# Patient Record
Sex: MALE | Race: WHITE | NOT HISPANIC OR LATINO | Employment: OTHER | URBAN - METROPOLITAN AREA
[De-identification: names, ages, dates, MRNs, and addresses within clinical notes are randomized per-mention and may not be internally consistent; named-entity substitution may affect disease eponyms.]

---

## 2017-01-11 ENCOUNTER — ANTICOAGULATION VISIT (OUTPATIENT)
Dept: PHARMACY | Facility: HOSPITAL | Age: 82
End: 2017-01-11

## 2017-01-11 DIAGNOSIS — I48.20 CHRONIC ATRIAL FIBRILLATION (HCC): ICD-10-CM

## 2017-01-11 LAB — INR PPP: 2.3 (ref 0.9–1.1)

## 2017-01-11 PROCEDURE — 36416 COLLJ CAPILLARY BLOOD SPEC: CPT

## 2017-01-11 PROCEDURE — G0463 HOSPITAL OUTPT CLINIC VISIT: HCPCS

## 2017-01-11 PROCEDURE — 85610 PROTHROMBIN TIME: CPT

## 2017-01-11 NOTE — PROGRESS NOTES
Anticoagulation Clinic Progress Note  Indication: afib  Referring Provider: Black  Goal INR: 2-3  Current Drug Interactions: aspirin, simvastatin, trazodone      Anticoagulation Clinic INR History:  Date 9/14 10/12 11/9 12/7 1/11       Total Weekly Dose 17.5 mg 17.5 mg 17.5 mg 17.5 mg 17.5 mg       INR 3.0 2.4 2.5 2.5 2.3           Clinic Interview:  Tablet Strength: pt has 5mg tablets  Current Dose: pt verified dose of 2.5mg daily  Patient Findings      Negatives Signs/symptoms of thrombosis, Signs/symptoms of bleeding, Laboratory test error suspected, Change in health, Change in alcohol use, Change in activity, Upcoming invasive procedure, Emergency department visit, Upcoming dental procedure, Missed doses, Extra doses, Change in medications, Change in diet/appetite, Hospital admission, Bruising, Other complaints         Plan:  1. INR is therapeutic again today. Instructed pt to continue current dose of 2.5mg daily.  2. RTC in 5 weeks.  3. Pt reports no recent changes to medications.  4. Pt declines refills.   5. Verbal and written information provided. Pt expresses understanding and has no further questions at this time.      Ann Cowden, PharmD  1/11/2017  3:58 PM

## 2017-01-11 NOTE — MR AVS SNAPSHOT
Mike Lucero Jr.   1/11/2017 4:00 PM   Anticoagulation Visit    Dept Phone:  [phone]   Encounter #:  [id number]    Provider:  [facility]   Department:  [facility]ICO[facility]UL[facility]IC                Your Full Care Plan              Your Updated Medication List          This list is accurate as of: 1/11/17  4:02 PM.  Always use your most recent med list.                aspirin 81 MG EC tablet       diltiaZEM  MG 24 hr capsule   Commonly known as:  CARDIZEM CD       DUREZOL 0.05 % ophthalmic emulsion   Generic drug:  difluprednate       furosemide 40 MG tablet   Commonly known as:  LASIX       ipratropium 0.03 % nasal spray   Commonly known as:  ATROVENT       potassium chloride 10 MEQ CR tablet   Commonly known as:  K-DUR,KLOR-CON       simvastatin 10 MG tablet   Commonly known as:  ZOCOR       tamsulosin 0.4 MG capsule 24 hr capsule   Commonly known as:  FLOMAX       traZODone 50 MG tablet   Commonly known as:  DESYREL       warfarin 2.5 MG tablet   Commonly known as:  COUMADIN               We Performed the Following     POC INR       You Were Diagnosed With        Codes Comments    Chronic atrial fibrillation     ICD-10-CM: I48.2  ICD-9-CM: 427.31       Instructions     None    Patient Instructions History      Anticoagulation Summary as of 1/11/2017     INR goal 2.0-3.0   Today's INR 2.30   Next INR check 2/15/2017    Indications   Chronic atrial fibrillation [I48.2]         January 2017 Details    Sun Mon Tue Wed Thu Fri Sat     1               2               3               4               5               6               7                 8               9               10               11      2.5 mg   See details      12      2.5 mg         13      2.5 mg         14      2.5 mg           15      2.5 mg         16      2.5 mg         17      2.5 mg         18      2.5 mg         19      2.5 mg         20      2.5 mg         21      2.5 mg          22      2.5 mg         23      2.5 mg         24      2.5 mg         25      2.5 mg         26      2.5 mg         27      2.5 mg         28      2.5 mg           29      2.5 mg         30      2.5 mg         31      2.5 mg              Date Details   01/11 This INR check                 February 2017 Details    Sun Mon Tue Wed Thu Fri Sat        1      2.5 mg         2      2.5 mg         3      2.5 mg         4      2.5 mg           5      2.5 mg         6      2.5 mg         7      2.5 mg         8      2.5 mg         9      2.5 mg         10      2.5 mg         11      2.5 mg           12      2.5 mg         13      2.5 mg         14      2.5 mg         15      2.5 mg         16               17               18                 19               20               21               22               23               24               25                 26               27               28                    Date Details   No additional details    Date of next INR:  2/15/2017           Upcoming Appointments     Visit Type Date Time Department    [facility]I [facility]AG - FOLLOW UP 1/11/2017  4:00 PM  [facility]    [facility] - FOLLOW UP 2/15/2017  4:00 PM  [facility]    FOLLOW UP 9/18/2017 11:15 AM [facility]      MyChart Signup     Our records indicate that you have declined [facility] signup. If you would like to sign up for [employer], please email [email or web address] or call [phone] to obtain an activation code.             Other Info from Your Visit           Your Appointments     Feb 15, 2017  4:00 PM EST   Anti [facility] - Follow Up with [facility]   [facility] (--)    [address]  [address]  [facility] [address]   [phone]            Sep 18, 2017 11:15 AM EDT   Follow Up with Sarbjit Cleaning MD   [facility] (--)    [address]  [facility] [id number] [phone]  "          Arrive 15 minutes prior to appointment.              Allergies     Temazepam Intolerance Other (See Comments)    Caused pt to \"sleep drive\"      Vital Signs     Smoking Status                   Former Smoker           Problems and Diagnoses Noted     Atrial fibrillation (irregular heartbeat)      Results     POC INR      Component Value Standard Range & Units    INR 2.30 0.9 - 1.1                    "

## 2017-02-15 ENCOUNTER — ANTICOAGULATION VISIT (OUTPATIENT)
Dept: PHARMACY | Facility: HOSPITAL | Age: 82
End: 2017-02-15

## 2017-02-15 DIAGNOSIS — I48.20 CHRONIC ATRIAL FIBRILLATION (HCC): ICD-10-CM

## 2017-02-15 LAB — INR PPP: 2.3 (ref 0.9–1.1)

## 2017-02-15 PROCEDURE — 36416 COLLJ CAPILLARY BLOOD SPEC: CPT

## 2017-02-15 PROCEDURE — 85610 PROTHROMBIN TIME: CPT

## 2017-02-15 PROCEDURE — G0463 HOSPITAL OUTPT CLINIC VISIT: HCPCS

## 2017-02-15 NOTE — PROGRESS NOTES
Anticoagulation Clinic Progress Note  Indication: afib  Referring Provider: Black  Goal INR: 2-3  Current Drug Interactions: aspirin, simvastatin, trazodone      Anticoagulation Clinic INR History:  Date 9/14 10/12 11/9 12/7 1/11 2/15      Total Weekly Dose 17.5 mg 17.5 mg 17.5 mg 17.5 mg 17.5 mg 17.5mg      INR 3.0 2.4 2.5 2.5 2.3 2.3      Notes                 Clinic Interview:  Tablet Strength: pt has 5mg tablets  Current Dose: pt verified dose of 2.5mg daily  Patient Findings      Negatives Signs/symptoms of thrombosis, Signs/symptoms of bleeding, Laboratory test error suspected, Change in health, Change in alcohol use, Change in activity, Upcoming invasive procedure, Emergency department visit, Upcoming dental procedure, Missed doses, Extra doses, Change in medications, Change in diet/appetite, Hospital admission, Bruising, Other complaints     Comments Pt has nothing to report at this time.     Plan:  1. INR is therapeutic again today (2.4). Instructed pt to continue 2.5mg daily.  2. RTC in 5 weeks.  3. Pt reports no recent changes to medications.  4. Pt declines refills.   5. Verbal and written information provided. Pt expresses understanding and has no further questions at this time.      Colleen Nolasco, PharmD  2/15/2017  4:04 PM

## 2017-03-07 RX ORDER — FUROSEMIDE 40 MG/1
TABLET ORAL
Qty: 90 TABLET | Refills: 3 | Status: ON HOLD | OUTPATIENT
Start: 2017-03-07 | End: 2017-10-23

## 2017-03-22 ENCOUNTER — ANTICOAGULATION VISIT (OUTPATIENT)
Dept: PHARMACY | Facility: HOSPITAL | Age: 82
End: 2017-03-22

## 2017-03-22 DIAGNOSIS — I48.20 CHRONIC ATRIAL FIBRILLATION (HCC): ICD-10-CM

## 2017-03-22 PROBLEM — G47.00 INSOMNIA: Status: ACTIVE | Noted: 2017-03-22

## 2017-03-22 PROBLEM — R41.89 ALTERED THOUGHT PROCESSES: Status: ACTIVE | Noted: 2017-03-22

## 2017-03-22 LAB
INR PPP: 2.6 (ref 0.91–1.09)
PROTHROMBIN TIME: 31.1 SECONDS (ref 10–13.8)

## 2017-03-22 PROCEDURE — G0463 HOSPITAL OUTPT CLINIC VISIT: HCPCS

## 2017-03-22 PROCEDURE — 85610 PROTHROMBIN TIME: CPT

## 2017-03-22 PROCEDURE — 36416 COLLJ CAPILLARY BLOOD SPEC: CPT

## 2017-03-22 RX ORDER — FINASTERIDE 5 MG/1
5 TABLET, FILM COATED ORAL DAILY
COMMUNITY
End: 2017-10-17 | Stop reason: ALTCHOICE

## 2017-03-22 NOTE — PROGRESS NOTES
Anticoagulation Clinic Progress Note  Indication: afib  Referring Provider: Black  Goal INR: 2-3  Current Drug Interactions: aspirin, simvastatin, trazodone      Anticoagulation Clinic INR History:  Date 9/14 10/12 11/9 12/7 1/11 2/15 3/22     Total Weekly Dose 17.5 mg 17.5 mg 17.5 mg 17.5 mg 17.5 mg 17.5 mg 17.5 mg     INR 3.0 2.4 2.5 2.5 2.3 2.3 2.6     Notes                 Clinic Interview:  Tablet Strength: pt has 5mg tablets  Current Dose: 2.5mg daily  Patient Findings      Positives Change in medications     Negatives Signs/symptoms of thrombosis, Signs/symptoms of bleeding, Laboratory test error suspected, Change in health, Change in alcohol use, Change in activity, Upcoming invasive procedure, Emergency department visit, Upcoming dental procedure, Missed doses, Extra doses, Change in diet/appetite, Hospital admission, Bruising, Other complaints     Comments Has been waking up frequently in the middle of the night to use the restroom -- started finasteride last week.         Plan:  1. INR is therapeutic again today. Instructed pt to continue warfarin 2.5mg daily.  2. RTC in 5 weeks.  3. Pt has started finasteride -- no noted DDI with warfarin.  4. Pt declines refills. Discussed possibility of calling in 2.5mg tabs when refill needed.  5. Verbal and written information provided. Pt expresses understanding and has no further questions at this time.      Ann Cowden, PharmD  3/22/2017  2:47 PM

## 2017-03-29 ENCOUNTER — TRANSCRIBE ORDERS (OUTPATIENT)
Dept: PULMONOLOGY | Facility: HOSPITAL | Age: 82
End: 2017-03-29

## 2017-03-29 ENCOUNTER — OFFICE VISIT (OUTPATIENT)
Dept: NEUROLOGY | Facility: CLINIC | Age: 82
End: 2017-03-29

## 2017-03-29 ENCOUNTER — APPOINTMENT (OUTPATIENT)
Dept: LAB | Facility: HOSPITAL | Age: 82
End: 2017-03-29

## 2017-03-29 VITALS
SYSTOLIC BLOOD PRESSURE: 128 MMHG | DIASTOLIC BLOOD PRESSURE: 76 MMHG | WEIGHT: 204 LBS | BODY MASS INDEX: 32.02 KG/M2 | HEIGHT: 67 IN

## 2017-03-29 DIAGNOSIS — R06.83 SNORING: ICD-10-CM

## 2017-03-29 DIAGNOSIS — G31.84 MILD COGNITIVE IMPAIRMENT: Primary | ICD-10-CM

## 2017-03-29 DIAGNOSIS — G47.33 OBSTRUCTIVE SLEEP APNEA (ADULT) (PEDIATRIC): Primary | ICD-10-CM

## 2017-03-29 DIAGNOSIS — R40.0 DAYTIME SLEEPINESS: ICD-10-CM

## 2017-03-29 LAB
ALBUMIN SERPL-MCNC: 4.4 G/DL (ref 3.2–4.8)
ALBUMIN/GLOB SERPL: 1.8 G/DL (ref 1.5–2.5)
ALP SERPL-CCNC: 58 U/L (ref 25–100)
ALT SERPL W P-5'-P-CCNC: 21 U/L (ref 7–40)
ANION GAP SERPL CALCULATED.3IONS-SCNC: 6 MMOL/L (ref 3–11)
AST SERPL-CCNC: 25 U/L (ref 0–33)
BASOPHILS # BLD AUTO: 0.03 10*3/MM3 (ref 0–0.2)
BASOPHILS NFR BLD AUTO: 0.3 % (ref 0–1)
BILIRUB SERPL-MCNC: 0.9 MG/DL (ref 0.3–1.2)
BUN BLD-MCNC: 12 MG/DL (ref 9–23)
BUN/CREAT SERPL: 15 (ref 7–25)
CALCIUM SPEC-SCNC: 9.3 MG/DL (ref 8.7–10.4)
CHLORIDE SERPL-SCNC: 100 MMOL/L (ref 99–109)
CO2 SERPL-SCNC: 33 MMOL/L (ref 20–31)
CREAT BLD-MCNC: 0.8 MG/DL (ref 0.6–1.3)
DEPRECATED RDW RBC AUTO: 46.5 FL (ref 37–54)
EOSINOPHIL # BLD AUTO: 0.17 10*3/MM3 (ref 0.1–0.3)
EOSINOPHIL NFR BLD AUTO: 1.9 % (ref 0–3)
ERYTHROCYTE [DISTWIDTH] IN BLOOD BY AUTOMATED COUNT: 13 % (ref 11.3–14.5)
FOLATE SERPL-MCNC: 13.69 NG/ML (ref 3.2–20)
GFR SERPL CREATININE-BSD FRML MDRD: 92 ML/MIN/1.73
GLOBULIN UR ELPH-MCNC: 2.5 GM/DL
GLUCOSE BLD-MCNC: 92 MG/DL (ref 70–100)
HCT VFR BLD AUTO: 47.7 % (ref 38.9–50.9)
HGB BLD-MCNC: 16.2 G/DL (ref 13.1–17.5)
IMM GRANULOCYTES # BLD: 0.11 10*3/MM3 (ref 0–0.03)
IMM GRANULOCYTES NFR BLD: 1.2 % (ref 0–0.6)
LYMPHOCYTES # BLD AUTO: 2.14 10*3/MM3 (ref 0.6–4.8)
LYMPHOCYTES NFR BLD AUTO: 23.6 % (ref 24–44)
MCH RBC QN AUTO: 33.3 PG (ref 27–31)
MCHC RBC AUTO-ENTMCNC: 34 G/DL (ref 32–36)
MCV RBC AUTO: 97.9 FL (ref 80–99)
MONOCYTES # BLD AUTO: 0.77 10*3/MM3 (ref 0–1)
MONOCYTES NFR BLD AUTO: 8.5 % (ref 0–12)
NEUTROPHILS # BLD AUTO: 5.84 10*3/MM3 (ref 1.5–8.3)
NEUTROPHILS NFR BLD AUTO: 64.5 % (ref 41–71)
PLATELET # BLD AUTO: 227 10*3/MM3 (ref 150–450)
PMV BLD AUTO: 9.4 FL (ref 6–12)
POTASSIUM BLD-SCNC: 4 MMOL/L (ref 3.5–5.5)
PROT SERPL-MCNC: 6.9 G/DL (ref 5.7–8.2)
RBC # BLD AUTO: 4.87 10*6/MM3 (ref 4.2–5.76)
SODIUM BLD-SCNC: 139 MMOL/L (ref 132–146)
TSH SERPL DL<=0.05 MIU/L-ACNC: 1.14 MIU/ML (ref 0.35–5.35)
VIT B12 BLD-MCNC: 1917 PG/ML (ref 211–911)
WBC NRBC COR # BLD: 9.06 10*3/MM3 (ref 3.5–10.8)

## 2017-03-29 PROCEDURE — 82746 ASSAY OF FOLIC ACID SERUM: CPT | Performed by: PHYSICIAN ASSISTANT

## 2017-03-29 PROCEDURE — 80053 COMPREHEN METABOLIC PANEL: CPT | Performed by: PHYSICIAN ASSISTANT

## 2017-03-29 PROCEDURE — 36415 COLL VENOUS BLD VENIPUNCTURE: CPT | Performed by: PHYSICIAN ASSISTANT

## 2017-03-29 PROCEDURE — 85025 COMPLETE CBC W/AUTO DIFF WBC: CPT | Performed by: PHYSICIAN ASSISTANT

## 2017-03-29 PROCEDURE — 99215 OFFICE O/P EST HI 40 MIN: CPT | Performed by: PHYSICIAN ASSISTANT

## 2017-03-29 PROCEDURE — 82607 VITAMIN B-12: CPT | Performed by: PHYSICIAN ASSISTANT

## 2017-03-29 PROCEDURE — 84443 ASSAY THYROID STIM HORMONE: CPT | Performed by: PHYSICIAN ASSISTANT

## 2017-03-29 RX ORDER — LISINOPRIL 10 MG/1
10 TABLET ORAL DAILY
COMMUNITY

## 2017-03-29 RX ORDER — TEMAZEPAM 30 MG/1
30 CAPSULE ORAL NIGHTLY PRN
COMMUNITY
End: 2017-10-17

## 2017-03-29 RX ORDER — TERAZOSIN 5 MG/1
10 CAPSULE ORAL NIGHTLY
COMMUNITY
End: 2017-10-17 | Stop reason: ALTCHOICE

## 2017-03-29 RX ORDER — CHLORAL HYDRATE 500 MG
1000 CAPSULE ORAL
COMMUNITY
End: 2017-10-22

## 2017-03-29 NOTE — PROGRESS NOTES
"Subjective     History of Present Illness   Mike Lucero Jr. is a 84 y.o. male who returns to clinic today for evaluation of cognitive impairment. He was previously followed by Dr. Whyte. He has noted symptoms for at least several years marked initially by forgetfulness as well as naming and word-finding difficulties. This has gradually worsened  over time. Additional symptoms have included impairments in orientation and executive function. There have been no associated  symptoms of anxiety and depression. He denies  impairments in ADL's. He manages his medications and finances. He continues to drive.   He is currently residing at home with wife.     Prior evaluation has included an MRI of the brain which was essentially unremarkable.     It should be noted that he has a history of RELL and is currently using a CPAP machine. He is scheduled to have a repeat sleep study performed on 4/6/17.         The following portions of the patient's history were reviewed and updated as appropriate: allergies, current medications, past family history, past medical history, past social history, past surgical history and problem list.    Review of Systems   Constitutional: Positive for fatigue.   HENT: Negative.    Eyes: Negative.    Respiratory: Negative.    Cardiovascular: Negative.    Gastrointestinal: Negative.    Endocrine: Negative.    Genitourinary: Negative.    Musculoskeletal: Negative.    Skin: Negative.    Allergic/Immunologic: Negative.    Hematological: Negative.    Psychiatric/Behavioral: Negative.        Objective     /76  Ht 67\" (170.2 cm)  Wt 204 lb (92.5 kg)  BMI 31.95 kg/m2    General appearance today is normal.       Physical Exam   Constitutional: He is oriented to person, place, and time.   Neurological: He is oriented to person, place, and time. He has normal strength. He has a normal Finger-Nose-Finger Test.   Psychiatric: His speech is normal.        Neurologic Exam     Mental Status "   Oriented to person, place, and time.   Registration: recalls 3 of 3 objects. Recall at 5 minutes: recalls 3 of 3 objects. Follows 3 step commands.   Attention: normal.   Speech: speech is normal   Level of consciousness: alert  Knowledge: good.   Able to name object. Able to read. Able to repeat. Able to write. Normal comprehension.     Cranial Nerves   Cranial nerves II through XII intact.     Motor Exam   Muscle bulk: normal  Overall muscle tone: normal    Strength   Strength 5/5 throughout.     Sensory Exam   Light touch normal.     Gait, Coordination, and Reflexes     Coordination   Finger to nose coordination: normal    Tremor   Resting tremor: absent        Results  MMSE=29 (30 in 8/15)      Assessment/Plan   Mike was seen today for memory loss.    Diagnoses and all orders for this visit:    Mild cognitive impairment  -     Vitamin B12  -     TSH  -     CBC & Differential  -     Comprehensive Metabolic Panel  -     Folate  -     CBC Auto Differential          Discussion/Summary   Mike Lucero Jr. returns to clinic today for evaluation of cognitive impairment. His history and examination, including cognitive bedside testing is concerning for Mild Cognitive Impairment(MCI). However, I am concerned that his history of RELL could be negatively affecting his cognition. All of this was discussed in detail. It was elected to obtain screening blood work  to complete his discussion. We discussed potential treatment options such as adding donepezil. However, it was elected to wait until after his sleep study before beginning any treatment with cognitive enhancers, which is quite reasonable. He will then follow up in 1 month, or sooner if needed.     I also discussed advanced care planning with the patient and their family. He has a current advanced directive.       I spent 35 minutes out of 45 minutes face to face with the patient and family and discussing diagnosis, prognosis, diagnostic testing, evaluation,  current status, treatment options and management.    As part of this visit I reviewed radiology results and obtained additional history from the family which is incorporated in the HPI.    Additionally, I gave the patient educational materials including an overview packet and disease specific educational materials     Snehal Lemus PA-C

## 2017-04-06 ENCOUNTER — HOSPITAL ENCOUNTER (OUTPATIENT)
Dept: SLEEP MEDICINE | Facility: HOSPITAL | Age: 82
Discharge: HOME | End: 2017-04-06
Admitting: INTERNAL MEDICINE

## 2017-04-06 VITALS
HEIGHT: 67 IN | OXYGEN SATURATION: 93 % | BODY MASS INDEX: 32.18 KG/M2 | DIASTOLIC BLOOD PRESSURE: 59 MMHG | SYSTOLIC BLOOD PRESSURE: 136 MMHG | WEIGHT: 205 LBS | HEART RATE: 62 BPM

## 2017-04-06 DIAGNOSIS — G47.33 OBSTRUCTIVE SLEEP APNEA (ADULT) (PEDIATRIC): ICD-10-CM

## 2017-04-06 DIAGNOSIS — R06.83 SNORING: ICD-10-CM

## 2017-04-06 DIAGNOSIS — R40.0 DAYTIME SLEEPINESS: ICD-10-CM

## 2017-04-06 PROCEDURE — 95811 POLYSOM 6/>YRS CPAP 4/> PARM: CPT | Performed by: INTERNAL MEDICINE

## 2017-04-19 RX ORDER — DILTIAZEM HYDROCHLORIDE 240 MG/1
CAPSULE, COATED, EXTENDED RELEASE ORAL
Qty: 90 CAPSULE | Refills: 2 | Status: ON HOLD | OUTPATIENT
Start: 2017-04-19 | End: 2017-10-23

## 2017-04-26 ENCOUNTER — ANTICOAGULATION VISIT (OUTPATIENT)
Dept: PHARMACY | Facility: HOSPITAL | Age: 82
End: 2017-04-26

## 2017-04-26 DIAGNOSIS — I48.20 CHRONIC ATRIAL FIBRILLATION (HCC): ICD-10-CM

## 2017-04-26 LAB
INR PPP: 2.4 (ref 0.91–1.09)
PROTHROMBIN TIME: 28.6 SECONDS (ref 10–13.8)

## 2017-04-26 PROCEDURE — 85610 PROTHROMBIN TIME: CPT

## 2017-04-26 PROCEDURE — 36416 COLLJ CAPILLARY BLOOD SPEC: CPT

## 2017-04-26 PROCEDURE — G0463 HOSPITAL OUTPT CLINIC VISIT: HCPCS

## 2017-04-26 RX ORDER — WARFARIN SODIUM 2.5 MG/1
TABLET ORAL
Qty: 90 TABLET | Refills: 0 | OUTPATIENT
Start: 2017-04-26 | End: 2017-07-12 | Stop reason: SDUPTHER

## 2017-04-26 NOTE — PROGRESS NOTES
Anticoagulation Clinic Progress Note  Indication: afib  Referring Provider: Black  Goal INR: 2-3  Current Drug Interactions: aspirin, simvastatin, trazodone      Anticoagulation Clinic INR History:  Date 9/14 10/12 11/9 12/7 1/11 2/15 3/22 4/26    Total Weekly Dose 17.5 mg 17.5 mg 17.5 mg 17.5 mg 17.5 mg 17.5 mg 17.5 mg 17.5mg 17.5   INR 3.0 2.4 2.5 2.5 2.3 2.3 2.6 2.5    Notes                 Clinic Interview:  Tablet Strength: pt has 5mg tablets currently but am calling 2.5mg tablets today (4/26)  Current Dose: 2.5mg daily  Patient Findings      Negatives Signs/symptoms of thrombosis, Signs/symptoms of bleeding, Laboratory test error suspected, Change in health, Change in alcohol use, Change in activity, Upcoming invasive procedure, Emergency department visit, Upcoming dental procedure, Missed doses, Extra doses, Change in medications, Change in diet/appetite, Hospital admission, Bruising, Other complaints     Comments Patient is consistent with diet and has been therapeutic on this dose for several months. Discussed if patient has medications that are added (OTC, RX, or Herbal) to call us prior to starting medication. Patient has been splitting 5mg tablets- discussed 2.5mg tablets and will call some in for patient today. Patient last picked up warfarin 5mg tab on 2/26 QTY: 30.       Plan:  1. INR is therapeutic again today.  Instructed pt to continue warfarin 2.5mg daily.  2. RTC in 5 weeks.  3. Medications reviewed- no changes at this time.  4. Pt request refills. Patient would prefer to take 2.5mg tablets when 5mg run out. Discussed calling in 2.5mg tabs. When called asked pharmacist and pharmacist verified that she deactivated 5mg tablets that are on profile. Also discussed that patient should wait until close to finishing 5mg tablets before picking up 2.5mg to help avoid confusion.   5. Verbal and written information provided. Pt expresses understanding and has no further questions at this  time.      Colleen Nolasco, PharmD  4/26/2017  2:42 PM

## 2017-05-03 ENCOUNTER — OFFICE VISIT (OUTPATIENT)
Dept: NEUROLOGY | Facility: CLINIC | Age: 82
End: 2017-05-03

## 2017-05-03 VITALS
DIASTOLIC BLOOD PRESSURE: 82 MMHG | WEIGHT: 200 LBS | HEIGHT: 67 IN | SYSTOLIC BLOOD PRESSURE: 122 MMHG | BODY MASS INDEX: 31.39 KG/M2

## 2017-05-03 DIAGNOSIS — R41.3 MEMORY LOSS: Primary | ICD-10-CM

## 2017-05-03 PROCEDURE — 99214 OFFICE O/P EST MOD 30 MIN: CPT | Performed by: PSYCHIATRY & NEUROLOGY

## 2017-05-03 RX ORDER — DONEPEZIL HYDROCHLORIDE 5 MG/1
5 TABLET, FILM COATED ORAL DAILY
Qty: 30 TABLET | Refills: 11 | Status: SHIPPED | OUTPATIENT
Start: 2017-05-03 | End: 2017-08-09

## 2017-05-15 RX ORDER — POTASSIUM CHLORIDE 20 MEQ/1
TABLET, EXTENDED RELEASE ORAL
Qty: 90 TABLET | Refills: 2 | Status: ON HOLD | OUTPATIENT
Start: 2017-05-15 | End: 2017-10-23

## 2017-05-31 ENCOUNTER — ANTICOAGULATION VISIT (OUTPATIENT)
Dept: PHARMACY | Facility: HOSPITAL | Age: 82
End: 2017-05-31

## 2017-05-31 DIAGNOSIS — I48.20 CHRONIC ATRIAL FIBRILLATION (HCC): ICD-10-CM

## 2017-05-31 LAB
INR PPP: 2.6 (ref 0.91–1.09)
PROTHROMBIN TIME: 31.6 SECONDS (ref 10–13.8)

## 2017-05-31 PROCEDURE — G0463 HOSPITAL OUTPT CLINIC VISIT: HCPCS

## 2017-05-31 PROCEDURE — 85610 PROTHROMBIN TIME: CPT

## 2017-05-31 PROCEDURE — 36416 COLLJ CAPILLARY BLOOD SPEC: CPT

## 2017-07-05 ENCOUNTER — ANTICOAGULATION VISIT (OUTPATIENT)
Dept: PHARMACY | Facility: HOSPITAL | Age: 82
End: 2017-07-05

## 2017-07-05 DIAGNOSIS — I48.20 CHRONIC ATRIAL FIBRILLATION (HCC): ICD-10-CM

## 2017-07-05 LAB
INR PPP: 2.2 (ref 0.91–1.09)
PROTHROMBIN TIME: 26.2 SECONDS (ref 10–13.8)

## 2017-07-05 PROCEDURE — 85610 PROTHROMBIN TIME: CPT

## 2017-07-05 PROCEDURE — G0463 HOSPITAL OUTPT CLINIC VISIT: HCPCS

## 2017-07-05 PROCEDURE — 36416 COLLJ CAPILLARY BLOOD SPEC: CPT

## 2017-07-05 NOTE — PROGRESS NOTES
"Anticoagulation Clinic Progress Note  Indication: afib  Referring Provider: Black  Goal INR: 2-3  Current Drug Interactions: aspirin, simvastatin, trazodone    Anticoagulation Clinic INR History:  Date 9/14 10/12 11/9 12/7 1/11 2/15 3/22 4/26 5/31   Total Weekly Dose 17.5 mg 17.5 mg 17.5 mg 17.5 mg 17.5 mg 17.5 mg 17.5 mg 17.5mg 17.5   INR 3.0 2.4 2.5 2.5 2.3 2.3 2.6 2.5 2.6   Notes               Date 7/5           Total Weekly Dose 17.5mg           INR 2.2           Notes                 Clinic Interview:  Tablet Strength: 2.5mg tablets   Current Dose: 2.5mg daily  Patient Findings      Positives Change in medications     Negatives Signs/symptoms of thrombosis, Signs/symptoms of bleeding, Laboratory test error suspected, Change in health, Change in alcohol use, Change in activity, Upcoming invasive procedure, Emergency department visit, Upcoming dental procedure, Missed doses, Extra doses, Change in diet/appetite, Hospital admission, Bruising, Other complaints     Comments Patient typically is given an antibiotic prior to dental procedures due to hx of endocarditis. Instructed pt to give us a call if starts antibiotics for a dental procedure. Patient has recently started Vit B12 and states he has started a new medication that helps him from feeling \"sluggish.\" Patient has been taking this medication for a few months, however, cannot recall the name. We were unable to determine the name at this appointment. He plans to review his medication list with his bottles at home and will call if he is taking one that is not on his AVS.       Plan:  1. INR is therapeutic again today (2.2).  Instructed pt to continue warfarin 2.5mg daily.  2. RTC in 5 weeks.  3. Medications list updated with vitamin B12.  4. Patient is taking 2.5mg tablets now instead of splitting 5mg in half. 4/26  5. Verbal and written information provided. Pt expresses understanding and has no further questions at this time.    Ezio Horne, PharmD " Candidate 2018  Colleen Nolasco, PharmD  7/5/2017  2:58 PM

## 2017-07-12 RX ORDER — WARFARIN SODIUM 2.5 MG/1
TABLET ORAL
Qty: 35 TABLET | Refills: 3 | Status: SHIPPED | OUTPATIENT
Start: 2017-07-12 | End: 2017-11-26 | Stop reason: SDUPTHER

## 2017-07-17 RX ORDER — WARFARIN SODIUM 2.5 MG/1
TABLET ORAL
Qty: 35 TABLET | Refills: 0 | OUTPATIENT
Start: 2017-07-17

## 2017-08-09 ENCOUNTER — OFFICE VISIT (OUTPATIENT)
Dept: NEUROLOGY | Facility: CLINIC | Age: 82
End: 2017-08-09

## 2017-08-09 ENCOUNTER — ANTICOAGULATION VISIT (OUTPATIENT)
Dept: PHARMACY | Facility: HOSPITAL | Age: 82
End: 2017-08-09

## 2017-08-09 VITALS — HEIGHT: 67 IN | BODY MASS INDEX: 31.39 KG/M2 | WEIGHT: 200 LBS

## 2017-08-09 DIAGNOSIS — R41.3 MEMORY LOSS: Primary | ICD-10-CM

## 2017-08-09 DIAGNOSIS — I48.20 CHRONIC ATRIAL FIBRILLATION (HCC): ICD-10-CM

## 2017-08-09 LAB
INR PPP: 1.9 (ref 0.91–1.09)
PROTHROMBIN TIME: 22.8 SECONDS (ref 10–13.8)

## 2017-08-09 PROCEDURE — 99214 OFFICE O/P EST MOD 30 MIN: CPT | Performed by: PHYSICIAN ASSISTANT

## 2017-08-09 PROCEDURE — G0463 HOSPITAL OUTPT CLINIC VISIT: HCPCS

## 2017-08-09 PROCEDURE — 85610 PROTHROMBIN TIME: CPT

## 2017-08-09 PROCEDURE — 36416 COLLJ CAPILLARY BLOOD SPEC: CPT

## 2017-08-09 RX ORDER — DONEPEZIL HYDROCHLORIDE 10 MG/1
10 TABLET, FILM COATED ORAL DAILY
Qty: 30 TABLET | Refills: 11 | Status: SHIPPED | OUTPATIENT
Start: 2017-08-09 | End: 2018-08-19 | Stop reason: SDUPTHER

## 2017-08-09 NOTE — PROGRESS NOTES
"Subjective     History of Present Illness   Mike Lucero Jr. is a 85 y.o. male who returns to clinic today for evaluation of cognitive impairment. He was previously followed by Dr. Whyte. He has noted symptoms for at least several years marked initially by forgetfulness as well as naming and word-finding difficulties. This has gradually worsened  over time. Additional symptoms have included impairments in orientation and executive function. There have been no associated  symptoms of anxiety and depression. He denies  impairments in ADL's. He manages his medications and finances. He continues to drive.   He is currently residing at home with wife.      Prior evaluation has included screening blood work  and an MRI of the brain which were essentially unremarkable. He is currently taking donepezil 5mg daily.      It should be noted that he has a history of RELL and has recently had his CPAP pressure adjusted after a titration study in 4/17. However, this has not led to any clear improvement in memory, though his sleep quality has improved.     Since his last visit in 5/17, he and his family feel that his memory has somewhat improved.        The following portions of the patient's history were reviewed and updated as appropriate: allergies, current medications, past family history, past medical history, past social history, past surgical history and problem list.    Review of Systems   Constitutional: Negative.    HENT: Negative.    Eyes: Negative.    Respiratory: Negative.    Cardiovascular: Negative.    Gastrointestinal: Negative.    Endocrine: Negative.    Genitourinary: Negative.    Musculoskeletal: Negative.    Skin: Negative.    Allergic/Immunologic: Negative.    Neurological:        As noted in HPI   Hematological: Negative.    Psychiatric/Behavioral:        As noted in HPI       Objective     Ht 67\" (170.2 cm)  Wt 200 lb (90.7 kg)  BMI 31.32 kg/m2    General appearance today is normal.       Physical Exam "   Constitutional: He is oriented to person, place, and time.   Neurological: He is oriented to person, place, and time. He has normal strength. He has a normal Finger-Nose-Finger Test.   Psychiatric: His speech is normal.        Neurologic Exam     Mental Status   Oriented to person, place, and time.   Registration: recalls 3 of 3 objects. Recall at 5 minutes: recalls 3 of 3 objects. Follows 2 step commands.   Attention: 4/5 sequencing.   Speech: speech is normal   Level of consciousness: alert  Able to name object. Able to read. Able to repeat. Able to write. Normal comprehension.     Cranial Nerves   Cranial nerves II through XII intact.     Motor Exam   Muscle bulk: normal  Overall muscle tone: normal    Strength   Strength 5/5 throughout.     Sensory Exam   Light touch normal.     Gait, Coordination, and Reflexes     Coordination   Finger to nose coordination: normal    Tremor   Resting tremor: absent        Results  MMSE=28 (29 in 5/17)       Assessment/Plan   Mike was seen today for memory loss.    Diagnoses and all orders for this visit:    Memory loss    Other orders  -     donepezil (ARICEPT) 10 MG tablet; Take 1 tablet by mouth Daily.          Discussion/Summary   Mike Lucero Jr. returns to clinic today for evaluation of cognitive impairment. It is possible that his symptoms are related to underlying Mild Cognitive Impairment, though normal aging also remains a possibility. I again reviewed his current status and treatment options. After discussing potential treatment options, it was elected to increase  donepezil to 10mg daily. He will then follow up in 3 months, or sooner if needed.       I spent 20 minutes out of 30  minutes face to face with the patient and family and discussing diagnosis, prognosis, diagnostic testing, evaluation, current status, treatment options and management.    As part of this visit I obtained additional history from the family which is incorporated in the HPI.      Snehal  ELIZABETH Lemus

## 2017-08-30 ENCOUNTER — ANTICOAGULATION VISIT (OUTPATIENT)
Dept: PHARMACY | Facility: HOSPITAL | Age: 82
End: 2017-08-30

## 2017-08-30 DIAGNOSIS — I48.20 CHRONIC ATRIAL FIBRILLATION (HCC): ICD-10-CM

## 2017-08-30 LAB
INR PPP: 2.1 (ref 0.91–1.09)
PROTHROMBIN TIME: 25.4 SECONDS (ref 10–13.8)

## 2017-08-30 PROCEDURE — 85610 PROTHROMBIN TIME: CPT

## 2017-08-30 PROCEDURE — G0463 HOSPITAL OUTPT CLINIC VISIT: HCPCS

## 2017-08-30 PROCEDURE — 36416 COLLJ CAPILLARY BLOOD SPEC: CPT

## 2017-09-18 ENCOUNTER — ANTICOAGULATION VISIT (OUTPATIENT)
Dept: PHARMACY | Facility: HOSPITAL | Age: 82
End: 2017-09-18

## 2017-09-18 ENCOUNTER — OFFICE VISIT (OUTPATIENT)
Dept: CARDIOLOGY | Facility: CLINIC | Age: 82
End: 2017-09-18

## 2017-09-18 VITALS
HEIGHT: 66 IN | BODY MASS INDEX: 32.47 KG/M2 | DIASTOLIC BLOOD PRESSURE: 78 MMHG | HEART RATE: 71 BPM | SYSTOLIC BLOOD PRESSURE: 116 MMHG | WEIGHT: 202 LBS

## 2017-09-18 DIAGNOSIS — I25.10 CORONARY ARTERY DISEASE INVOLVING NATIVE CORONARY ARTERY OF NATIVE HEART WITHOUT ANGINA PECTORIS: ICD-10-CM

## 2017-09-18 DIAGNOSIS — E78.2 MIXED HYPERLIPIDEMIA: ICD-10-CM

## 2017-09-18 DIAGNOSIS — I48.20 CHRONIC ATRIAL FIBRILLATION (HCC): ICD-10-CM

## 2017-09-18 DIAGNOSIS — I10 ESSENTIAL HYPERTENSION: ICD-10-CM

## 2017-09-18 DIAGNOSIS — I48.20 CHRONIC ATRIAL FIBRILLATION (HCC): Primary | ICD-10-CM

## 2017-09-18 LAB
INR PPP: 2 (ref 0.91–1.09)
PROTHROMBIN TIME: 23.5 SECONDS (ref 10–13.8)

## 2017-09-18 PROCEDURE — 93000 ELECTROCARDIOGRAM COMPLETE: CPT | Performed by: INTERNAL MEDICINE

## 2017-09-18 PROCEDURE — 36416 COLLJ CAPILLARY BLOOD SPEC: CPT

## 2017-09-18 PROCEDURE — 85610 PROTHROMBIN TIME: CPT

## 2017-09-18 PROCEDURE — 99214 OFFICE O/P EST MOD 30 MIN: CPT | Performed by: INTERNAL MEDICINE

## 2017-09-18 PROCEDURE — G0463 HOSPITAL OUTPT CLINIC VISIT: HCPCS

## 2017-09-18 NOTE — PROGRESS NOTES
Anticoagulation Clinic Progress Note  Indication: afib  Referring Provider: Em  Goal INR: 2-3  Current Drug Interactions: aspirin, simvastatin, trazodone    Diet: Typically doesn't eat many Vit K foods    Anticoagulation Clinic INR History:  Date 9/14 10/12 11/9 12/7 1/11 2/15 3/22 4/26 5/31   Total Weekly Dose 17.5 mg 17.5 mg 17.5 mg 17.5 mg 17.5 mg 17.5 mg 17.5 mg 17.5 mg 17.5mg   INR 3.0 2.4 2.5 2.5 2.3 2.3 2.6 2.5 2.6   Notes               Date 7/5 8/9 8/30 9/18        Total Weekly Dose 17.5mg 17.5mg 17.5mg 17.5 mg        INR 2.2 1.9 2.1 2.0        Notes               Clinic Interview:  Tablet Strength: 2.5mg tablets   Current Dose: 2.5mg daily  Patient Findings      Positives Upcoming dental procedure     Negatives Signs/symptoms of thrombosis, Signs/symptoms of bleeding, Laboratory test error suspected, Change in health, Change in alcohol use, Change in activity, Upcoming invasive procedure, Emergency department visit, Missed doses, Extra doses, Change in medications, Change in diet/appetite, Hospital admission, Bruising, Other complaints     Comments Pt had a dental procedure this AM (drill to remove decay) and is going back this afternoon to receive an implant     Plan:  1. INR is therapeutic again today, stable at the low end of goal. Instructed patient to continue maintenance warfarin dose, 2.5mg daily.  2. RTC in 4 weeks. If INR remains low-normal, may consider slight dose increase at that time.  3. No recent medication changes -- encouraged Mr. Lucero to call the clinic if he requires any abx after today's dental procedure.  4. Patient declines warfarin refills.  5. Verbal and written information provided. Pt expresses understanding and has no further questions at this time.    Stefanie Hogue, PharmD  9/18/2017  2:31 PM

## 2017-09-18 NOTE — PROGRESS NOTES
[facility] at [facility]  Office visit  Mike Lucero Jr.  7/29/1932  [phone]  VISIT DATE:  09/18/2017    PCP: Ike Douglas MD  [address] [place] [id number]    CC:  Chief Complaint   Patient presents with   • Coronary Artery Disease     follow up       PROBLEM LIST:  1.  Coronary artery disease:  a. Preserved LV systolic function.   b.  CABG for 3-vessel disease, July 2006.  2. Paroxysmal atrial fibrillation,  anticoagulated on Coumadin.   3. Benign hypertension.   4. Hypercholesterolemia.   5. Endocarditis, September 2009, treated with IV antibiotics:  a.  MILTON by Dr. Valladares, 09/11/2009:  Small nodular nonpedunculated density, possibly consistent with small vegetation of the posterior leaflet of the mitral valve.  b. Previously treated with Dr. Vann.  6. Remote tobacco abuse.   7. Osteoarthritis.   8. Cataracts.   9. Seasonal allergies/rhinitis.   10. Overmedication reaction secondary to temazepam with neurologic  effects now resolved      ASSESSMENT:   Diagnosis Plan   1. Chronic atrial fibrillation     2. Essential hypertension     3. Coronary artery disease involving native coronary artery of native heart without angina pectoris     4. Mixed hyperlipidemia         PLAN:  Continue low-dose aspirin  Continue oral anticoagulation with a goal INR of 2-3 for stroke prophylaxis  Continue rate control with combination of beta blockade and diltiazem  Continue current antihypertensive regimen  Continue current statin therapy, goal LDL less than 100.    Subjective  Reports stable functional capacity.  Has not been exercising on a regular basis but denies dyspnea on exertion or chest pain.  No PND or orthopnea.  Blood pressures up and running less than 140/90 mmHg.  He denies myalgias on statin therapy.  Only complaint is related to recent toothache which she is been seen by dentistry earlier this morning.  He is compliant with medical therapy.    PHYSICAL  "EXAMINATION:  Vitals:    09/18/17 1251   BP: 116/78   BP Location: Left arm   Patient Position: Sitting   Pulse: 71   Weight: 202 lb (91.6 kg)   Height: 66\" (167.6 cm)     General Appearance:    Alert, cooperative, no distress, appears stated age   Head:    Normocephalic, without obvious abnormality, atraumatic   Eyes:    conjunctiva/corneas clear   Nose:   Nares normal, septum midline, mucosa normal, no drainage   Throat:   Lips, teeth and gums normal   Neck:   Supple, symmetrical, trachea midline, no carotid    bruit or JVD   Lungs:     Clear to auscultation bilaterally, respirations unlabored   Chest Wall:    No tenderness or deformity    Heart:   Irregularly irregular, S1 and S2 normal, no murmur, rub   or gallop, normal carotid impulse bilaterally without bruit.   Abdomen:     Soft, non-tender   Extremities:   Extremities normal, atraumatic, no cyanosis or edema   Pulses:   2+ and symmetric all extremities   Skin:   Skin color, texture, turgor normal, no rashes or lesions       Diagnostic Data:    ECG 12 Lead  Date/Time: 9/18/2017 1:05 PM  Performed by: CHUCK CARTAGENA III  Authorized by: CHUCK CARTAGENA III   Rhythm: atrial fibrillation  Conduction: right bundle branch block  T depression: V1-V6, III and aVF  Clinical impression: abnormal ECG          No results found for: CHLPL, TRIG, HDL, LDLDIRECT  Lab Results   Component Value Date    GLUCOSE 92 03/29/2017    BUN 12 03/29/2017    CREATININE 0.80 03/29/2017     03/29/2017    K 4.0 03/29/2017     03/29/2017    CO2 33.0 (H) 03/29/2017     No results found for: HGBA1C  Lab Results   Component Value Date    WBC 9.06 03/29/2017    HGB 16.2 03/29/2017    HCT 47.7 03/29/2017     03/29/2017       Allergies  Allergies   Allergen Reactions   • Temazepam Other (See Comments)     Caused pt to \"sleep drive\"       Current Medications    Current Outpatient Prescriptions:   •  aspirin 81 MG EC tablet, Take 81 mg by mouth daily., Disp: , Rfl:   •  diltiaZEM " CD (CARDIZEM CD) 240 MG 24 hr capsule, TAKE ONE CAPSULE BY MOUTH EVERY DAY, Disp: 90 capsule, Rfl: 2  •  donepezil (ARICEPT) 10 MG tablet, Take 1 tablet by mouth Daily., Disp: 30 tablet, Rfl: 11  •  DUREZOL 0.05 % ophthalmic emulsion, Administer 1 drop to the right eye daily., Disp: , Rfl:   •  finasteride (PROSCAR) 5 MG tablet, Take 5 mg by mouth Daily., Disp: , Rfl:   •  furosemide (LASIX) 40 MG tablet, TAKE ONE TABLET BY MOUTH EVERY DAY, Disp: 90 tablet, Rfl: 3  •  ipratropium (ATROVENT) 0.03 % nasal spray, 2 sprays into each nostril Every 12 (Twelve) Hours., Disp: , Rfl:   •  lisinopril (PRINIVIL,ZESTRIL) 20 MG tablet, Take 20 mg by mouth Daily., Disp: , Rfl:   •  metoprolol tartrate (LOPRESSOR) 25 MG tablet, Take 25 mg by mouth 2 (Two) Times a Day., Disp: , Rfl:   •  simvastatin (ZOCOR) 10 MG tablet, Take 10 mg by mouth every night., Disp: , Rfl:   •  Omega-3 Fatty Acids (FISH OIL) 1000 MG capsule capsule, Take 1,000 mg by mouth Daily With Breakfast., Disp: , Rfl:   •  PHARMACY TO DOSE WARFARIN, Continuous As Needed (patient is managed by the [facility] ([phone]))., Disp: , Rfl:   •  potassium chloride (K-DUR,KLOR-CON) 10 MEQ CR tablet, Take 10 mEq by mouth daily., Disp: , Rfl:   •  potassium chloride (K-DUR,KLOR-CON) 20 MEQ CR tablet, TAKE ONE TABLET BY MOUTH EVERY DAY, Disp: 90 tablet, Rfl: 2  •  tamsulosin (FLOMAX) 0.4 MG capsule 24 hr capsule, Take 1 capsule by mouth daily., Disp: , Rfl:   •  temazepam (RESTORIL) 30 MG capsule, Take 30 mg by mouth At Night As Needed for Sleep., Disp: , Rfl:   •  terazosin (HYTRIN) 5 MG capsule, Take 10 mg by mouth Every Night., Disp: , Rfl:   •  traZODone (DESYREL) 50 MG tablet, Take 50 mg by mouth every night., Disp: , Rfl:   •  warfarin (COUMADIN) 2.5 MG tablet, TAKE ONE TABLET BY MOUTH DAILY OR AS DIRECTED BY ANTICOAGULATION PHARMACIST, Disp: 35 tablet, Rfl: 3          ROS  Review of Systems   Constitution: Negative for  diaphoresis and malaise/fatigue.   HENT: Positive for congestion.    Cardiovascular: Negative for chest pain, claudication, cyanosis, dyspnea on exertion, irregular heartbeat, leg swelling and near-syncope.   Respiratory: Negative for cough and hemoptysis.        SOCIAL HX  Social History     Social History   • Marital status:      Spouse name: N/A   • Number of children: N/A   • Years of education: N/A     Occupational History   • Not on file.     Social History Main Topics   • Smoking status: Former Smoker     Quit date: 06/1970   • Smokeless tobacco: Not on file   • Alcohol use No   • Drug use: No   • Sexual activity: Defer     Other Topics Concern   • Not on file     Social History Narrative       FAMILY HX  Family History   Problem Relation Age of Onset   • Alzheimer's disease Mother    • Dementia Mother    • Heart disease Father    • Stroke Father    • Heart disease Sister    • Heart disease Brother    • Stroke Brother              Ike Strickland III, MD, FACC

## 2017-09-26 ENCOUNTER — TRANSCRIBE ORDERS (OUTPATIENT)
Dept: ADMINISTRATIVE | Facility: HOSPITAL | Age: 82
End: 2017-09-26

## 2017-09-26 DIAGNOSIS — J32.9 CHRONIC SINUSITIS, UNSPECIFIED LOCATION: Primary | ICD-10-CM

## 2017-10-05 ENCOUNTER — HOSPITAL ENCOUNTER (OUTPATIENT)
Dept: CT IMAGING | Facility: HOSPITAL | Age: 82
Discharge: HOME | End: 2017-10-05
Attending: INTERNAL MEDICINE | Admitting: INTERNAL MEDICINE

## 2017-10-05 DIAGNOSIS — J32.9 CHRONIC SINUSITIS, UNSPECIFIED LOCATION: ICD-10-CM

## 2017-10-05 PROCEDURE — 70486 CT MAXILLOFACIAL W/O DYE: CPT

## 2017-10-16 ENCOUNTER — ANTICOAGULATION VISIT (OUTPATIENT)
Dept: PHARMACY | Facility: HOSPITAL | Age: 82
End: 2017-10-16

## 2017-10-16 DIAGNOSIS — I48.20 CHRONIC ATRIAL FIBRILLATION (HCC): ICD-10-CM

## 2017-10-16 LAB
INR PPP: 2.2 (ref 0.91–1.09)
PROTHROMBIN TIME: 26.9 SECONDS (ref 10–13.8)

## 2017-10-16 PROCEDURE — G0463 HOSPITAL OUTPT CLINIC VISIT: HCPCS

## 2017-10-16 PROCEDURE — 36416 COLLJ CAPILLARY BLOOD SPEC: CPT

## 2017-10-16 PROCEDURE — 85610 PROTHROMBIN TIME: CPT

## 2017-10-16 RX ORDER — AZELASTINE 1 MG/ML
2 SPRAY, METERED NASAL 2 TIMES DAILY
COMMUNITY
End: 2017-10-17

## 2017-10-16 NOTE — PROGRESS NOTES
Anticoagulation Clinic Progress Note  Indication: afib  Referring Provider: Em  Goal INR: 2-3  Current Drug Interactions: aspirin, simvastatin, trazodone    Diet: Typically doesn't eat many Vit K foods    Anticoagulation Clinic INR History:  Date 9/14 10/12 11/9 12/7 1/11 2/15 3/22 4/26 5/31   Total Weekly Dose 17.5 mg 17.5 mg 17.5 mg 17.5 mg 17.5 mg 17.5 mg 17.5 mg 17.5 mg 17.5mg   INR 3.0 2.4 2.5 2.5 2.3 2.3 2.6 2.5 2.6   Notes               Date 7/5 8/9 8/30 9/18 10/16       Total Weekly Dose 17.5  mg 17.5 mg 17.5 mg 17.5 mg 17.5 mg       INR 2.2 1.9 2.1 2.0 2.2       Notes               Clinic Interview:  Tablet Strength: 2.5mg tablets   Current Dose: 2.5mg daily  Patient Findings      Positives Change in medications     Negatives Signs/symptoms of thrombosis, Signs/symptoms of bleeding, Laboratory test error suspected, Change in health, Change in alcohol use, Change in activity, Upcoming invasive procedure, Emergency department visit, Upcoming dental procedure, Missed doses, Extra doses, Change in diet/appetite, Hospital admission, Bruising, Other complaints     Comments Dr. Douglas changed some of Mr. Lucero's medications (tamsulosin, furosemide, ?terazosin, ?finasteride) -- will call for records. Mr. Lucero otherwise complains of some recent congestion.     Plan:  1. INR is therapeutic. Instructed Mr. Lucero to continue maintenance warfarin dose, 2.5mg daily.  2. RTC in ~4 weeks after appt with Dr. Rodriguez 11/15.  3. See above re: recent med changes -- will call Dr. Douglas's office for updated med list.  4. Patient declines warfarin refills.  5. Verbal and written information provided. Pt expresses understanding and has no further questions at this time.    Stefanie Hogue, PharmD  10/16/2017  12:02 PM

## 2017-10-17 RX ORDER — ECHINACEA PURPUREA EXTRACT 125 MG
1 TABLET ORAL DAILY PRN
COMMUNITY
End: 2018-01-08 | Stop reason: HOSPADM

## 2017-10-22 ENCOUNTER — APPOINTMENT (OUTPATIENT)
Dept: CT IMAGING | Facility: HOSPITAL | Age: 82
End: 2017-10-22

## 2017-10-22 ENCOUNTER — APPOINTMENT (OUTPATIENT)
Dept: GENERAL RADIOLOGY | Facility: HOSPITAL | Age: 82
End: 2017-10-22

## 2017-10-22 ENCOUNTER — HOSPITAL ENCOUNTER (INPATIENT)
Facility: HOSPITAL | Age: 82
LOS: 2 days | Discharge: HOME | End: 2017-10-24
Attending: EMERGENCY MEDICINE | Admitting: INTERNAL MEDICINE

## 2017-10-22 ENCOUNTER — APPOINTMENT (OUTPATIENT)
Dept: CARDIOLOGY | Facility: HOSPITAL | Age: 82
End: 2017-10-22

## 2017-10-22 DIAGNOSIS — A41.9 SEPSIS, DUE TO UNSPECIFIED ORGANISM: Primary | ICD-10-CM

## 2017-10-22 DIAGNOSIS — Z74.09 IMPAIRED FUNCTIONAL MOBILITY, BALANCE, GAIT, AND ENDURANCE: ICD-10-CM

## 2017-10-22 DIAGNOSIS — L03.116 CELLULITIS OF LEFT LOWER EXTREMITY: ICD-10-CM

## 2017-10-22 PROBLEM — F09 COGNITIVE DYSFUNCTION: Status: ACTIVE | Noted: 2017-10-22

## 2017-10-22 PROBLEM — R79.1 SUBTHERAPEUTIC INTERNATIONAL NORMALIZED RATIO (INR): Status: ACTIVE | Noted: 2017-10-22

## 2017-10-22 PROBLEM — J06.9 VIRAL UPPER RESPIRATORY TRACT INFECTION: Status: ACTIVE | Noted: 2017-10-22

## 2017-10-22 PROBLEM — R10.33 UMBILICAL PAIN: Status: ACTIVE | Noted: 2017-10-22

## 2017-10-22 LAB
ALBUMIN SERPL-MCNC: 4.1 G/DL (ref 3.2–4.8)
ALBUMIN/GLOB SERPL: 1.4 G/DL (ref 1.5–2.5)
ALP SERPL-CCNC: 52 U/L (ref 25–100)
ALT SERPL W P-5'-P-CCNC: 18 U/L (ref 7–40)
ANION GAP SERPL CALCULATED.3IONS-SCNC: 8 MMOL/L (ref 3–11)
AST SERPL-CCNC: 20 U/L (ref 0–33)
BACTERIA UR QL AUTO: ABNORMAL /HPF
BASOPHILS # BLD AUTO: 0.01 10*3/MM3 (ref 0–0.2)
BASOPHILS NFR BLD AUTO: 0.1 % (ref 0–1)
BILIRUB SERPL-MCNC: 2.5 MG/DL (ref 0.3–1.2)
BILIRUB UR QL STRIP: NEGATIVE
BUN BLD-MCNC: 15 MG/DL (ref 9–23)
BUN/CREAT SERPL: 15 (ref 7–25)
CALCIUM SPEC-SCNC: 9.2 MG/DL (ref 8.7–10.4)
CHLORIDE SERPL-SCNC: 99 MMOL/L (ref 99–109)
CLARITY UR: CLEAR
CO2 SERPL-SCNC: 29 MMOL/L (ref 20–31)
COLOR UR: ABNORMAL
CREAT BLD-MCNC: 1 MG/DL (ref 0.6–1.3)
D-LACTATE SERPL-SCNC: 1.2 MMOL/L (ref 0.5–2)
D-LACTATE SERPL-SCNC: 2.4 MMOL/L (ref 0.5–2)
DEPRECATED RDW RBC AUTO: 48.9 FL (ref 37–54)
EOSINOPHIL # BLD AUTO: 0 10*3/MM3 (ref 0–0.3)
EOSINOPHIL NFR BLD AUTO: 0 % (ref 0–3)
ERYTHROCYTE [DISTWIDTH] IN BLOOD BY AUTOMATED COUNT: 13.7 % (ref 11.3–14.5)
FLUAV AG NPH QL: NEGATIVE
FLUBV AG NPH QL IA: NEGATIVE
GFR SERPL CREATININE-BSD FRML MDRD: 71 ML/MIN/1.73
GLOBULIN UR ELPH-MCNC: 2.9 GM/DL
GLUCOSE BLD-MCNC: 160 MG/DL (ref 70–100)
GLUCOSE UR STRIP-MCNC: NEGATIVE MG/DL
HCT VFR BLD AUTO: 47.9 % (ref 38.9–50.9)
HGB BLD-MCNC: 16.2 G/DL (ref 13.1–17.5)
HGB UR QL STRIP.AUTO: ABNORMAL
HOLD SPECIMEN: NORMAL
HYALINE CASTS UR QL AUTO: ABNORMAL /LPF
IMM GRANULOCYTES # BLD: 0.06 10*3/MM3 (ref 0–0.03)
IMM GRANULOCYTES NFR BLD: 0.4 % (ref 0–0.6)
INR PPP: 1.81
INR PPP: 1.84
KETONES UR QL STRIP: NEGATIVE
LEUKOCYTE ESTERASE UR QL STRIP.AUTO: NEGATIVE
LYMPHOCYTES # BLD AUTO: 0.88 10*3/MM3 (ref 0.6–4.8)
LYMPHOCYTES NFR BLD AUTO: 5.2 % (ref 24–44)
MCH RBC QN AUTO: 32.9 PG (ref 27–31)
MCHC RBC AUTO-ENTMCNC: 33.8 G/DL (ref 32–36)
MCV RBC AUTO: 97.4 FL (ref 80–99)
MONOCYTES # BLD AUTO: 0.81 10*3/MM3 (ref 0–1)
MONOCYTES NFR BLD AUTO: 4.7 % (ref 0–12)
NEUTROPHILS # BLD AUTO: 15.32 10*3/MM3 (ref 1.5–8.3)
NEUTROPHILS NFR BLD AUTO: 89.6 % (ref 41–71)
NITRITE UR QL STRIP: NEGATIVE
PH UR STRIP.AUTO: 7 [PH] (ref 5–8)
PLATELET # BLD AUTO: 144 10*3/MM3 (ref 150–450)
PMV BLD AUTO: 9 FL (ref 6–12)
POTASSIUM BLD-SCNC: 3.7 MMOL/L (ref 3.5–5.5)
PROT SERPL-MCNC: 7 G/DL (ref 5.7–8.2)
PROT UR QL STRIP: ABNORMAL
PROTHROMBIN TIME: 20.1 SECONDS (ref 9.6–11.5)
PROTHROMBIN TIME: 20.4 SECONDS (ref 9.6–11.5)
RBC # BLD AUTO: 4.92 10*6/MM3 (ref 4.2–5.76)
RBC # UR: ABNORMAL /HPF
REF LAB TEST METHOD: ABNORMAL
SODIUM BLD-SCNC: 136 MMOL/L (ref 132–146)
SP GR UR STRIP: 1.02 (ref 1–1.03)
SQUAMOUS #/AREA URNS HPF: ABNORMAL /HPF
UROBILINOGEN UR QL STRIP: ABNORMAL
WBC NRBC COR # BLD: 17.08 10*3/MM3 (ref 3.5–10.8)
WBC UR QL AUTO: ABNORMAL /HPF
WHOLE BLOOD HOLD SPECIMEN: NORMAL
WHOLE BLOOD HOLD SPECIMEN: NORMAL

## 2017-10-22 PROCEDURE — 87804 INFLUENZA ASSAY W/OPTIC: CPT | Performed by: EMERGENCY MEDICINE

## 2017-10-22 PROCEDURE — 80053 COMPREHEN METABOLIC PANEL: CPT | Performed by: EMERGENCY MEDICINE

## 2017-10-22 PROCEDURE — 85610 PROTHROMBIN TIME: CPT | Performed by: EMERGENCY MEDICINE

## 2017-10-22 PROCEDURE — 25010000002 PIPERACILLIN SOD-TAZOBACTAM PER 1 G: Performed by: EMERGENCY MEDICINE

## 2017-10-22 PROCEDURE — 25810000003 SODIUM CHLORIDE 0.9 % WITH KCL 20 MEQ 20-0.9 MEQ/L-% SOLUTION: Performed by: NURSE PRACTITIONER

## 2017-10-22 PROCEDURE — 83605 ASSAY OF LACTIC ACID: CPT | Performed by: EMERGENCY MEDICINE

## 2017-10-22 PROCEDURE — 70450 CT HEAD/BRAIN W/O DYE: CPT

## 2017-10-22 PROCEDURE — 85025 COMPLETE CBC W/AUTO DIFF WBC: CPT | Performed by: EMERGENCY MEDICINE

## 2017-10-22 PROCEDURE — 25010000002 VANCOMYCIN HCL IN NACL 1.75-0.9 GM/500ML-% SOLUTION: Performed by: EMERGENCY MEDICINE

## 2017-10-22 PROCEDURE — 74176 CT ABD & PELVIS W/O CONTRAST: CPT

## 2017-10-22 PROCEDURE — 99223 1ST HOSP IP/OBS HIGH 75: CPT | Performed by: NURSE PRACTITIONER

## 2017-10-22 PROCEDURE — 25010000002 PIPERACILLIN SOD-TAZOBACTAM PER 1 G: Performed by: NURSE PRACTITIONER

## 2017-10-22 PROCEDURE — 99285 EMERGENCY DEPT VISIT HI MDM: CPT

## 2017-10-22 PROCEDURE — 71010 HC CHEST PA OR AP: CPT

## 2017-10-22 PROCEDURE — 87040 BLOOD CULTURE FOR BACTERIA: CPT | Performed by: EMERGENCY MEDICINE

## 2017-10-22 PROCEDURE — 81001 URINALYSIS AUTO W/SCOPE: CPT | Performed by: EMERGENCY MEDICINE

## 2017-10-22 PROCEDURE — 85610 PROTHROMBIN TIME: CPT | Performed by: NURSE PRACTITIONER

## 2017-10-22 RX ORDER — SODIUM CHLORIDE 0.9 % (FLUSH) 0.9 %
1-10 SYRINGE (ML) INJECTION AS NEEDED
Status: DISCONTINUED | OUTPATIENT
Start: 2017-10-22 | End: 2017-10-24 | Stop reason: HOSPADM

## 2017-10-22 RX ORDER — LISINOPRIL 20 MG/1
20 TABLET ORAL DAILY
Status: DISCONTINUED | OUTPATIENT
Start: 2017-10-22 | End: 2017-10-24 | Stop reason: HOSPADM

## 2017-10-22 RX ORDER — TRAZODONE HYDROCHLORIDE 50 MG/1
50 TABLET ORAL NIGHTLY
Status: DISCONTINUED | OUTPATIENT
Start: 2017-10-22 | End: 2017-10-24 | Stop reason: HOSPADM

## 2017-10-22 RX ORDER — DILTIAZEM HYDROCHLORIDE 240 MG/1
240 CAPSULE, COATED, EXTENDED RELEASE ORAL
Status: DISCONTINUED | OUTPATIENT
Start: 2017-10-22 | End: 2017-10-24 | Stop reason: HOSPADM

## 2017-10-22 RX ORDER — ECHINACEA PURPUREA EXTRACT 125 MG
1 TABLET ORAL DAILY PRN
Status: DISCONTINUED | OUTPATIENT
Start: 2017-10-22 | End: 2017-10-24 | Stop reason: HOSPADM

## 2017-10-22 RX ORDER — TAMSULOSIN HYDROCHLORIDE 0.4 MG/1
0.4 CAPSULE ORAL NIGHTLY
Status: DISCONTINUED | OUTPATIENT
Start: 2017-10-22 | End: 2017-10-23

## 2017-10-22 RX ORDER — ACETAMINOPHEN 325 MG/1
650 TABLET ORAL ONCE
Status: COMPLETED | OUTPATIENT
Start: 2017-10-22 | End: 2017-10-22

## 2017-10-22 RX ORDER — POTASSIUM CHLORIDE 1.5 G/1.77G
20 POWDER, FOR SOLUTION ORAL DAILY
Status: DISCONTINUED | OUTPATIENT
Start: 2017-10-23 | End: 2017-10-24 | Stop reason: HOSPADM

## 2017-10-22 RX ORDER — WARFARIN SODIUM 2.5 MG/1
2.5 TABLET ORAL
Status: DISCONTINUED | OUTPATIENT
Start: 2017-10-22 | End: 2017-10-23

## 2017-10-22 RX ORDER — TERAZOSIN 5 MG/1
5 CAPSULE ORAL NIGHTLY
Status: DISCONTINUED | OUTPATIENT
Start: 2017-10-22 | End: 2017-10-24 | Stop reason: HOSPADM

## 2017-10-22 RX ORDER — ACETAMINOPHEN 325 MG/1
650 TABLET ORAL EVERY 4 HOURS PRN
Status: DISCONTINUED | OUTPATIENT
Start: 2017-10-22 | End: 2017-10-24 | Stop reason: HOSPADM

## 2017-10-22 RX ORDER — SODIUM CHLORIDE AND POTASSIUM CHLORIDE 150; 900 MG/100ML; MG/100ML
75 INJECTION, SOLUTION INTRAVENOUS CONTINUOUS
Status: DISCONTINUED | OUTPATIENT
Start: 2017-10-22 | End: 2017-10-23

## 2017-10-22 RX ORDER — HYDROCODONE BITARTRATE AND ACETAMINOPHEN 5; 325 MG/1; MG/1
1 TABLET ORAL EVERY 4 HOURS PRN
Status: DISCONTINUED | OUTPATIENT
Start: 2017-10-22 | End: 2017-10-24 | Stop reason: HOSPADM

## 2017-10-22 RX ORDER — SACCHAROMYCES BOULARDII 250 MG
250 CAPSULE ORAL 2 TIMES DAILY
Status: DISCONTINUED | OUTPATIENT
Start: 2017-10-22 | End: 2017-10-24 | Stop reason: HOSPADM

## 2017-10-22 RX ORDER — FINASTERIDE 5 MG/1
5 TABLET, FILM COATED ORAL DAILY
COMMUNITY
End: 2018-01-11

## 2017-10-22 RX ORDER — TERAZOSIN 5 MG/1
5 CAPSULE ORAL NIGHTLY
Status: ON HOLD | COMMUNITY
End: 2017-10-23

## 2017-10-22 RX ORDER — FUROSEMIDE 40 MG/1
40 TABLET ORAL DAILY
Status: DISCONTINUED | OUTPATIENT
Start: 2017-10-23 | End: 2017-10-23

## 2017-10-22 RX ORDER — ASPIRIN 81 MG/1
81 TABLET ORAL DAILY
Status: DISCONTINUED | OUTPATIENT
Start: 2017-10-22 | End: 2017-10-24 | Stop reason: HOSPADM

## 2017-10-22 RX ORDER — SODIUM CHLORIDE 0.9 % (FLUSH) 0.9 %
10 SYRINGE (ML) INJECTION AS NEEDED
Status: DISCONTINUED | OUTPATIENT
Start: 2017-10-22 | End: 2017-10-24 | Stop reason: HOSPADM

## 2017-10-22 RX ORDER — ATORVASTATIN CALCIUM 10 MG/1
10 TABLET, FILM COATED ORAL NIGHTLY
Status: DISCONTINUED | OUTPATIENT
Start: 2017-10-22 | End: 2017-10-24 | Stop reason: HOSPADM

## 2017-10-22 RX ORDER — FLUTICASONE PROPIONATE 50 MCG
2 SPRAY, SUSPENSION (ML) NASAL DAILY
Status: DISCONTINUED | OUTPATIENT
Start: 2017-10-22 | End: 2017-10-24 | Stop reason: HOSPADM

## 2017-10-22 RX ORDER — VANCOMYCIN 1.75 GRAM/500 ML IN 0.9 % SODIUM CHLORIDE INTRAVENOUS
1750 ONCE
Status: COMPLETED | OUTPATIENT
Start: 2017-10-22 | End: 2017-10-22

## 2017-10-22 RX ORDER — FINASTERIDE 5 MG/1
5 TABLET, FILM COATED ORAL DAILY
Status: DISCONTINUED | OUTPATIENT
Start: 2017-10-22 | End: 2017-10-24 | Stop reason: HOSPADM

## 2017-10-22 RX ORDER — TAMSULOSIN HYDROCHLORIDE 0.4 MG/1
1 CAPSULE ORAL NIGHTLY
Status: ON HOLD | COMMUNITY
End: 2017-10-23

## 2017-10-22 RX ORDER — DONEPEZIL HYDROCHLORIDE 10 MG/1
10 TABLET, FILM COATED ORAL NIGHTLY
Status: DISCONTINUED | OUTPATIENT
Start: 2017-10-22 | End: 2017-10-24 | Stop reason: HOSPADM

## 2017-10-22 RX ORDER — VANCOMYCIN/0.9 % SOD CHLORIDE 1.5G/250ML
1500 PLASTIC BAG, INJECTION (ML) INTRAVENOUS EVERY 24 HOURS
Status: DISCONTINUED | OUTPATIENT
Start: 2017-10-23 | End: 2017-10-24 | Stop reason: HOSPADM

## 2017-10-22 RX ADMIN — FINASTERIDE 5 MG: 5 TABLET, FILM COATED ORAL at 21:28

## 2017-10-22 RX ADMIN — TAZOBACTAM SODIUM AND PIPERACILLIN SODIUM 4.5 G: 500; 4 INJECTION, SOLUTION INTRAVENOUS at 15:20

## 2017-10-22 RX ADMIN — TAMSULOSIN HYDROCHLORIDE 0.4 MG: 0.4 CAPSULE ORAL at 21:17

## 2017-10-22 RX ADMIN — ACETAMINOPHEN 650 MG: 325 TABLET, FILM COATED ORAL at 13:38

## 2017-10-22 RX ADMIN — DILTIAZEM HYDROCHLORIDE 240 MG: 240 CAPSULE, COATED, EXTENDED RELEASE ORAL at 21:17

## 2017-10-22 RX ADMIN — Medication 250 MG: at 18:52

## 2017-10-22 RX ADMIN — WARFARIN SODIUM 2.5 MG: 2.5 TABLET ORAL at 18:52

## 2017-10-22 RX ADMIN — ASPIRIN 81 MG: 81 TABLET, COATED ORAL at 21:17

## 2017-10-22 RX ADMIN — TAZOBACTAM SODIUM AND PIPERACILLIN SODIUM 3.38 G: 375; 3 INJECTION, SOLUTION INTRAVENOUS at 21:22

## 2017-10-22 RX ADMIN — Medication 1750 MG: at 16:26

## 2017-10-22 RX ADMIN — ATORVASTATIN CALCIUM 10 MG: 10 TABLET, FILM COATED ORAL at 21:17

## 2017-10-22 RX ADMIN — POTASSIUM CHLORIDE AND SODIUM CHLORIDE 75 ML/HR: 900; 150 INJECTION, SOLUTION INTRAVENOUS at 18:52

## 2017-10-22 RX ADMIN — SODIUM CHLORIDE 2802 ML: 9 INJECTION, SOLUTION INTRAVENOUS at 14:17

## 2017-10-22 RX ADMIN — TRAZODONE HYDROCHLORIDE 50 MG: 50 TABLET ORAL at 21:18

## 2017-10-22 RX ADMIN — FLUTICASONE PROPIONATE 2 SPRAY: 50 SPRAY, METERED NASAL at 18:52

## 2017-10-22 RX ADMIN — DONEPEZIL HYDROCHLORIDE 10 MG: 10 TABLET, FILM COATED ORAL at 21:17

## 2017-10-22 NOTE — ED PROVIDER NOTES
Subjective   HPI Comments: 85-year-old white male arrives by private vehicle with wife complaining of temperature Tmax 101.5 discovered this morning.  According to patient, he wasn't feeling well last evening and according to his wife did not have much of a dinner.  This morning he woke with sweating at which point a temperature was documented at 101.5.  Patient states he has had some congestion as well as facial pain.  He denies any headache, neck pain, chest pain, vomiting, diarrhea, or other complaints.    Patient is a 85 y.o. male presenting with fever.   History provided by:  Patient and spouse  Fever   Temp source:  Oral  Onset quality:  Gradual  Timing:  Unable to specify  Progression:  Unchanged  Chronicity:  New  Relieved by:  Nothing  Worsened by:  Nothing  Associated symptoms: congestion and cough    Associated symptoms: no chest pain, no chills, no confusion, no diarrhea, no dysuria, no headaches, no myalgias, no nausea, no rash, no rhinorrhea and no sore throat        Review of Systems   Constitutional: Positive for fever. Negative for chills.   HENT: Positive for congestion. Negative for rhinorrhea and sore throat.    Respiratory: Positive for cough.    Cardiovascular: Negative for chest pain.   Gastrointestinal: Negative for diarrhea and nausea.   Genitourinary: Negative for dysuria.   Musculoskeletal: Negative for myalgias.   Skin: Negative for rash.   Neurological: Negative for headaches.   Psychiatric/Behavioral: Negative for confusion.   All other systems reviewed and are negative.      Past Medical History:   Diagnosis Date   • Benign hypertension    • Cataract    • Coronary artery disease    • Endocarditis    • Hypercholesterolemia    • Medication reaction     Overmedication reaction secondary to Temazepam with neurologic effects now resolved.   • Osteoarthritis    • Permanent atrial fibrillation    • Seasonal allergies     Seasonal allergies/rhinitis.    • Tobacco abuse     Remote tobacco  "abuse.        Allergies   Allergen Reactions   • Temazepam Other (See Comments)     Caused pt to \"sleep drive\"       Past Surgical History:   Procedure Laterality Date   • CORONARY ARTERY BYPASS GRAFT  07/2006    CABG for 3-vessel disease, July 2006.       Family History   Problem Relation Age of Onset   • Alzheimer's disease Mother    • Dementia Mother    • Heart disease Father    • Stroke Father    • Heart disease Sister    • Heart disease Brother    • Stroke Brother        Social History     Social History   • Marital status:      Spouse name: N/A   • Number of children: N/A   • Years of education: N/A     Social History Main Topics   • Smoking status: Former Smoker     Quit date: 06/1970   • Smokeless tobacco: None   • Alcohol use No   • Drug use: No   • Sexual activity: Defer     Other Topics Concern   • None     Social History Narrative           Objective   Physical Exam   Constitutional: He appears well-developed and well-nourished.   HENT:   Head: Normocephalic and atraumatic.   Right Ear: External ear normal.   Left Ear: External ear normal.   Eyes: Conjunctivae are normal.   Neck: Normal range of motion. Neck supple.   Cardiovascular: Normal rate, regular rhythm and normal heart sounds.    No murmur heard.  Pulmonary/Chest: Effort normal and breath sounds normal. No respiratory distress. He has no wheezes. He has no rales.   Abdominal: Soft. Bowel sounds are normal. He exhibits no distension.   Musculoskeletal: Normal range of motion.   Neurological: He is alert.   Skin: Skin is warm and dry. There is erythema.   Absent nail left great toe.  Redness at midfoot posterior from solar surface to distal one third.  In addition there is warmth and erythema to the calf or proximal one third and middle one third.  Neurovascular status is intact.  Of note: Dorsum of foot spared of erythema.   Psychiatric: He has a normal mood and affect. His behavior is normal. Judgment and thought content normal.   Nursing " note and vitals reviewed.      Critical Care  Performed by: RANJANA CALLOWAY  Authorized by: JOSE LUNA   Total critical care time: 35 minutes  Critical care was necessary to treat or prevent imminent or life-threatening deterioration of the following conditions: sepsis.  Critical care was time spent personally by me on the following activities: evaluation of patient's response to treatment, obtaining history from patient or surrogate, ordering and review of laboratory studies, pulse oximetry, re-evaluation of patient's condition, ordering and review of radiographic studies, ordering and performing treatments and interventions and examination of patient.               ED Course  ED Course   Value Comment By Time   Temp: (!) 101.4 °F (38.6 °C) (Reviewed) Jose Luna MD 10/22 1319    I spoke with Dr. Leos regarding this patient.  He has agreed to admit the patient. ZULY Drew 10/22 1531          Recent Results (from the past 24 hour(s))   Comprehensive Metabolic Panel    Collection Time: 10/22/17  1:15 PM   Result Value Ref Range    Glucose 160 (H) 70 - 100 mg/dL    BUN 15 9 - 23 mg/dL    Creatinine 1.00 0.60 - 1.30 mg/dL    Sodium 136 132 - 146 mmol/L    Potassium 3.7 3.5 - 5.5 mmol/L    Chloride 99 99 - 109 mmol/L    CO2 29.0 20.0 - 31.0 mmol/L    Calcium 9.2 8.7 - 10.4 mg/dL    Total Protein 7.0 5.7 - 8.2 g/dL    Albumin 4.10 3.20 - 4.80 g/dL    ALT (SGPT) 18 7 - 40 U/L    AST (SGOT) 20 0 - 33 U/L    Alkaline Phosphatase 52 25 - 100 U/L    Total Bilirubin 2.5 (H) 0.3 - 1.2 mg/dL    eGFR Non African Amer 71 >60 mL/min/1.73    Globulin 2.9 gm/dL    A/G Ratio 1.4 (L) 1.5 - 2.5 g/dL    BUN/Creatinine Ratio 15.0 7.0 - 25.0    Anion Gap 8.0 3.0 - 11.0 mmol/L   Lactic Acid, Plasma    Collection Time: 10/22/17  1:15 PM   Result Value Ref Range    Lactate 2.4 (C) 0.5 - 2.0 mmol/L   CBC Auto Differential    Collection Time: 10/22/17  1:15 PM   Result Value Ref Range    WBC 17.08 (H) 3.50 - 10.80  10*3/mm3    RBC 4.92 4.20 - 5.76 10*6/mm3    Hemoglobin 16.2 13.1 - 17.5 g/dL    Hematocrit 47.9 38.9 - 50.9 %    MCV 97.4 80.0 - 99.0 fL    MCH 32.9 (H) 27.0 - 31.0 pg    MCHC 33.8 32.0 - 36.0 g/dL    RDW 13.7 11.3 - 14.5 %    RDW-SD 48.9 37.0 - 54.0 fl    MPV 9.0 6.0 - 12.0 fL    Platelets 144 (L) 150 - 450 10*3/mm3    Neutrophil % 89.6 (H) 41.0 - 71.0 %    Lymphocyte % 5.2 (L) 24.0 - 44.0 %    Monocyte % 4.7 0.0 - 12.0 %    Eosinophil % 0.0 0.0 - 3.0 %    Basophil % 0.1 0.0 - 1.0 %    Immature Grans % 0.4 0.0 - 0.6 %    Neutrophils, Absolute 15.32 (H) 1.50 - 8.30 10*3/mm3    Lymphocytes, Absolute 0.88 0.60 - 4.80 10*3/mm3    Monocytes, Absolute 0.81 0.00 - 1.00 10*3/mm3    Eosinophils, Absolute 0.00 0.00 - 0.30 10*3/mm3    Basophils, Absolute 0.01 0.00 - 0.20 10*3/mm3    Immature Grans, Absolute 0.06 (H) 0.00 - 0.03 10*3/mm3   Green Top (Gel)    Collection Time: 10/22/17  1:15 PM   Result Value Ref Range    Extra Tube Hold for add-ons.    Lavender Top    Collection Time: 10/22/17  1:15 PM   Result Value Ref Range    Extra Tube hold for add-on    Gold Top - SST    Collection Time: 10/22/17  1:15 PM   Result Value Ref Range    Extra Tube Hold for add-ons.    Protime-INR    Collection Time: 10/22/17  1:29 PM   Result Value Ref Range    Protime 20.4 (H) 9.6 - 11.5 Seconds    INR 1.84    Light Blue Top    Collection Time: 10/22/17  1:29 PM   Result Value Ref Range    Extra Tube hold for add-on      Note: In addition to lab results from this visit, the labs listed above may include labs taken at another facility or during a different encounter within the last 24 hours. Please correlate lab times with ED admission and discharge times for further clarification of the services performed during this visit.    CT Head Without Contrast   Preliminary Result   Atrophy and chronic changes seen within the brain with no   acute intracranial abnormality identified.       DICTATED:     10/22/2017   EDITED:          10/22/2017                       XR Chest 1 View    (Results Pending)     Vitals:    10/22/17 1312 10/22/17 1330 10/22/17 1414 10/22/17 1520   BP: 145/78 136/77  127/69   Pulse: 75 91 82 77   Resp: 28 24 24   Temp: (!) 101.4 °F (38.6 °C)  (!) 100.8 °F (38.2 °C)    TempSrc: Oral  Oral    SpO2: 93% 96% 96% 92%   Weight:       Height:         Medications   sodium chloride 0.9 % flush 10 mL (not administered)   sodium chloride 0.9 % bolus 2,802 mL (2,802 mL Intravenous New Bag 10/22/17 1417)   Pharmacy to dose vancomycin (not administered)   vancomycin IVPB 1750 mg in 0.9% Sodium Chloride (premix) 500 mL (not administered)   vancomycin IVPB 1500 mg in 0.9% NaCl (Premix) 500 mL (not administered)   ! Vanc trough due 10/24 @ 1430. Please hold 1500 dose until level assessed by pharmacy, thank you. (not administered)   acetaminophen (TYLENOL) tablet 650 mg (650 mg Oral Given 10/22/17 1338)   piperacillin-tazobactam (ZOSYN) 4.5 g in iso-osmotic dextrose 100 mL IVPB (premix) (4.5 g Intravenous New Bag 10/22/17 1520)     ECG/EMG Results (last 24 hours)     ** No results found for the last 24 hours. **              [facility]    Final diagnoses:   Sepsis, due to unspecified organism   Cellulitis of left lower extremity            ZULY Drew  10/22/17 1532       ZULY Drew  10/22/17 1636

## 2017-10-23 ENCOUNTER — APPOINTMENT (OUTPATIENT)
Dept: CARDIOLOGY | Facility: HOSPITAL | Age: 82
End: 2017-10-23

## 2017-10-23 ENCOUNTER — APPOINTMENT (OUTPATIENT)
Dept: ULTRASOUND IMAGING | Facility: HOSPITAL | Age: 82
End: 2017-10-23

## 2017-10-23 LAB
ALBUMIN SERPL-MCNC: 3.4 G/DL (ref 3.2–4.8)
ALBUMIN/GLOB SERPL: 1.5 G/DL (ref 1.5–2.5)
ALP SERPL-CCNC: 39 U/L (ref 25–100)
ALT SERPL W P-5'-P-CCNC: 11 U/L (ref 7–40)
ANION GAP SERPL CALCULATED.3IONS-SCNC: 4 MMOL/L (ref 3–11)
AST SERPL-CCNC: 16 U/L (ref 0–33)
BASOPHILS # BLD AUTO: 0.01 10*3/MM3 (ref 0–0.2)
BASOPHILS NFR BLD AUTO: 0.1 % (ref 0–1)
BH CV LOWER VASCULAR LEFT COMMON FEMORAL AUGMENT: NORMAL
BH CV LOWER VASCULAR LEFT COMMON FEMORAL COMPRESS: NORMAL
BH CV LOWER VASCULAR LEFT COMMON FEMORAL PHASIC: NORMAL
BH CV LOWER VASCULAR LEFT COMMON FEMORAL SPONT: NORMAL
BH CV LOWER VASCULAR LEFT DISTAL FEMORAL COMPRESS: NORMAL
BH CV LOWER VASCULAR LEFT GASTRONEMIUS COMPRESS: NORMAL
BH CV LOWER VASCULAR LEFT GREATER SAPH AK COMPRESS: NORMAL
BH CV LOWER VASCULAR LEFT GREATER SAPH BK COMPRESS: NORMAL
BH CV LOWER VASCULAR LEFT LESSER SAPH COMPRESS: NORMAL
BH CV LOWER VASCULAR LEFT MID FEMORAL AUGMENT: NORMAL
BH CV LOWER VASCULAR LEFT MID FEMORAL COMPETENT: NORMAL
BH CV LOWER VASCULAR LEFT MID FEMORAL COMPRESS: NORMAL
BH CV LOWER VASCULAR LEFT MID FEMORAL PHASIC: NORMAL
BH CV LOWER VASCULAR LEFT MID FEMORAL SPONT: NORMAL
BH CV LOWER VASCULAR LEFT PERONEAL COMPRESS: NORMAL
BH CV LOWER VASCULAR LEFT POPLITEAL AUGMENT: NORMAL
BH CV LOWER VASCULAR LEFT POPLITEAL COMPRESS: NORMAL
BH CV LOWER VASCULAR LEFT POPLITEAL PHASIC: NORMAL
BH CV LOWER VASCULAR LEFT POPLITEAL SPONT: NORMAL
BH CV LOWER VASCULAR LEFT POSTERIOR TIBIAL COMPRESS: NORMAL
BH CV LOWER VASCULAR LEFT PROXIMAL FEMORAL COMPRESS: NORMAL
BH CV LOWER VASCULAR LEFT SAPHENOFEMORAL JUNCTION AUGMENT: NORMAL
BH CV LOWER VASCULAR LEFT SAPHENOFEMORAL JUNCTION COMPRESS: NORMAL
BH CV LOWER VASCULAR LEFT SAPHENOFEMORAL JUNCTION PHASIC: NORMAL
BH CV LOWER VASCULAR LEFT SAPHENOFEMORAL JUNCTION SPONT: NORMAL
BH CV LOWER VASCULAR RIGHT COMMON FEMORAL AUGMENT: NORMAL
BH CV LOWER VASCULAR RIGHT COMMON FEMORAL COMPRESS: NORMAL
BH CV LOWER VASCULAR RIGHT COMMON FEMORAL PHASIC: NORMAL
BH CV LOWER VASCULAR RIGHT COMMON FEMORAL SPONT: NORMAL
BILIRUB SERPL-MCNC: 1.4 MG/DL (ref 0.3–1.2)
BUN BLD-MCNC: 14 MG/DL (ref 9–23)
BUN/CREAT SERPL: 17.5 (ref 7–25)
CALCIUM SPEC-SCNC: 8.1 MG/DL (ref 8.7–10.4)
CHLORIDE SERPL-SCNC: 103 MMOL/L (ref 99–109)
CO2 SERPL-SCNC: 28 MMOL/L (ref 20–31)
CREAT BLD-MCNC: 0.8 MG/DL (ref 0.6–1.3)
D-LACTATE SERPL-SCNC: 0.9 MMOL/L (ref 0.5–2)
DEPRECATED RDW RBC AUTO: 49.8 FL (ref 37–54)
EOSINOPHIL # BLD AUTO: 0.06 10*3/MM3 (ref 0–0.3)
EOSINOPHIL NFR BLD AUTO: 0.7 % (ref 0–3)
ERYTHROCYTE [DISTWIDTH] IN BLOOD BY AUTOMATED COUNT: 13.8 % (ref 11.3–14.5)
GFR SERPL CREATININE-BSD FRML MDRD: 92 ML/MIN/1.73
GLOBULIN UR ELPH-MCNC: 2.3 GM/DL
GLUCOSE BLD-MCNC: 106 MG/DL (ref 70–100)
HBA1C MFR BLD: 5.5 % (ref 4.8–5.6)
HCT VFR BLD AUTO: 41.4 % (ref 38.9–50.9)
HGB BLD-MCNC: 13.6 G/DL (ref 13.1–17.5)
IMM GRANULOCYTES # BLD: 0.04 10*3/MM3 (ref 0–0.03)
IMM GRANULOCYTES NFR BLD: 0.5 % (ref 0–0.6)
INR PPP: 1.72
LYMPHOCYTES # BLD AUTO: 1.21 10*3/MM3 (ref 0.6–4.8)
LYMPHOCYTES NFR BLD AUTO: 14.1 % (ref 24–44)
MCH RBC QN AUTO: 32.2 PG (ref 27–31)
MCHC RBC AUTO-ENTMCNC: 32.9 G/DL (ref 32–36)
MCV RBC AUTO: 97.9 FL (ref 80–99)
MONOCYTES # BLD AUTO: 0.83 10*3/MM3 (ref 0–1)
MONOCYTES NFR BLD AUTO: 9.7 % (ref 0–12)
NEUTROPHILS # BLD AUTO: 6.45 10*3/MM3 (ref 1.5–8.3)
NEUTROPHILS NFR BLD AUTO: 74.9 % (ref 41–71)
PLATELET # BLD AUTO: 121 10*3/MM3 (ref 150–450)
PMV BLD AUTO: 9.5 FL (ref 6–12)
POTASSIUM BLD-SCNC: 3.5 MMOL/L (ref 3.5–5.5)
PROT SERPL-MCNC: 5.7 G/DL (ref 5.7–8.2)
PROTHROMBIN TIME: 19.1 SECONDS (ref 9.6–11.5)
RBC # BLD AUTO: 4.23 10*6/MM3 (ref 4.2–5.76)
SODIUM BLD-SCNC: 135 MMOL/L (ref 132–146)
TSH SERPL DL<=0.05 MIU/L-ACNC: 1.55 MIU/ML (ref 0.35–5.35)
WBC NRBC COR # BLD: 8.6 10*3/MM3 (ref 3.5–10.8)

## 2017-10-23 PROCEDURE — 97161 PT EVAL LOW COMPLEX 20 MIN: CPT

## 2017-10-23 PROCEDURE — 84443 ASSAY THYROID STIM HORMONE: CPT | Performed by: NURSE PRACTITIONER

## 2017-10-23 PROCEDURE — 80053 COMPREHEN METABOLIC PANEL: CPT | Performed by: NURSE PRACTITIONER

## 2017-10-23 PROCEDURE — 25010000002 VANCOMYCIN 10 G RECONSTITUTED SOLUTION: Performed by: EMERGENCY MEDICINE

## 2017-10-23 PROCEDURE — 83036 HEMOGLOBIN GLYCOSYLATED A1C: CPT | Performed by: NURSE PRACTITIONER

## 2017-10-23 PROCEDURE — 25010000002 PIPERACILLIN SOD-TAZOBACTAM PER 1 G: Performed by: NURSE PRACTITIONER

## 2017-10-23 PROCEDURE — 76705 ECHO EXAM OF ABDOMEN: CPT

## 2017-10-23 PROCEDURE — 83605 ASSAY OF LACTIC ACID: CPT | Performed by: NURSE PRACTITIONER

## 2017-10-23 PROCEDURE — 85025 COMPLETE CBC W/AUTO DIFF WBC: CPT | Performed by: NURSE PRACTITIONER

## 2017-10-23 PROCEDURE — 85610 PROTHROMBIN TIME: CPT | Performed by: NURSE PRACTITIONER

## 2017-10-23 PROCEDURE — 99233 SBSQ HOSP IP/OBS HIGH 50: CPT | Performed by: INTERNAL MEDICINE

## 2017-10-23 PROCEDURE — 93971 EXTREMITY STUDY: CPT | Performed by: INTERNAL MEDICINE

## 2017-10-23 PROCEDURE — 93971 EXTREMITY STUDY: CPT

## 2017-10-23 RX ORDER — DILTIAZEM HYDROCHLORIDE 240 MG/1
240 CAPSULE, COATED, EXTENDED RELEASE ORAL DAILY
COMMUNITY
End: 2018-01-22 | Stop reason: SDUPTHER

## 2017-10-23 RX ORDER — FUROSEMIDE 40 MG/1
40 TABLET ORAL DAILY
Status: ON HOLD | COMMUNITY
End: 2017-10-23

## 2017-10-23 RX ORDER — WARFARIN SODIUM 4 MG/1
4 TABLET ORAL
Status: DISCONTINUED | OUTPATIENT
Start: 2017-10-23 | End: 2017-10-24 | Stop reason: HOSPADM

## 2017-10-23 RX ORDER — POTASSIUM CHLORIDE 20 MEQ/1
20 TABLET, EXTENDED RELEASE ORAL DAILY
COMMUNITY
End: 2018-02-11 | Stop reason: SDUPTHER

## 2017-10-23 RX ADMIN — TAZOBACTAM SODIUM AND PIPERACILLIN SODIUM 3.38 G: 375; 3 INJECTION, SOLUTION INTRAVENOUS at 06:12

## 2017-10-23 RX ADMIN — TERAZOSIN HYDROCHLORIDE ANHYDROUS 5 MG: 5 CAPSULE ORAL at 20:41

## 2017-10-23 RX ADMIN — ATORVASTATIN CALCIUM 10 MG: 10 TABLET, FILM COATED ORAL at 20:41

## 2017-10-23 RX ADMIN — TRAZODONE HYDROCHLORIDE 50 MG: 50 TABLET ORAL at 20:41

## 2017-10-23 RX ADMIN — FINASTERIDE 5 MG: 5 TABLET, FILM COATED ORAL at 09:11

## 2017-10-23 RX ADMIN — FLUTICASONE PROPIONATE 2 SPRAY: 50 SPRAY, METERED NASAL at 09:12

## 2017-10-23 RX ADMIN — POTASSIUM CHLORIDE 20 MEQ: 1.5 POWDER, FOR SOLUTION ORAL at 09:11

## 2017-10-23 RX ADMIN — Medication 250 MG: at 09:11

## 2017-10-23 RX ADMIN — VANCOMYCIN HYDROCHLORIDE 1500 MG: 10 INJECTION, POWDER, LYOPHILIZED, FOR SOLUTION INTRAVENOUS at 20:41

## 2017-10-23 RX ADMIN — DILTIAZEM HYDROCHLORIDE 240 MG: 240 CAPSULE, COATED, EXTENDED RELEASE ORAL at 09:11

## 2017-10-23 RX ADMIN — LISINOPRIL 20 MG: 20 TABLET ORAL at 09:11

## 2017-10-23 RX ADMIN — WARFARIN SODIUM 4 MG: 4 TABLET ORAL at 17:22

## 2017-10-23 RX ADMIN — DONEPEZIL HYDROCHLORIDE 10 MG: 10 TABLET, FILM COATED ORAL at 20:41

## 2017-10-23 RX ADMIN — METOPROLOL TARTRATE 25 MG: 25 TABLET ORAL at 09:11

## 2017-10-23 RX ADMIN — ASPIRIN 81 MG: 81 TABLET, COATED ORAL at 09:11

## 2017-10-23 RX ADMIN — TAZOBACTAM SODIUM AND PIPERACILLIN SODIUM 3.38 G: 375; 3 INJECTION, SOLUTION INTRAVENOUS at 15:08

## 2017-10-23 RX ADMIN — FUROSEMIDE 40 MG: 40 TABLET ORAL at 09:11

## 2017-10-23 RX ADMIN — Medication 250 MG: at 17:21

## 2017-10-23 NOTE — PROGRESS NOTES
"Pharmacy Consult  -  Warfarin  Mike Lucero Jr. is a  85 y.o. male   Height - 66\" (167.6 cm)  Weight - 206 lb (93.4 kg)    Consulting Provider: TIFFANY Elizondo  Indication: afib  Goal INR: 2-3  Home Regimen: 2.5mg daily (per [facility] documentation)    Bridge Therapy: none    Drug-Drug Interactions with current regimen:  Zosyn (enhanced anticoag effects of warfarin)    Warfarin Dosing During Admission:  Date  10/22 10/23          INR  1.84 1.72          Dose  2.5 mg (4 mg)             Labs:  Results from last 7 days   Lab Units 10/23/17  0518 10/23/17  0517 10/22/17  1807 10/22/17  1329 10/22/17  1315 10/16/17  1149   INR  --  1.72 1.81 1.84 --  2.2*   HEMOGLOBIN g/dL 13.6 --  --  --  16.2 --    HEMATOCRIT % 41.4 --  --  --  47.9 --    PLATELETS 10*3/mm3 121* --  --  --  144* --      Results from last 7 days   Lab Units 10/23/17  0517 10/22/17  1315   SODIUM mmol/L 135 136   POTASSIUM mmol/L 3.5 3.7   CHLORIDE mmol/L 103 99   CO2 mmol/L 28.0 29.0   BUN mg/dL 14 15   CREATININE mg/dL 0.80 1.00   CALCIUM mg/dL 8.1* 9.2   BILIRUBIN mg/dL 1.4* 2.5*   ALK PHOS U/L 39 52   ALT (SGPT) U/L 11 18   AST (SGOT) U/L 16 20   GLUCOSE mg/dL 106* 160*     Current dietary intake: regular cardiac diet ordered, patient is a new admit but breakfast documented this AM    Assessment/Plan:     INR was slightly subtherapeutic on admission, patient was given home dose of warfarin 2.5 mg last night (10/22).  INR this morning dropped slightly to 1.72 (from 1.84 yesterday) and is still subtherapeutic. Will give 4mg warfarin tonight.  Assess daily PT/INR and any s/sx of bleeding or thrombosis.  Pharmacy will follow.    Tracie Delarosa, PharmD  10/23/2017  8:05 AM     "

## 2017-10-23 NOTE — PLAN OF CARE
Problem: Patient Care Overview (Adult)  Goal: Plan of Care Review  Outcome: Ongoing (interventions implemented as appropriate)    10/23/17 1025   Coping/Psychosocial Response Interventions   Plan Of Care Reviewed With patient   Patient Care Overview   Progress improving   Outcome Evaluation   Outcome Summary/Follow up Plan Pt demonstrates bed mobility, transfers, ambulation with supervision without LOB or AD. Pt does not need skilled PT at this time.

## 2017-10-23 NOTE — CONSULTS
Mike Lucero .  7/29/1932  [id number]  10/23/2017      Referring Provider: Mauro Schwartz M.D. Hospital medicine  Reason for Consultation: Severe sepsis      Subjective   History of present illness:  This is a pleasant 85-year-old gentleman who resides in [facility].  He is followed primarily by Dr. Ike Douglas of internal medicine.  He presented to the emergency department yesterday with a 48-hour history of left lower extremity pain and erythema.  He was febrile to 101.5° with moderate confusion.  His peripheral leukocyte count was 17,000.  He was noted to have a lactic acidosis of 2.4.  The patient apparently complained to his wife that while putting on a shoe he avulsed a hypertrophic left hallux toenail which was fissured and cracked.  The patient simply remove this fragment by himself.  He is unaware purulent wound drainage.  He did have some bleeding.  He does not recall his last tetanus immunization.  The patient did have a total bilirubin of 2.5 concerning for cholestasis of gram-positive sepsis.  He was started on vancomycin and piperacillin tazobactam.  A CT scan of the abdomen revealed evidence of diverticulosis and gallstones with a small ventral hernia without incarceration.  Urinalysis was remarkable for microscopic hematuria.  Asked to assist with antimicrobial therapy and diagnostic evaluation in this context.    Past Medical History:   Diagnosis Date   • Atrial fibrillation    • Cataract    • Chronic anticoagulation    • Coronary artery disease    • Diverticulosis    • Gallstones    • History of endocarditis    • HLD (hyperlipidemia)    • HTN (hypertension)    • Osteoarthritis    • Seasonal allergies     Seasonal allergies/rhinitis.        Past Surgical History:   Procedure Laterality Date   • APPENDECTOMY     • CORONARY ARTERY BYPASS GRAFT  07/2006    CABG for 3-vessel disease, July 2006.   • INGUINAL HERNIA REPAIR     • KIDNEY SURGERY         Pediatric History   Patient Guardian Status  "  • Not on file.     Other Topics Concern   • Not on file     Social History Narrative       family history includes Alzheimer's disease in his mother; Dementia in his mother; Heart disease in his brother, father, and sister; Stroke in his brother and father.    Allergies   Allergen Reactions   • Temazepam Other (See Comments)     Caused pt to \"sleep drive\"       Medication:See below  Antibiotics: MAR reviewed  IV Anti-Infectives     Ordered     Dose/Rate Route Frequency Start Stop    10/22/17 1452  vancomycin IVPB 1500 mg in 0.9% NaCl (Premix) 500 mL     Ordering Provider:  ZULY Drew    1,500 mg Intravenous Every 24 Hours 10/23/17 1500      10/22/17 1642  piperacillin-tazobactam (ZOSYN) 3.375 g in iso-osmotic dextrose 50 ml (premix)     Ordering Provider:  TIFFANY Enciso    3.375 g  over 4 Hours Intravenous Every 8 Hours Scheduled 10/22/17 2200      10/22/17 1448  vancomycin IVPB 1750 mg in 0.9% Sodium Chloride (premix) 500 mL     Ordering Provider:  ZULY Drew    1,750 mg Intravenous Once 10/22/17 1500 10/22/17 1626    10/22/17 1441  piperacillin-tazobactam (ZOSYN) 4.5 g in iso-osmotic dextrose 100 mL IVPB (premix)     Ordering Provider:  ZULY Drew    4.5 g Intravenous Once 10/22/17 1442 10/22/17 1625    10/22/17 1441  Pharmacy to dose vancomycin     Ordering Provider:  ZULY Drew     Does not apply Continuous PRN 10/22/17 1441            Please refer to the medical record for a full medication list    Review of Systems  Pertinent items are noted in HPI, all other systems reviewed and negative    Objective     Physical Exam: See below  Vital Signs   Temp:  [97.9 °F (36.6 °C)-101.4 °F (38.6 °C)] 98 °F (36.7 °C)  Heart Rate:  [62-91] 71  Resp:  [18-28] 18  BP: (107-145)/(67-83) 124/82    Blood pressure 124/82, pulse 71, temperature 98 °F (36.7 °C), temperature source Oral, resp. rate 18, height 66\" (167.6 cm), weight 206 lb (93.4 kg), SpO2 96 %.  GENERAL: Awake and alert, in no acute " distress.  The patient is sitting upright in bed.  He is somewhat hard of hearing.  He is in no acute distress.  His wife is at the bedside during examination and interview.  He denies fever chills or night sweats.  He said no cough or sputum production.  He denies nausea vomiting or diarrhea.  He denies left lower extremity pain.  He is unaware purulent wound drainage.  His major complaint is related to back stiffness from the bed.  HEENT: Oropharynx without thrush. Sinuses nontender. Dentition in good repair. No cervical adenopathy. No carotid bruits/ jugular venous distention.   EYES: PERRL. No conjunctival injection. No icterus. EOMI.  LYMPHATICS: No lymphadenopathy of the neck or axillary or inguinal regions.   HEART: No murmur, gallop, or pericardial friction rub.   LUNGS: Clear to auscultation anteriorly. No percussion dullness.   ABDOMEN: Soft, nontender, nondistended. No appreciable HSM.  No peritoneal findings of rebound or guarding.  SKIN: Warm and dry without cutaneous eruptions. No embolic stigmata.  The toenail involving the left hallux has largely been removed.  There is some encrusted blood at the site.  There is blanching erythema and palpable warmth that extends from the ankle to the tibial plateau.  There is no crepitant gas in the soft tissues.  I do not appreciate popliteal fossa or left femoral triangle lymphadenopathy.  PSYCHIATRIC: Mental status lucid. Cranial nerve function intact.       Results Review:   I reviewed the patient's new clinical results.    Lab Results   Component Value Date    WBC 8.60 10/23/2017    HGB 13.6 10/23/2017    HCT 41.4 10/23/2017    MCV 97.9 10/23/2017     (L) 10/23/2017       Lab Results   Component Value Date    GLUCOSE 106 (H) 10/23/2017    BUN 14 10/23/2017    CREATININE 0.80 10/23/2017    EGFRIFNONA 92 10/23/2017    BCR 17.5 10/23/2017    CO2 28.0 10/23/2017    CALCIUM 8.1 (L) 10/23/2017    ALBUMIN 3.40 10/23/2017    LABIL2 1.5 10/23/2017    AST 16  10/23/2017    ALT 11 10/23/2017       Estimated Creatinine Clearance: 72.2 mL/min (by C-G formula based on Cr of 0.8).      Microbiology:Blood cultures ×2 negative at less than 24 hours of incubation.      Radiology:  Imaging Results (last 72 hours)     Procedure Component Value Units Date/Time    CT Head Without Contrast [[id number]] Collected:  10/22/17 1526     Updated:  10/23/17 0926    Narrative:       EXAMINATION: CT HEAD WO CONTRAST - 10/22/2017     INDICATION: Frontal headache.      TECHNIQUE: Multiple axial CT imaging is obtained of the head from skull  base to skull vertex without the administration of intravenous contrast.     The radiation dose reduction device was turned on for each scan per the  ALARA (As Low as Reasonably Achievable) protocol.     COMPARISON: 10/05/2017.     FINDINGS: There is atrophy identified of the brain. No intracranial  hemorrhage or hydrocephalus. No mass, mass effect or midline shift. No  abnormal extra-axial fluid collections identified. There is no acute  parenchymal disease. Calvarium is unremarkable. The visualized paranasal  sinuses are grossly clear. Globes and orbits are intact. The mastoid air  cells are patent.       Impression:       Atrophy and chronic changes seen within the brain with no  acute intracranial abnormality identified.     DICTATED:     10/22/2017  EDITED:          10/22/2017        This report was finalized on 10/23/2017 9:24 AM by Dr. Aleida Riley MD.       XR Chest 1 View [[id number]] Collected:  10/22/17 1654     Updated:  10/23/17 0926    Narrative:       EXAMINATION: XR CHEST, SINGLE VIEW - 10/22/2017     INDICATION: Cough, fever.       COMPARISON: 08/27/2011.     FINDINGS: Portable chest reveals the patient to be status post median  sternotomy. The heart is enlarged. Vascular calcification seen within  the thoracic aorta. No pleural effusion or  pneumothorax. Pulmonary  vascularity is within normal limits. Degenerative change is seen  within  the spine. No focal parenchymal opacification present.           Impression:       Heart is enlarged with no evidence of acute parenchymal  disease.     DICTATED:     10/22/2017  EDITED:          10/22/2017           This report was finalized on 10/23/2017 9:24 AM by Dr. Aleida Riley MD.       CT Abdomen Pelvis Without Contrast [[id number]] Collected:  10/22/17 1800     Updated:  10/23/17 0927    Narrative:       EXAMINATION: CT ABDOMEN PELVIS WO CONTRAST - 10/22/2017     INDICATION: A41.9-Sepsis, unspecified organism; L03.116-Cellulitis of  left lower limb. Swelling.      TECHNIQUE: Multiple axial CT imaging is obtained of the abdomen and  pelvis without the administration of oral or intravenous contrast.     The radiation dose reduction device was turned on for each scan per the  ALARA (As Low as Reasonably Achievable) protocol.     COMPARISON: 07/07/2010.     FINDINGS:      ABDOMEN: Chronic changes are seen at the lung bases bilaterally with  mild bronchiectatic changes present. Four-chamber cardiac enlargement.  The liver is homogeneous as well as the spleen with multiple stones in  the gallbladder. Pancreas is homogeneous in appearance. The kidneys and  adrenal glands are within normal limits. Small hiatal hernia. The  abdominal portion of the gastrointestinal tract is within normal limits.  Diverticulosis of the colon without evidence of  diverticulitis. Small  umbilical hernia containing mesenteric fat only.     PELVIS: Some stranding identified in the left inguinal region which may  be from prior hernia repair. A mesh is in place. There is diverticulosis  with no evidence of diverticulitis. No free fluid or free air. The  pelvic portions of the gastrointestinal tract are otherwise within  normal limits. The pelvic organs are unremarkable. Small lymph nodes  identified in the inguinal region on the left. No bulky adenopathy is  identified. Findings are unchanged when compared to the prior study  of  2010. The bony structures reveal degenerative changes identified within  the spine and pelvis.       Impression:       1. Diverticulosis of the colon without evidence of diverticulitis.  2. Gallstones.  3. Small umbilical hernia containing mesenteric fat only with chronic  interstitial changes seen at the lung bases. No definite acute  intra-abdominal or pelvic abnormality.     DICTATED:     10/22/2017  EDITED:          10/22/2017                 This report was finalized on 10/23/2017 9:25 AM by Dr. Aleida Riley MD.             ASSESSMENT AND PLAN: Severe sepsis.  Hectic fever to 101.4°.  Leukocytosis to 17,000 with left shift.  Lactic acidosis.  Cholestasis of gram-positive sepsis/presumed.  Liver function abnormalities.  Thrombocytopenia/clinical concerns for evolving disseminated intravascular coagulation.  Avulsion of left hallux nail plate with cellulitis and lymphangitis of left lower extremity.  Diverticulosis.  Asymptomatic cholelithiasis.  Umbilical hernia without evidence of incarceration.  The patient's maximum temperature over 24 hours 101.5°.  Currently 98.0.  The peripheral leukocyte count is down from 17,000 to 8600 with 75% segmented neutrophils after less than 24 hours of IV antimicrobials.  The platelet count has fallen 121,000.  There is no evidence of mucosal bleeding.  Influenza A and B rapid antigens are unremarkable.  Blood cultures ×2 are negative thus far.  Utilization management: Anticipate discharge home tomorrow on ceftriaxone 2 g IV every 24 hours by short butterfly in our office.  If so, follow-up with me in Thursday clinic this week.  I would consider Tdap booster administration prior to discharge.  Discussed with the patient's spouse at length.       I discussed the patients findings and my recommendations with patient and family    Thank you for this consult.  Our group would be pleased to follow this patient over the course of their hospitalization and assist with  outpatient antimicrobial therapy, as indicated.     Benny White MD  10/23/2017

## 2017-10-23 NOTE — PROGRESS NOTES
Discharge Planning Assessment  [facility]     Patient Name: Mike Lucero Jr.  MRN: [id number]  Today's Date: 10/23/2017    Admit Date: 10/22/2017          Discharge Needs Assessment       10/23/17 1056    Living Environment    Lives With spouse   Pt resides in [facility] with his wife Glenis    Living Arrangements house    Type of Financial/Environmental Concern none    Transportation Available car    Living Environment    Provides Primary Care For no one    Quality Of Family Relationships supportive;helpful;involved    Able to Return to Prior Living Arrangements yes    Discharge Needs Assessment    Concerns To Be Addressed no discharge needs identified;denies needs/concerns at this time    Readmission Within The Last 30 Days no previous admission in last 30 days    Equipment Currently Used at Home none    Discharge Contact Information if Applicable [phone]            Discharge Plan       10/23/17 1057    Case Management/Social Work Plan    Plan home with out pt antibiotics at [facility]    Patient/Family In Agreement With Plan yes    Additional Comments CM spoke with pt and wife at bedside. Plan is for pt to return home with assistance from his wife as needed. Dr. White with [facility] was at pt's bedside when CM entered and he was discussing discharge plan of home and presenting to [facility] office for IV antibiotics, pt and wife are both agreeable to this plan. Pt denies discharge needs at this time. CM will continue to follow.        Discharge Placement     No information found                Demographic Summary       10/23/17 1047    Referral Information    Referral Source admission list    Contact Information    Permission Granted to Share Information With     Primary Care Physician Information    Name Ike Douglas            Functional Status       10/23/17 1047    Functional Status Current    Current Functional Level Comment see previous charting    Functional Status Prior    Ambulation  0-->independent    Transferring 0-->independent    Toileting 0-->independent    Bathing 0-->independent    Dressing 0-->independent    Eating 0-->independent    Communication 0-->understands/communicates without difficulty    Swallowing 0-->swallows foods/liquids without difficulty    IADL    Medications --   pt confirms he has prescription drug coverage and denies issues obtaining or affording current medications    Meal Preparation independent    Housekeeping independent    Laundry independent    Shopping independent    Oral Care independent    Activity Tolerance    Current Activity Limitations none    Usual Activity Tolerance moderate    Current Activity Tolerance moderate    Employment/Financial    Financial Concerns none   pt confirms he has Medicare and [employer], denies concerns or disruption in coverage            Psychosocial     None            Abuse/Neglect     None            Legal     None            Substance Abuse     None            Patient Forms     None          Subha Cameron

## 2017-10-23 NOTE — PROGRESS NOTES
[facility] Medicine Services  PROGRESS NOTE    Patient Name: Mike Lucero Jr.  : 1932  MRN: [id number]    Date of Admission: 10/22/2017  Length of Stay: 1  Primary Care Physician: Ike Douglas MD    Subjective   Subjective     CC:  F/u cellulitis    HPI:  Didn't sleep well.  Main complaint is back soreness from laying in bed.  Not having much pain or discomfort from his leg.  No further bleeding from nail bed, scabbed over.  He is concerned because of previous history of endocarditis    Review of Systems  Gen- No fevers, chills  CV- No chest pain, palpitations  Resp- No cough, dyspnea  GI- No N/V/D, minimal abd pain      Otherwise ROS is negative except as mentioned in the HPI.    Objective   Objective     Vital Signs: Temp:  [97.9 °F (36.6 °C)-101.4 °F (38.6 °C)] 98 °F (36.7 °C)  Heart Rate:  [62-91] 71  Resp:  [18-28] 18  BP: (107-145)/(67-83) 124/82     Physical Exam:  Constitutional: No acute distress, awake, alert  Eyes: PERRLA, sclerae anicteric, no conjunctival injection  HENT: NCAT, mucous membranes moist  Respiratory: Clear to auscultation bilaterally, nonlabored respirations   Cardiovascular: RRR, no murmurs, rubs, or gallops, palpable pedal pulses bilaterally  Gastrointestinal: Positive bowel sounds, soft, nontender, nondistended  Musculoskeletal: left great toe nail bed with dried blood.  Some redness around to extending up medial side.  Warmth, no swelling.  Psychiatric: Oriented x 3, appropriate affect, cooperative  Neurologic: Strength symmetric in all extremities, Cranial Nerves grossly intact to confrontation, speech clear  Skin: No rashes    Results Reviewed:  I have personally reviewed current lab, radiology, and data and agree.      Results from last 7 days  Lab Units 10/23/17  0518 10/23/17  0517 10/22/17  1807 10/22/17  1329 10/22/17  1315   WBC 10*3/mm3 8.60  --   --   --  17.08*   HEMOGLOBIN g/dL 13.6  --   --   --  16.2   HEMATOCRIT % 41.4  --    --   --  47.9   PLATELETS 10*3/mm3 121*  --   --   --  144*   INR   --  1.72 1.81 1.84  --        Results from last 7 days  Lab Units 10/23/17  0517 10/22/17  1315   SODIUM mmol/L 135 136   POTASSIUM mmol/L 3.5 3.7   CHLORIDE mmol/L 103 99   CO2 mmol/L 28.0 29.0   BUN mg/dL 14 15   CREATININE mg/dL 0.80 1.00   GLUCOSE mg/dL 106* 160*   CALCIUM mg/dL 8.1* 9.2   ALT (SGPT) U/L 11 18   AST (SGOT) U/L 16 20     No results found for: BNP  No results found for: PHART    Cultures:  Blood Culture   Date Value Ref Range Status   10/22/2017 No growth at less than 24 hours  Preliminary   10/22/2017 No growth at less than 24 hours  Preliminary       Imaging Results (last 24 hours)     Procedure Component Value Units Date/Time    CT Head Without Contrast [[id number]] Collected:  10/22/17 1526     Updated:  10/22/17 1527    Narrative:       EXAMINATION: CT HEAD WO CONTRAST - 10/22/2017     INDICATION: Frontal headache.      TECHNIQUE: Multiple axial CT imaging is obtained of the head from skull  base to skull vertex without the administration of intravenous contrast.     The radiation dose reduction device was turned on for each scan per the  ALARA (As Low as Reasonably Achievable) protocol.     COMPARISON: 10/05/2017.     FINDINGS: There is atrophy identified of the brain. No intracranial  hemorrhage or hydrocephalus. No mass, mass effect or midline shift. No  abnormal extra-axial fluid collections identified. There is no acute  parenchymal disease. Calvarium is unremarkable. The visualized paranasal  sinuses are grossly clear. Globes and orbits are intact. The mastoid air  cells are patent.       Impression:       Atrophy and chronic changes seen within the brain with no  acute intracranial abnormality identified.     DICTATED:     10/22/2017  EDITED:          10/22/2017                XR Chest 1 View [[id number]] Collected:  10/22/17 1654     Updated:  10/22/17 1654    Narrative:       EXAMINATION: XR CHEST, SINGLE VIEW -  10/22/2017     INDICATION: Cough, fever.       COMPARISON: 08/27/2011.     FINDINGS: Portable chest reveals the patient to be status post median  sternotomy. The heart is enlarged. Vascular calcification seen within  the thoracic aorta. No pleural effusion or  pneumothorax. Pulmonary  vascularity is within normal limits. Degenerative change is seen within  the spine. No focal parenchymal opacification present.           Impression:       Heart is enlarged with no evidence of acute parenchymal  disease.     DICTATED:     10/22/2017  EDITED:          10/22/2017                   CT Abdomen Pelvis Without Contrast [[id number]] Collected:  10/22/17 1800     Updated:  10/22/17 1800    Narrative:       EXAMINATION: CT ABDOMEN PELVIS WO CONTRAST - 10/22/2017     INDICATION: A41.9-Sepsis, unspecified organism; L03.116-Cellulitis of  left lower limb. Swelling.      TECHNIQUE: Multiple axial CT imaging is obtained of the abdomen and  pelvis without the administration of oral or intravenous contrast.     The radiation dose reduction device was turned on for each scan per the  ALARA (As Low as Reasonably Achievable) protocol.     COMPARISON: 07/07/2010.     FINDINGS:      ABDOMEN: Chronic changes are seen at the lung bases bilaterally with  mild bronchiectatic changes present. Four-chamber cardiac enlargement.  The liver is homogeneous as well as the spleen with multiple stones in  the gallbladder. Pancreas is homogeneous in appearance. The kidneys and  adrenal glands are within normal limits. Small hiatal hernia. The  abdominal portion of the gastrointestinal tract is within normal limits.  Diverticulosis of the colon without evidence of  diverticulitis. Small  umbilical hernia containing mesenteric fat only.     PELVIS: Some stranding identified in the left inguinal region which may  be from prior hernia repair. A mesh is in place. There is diverticulosis  with no evidence of diverticulitis. No free fluid or free air.  The  pelvic portions of the gastrointestinal tract are otherwise within  normal limits. The pelvic organs are unremarkable. Small lymph nodes  identified in the inguinal region on the left. No bulky adenopathy is  identified. Findings are unchanged when compared to the prior study of  2010. The bony structures reveal degenerative changes identified within  the spine and pelvis.       Impression:       1. Diverticulosis of the colon without evidence of diverticulitis.  2. Gallstones.  3. Small umbilical hernia containing mesenteric fat only with chronic  interstitial changes seen at the lung bases. No definite acute  intra-abdominal or pelvic abnormality.     DICTATED:     10/22/2017  EDITED:          10/22/2017                            I have reviewed the medications.    Assessment/Plan   Assessment / Plan     Hospital Problem List     * (Principal)Sepsis    Chronic atrial fibrillation    Essential hypertension    Hyperlipidemia    Coronary artery disease    Overview Signed 9/13/2016  9:12 AM by Sarbjit Saunders     1. Coronary artery disease:  a. Preserved LV systolic function.   b. CABG for 3-vessel disease, July 2006.           h/o Endocarditis    Overview Signed 9/13/2016  9:12 AM by Sarbjit Saunders       2. Endocarditis, September 2009, treated with IV antibiotics:  a. MILTON by Dr. Valladares, 09/11/2009:  Small nodular non-pedunculated density, possibly consistent with small vegetation of the posterior leaflet of the mitral valve.  b. Previously treated with Dr. Vann.           RELL (obstructive sleep apnea)    Left leg cellulitis    Cognitive dysfunction    Subtherapeutic international normalized ratio (INR)             Brief Hospital Course to date:  Mike Lucero  is a 85 y.o. male with hx of a-fib on coumadin, CAD s/p CABG, previous endocarditis (2009, unknown organsim) presents after injury to left toe nail with subsequent redness spreading up leg.  Found to have temp of 101.7 and WBC 17.  Started on  empiric abx and admitted.    Assessment & Plan:  - continue vanc/zosyn for now, may change to rocephin.  Clinically looks to be improving and WBC now normalized.  Has previously seen ID for endocarditis in 2009 (? [facility]).  Will ask them to come by to assist with abx management.  - pharmacy to dose coumadin  - GB US ordered by admitting MD for some abd pain, but CT negative.  Doubt source of infection.  - home meds resumed as appropriate.    DVT Prophylaxis: coumadin    CODE STATUS: Full     Disposition: I expect the patient to be discharged to home in 1-2 days if improves.    Mauro Schwartz MD  10/23/17  8:46 AM

## 2017-10-23 NOTE — PLAN OF CARE
Problem: Patient Care Overview (Adult)  Goal: Plan of Care Review  Outcome: Ongoing (interventions implemented as appropriate)    10/23/17 0447   Coping/Psychosocial Response Interventions   Plan Of Care Reviewed With patient   Patient Care Overview   Progress progress towards functional goals is fair   Outcome Evaluation   Outcome Summary/Follow up Plan Patient complains of no pain. Redness and warmth noted on left lower extremity. Sleeping well throughout shift. Vitals stable. Continue to monitor.          Problem: Fall Risk (Adult)  Goal: Absence of Falls  Outcome: Ongoing (interventions implemented as appropriate)    10/23/17 3567   Fall Risk (Adult)   Absence of Falls making progress toward outcome

## 2017-10-23 NOTE — THERAPY DISCHARGE NOTE
Acute Care - Physical Therapy Initial Eval/Discharge  [facility]     Patient Name: Mike Lucero Jr.  : 1932  MRN: [id number]  Today's Date: 10/23/2017   Onset of Illness/Injury or Date of Surgery Date: 10/22/17  Date of Referral to PT: 10/22/17  Referring Physician: TIFFANY Mansfield      Admit Date: 10/22/2017    Visit Dx:    ICD-10-CM ICD-9-CM   1. Sepsis, due to unspecified organism A41.9 038.9     995.91   2. Cellulitis of left lower extremity L03.116 682.6   3. Impaired functional mobility, balance, gait, and endurance Z74.09 V49.89     Patient Active Problem List   Diagnosis   • Chronic atrial fibrillation   • Essential hypertension   • Hyperlipidemia   • Coronary artery disease   • Permanent atrial fibrillation   • Benign hypertension   • Hypercholesterolemia   • h/o Endocarditis   • Osteoarthritis   • Cataract   • Seasonal allergies   • Insomnia   • Daytime sleepiness   • RELL (obstructive sleep apnea)   • Snoring   • Memory loss   • Sepsis   • Left leg cellulitis   • Cognitive dysfunction   • Subtherapeutic international normalized ratio (INR)     Past Medical History:   Diagnosis Date   • Atrial fibrillation    • Cataract    • Chronic anticoagulation    • Coronary artery disease    • Diverticulosis    • Gallstones    • History of endocarditis    • HLD (hyperlipidemia)    • HTN (hypertension)    • Osteoarthritis    • Seasonal allergies     Seasonal allergies/rhinitis.      Past Surgical History:   Procedure Laterality Date   • APPENDECTOMY     • CORONARY ARTERY BYPASS GRAFT  2006    CABG for 3-vessel disease, 2006.   • INGUINAL HERNIA REPAIR     • KIDNEY SURGERY            PT ASSESSMENT (last 72 hours)      PT Evaluation       10/23/17 0934 10/22/17 1806    Rehab Evaluation    Document Type evaluation  -EH     Subjective Information agree to therapy;complains of;fatigue   hunger  -EH     Symptoms Noted During/After Treatment none  -EH     General Information    Patient Profile Review yes  -EH      Onset of Illness/Injury or Date of Surgery Date 10/22/17  -     Referring Physician TIFFANY Mansfield  -     General Observations pt supine in bed with wife in room,  -     Pertinent History Of Current Problem Pt presented to ED with 101.5 degree fever with congestion, cough, facial pain and decfrreased PO intake night PTA. Pt with LLE reedness/cellulitis with hx of injujry to L toe nail. Pt with hx ofr chronic A-fib on coumadin. Dx with sepsis.   -     Precautions/Limitations fall precautions;other (see comments)   US pending to assess for DVT; pt on coumadin  -     Prior Level of Function independent:;all household mobility;community mobility;ADL's;mod assist:;driving   per wife driving limited.  -     Equipment Currently Used at Home none  - none  -    Plans/Goals Discussed With patient;agreed upon  -     Risks Reviewed patient:;LOB;increased discomfort  -     Benefits Reviewed patient:;improve function;increase independence  -     Barriers to Rehab none identified  -     Living Environment    Lives With spouse  - spouse  -KB    Living Arrangements house  - house  -KB    Home Accessibility tub/shower is not walk in  -EH other (see comments);stairs to enter home   1 STEP  -KB    Stair Railings at Home  none  -    Type of Financial/Environmental Concern  none  -KB    Transportation Available  car  -    Clinical Impression    Date of Referral to PT 10/22/17  -     PT Diagnosis potential for decreased independence with mobility.  -     Criteria for Skilled Therapeutic Interventions Met no problems identified which require skilled intervention  -     Rehab Potential good, to achieve stated therapy goals  -     Pain Assessment    Pain Assessment Gibson-Escobedo FACES  -     Gibson-Baker FACES Pain Rating 4  -     Pain Location Back  -     Pain Intervention(s) Repositioned;Ambulation/increased activity  -     Response to Interventions improved.  -     Cognitive  Assessment/Intervention    Current Cognitive/Communication Assessment impaired   [place]  -     Orientation Status oriented x 4  -     Follows Commands/Answers Questions 100% of the time;able to follow single-step instructions;needs repetition   [place]  -     Personal Safety good awareness, safety precautions  -     Personal Safety Interventions gait belt;fall prevention program maintained;nonskid shoes/slippers when out of bed  -     ROM (Range of Motion)    General ROM Detail WNL throughout  -     MMT (Manual Muscle Testing)    General MMT Assessment Detail 4+/5 hip flexion, elbow extension BLE/BUEs., Remaining 5/5.  -     Bed Mobility, Assessment/Treatment    Bed Mob, Supine to Sit, [place] supervision required  -     Bed Mob, Sit to Supine, [place] supervision required  -     Transfer Assessment/Treatment    Transfers, Sit-Stand [place] supervision required  -     Transfers, Stand-Sit [place] supervision required  -     Toilet Transfer, [place] independent  -     Gait Assessment/Treatment    Gait, [place] Level supervision required  -     Gait, Assistive Device --   none  -     Gait, Distance (Feet) 300  -     Gait, Gait Pattern Analysis swing-through gait  -     Gait, Comment No LOB, [place].  -     Positioning and Restraints    Pre-Treatment Position in bed  -     Post Treatment Position bed  -EH     In Bed notified nsg;supine;call light within reach;encouraged to call for assist;exit alarm on;with family/caregiver  -       User Key  (r) = Recorded By, (t) = Taken By, (c) = Cosigned By    Initials Name Provider Type     Tena Mansfield, PT Physical Therapist    FELIZ Haddad LPN Licensed Nurse          Physical Therapy Education     Title: PT OT SLP Therapies (Done)     Topic: Physical Therapy (Done)     Point: Mobility training (Done)    Learning Progress Summary    Learner Readiness Method Response Comment Documented by Status   Patient  Acceptance E SALINAS BANEGAS   10/23/17 1024 Done   Significant Other Acceptance E SALINAS BANEGAS   10/23/17 1024 Done               Point: Body mechanics (Done)    Learning Progress Summary    Learner Readiness Method Response Comment Documented by Status   Patient Acceptance E SALINAS BANEGAS   10/23/17 1024 Done   Significant Other Acceptance E SALINAS BANEGAS   10/23/17 1024 Done                      User Key     Initials Effective Dates Name Provider Type [facility] 06/19/15 -  Tena Mansfield, PT Physical Therapist PT                PT Recommendation and Plan  Anticipated Equipment Needs At Discharge:  (none)  Anticipated Discharge Disposition: home with assist  PT Frequency: evaluation only  Plan of Care Review  Plan Of Care Reviewed With: patient  Progress: improving  Outcome Summary/Follow up Plan: Pt demonstrates bed mobility, transfers, ambulation with supervision without LOB or AD. Pt does not need skilled PT at this time.              Outcome Measures       10/23/17 0934          How much help from another person do you currently need...    Turning from your back to your side while in flat bed without using bedrails? 4  -EH      Moving from lying on back to sitting on the side of a flat bed without bedrails? 4  -EH      Moving to and from a bed to a chair (including a wheelchair)? 4  -EH      Standing up from a chair using your arms (e.g., wheelchair, bedside chair)? 4  -EH      Climbing 3-5 steps with a railing? 3  -EH      To walk in hospital room? 3  -EH      AM-PAC 6 Clicks Score 22  -      Functional Assessment    Outcome Measure Options AM-PAC 6 Clicks Basic Mobility (PT)  -        User Key  (r) = Recorded By, (t) = Taken By, (c) = Cosigned By    Initials Name Provider Type     Tena Mansfield, PT Physical Therapist           Time Calculation:         PT Charges       10/23/17 1026          Time Calculation    Start Time 0934  -      PT Received On 10/23/17  -      PT Goal Re-Cert Due Date 11/02/17  [facility]       Time Calculation- PT    Total Timed Code Minutes- PT 0 minute(s)  -        User Key  (r) = Recorded By, (t) = Taken By, (c) = Cosigned By    Initials Name Provider Type     Tena Mansfield PT Physical Therapist          Therapy Charges for Today     Code Description Service Date Service Provider Modifiers Qty    [id number] HC PT EVAL LOW COMPLEXITY 4 10/23/2017 Tena Mansfield, PT GP 1          PT G-Codes  Outcome Measure Options: AM-PAC 6 Clicks Basic Mobility (PT)    PT Discharge Summary  Anticipated Discharge Disposition: home with assist  Reason for Discharge: At baseline function, other (comment) (no need for skilled PT at this time.)  Outcomes Achieved: Able to achieve all goals within established timeline    Tena Mansfield, PT  10/23/2017

## 2017-10-24 VITALS
SYSTOLIC BLOOD PRESSURE: 152 MMHG | WEIGHT: 206 LBS | HEIGHT: 66 IN | OXYGEN SATURATION: 93 % | TEMPERATURE: 97.9 F | RESPIRATION RATE: 16 BRPM | BODY MASS INDEX: 33.11 KG/M2 | HEART RATE: 89 BPM | DIASTOLIC BLOOD PRESSURE: 88 MMHG

## 2017-10-24 PROBLEM — Z79.01 CHRONIC ANTICOAGULATION: Status: ACTIVE | Noted: 2017-10-24

## 2017-10-24 PROBLEM — L03.116 LEFT LEG CELLULITIS: Status: RESOLVED | Noted: 2017-10-22 | Resolved: 2017-10-24

## 2017-10-24 PROBLEM — R79.1 SUBTHERAPEUTIC INTERNATIONAL NORMALIZED RATIO (INR): Status: RESOLVED | Noted: 2017-10-22 | Resolved: 2017-10-24

## 2017-10-24 PROBLEM — A41.9 SEPSIS: Status: RESOLVED | Noted: 2017-10-22 | Resolved: 2017-10-24

## 2017-10-24 LAB
INR PPP: 1.84
PROTHROMBIN TIME: 20.4 SECONDS (ref 9.6–11.5)

## 2017-10-24 PROCEDURE — 85610 PROTHROMBIN TIME: CPT | Performed by: NURSE PRACTITIONER

## 2017-10-24 PROCEDURE — 99239 HOSP IP/OBS DSCHRG MGMT >30: CPT | Performed by: PHYSICIAN ASSISTANT

## 2017-10-24 PROCEDURE — 25010000002 PIPERACILLIN SOD-TAZOBACTAM PER 1 G: Performed by: NURSE PRACTITIONER

## 2017-10-24 RX ORDER — SACCHAROMYCES BOULARDII 250 MG
250 CAPSULE ORAL 2 TIMES DAILY
Qty: 42 CAPSULE | Refills: 0 | Status: SHIPPED | OUTPATIENT
Start: 2017-10-24 | End: 2017-11-14

## 2017-10-24 RX ADMIN — FINASTERIDE 5 MG: 5 TABLET, FILM COATED ORAL at 09:08

## 2017-10-24 RX ADMIN — POTASSIUM CHLORIDE 20 MEQ: 1.5 POWDER, FOR SOLUTION ORAL at 09:07

## 2017-10-24 RX ADMIN — ASPIRIN 81 MG: 81 TABLET, COATED ORAL at 09:07

## 2017-10-24 RX ADMIN — FLUTICASONE PROPIONATE 2 SPRAY: 50 SPRAY, METERED NASAL at 09:08

## 2017-10-24 RX ADMIN — Medication 250 MG: at 09:07

## 2017-10-24 RX ADMIN — DILTIAZEM HYDROCHLORIDE 240 MG: 240 CAPSULE, COATED, EXTENDED RELEASE ORAL at 09:08

## 2017-10-24 RX ADMIN — METOPROLOL TARTRATE 25 MG: 25 TABLET ORAL at 09:07

## 2017-10-24 RX ADMIN — TAZOBACTAM SODIUM AND PIPERACILLIN SODIUM 3.38 G: 375; 3 INJECTION, SOLUTION INTRAVENOUS at 05:45

## 2017-10-24 RX ADMIN — TAZOBACTAM SODIUM AND PIPERACILLIN SODIUM 3.38 G: 375; 3 INJECTION, SOLUTION INTRAVENOUS at 00:24

## 2017-10-24 RX ADMIN — LISINOPRIL 20 MG: 20 TABLET ORAL at 09:08

## 2017-10-24 NOTE — PROGRESS NOTES
Mike Lucero Jr.  7/29/1932  [id number]  10/24/2017    CC: Severe sepsis.    Mike Lucero Jr. is a 85 y.o. male here for marked leukocytosis, lactic acidosis, elevated pro-calcitonin level, and left lower extremity cellulitis after avulsion of the left hallux nail matrix.      Past medical history:  Past Medical History:   Diagnosis Date   • Atrial fibrillation    • Cataract    • Chronic anticoagulation    • Coronary artery disease    • Diverticulosis    • Gallstones    • History of endocarditis    • HLD (hyperlipidemia)    • HTN (hypertension)    • Osteoarthritis    • Seasonal allergies     Seasonal allergies/rhinitis.        Medications:   Current Facility-Administered Medications:   •  ! Vanc trough due 10/24 @ 1430. Please hold 1500 dose until level assessed by pharmacy, thank you., , Does not apply, Once, ZULY Drew  •  acetaminophen (TYLENOL) tablet 650 mg, 650 mg, Oral, Q4H PRN, Janene Mansfield, APRN  •  aspirin EC tablet 81 mg, 81 mg, Oral, Daily, Janene LUIS Mansfield, APRN, 81 mg at 10/23/17 0911  •  atorvastatin (LIPITOR) tablet 10 mg, 10 mg, Oral, Nightly, Janeneradha Mansfield, APRN, 10 mg at 10/23/17 2041  •  diltiaZEM CD (CARDIZEM CD) 24 hr capsule 240 mg, 240 mg, Oral, Q24H, Janene LUIS Mansfield, APRN, 240 mg at 10/23/17 0911  •  donepezil (ARICEPT) tablet 10 mg, 10 mg, Oral, Nightly, Janene LUIS Mansfield, APRN, 10 mg at 10/23/17 2041  •  finasteride (PROSCAR) tablet 5 mg, 5 mg, Oral, Daily, Janene LUIS Mansfield, APRN, 5 mg at 10/23/17 0911  •  fluticasone (FLONASE) 50 MCG/ACT nasal spray 2 spray, 2 spray, Each Nare, Daily, Janenehoracio Mansfield APRN, 2 spray at 10/23/17 0912  •  HYDROcodone-acetaminophen (NORCO) 5-325 MG per tablet 1 tablet, 1 tablet, Oral, Q4H PRN, Janenehoracio Mansfield, APRN  •  lisinopril (PRINIVIL,ZESTRIL) tablet 20 mg, 20 mg, Oral, Daily, TIFFANY Enciso, 20 mg at 10/23/17 0911  •  metoprolol tartrate (LOPRESSOR) tablet 25 mg, 25 mg, Oral, BID, TIFFANY Enciso, 25 mg at 10/23/17  0911  •  Pharmacy to dose vancomycin, , Does not apply, Continuous PRN, ZULY Drew  •  Pharmacy to dose warfarin, , Does not apply, Continuous PRN, Tracie Delarosa RPH  •  piperacillin-tazobactam (ZOSYN) 3.375 g in iso-osmotic dextrose 50 ml (premix), 3.375 g, Intravenous, Q8H, Janene LUIS Mansfield, APRN, 3.375 g at 10/24/17 0545  •  potassium chloride (KLOR-CON) packet 20 mEq, 20 mEq, Oral, Daily, Janene R Donal, APRN, 20 mEq at 10/23/17 0911  •  saccharomyces boulardii (FLORASTOR) capsule 250 mg, 250 mg, Oral, BID, Janene R Mansfield, APRN, 250 mg at 10/23/17 1721  •  sodium chloride (OCEAN) nasal spray 1 spray, 1 spray, Nasal, Daily PRN, Janene R Mansfield, APRN  •  sodium chloride 0.9 % flush 1-10 mL, 1-10 mL, Intravenous, PRN, Janene R Donal, APRN  •  sodium chloride 0.9 % flush 10 mL, 10 mL, Intravenous, PRN, Jose Shi MD  •  terazosin (HYTRIN) capsule 5 mg, 5 mg, Oral, Nightly, Janene R Donal, APRN, 5 mg at 10/23/17 2041  •  traZODone (DESYREL) tablet 50 mg, 50 mg, Oral, Nightly, Janene LUIS Mansfield, APRN, 50 mg at 10/23/17 2041  •  vancomycin IVPB 1500 mg in 0.9% NaCl (Premix) 500 mL, 1,500 mg, Intravenous, Q24H, ZULY Drew, 1,500 mg at 10/23/17 2041  •  warfarin (COUMADIN) tablet 4 mg, 4 mg, Oral, Daily, Tracie Delarosa RPH, 4 mg at 10/23/17 1722  Antibiotics:  IV Anti-Infectives     Ordered     Dose/Rate Route Frequency Start Stop    10/22/17 1452  vancomycin IVPB 1500 mg in 0.9% NaCl (Premix) 500 mL     Ordering Provider:  ZULY Drew    1,500 mg Intravenous Every 24 Hours 10/23/17 1500      10/22/17 1642  piperacillin-tazobactam (ZOSYN) 3.375 g in iso-osmotic dextrose 50 ml (premix)     Ordering Provider:  TIFFANY Enciso    3.375 g  over 4 Hours Intravenous Every 8 Hours Scheduled 10/22/17 2200      10/22/17 1448  vancomycin IVPB 1750 mg in 0.9% Sodium Chloride (premix) 500 mL     Ordering Provider:  ZULY Drew    1,750 mg Intravenous Once 10/22/17 1500 10/22/17 1626     "10/22/17 1441  piperacillin-tazobactam (ZOSYN) 4.5 g in iso-osmotic dextrose 100 mL IVPB (premix)     Ordering Provider:  ZULY Drew    4.5 g Intravenous Once 10/22/17 1442 10/22/17 1625    10/22/17 1441  Pharmacy to dose vancomycin     Ordering Provider:  ZULY Drew     Does not apply Continuous PRN 10/22/17 1441            Allergies:  is allergic to temazepam.    Review of Systems: All other reviewed and negative except as per HPI    Blood pressure 134/87, pulse 58, temperature 97.2 °F (36.2 °C), temperature source Axillary, resp. rate 16, height 66\" (167.6 cm), weight 206 lb (93.4 kg), SpO2 93 %.  GENERAL: Awake and alert, in no acute distress. Mildly hard of hearing.  Does not appear to be clinically toxic.  Denies fever chills or night sweats.  No cough sputum production pleuritic chest pain or shortness of breath.  No nausea vomiting or diarrhea.  Decreased discomfort in left lower extremity.  No purulent drainage from left hallux nail plate.  HEENT: Oropharynx without thrush. Sinuses nontender. Dentition in good repair. No cervical adenopathy. No carotid bruits/ jugular venous distention.   EYES: PERRL. No conjunctival injection. No icterus. EOMI.  LYMPHATICS: No lymphadenopathy of the neck or axillary or inguinal regions.   HEART: No murmur, gallop, or pericardial friction rub.  Irregular rhythm.  LUNGS: Clear to auscultation anteriorly. No percussion dullness.   ABDOMEN: Soft, nontender, nondistended. No appreciable HSM.    SKIN: Warm and dry without cutaneous eruptions. No embolic stigmata.  Decreased blanching erythema and left lower extremity.  No purulent drainage from left hallux.  PSYCHIATRIC: Mental status lucid. Cranial nerve function intact.       DIAGNOSTICS:  Lab Results   Component Value Date    WBC 8.60 10/23/2017    HGB 13.6 10/23/2017    HCT 41.4 10/23/2017     (L) 10/23/2017     No results found for: CRP  No results found for: SEDRATE  Lab Results   Component Value Date    " GLUCOSE 106 (H) 10/23/2017    BUN 14 10/23/2017    CREATININE 0.80 10/23/2017    EGFRIFNONA 92 10/23/2017    BCR 17.5 10/23/2017    CO2 28.0 10/23/2017    CALCIUM 8.1 (L) 10/23/2017    ALBUMIN 3.40 10/23/2017    LABIL2 1.5 10/23/2017    AST 16 10/23/2017    ALT 11 10/23/2017       Microbiology:Blood cultures ×2 remain negative.    RADIOLOGY:  Imaging Results (last 72 hours)     Procedure Component Value Units Date/Time    CT Head Without Contrast [[id number]] Collected:  10/22/17 1526     Updated:  10/23/17 0926    Narrative:       EXAMINATION: CT HEAD WO CONTRAST - 10/22/2017     INDICATION: Frontal headache.      TECHNIQUE: Multiple axial CT imaging is obtained of the head from skull  base to skull vertex without the administration of intravenous contrast.     The radiation dose reduction device was turned on for each scan per the  ALARA (As Low as Reasonably Achievable) protocol.     COMPARISON: 10/05/2017.     FINDINGS: There is atrophy identified of the brain. No intracranial  hemorrhage or hydrocephalus. No mass, mass effect or midline shift. No  abnormal extra-axial fluid collections identified. There is no acute  parenchymal disease. Calvarium is unremarkable. The visualized paranasal  sinuses are grossly clear. Globes and orbits are intact. The mastoid air  cells are patent.       Impression:       Atrophy and chronic changes seen within the brain with no  acute intracranial abnormality identified.     DICTATED:     10/22/2017  EDITED:          10/22/2017        This report was finalized on 10/23/2017 9:24 AM by Dr. Aleida Riley MD.       XR Chest 1 View [[id number]] Collected:  10/22/17 1654     Updated:  10/23/17 0926    Narrative:       EXAMINATION: XR CHEST, SINGLE VIEW - 10/22/2017     INDICATION: Cough, fever.       COMPARISON: 08/27/2011.     FINDINGS: Portable chest reveals the patient to be status post median  sternotomy. The heart is enlarged. Vascular calcification seen within  the thoracic  aorta. No pleural effusion or  pneumothorax. Pulmonary  vascularity is within normal limits. Degenerative change is seen within  the spine. No focal parenchymal opacification present.           Impression:       Heart is enlarged with no evidence of acute parenchymal  disease.     DICTATED:     10/22/2017  EDITED:          10/22/2017           This report was finalized on 10/23/2017 9:24 AM by Dr. Aleida Riley MD.       CT Abdomen Pelvis Without Contrast [[id number]] Collected:  10/22/17 1800     Updated:  10/23/17 0927    Narrative:       EXAMINATION: CT ABDOMEN PELVIS WO CONTRAST - 10/22/2017     INDICATION: A41.9-Sepsis, unspecified organism; L03.116-Cellulitis of  left lower limb. Swelling.      TECHNIQUE: Multiple axial CT imaging is obtained of the abdomen and  pelvis without the administration of oral or intravenous contrast.     The radiation dose reduction device was turned on for each scan per the  ALARA (As Low as Reasonably Achievable) protocol.     COMPARISON: 07/07/2010.     FINDINGS:      ABDOMEN: Chronic changes are seen at the lung bases bilaterally with  mild bronchiectatic changes present. Four-chamber cardiac enlargement.  The liver is homogeneous as well as the spleen with multiple stones in  the gallbladder. Pancreas is homogeneous in appearance. The kidneys and  adrenal glands are within normal limits. Small hiatal hernia. The  abdominal portion of the gastrointestinal tract is within normal limits.  Diverticulosis of the colon without evidence of  diverticulitis. Small  umbilical hernia containing mesenteric fat only.     PELVIS: Some stranding identified in the left inguinal region which may  be from prior hernia repair. A mesh is in place. There is diverticulosis  with no evidence of diverticulitis. No free fluid or free air. The  pelvic portions of the gastrointestinal tract are otherwise within  normal limits. The pelvic organs are unremarkable. Small lymph nodes  identified in the  inguinal region on the left. No bulky adenopathy is  identified. Findings are unchanged when compared to the prior study of  2010. The bony structures reveal degenerative changes identified within  the spine and pelvis.       Impression:       1. Diverticulosis of the colon without evidence of diverticulitis.  2. Gallstones.  3. Small umbilical hernia containing mesenteric fat only with chronic  interstitial changes seen at the lung bases. No definite acute  intra-abdominal or pelvic abnormality.     DICTATED:     10/22/2017  EDITED:          10/22/2017                 This report was finalized on 10/23/2017 9:25 AM by Dr. Aleida Riley MD.       US Gallbladder [[id number]] Collected:  10/23/17 1241     Updated:  10/23/17 1543    Narrative:       EXAMINATION: US GALLBLADDER-10/23/2017:     INDICATION: Gallstones on CT and Sepsis admit; A41.9-Sepsis, unspecified  organism; L03.116-Cellulitis of left lower limb; Z74.09-Other reduced  mobility, right upper quadrant pain.     TECHNIQUE: Sonographic imaging was obtained of the right upper quadrant  in both the sagittal and transverse planes.     COMPARISON: NONE.     FINDINGS: The pancreas is not well seen due to overlying bowel gas.  There are multiple non-shadowing echogenic foci seen throughout the  liver suggesting old healed granulomatous disease. No intrahepatic  biliary ductal dilatation or underlying mass or lesion. There are  multiple stones seen with shadowing in the gallbladder. There is a small  amount of free fluid seen adjacent to the gallbladder suggesting  pericholecystic fluid. There is no wall thickening identified. The  common bile duct is within normal limits for the patient's age at 6 mm.  The right kidney is normal in size, configuration, and texture measuring  in length from pole to pole 13.0 cm. There is no solid cortical mass or  renal cortical cyst seen within the right kidney. No hydronephrosis or  nephrolithiasis.       Impression:        1.  Old healed granulomatous disease seen within the liver.  2.  Gallstones with minimal pericholecystic fluid and no gallbladder  wall thickening or biliary ductal dilatation.     D:  10/23/2017  E:  10/23/2017            This report was finalized on 10/23/2017 3:41 PM by Dr. Aleida Riley MD.             Assessment and Plan: Severe sepsis.  Marked leukocytosis.  Elevated pro-calcitonin level.  Metabolic acidosis with elevated serum lactate.  Left lower extremity cellulitis. Avulsion of left hallux nail matrix.  Maximum temperature over 24 hours 98.3°.  Currently 97.7.  Peripheral leukocyte count is down to 8600.  Serum lactate is down from a peak of 2.4-0.9.  Blood cultures ×2 remain negative.  Dupplex examination of the left lower extremity is negative for deep vein thrombosis.  Utilization management: Given the patient's dramatic clinical improvement, I believe that he is safe for discharge home today.  I would anticipate ceftriaxone 2 g IV by short butterfly given in our office over the next 7-10 days.  If discharged home today, follow-up with me in Thursday clinic.      Benny White MD  10/24/2017

## 2017-10-24 NOTE — PLAN OF CARE
Problem: Fall Risk (Adult)  Goal: Absence of Falls  Outcome: Ongoing (interventions implemented as appropriate)    10/24/17 0459   Fall Risk (Adult)   Absence of Falls making progress toward outcome

## 2017-10-24 NOTE — DISCHARGE SUMMARY
[facility] Medicine Services  DISCHARGE SUMMARY    Patient Name: Mike Lucero Jr.  : 1932  MRN: [id number]    Date of Admission: 10/22/2017  Date of Discharge:    Length of Stay: 2  Primary Care Physician: Ike Douglas MD    Consults     Date and Time Order Name Status Description    10/23/2017 0845 Inpatient Consult to Infectious Diseases Completed         Hospital Course     Presenting Problem:   Sepsis, due to unspecified organism [A41.9]    Active Hospital Problems (** Indicates Principal Problem)    Diagnosis Date Noted   • Chronic anticoagulation [Z79.01] 10/24/2017   • Left leg cellulitis [L03.116] 10/22/2017   • Cognitive dysfunction [F09] 10/22/2017   • RELL (obstructive sleep apnea) [G47.33]    • Coronary artery disease [I25.10]    • h/o Endocarditis [I38]    • Chronic atrial fibrillation [I48.2] 2016   • Essential hypertension [I10] 2016   • Hyperlipidemia [E78.5] 2016      Resolved Hospital Problems    Diagnosis Date Noted Date Resolved   • **Sepsis [A41.9] 10/22/2017 10/24/2017   • Subtherapeutic international normalized ratio (INR) [R79.1] 10/22/2017 10/24/2017      Hospital Course:  Mr. Mike Lucero is an 86yo male with past medical history significant for hypertension, hyperlipidemia, RELL on CPAP, CAD s/p CABG x 4 (), chronic atrial fibrillation on Coumadin therapy followed by Dr. Strickland with [facility] cardiology, cognitive function/memory loss followed by Dr. Jeovany Rodriguez and remote history of endocarditis presented to [facility] ED on 10/22/17 with sudden onset of fever and chills up to 101.5 prior to arrival.  He reported that he was in his usual state of health until the day prior to presentation when he began to develop fatigue and generalized malaise.  Patient's wife voiced concerns that his left toenail had oh.  A few days ago and now his left lower leg with erythematous with some associated mild edema.    ED  evaluation revealed temperature 101.4, pulse 91, respirations 28.  White blood cell count 17,000 with 89.6% neutrophils, lactic acid 2.4.  CT head showed no acute findings.  Influenza screen was negative.  Left lower extremity was noted to be erythematous with a circumferential redness from ankle extending up towards the knee.  Hospital medicine admitted the patient and he was started on empiric vancomycin and Zosyn.  Infectious disease was consulted for antibiotic management.  Dr. Benny White followed the patient.    Duplex of the left lower extremity was negative for DVT.  Blood cultures remained negative throughout this hospitalization.  White blood cell count decreased to 8600 on day of discharge.  Patient has been afebrile for over 24 hours.    INR was subtherapeutic on admission. Pharmacy dosed coumadin this admission. INR is 1.84 on day of discharge. He did receive 4mg Coumadin on 10/23 and at discharge, pharmacy recommends resuming home dose given strong history of therapeutic readings over the past year on his home dose.     Mr. Lucero improved rapidly with IV antibiotic therapy and is cleared for discharge on 10/24/17.  At discharge, will transition to IV ceftriaxone 2 g daily in the [facility] office over the next 7-10 days.  He will follow-up with Dr. White in clinic on Thursday 10/26/17.    Day of Discharge     HPI:   Didn't sleep well last night, woke up disoriented overnight and pulled his IV out. He says he feels much better than he did when he came in and thinks he would be more comfortable at home. Interested in d/c today.     Review of Systems  Gen- No fevers, chills  CV- No chest pain, palpitations  Resp- No cough, dyspnea  GI- No N/V/D, abd pain    Otherwise complete ROS is negative except as mentioned in the HPI.    Temp:  [97.2 °F (36.2 °C)-98.3 °F (36.8 °C)] 97.9 °F (36.6 °C)  Heart Rate:  [51-89] 89  Resp:  [16] 16  BP: ()/(44-88) 152/88     Physical Exam:  Constitutional: No acute  distress, awake, alert  HENT: NCAT, mucous membranes moist  Respiratory: Clear to auscultation bilaterally, respiratory effort normal   Cardiovascular: RRR, no murmurs, rubs, or gallops, palpable pedal pulses bilaterally  Gastrointestinal: Positive bowel sounds, soft, nontender, nondistended  Musculoskeletal: Trace BLE edema. L great toe nail bed with dried blood. Erythema and warmth to medial side of L foot.   Psychiatric: Oriented x 3, appropriate affect, cooperative  Neurologic: Strength symmetric in all extremities, CN grossly intact to confrontation, speech is clear  Skin: No rashes    Pertinent  and/or Most Recent Results     Results from last 7 days  Lab Units 10/23/17  0518 10/23/17  0517 10/22/17  1315   WBC 10*3/mm3 8.60  --  17.08*   HEMOGLOBIN g/dL 13.6  --  16.2   HEMATOCRIT % 41.4  --  47.9   PLATELETS 10*3/mm3 121*  --  144*   SODIUM mmol/L  --  135 136   POTASSIUM mmol/L  --  3.5 3.7   CHLORIDE mmol/L  --  103 99   CO2 mmol/L  --  28.0 29.0   BUN mg/dL  --  14 15   CREATININE mg/dL  --  0.80 1.00   GLUCOSE mg/dL  --  106* 160*   CALCIUM mg/dL  --  8.1* 9.2     Results from last 7 days  Lab Units 10/24/17  0545 10/23/17  0517 10/22/17  1807 10/22/17  1329 10/22/17  1315   BILIRUBIN mg/dL  --  1.4*  --   --  2.5*   ALK PHOS U/L  --  39  --   --  52   ALT (SGPT) U/L  --  11  --   --  18   AST (SGOT) U/L  --  16  --   --  20   PROTIME Seconds 20.4* 19.1* 20.1* 20.4*  --    INR  1.84 1.72 1.81 1.84  --      Results from last 7 days  Lab Units 10/23/17  0517   TSH mIU/mL 1.555   HEMOGLOBIN A1C % 5.50     Brief Urine Lab Results  (Last result in the past 365 days)      Color   Clarity   Blood   Leuk Est   Nitrite   Protein   CREAT   Urine HCG        10/22/17 1609 Dark Yellow(A) Clear Small (1+)(A) Negative Negative 30 mg/dL (1+)(A)             Blood Culture   Date Value Ref Range Status   10/22/2017 No growth at 24 hours  Preliminary   10/22/2017 No growth at 24 hours  Preliminary     Imaging Results (all)      Procedure Component Value Units Date/Time    CT Head Without Contrast [[id number]] Collected:  10/22/17 1526     Updated:  10/23/17 0926    Narrative:       EXAMINATION: CT HEAD WO CONTRAST - 10/22/2017     INDICATION: Frontal headache.      TECHNIQUE: Multiple axial CT imaging is obtained of the head from skull  base to skull vertex without the administration of intravenous contrast.     The radiation dose reduction device was turned on for each scan per the  ALARA (As Low as Reasonably Achievable) protocol.     COMPARISON: 10/05/2017.     FINDINGS: There is atrophy identified of the brain. No intracranial  hemorrhage or hydrocephalus. No mass, mass effect or midline shift. No  abnormal extra-axial fluid collections identified. There is no acute  parenchymal disease. Calvarium is unremarkable. The visualized paranasal  sinuses are grossly clear. Globes and orbits are intact. The mastoid air  cells are patent.       Impression:       Atrophy and chronic changes seen within the brain with no  acute intracranial abnormality identified.     DICTATED:     10/22/2017  EDITED:          10/22/2017     This report was finalized on 10/23/2017 9:24 AM by Dr. Aleida Riley MD.       XR Chest 1 View [[id number]] Collected:  10/22/17 1654     Updated:  10/23/17 0926    Narrative:       EXAMINATION: XR CHEST, SINGLE VIEW - 10/22/2017     INDICATION: Cough, fever.       COMPARISON: 08/27/2011.     FINDINGS: Portable chest reveals the patient to be status post median  sternotomy. The heart is enlarged. Vascular calcification seen within  the thoracic aorta. No pleural effusion or  pneumothorax. Pulmonary  vascularity is within normal limits. Degenerative change is seen within  the spine. No focal parenchymal opacification present.           Impression:       Heart is enlarged with no evidence of acute parenchymal  disease.     DICTATED:     10/22/2017  EDITED:          10/22/2017     This report was finalized on 10/23/2017  9:24 AM by Dr. Aleida Riley MD.       CT Abdomen Pelvis Without Contrast [[id number]] Collected:  10/22/17 1800     Updated:  10/23/17 0927    Narrative:       EXAMINATION: CT ABDOMEN PELVIS WO CONTRAST - 10/22/2017     INDICATION: A41.9-Sepsis, unspecified organism; L03.116-Cellulitis of  left lower limb. Swelling.      TECHNIQUE: Multiple axial CT imaging is obtained of the abdomen and  pelvis without the administration of oral or intravenous contrast.     The radiation dose reduction device was turned on for each scan per the  ALARA (As Low as Reasonably Achievable) protocol.     COMPARISON: 07/07/2010.     FINDINGS:      ABDOMEN: Chronic changes are seen at the lung bases bilaterally with  mild bronchiectatic changes present. Four-chamber cardiac enlargement.  The liver is homogeneous as well as the spleen with multiple stones in  the gallbladder. Pancreas is homogeneous in appearance. The kidneys and  adrenal glands are within normal limits. Small hiatal hernia. The  abdominal portion of the gastrointestinal tract is within normal limits.  Diverticulosis of the colon without evidence of  diverticulitis. Small  umbilical hernia containing mesenteric fat only.     PELVIS: Some stranding identified in the left inguinal region which may  be from prior hernia repair. A mesh is in place. There is diverticulosis  with no evidence of diverticulitis. No free fluid or free air. The  pelvic portions of the gastrointestinal tract are otherwise within  normal limits. The pelvic organs are unremarkable. Small lymph nodes  identified in the inguinal region on the left. No bulky adenopathy is  identified. Findings are unchanged when compared to the prior study of  2010. The bony structures reveal degenerative changes identified within  the spine and pelvis.       Impression:       1. Diverticulosis of the colon without evidence of diverticulitis.  2. Gallstones.  3. Small umbilical hernia containing mesenteric fat only  with chronic  interstitial changes seen at the lung bases. No definite acute  intra-abdominal or pelvic abnormality.     DICTATED:     10/22/2017  EDITED:          10/22/2017     This report was finalized on 10/23/2017 9:25 AM by Dr. Aleida Riley MD.       US Gallbladder [[id number]] Collected:  10/23/17 1241     Updated:  10/23/17 1543    Narrative:       EXAMINATION: US GALLBLADDER-10/23/2017:     INDICATION: Gallstones on CT and Sepsis admit; A41.9-Sepsis, unspecified  organism; L03.116-Cellulitis of left lower limb; Z74.09-Other reduced  mobility, right upper quadrant pain.     TECHNIQUE: Sonographic imaging was obtained of the right upper quadrant  in both the sagittal and transverse planes.     COMPARISON: NONE.     FINDINGS: The pancreas is not well seen due to overlying bowel gas.  There are multiple non-shadowing echogenic foci seen throughout the  liver suggesting old healed granulomatous disease. No intrahepatic  biliary ductal dilatation or underlying mass or lesion. There are  multiple stones seen with shadowing in the gallbladder. There is a small  amount of free fluid seen adjacent to the gallbladder suggesting  pericholecystic fluid. There is no wall thickening identified. The  common bile duct is within normal limits for the patient's age at 6 mm.  The right kidney is normal in size, configuration, and texture measuring  in length from pole to pole 13.0 cm. There is no solid cortical mass or  renal cortical cyst seen within the right kidney. No hydronephrosis or  nephrolithiasis.       Impression:       1.  Old healed granulomatous disease seen within the liver.  2.  Gallstones with minimal pericholecystic fluid and no gallbladder  wall thickening or biliary ductal dilatation.     D:  10/23/2017  E:  10/23/2017     This report was finalized on 10/23/2017 3:41 PM by Dr. Aleida Riley MD.         Order Current Status    Blood Culture - Blood, Preliminary result    Blood Culture - Blood,  Preliminary result        Discharge Details      Mike Lucero JAZZ Calvin.   Home Medication Instructions HAIDER:[id number]    Printed on:10/24/17 6747   Medication Information                      aspirin 81 MG EC tablet  Take 81 mg by mouth daily.             cefTRIAXone 2 g in dextrose 5 % 50 mL IVPB  Infuse 2 g into a venous catheter Daily.             diltiaZEM CD (CARDIZEM CD) 240 MG 24 hr capsule  Take 240 mg by mouth Daily.             donepezil (ARICEPT) 10 MG tablet  Take 1 tablet by mouth Daily.             finasteride (PROSCAR) 5 MG tablet  Take 5 mg by mouth Daily.             lisinopril (PRINIVIL,ZESTRIL) 20 MG tablet  Take 20 mg by mouth Daily.             metoprolol tartrate (LOPRESSOR) 25 MG tablet  Take 25 mg by mouth 2 (Two) Times a Day.             potassium chloride (K-DUR,KLOR-CON) 20 MEQ CR tablet  Take 20 mEq by mouth Daily.             saccharomyces boulardii (FLORASTOR) 250 MG capsule  Take 1 capsule by mouth 2 (Two) Times a Day for 21 days.             simvastatin (ZOCOR) 10 MG tablet  Take 10 mg by mouth every night.             sodium chloride (OCEAN) 0.65 % nasal spray  1 spray into each nostril Daily As Needed for Congestion.             traZODone (DESYREL) 50 MG tablet  Take 50 mg by mouth every night.             warfarin (COUMADIN) 2.5 MG tablet  TAKE ONE TABLET BY MOUTH DAILY OR AS DIRECTED BY ANTICOAGULATION PHARMACIST               Discharge Disposition:  Home or Self Care    Discharge Diet:  Diet Instructions     Diet: Regular, Cardiac; Thin       Discharge Diet:   Regular  Cardiac      Fluid Consistency:  Thin               Activity Instructions     Activity as Tolerated                   Future Appointments  Date Time Provider Department Center   11/15/2017 11:30 AM Jeovany Rodriguez MD [facility] None   11/15/2017 1:00 PM ANTI COAG CLINIC  [facility] None   9/19/2018 1:30 PM Ike Strickland III, MD [facility] None     Additional Instructions for the Follow-ups that You Need to  Schedule     Discharge Follow-Up With Specified Provider    As directed    To:  Dr. Benny Cardoza - follow up on Thursday 10/26/17       Discharge Follow-up with PCP    As directed    Follow Up Details:  PCP follow up in 7-10 days or sooner if needed               Time Spent on Discharge: 40 minutes    Susi Cifuentes PA-C  10/24/17  9:23 AM

## 2017-10-24 NOTE — PROGRESS NOTES
Continued Stay Note  [facility]     Patient Name: Mike Lucero Jr.  MRN: [id number]  Today's Date: 10/24/2017    Admit Date: 10/22/2017          Discharge Plan       10/24/17 0949    Case Management/Social Work Plan    Additional Comments Pt reports his goal is to return home and follow up with [facility] for IV antibiotics. Per [facility] they will schedule the first appointment with pt.    Final Note    Final Note home with follow up with [facility]              Discharge Codes     None        Expected Discharge Date and Time     Expected Discharge Date Expected Discharge Time    Oct 24, 2017             Sally Garg RN

## 2017-10-27 LAB
BACTERIA SPEC AEROBE CULT: NORMAL
BACTERIA SPEC AEROBE CULT: NORMAL

## 2017-11-15 ENCOUNTER — ANTICOAGULATION VISIT (OUTPATIENT)
Dept: PHARMACY | Facility: HOSPITAL | Age: 82
End: 2017-11-15

## 2017-11-15 DIAGNOSIS — I48.20 CHRONIC ATRIAL FIBRILLATION (HCC): ICD-10-CM

## 2017-11-15 LAB
INR PPP: 1.8 (ref 0.91–1.09)
PROTHROMBIN TIME: 22 SECONDS (ref 10–13.8)

## 2017-11-15 PROCEDURE — G0463 HOSPITAL OUTPT CLINIC VISIT: HCPCS

## 2017-11-15 PROCEDURE — 85610 PROTHROMBIN TIME: CPT

## 2017-11-15 PROCEDURE — 36416 COLLJ CAPILLARY BLOOD SPEC: CPT

## 2017-11-15 RX ORDER — ASPIRIN 81 MG/1
81 TABLET ORAL DAILY
COMMUNITY
End: 2021-12-15 | Stop reason: HOSPADM

## 2017-11-15 RX ORDER — CEPHALEXIN 500 MG/1
500 CAPSULE ORAL 2 TIMES DAILY
COMMUNITY
End: 2017-11-20

## 2017-11-15 NOTE — PROGRESS NOTES
Anticoagulation Clinic Progress Note  Indication: afib  Referring Provider: Em  Goal INR: 2-3  Current Drug Interactions: aspirin, simvastatin, trazodone  Other: no bleeding per patient  [id number]:    Diet: Typically doesn't eat many Vit K foods    Anticoagulation Clinic INR History:  Date 9/14 10/12 11/9 12/7 1/11 2/15 3/22 4/26 5/31   Total Weekly Dose 17.5 mg 17.5 mg 17.5 mg 17.5 mg 17.5 mg 17.5 mg 17.5 mg 17.5 mg 17.5mg   INR 3.0 2.4 2.5 2.5 2.3 2.3 2.6 2.5 2.6   Notes               Date 7/5 8/9 8/30 9/18 10/16 11/15      Total Weekly Dose 17.5  mg 17.5 mg 17.5 mg 17.5 mg 17.5 mg 17.5mg      INR 2.2 1.9 2.1 2.0 2.2 1.8      Notes       abx        Warfarin Dosing During Admission:  Date  10/22 10/23                 INR  1.84 1.72                 Dose  2.5 mg (4 mg)                      Clinic Interview:  Tablet Strength: 2.5mg tablets   Current Dose: 2.5mg daily  Patient Findings      Positives Change in medications, Change in diet/appetite, Hospital admission     Negatives Signs/symptoms of thrombosis, Signs/symptoms of bleeding, Laboratory test error suspected, Change in health, Change in alcohol use, Change in activity, Upcoming invasive procedure, Emergency department visit, Upcoming dental procedure, Missed doses, Extra doses, Bruising, Other complaints     Comments Mr. Lucero was admitted to inpatient at [facility] where he was given antibiotics for sepsis. Patient came to hospital for IV ceftriaxone x 7-10 days at Dr. White's office and then switched to oral cephalexin 500mg TID x 30 capsule picked up on 10/26, however, was still receiving IV abx. He will finish his last dose today. Reviewed with [facility] pharmacy. Patient states that his appetite has decreased, however, he is still eating. Discussed that cranberry's eaten over the holiday can cause his INR to increase.     Plan:  1. INR is SUBtherapeutic. Given Mr. Lucero was admitted through ED with a subtherapeutic INR and his INR is  SUBtherapeutic today with abx use. Instructed Mr. Lucero to finish his last dose of abx tonight and to increase dose slightly to continue maintenance warfarin dose, 2.5mg daily except 3.75mg on Wed.  2. Would prefer patient return to clinic in one week, however, given the holiday unable to, therefore, will RTC in two weeks.  3. See above re: recent med changes.  4. Patient declines warfarin refills.  5. Verbal and written information provided. Pt expresses understanding and has no further questions at this time.      Colleen Nolasco, PharmD  11/15/2017  2:31 PM

## 2017-11-20 ENCOUNTER — OFFICE VISIT (OUTPATIENT)
Dept: NEUROLOGY | Facility: CLINIC | Age: 82
End: 2017-11-20

## 2017-11-20 VITALS
HEIGHT: 67 IN | DIASTOLIC BLOOD PRESSURE: 80 MMHG | WEIGHT: 202 LBS | BODY MASS INDEX: 31.71 KG/M2 | SYSTOLIC BLOOD PRESSURE: 130 MMHG

## 2017-11-20 DIAGNOSIS — G31.84 MILD COGNITIVE IMPAIRMENT: Primary | ICD-10-CM

## 2017-11-20 PROCEDURE — 99214 OFFICE O/P EST MOD 30 MIN: CPT | Performed by: PSYCHIATRY & NEUROLOGY

## 2017-11-20 RX ORDER — MEMANTINE HYDROCHLORIDE 5 MG-10 MG
KIT ORAL
Qty: 1 PACKAGE | Refills: 0 | Status: SHIPPED | OUTPATIENT
Start: 2017-11-20 | End: 2018-03-08

## 2017-11-20 NOTE — PROGRESS NOTES
"Subjective      CC: memory loss    History of Present Illness   Mike Lucero Jr. is a 85 y.o. male who returns to clinic today with a history of possible Mild Cognitive Impairment (MCI) . He was previously followed by Dr. Whyte. He has noted symptoms for at least several years marked initially by forgetfulness as well as naming and word-finding difficulties. This has gradually worsened  over time. Additional symptoms have included impairments in orientation and executive function. There have been no associated  symptoms of anxiety and depression. He denies  impairments in ADL's. He manages his medications and finances. He continues to drive. He is currently residing at home with wife.      Prior evaluation has included screening blood work  and an MRI of the brain which were essentially unremarkable. He is currently taking donepezil which was started primarily based on the strength of the history from family supporting a diagnosis of MCI.      It should be noted that he has a history of RELL and has recently had his CPAP pressure adjusted after a titration study in 4/17 and is followed by Dr. Vieira.    Since his last visit on 8/9/17, he feels unchanged cognitively. He primarily notes difficulty recalling names. His wife also feels that he is unchanged since his last appointment.      The following portions of the patient's history were reviewed and updated as appropriate: allergies, current medications, past family history, past medical history, past social history, past surgical history and problem list.    Review of Systems   Constitutional: Positive for fatigue.   Respiratory: Negative.    Cardiovascular: Negative.    Gastrointestinal: Negative.    Genitourinary: Negative.    Musculoskeletal: Negative.    Psychiatric/Behavioral: Negative.        Objective     /80  Ht 67\" (170.2 cm)  Wt 202 lb (91.6 kg)  BMI 31.64 kg/m2    General appearance today is normal.       Physical Exam "   Constitutional: He is oriented to person, place, and time.   Neurological: He is oriented to person, place, and time. He has normal strength. He has a normal Finger-Nose-Finger Test.   Psychiatric: His speech is normal.        Neurologic Exam     Mental Status   Oriented to person, place, and time.   Registration: recalls 3 of 3 objects. Recall at 5 minutes: recalls 3 of 3 objects. Follows 3 step commands.   Attention: normal.   Speech: speech is normal   Level of consciousness: alert  Knowledge: good.   Able to name object. Able to read. Able to repeat. Able to write. Normal comprehension.     Cranial Nerves   Cranial nerves II through XII intact.     Motor Exam   Muscle bulk: normal  Overall muscle tone: normal    Strength   Strength 5/5 throughout.     Gait, Coordination, and Reflexes     Coordination   Finger to nose coordination: normal        Results  MMSE=30      Assessment/Plan   Mike was seen today for memory loss.    Diagnoses and all orders for this visit:    Mild cognitive impairment        Discussion/Summary   Mike Lucero Jr. returns to clinic today with a history of possible Mild Cognitive Impairment (MCI) . I again discussed the possibility that his memory impairment could be related to normal aging or other factors. I again reviewed his current status and treatment options. After discussing potential treatment options, it was elected to continue on  donepezil and add Namenda. I also discussed lifestyle issues and potential impact on cognitive function.  He will then follow up in 6 months, or sooner if needed.       I spent 15 minutes out of 25 minutes face to face with the patient and family and discussing diagnosis, prognosis, current status, treatment options, management and clinical trials.    As part of this visit I obtained additional history from the family which is incorporated in the HPI.      Jeovany Rodriguez MD

## 2017-11-27 RX ORDER — WARFARIN SODIUM 2.5 MG/1
TABLET ORAL
Qty: 35 TABLET | Refills: 2 | Status: SHIPPED | OUTPATIENT
Start: 2017-11-27 | End: 2018-03-08

## 2017-11-29 ENCOUNTER — ANTICOAGULATION VISIT (OUTPATIENT)
Dept: PHARMACY | Facility: HOSPITAL | Age: 82
End: 2017-11-29

## 2017-11-29 DIAGNOSIS — I48.20 CHRONIC ATRIAL FIBRILLATION (HCC): ICD-10-CM

## 2017-11-29 LAB
INR PPP: 1.9 (ref 0.91–1.09)
PROTHROMBIN TIME: 23.4 SECONDS (ref 10–13.8)

## 2017-11-29 PROCEDURE — G0463 HOSPITAL OUTPT CLINIC VISIT: HCPCS

## 2017-11-29 PROCEDURE — 85610 PROTHROMBIN TIME: CPT

## 2017-11-29 PROCEDURE — 36416 COLLJ CAPILLARY BLOOD SPEC: CPT

## 2017-12-13 ENCOUNTER — ANTICOAGULATION VISIT (OUTPATIENT)
Dept: PHARMACY | Facility: HOSPITAL | Age: 82
End: 2017-12-13

## 2017-12-13 DIAGNOSIS — I48.20 CHRONIC ATRIAL FIBRILLATION (HCC): ICD-10-CM

## 2017-12-13 LAB
INR PPP: 2.1 (ref 0.91–1.09)
PROTHROMBIN TIME: 25.3 SECONDS (ref 10–13.8)

## 2017-12-13 PROCEDURE — 36416 COLLJ CAPILLARY BLOOD SPEC: CPT

## 2017-12-13 PROCEDURE — G0463 HOSPITAL OUTPT CLINIC VISIT: HCPCS

## 2017-12-13 PROCEDURE — 85610 PROTHROMBIN TIME: CPT

## 2017-12-13 NOTE — PROGRESS NOTES
Anticoagulation Clinic Progress Note  Indication: afib  Referring Provider: Em  Goal INR: 2-3  Current Drug Interactions: aspirin, simvastatin, trazodone  Other: no bleeding per patient  [id number]:    Diet: Typically doesn't eat many Vit K foods    Anticoagulation Clinic INR History:  Date 9/14 10/12 11/9 12/7 1/11 2/15 3/22 4/26 5/31   Total Weekly Dose 17.5 mg 17.5 mg 17.5 mg 17.5 mg 17.5 mg 17.5 mg 17.5 mg 17.5 mg 17.5mg   INR 3.0 2.4 2.5 2.5 2.3 2.3 2.6 2.5 2.6   Notes               Date 7/5 8/9 8/30 9/18 10/16 11/15 11/29 12/13    Total Weekly Dose 17.5  mg 17.5 mg 17.5 mg 17.5 mg 17.5 mg 17.5 mg 18.75 mg 18.75mg 18.75mg   INR 2.2 1.9 2.1 2.0 2.2 1.8 1.9 2.1    Notes       abx        Clinic Interview:  Tablet Strength: 2.5mg tablets   Current Dose: 2.5mg daily except 3.75mg on Wed  Patient Findings      Positives Missed doses     Negatives Signs/symptoms of thrombosis, Signs/symptoms of bleeding, Laboratory test error suspected, Change in health, Change in alcohol use, Change in activity, Upcoming invasive procedure, Emergency department visit, Upcoming dental procedure, Extra doses, Change in medications, Change in diet/appetite, Hospital admission, Bruising, Other complaints     Comments Mr. Lucero was started on memantine shortly before his last visit to the clinic. He otherwise denies recent changes or complications, although upon discussion, discovered that he was only taking an extra 1/2 tab each Wednesday (3.75mg) rather than 2 tabs total (5mg).     Plan:  1. INR is therapeutic. Instructed Mr. Lucero to continue warfarin 2.5mg daily except 3.75mg on Wed.  2. RTC in 3 weeks.  3. Patient declines warfarin refills.  4. Verbal and written information provided. Pt expresses understanding with teach back and has no further questions at this time.    Stefanie Hogue [facility]  12/13/2017  2:42 PM

## 2018-01-03 ENCOUNTER — ANTICOAGULATION VISIT (OUTPATIENT)
Dept: PHARMACY | Facility: HOSPITAL | Age: 83
End: 2018-01-03

## 2018-01-03 DIAGNOSIS — I48.20 CHRONIC ATRIAL FIBRILLATION (HCC): ICD-10-CM

## 2018-01-03 LAB
INR PPP: 2.1 (ref 0.91–1.09)
PROTHROMBIN TIME: 25.4 SECONDS (ref 10–13.8)

## 2018-01-03 PROCEDURE — 85610 PROTHROMBIN TIME: CPT

## 2018-01-03 PROCEDURE — G0463 HOSPITAL OUTPT CLINIC VISIT: HCPCS

## 2018-01-03 PROCEDURE — 36416 COLLJ CAPILLARY BLOOD SPEC: CPT

## 2018-01-03 NOTE — PROGRESS NOTES
Anticoagulation Clinic Progress Note  Indication: afib  Referring Provider: Em  Goal INR: 2-3  Current Drug Interactions: aspirin, simvastatin, trazodone  Other: no bleeding per patient  [id number]:    Diet: Typically doesn't eat many Vit K foods    Anticoagulation Clinic INR History:  Date 9/14 10/12 11/9 12/7 1/11 2/15 3/22 4/26 5/31   Total Weekly Dose 17.5 mg 17.5 mg 17.5 mg 17.5 mg 17.5 mg 17.5 mg 17.5 mg 17.5 mg 17.5mg   INR 3.0 2.4 2.5 2.5 2.3 2.3 2.6 2.5 2.6   Notes               Date 7/5 8/9 8/30 9/18 10/16 11/15 11/29 12/13 1/3   Total Weekly Dose 17.5  mg 17.5 mg 17.5 mg 17.5 mg 17.5 mg 17.5 mg 18.75 mg 18.75mg 20mg   INR 2.2 1.9 2.1 2.0 2.2 1.8 1.9 2.1 2.1   Notes       abx   Keflex     Clinic Interview:  Tablet Strength: 2.5mg tablets   Current Dose: 2.5mg daily except 3.75mg on Wed  Patient Findings      Positives Extra doses, Change in medications, Other complaints     Negatives Signs/symptoms of thrombosis, Signs/symptoms of bleeding, Laboratory test error suspected, Change in health, Change in alcohol use, Change in activity, Upcoming invasive procedure, Emergency department visit, Upcoming dental procedure, Missed doses, Change in diet/appetite, Hospital admission, Bruising     Comments Patient reports that he took the 3.75mg last night because he wanted to take it before he came to the clinic. Patient reports that he slipped and fell on stairs on [address] on Eve and he went to the doctor yesterday. He was given Keflex 500mg 1 cap q6h x 7 days to fight a possible infection.     Patient is going to see Dr. Ike Douglas tomorrow. Will call clinic to ask if they can have a PT/INR drawn tomorrow with appointment.     Plan:  1. INR is therapeutic despite extra dose and new antibiotic use. Given patient's INR can likely increase with both changes, will plan to monitor Mr. Lucero closely. Instructed Mr. Lucero to continue warfarin 2.5mg daily except 3.75mg on Wed at this time.   2. Patient is going  to see Dr. Ike Douglas tomorrow. Will call clinic to ask if they can have a PT/INR drawn tomorrow with appointment. RTC on 1/8.  3. Patient declines warfarin refills.  4. Verbal and written information provided. Pt expresses understanding with teach back and has no further questions at this time.    Colleen Nolasco, PharmD  1/3/2018  2:36 PM

## 2018-01-04 LAB
BACTERIA UR QL AUTO: ABNORMAL /HPF
BILIRUB UR QL STRIP: NEGATIVE
CLARITY UR: CLEAR
COLOR UR: YELLOW
GLUCOSE UR STRIP-MCNC: NEGATIVE MG/DL
HGB UR QL STRIP.AUTO: ABNORMAL
HYALINE CASTS UR QL AUTO: ABNORMAL /LPF
KETONES UR QL STRIP: NEGATIVE
LEUKOCYTE ESTERASE UR QL STRIP.AUTO: NEGATIVE
NITRITE UR QL STRIP: NEGATIVE
PH UR STRIP.AUTO: 5.5 [PH] (ref 5–8)
PROT UR QL STRIP: NEGATIVE
RBC # UR: ABNORMAL /HPF
REF LAB TEST METHOD: ABNORMAL
SP GR UR STRIP: 1.01 (ref 1–1.03)
SQUAMOUS #/AREA URNS HPF: ABNORMAL /HPF
UROBILINOGEN UR QL STRIP: ABNORMAL
WBC UR QL AUTO: ABNORMAL /HPF

## 2018-01-04 PROCEDURE — 81001 URINALYSIS AUTO W/SCOPE: CPT | Performed by: EMERGENCY MEDICINE

## 2018-01-04 PROCEDURE — 99284 EMERGENCY DEPT VISIT MOD MDM: CPT

## 2018-01-05 ENCOUNTER — APPOINTMENT (OUTPATIENT)
Dept: GENERAL RADIOLOGY | Facility: HOSPITAL | Age: 83
End: 2018-01-05

## 2018-01-05 ENCOUNTER — APPOINTMENT (OUTPATIENT)
Dept: CT IMAGING | Facility: HOSPITAL | Age: 83
End: 2018-01-05

## 2018-01-05 ENCOUNTER — HOSPITAL ENCOUNTER (INPATIENT)
Facility: HOSPITAL | Age: 83
LOS: 3 days | Discharge: HOME | End: 2018-01-08
Attending: EMERGENCY MEDICINE | Admitting: INTERNAL MEDICINE

## 2018-01-05 DIAGNOSIS — B34.9 ACUTE VIRAL SYNDROME: ICD-10-CM

## 2018-01-05 DIAGNOSIS — D72.829 LEUKOCYTOSIS, UNSPECIFIED TYPE: ICD-10-CM

## 2018-01-05 DIAGNOSIS — R09.02 HYPOXIA: Primary | ICD-10-CM

## 2018-01-05 PROBLEM — J96.01 ACUTE RESPIRATORY FAILURE WITH HYPOXIA (HCC): Status: ACTIVE | Noted: 2018-01-05

## 2018-01-05 PROBLEM — J11.1 INFLUENZA: Status: ACTIVE | Noted: 2018-01-05

## 2018-01-05 LAB
ALBUMIN SERPL-MCNC: 4 G/DL (ref 3.2–4.8)
ALBUMIN/GLOB SERPL: 1.5 G/DL (ref 1.5–2.5)
ALP SERPL-CCNC: 61 U/L (ref 25–100)
ALT SERPL W P-5'-P-CCNC: 17 U/L (ref 7–40)
ANION GAP SERPL CALCULATED.3IONS-SCNC: 5 MMOL/L (ref 3–11)
ANION GAP SERPL CALCULATED.3IONS-SCNC: 9 MMOL/L (ref 3–11)
AST SERPL-CCNC: 22 U/L (ref 0–33)
BASOPHILS # BLD AUTO: 0.02 10*3/MM3 (ref 0–0.2)
BASOPHILS NFR BLD AUTO: 0.1 % (ref 0–1)
BILIRUB SERPL-MCNC: 1.5 MG/DL (ref 0.3–1.2)
BUN BLD-MCNC: 10 MG/DL (ref 9–23)
BUN BLD-MCNC: 14 MG/DL (ref 9–23)
BUN/CREAT SERPL: 14.3 (ref 7–25)
BUN/CREAT SERPL: 17.5 (ref 7–25)
CALCIUM SPEC-SCNC: 7.8 MG/DL (ref 8.7–10.4)
CALCIUM SPEC-SCNC: 8.8 MG/DL (ref 8.7–10.4)
CHLORIDE SERPL-SCNC: 100 MMOL/L (ref 99–109)
CHLORIDE SERPL-SCNC: 100 MMOL/L (ref 99–109)
CK SERPL-CCNC: 27 U/L (ref 26–174)
CO2 SERPL-SCNC: 26 MMOL/L (ref 20–31)
CO2 SERPL-SCNC: 27 MMOL/L (ref 20–31)
CREAT BLD-MCNC: 0.7 MG/DL (ref 0.6–1.3)
CREAT BLD-MCNC: 0.8 MG/DL (ref 0.6–1.3)
CRP SERPL-MCNC: 0.61 MG/DL (ref 0–1)
D-LACTATE SERPL-SCNC: 1 MMOL/L (ref 0.5–2)
DEPRECATED RDW RBC AUTO: 46.3 FL (ref 37–54)
DEPRECATED RDW RBC AUTO: 46.7 FL (ref 37–54)
EOSINOPHIL # BLD AUTO: 0.03 10*3/MM3 (ref 0–0.3)
EOSINOPHIL NFR BLD AUTO: 0.2 % (ref 0–3)
ERYTHROCYTE [DISTWIDTH] IN BLOOD BY AUTOMATED COUNT: 13.3 % (ref 11.3–14.5)
ERYTHROCYTE [DISTWIDTH] IN BLOOD BY AUTOMATED COUNT: 13.4 % (ref 11.3–14.5)
FLUAV AG NPH QL: NEGATIVE
FLUAV SUBTYP SPEC NAA+PROBE: NOT DETECTED
FLUBV AG NPH QL IA: NEGATIVE
FLUBV RNA ISLT QL NAA+PROBE: NOT DETECTED
GFR SERPL CREATININE-BSD FRML MDRD: 107 ML/MIN/1.73
GFR SERPL CREATININE-BSD FRML MDRD: 92 ML/MIN/1.73
GLOBULIN UR ELPH-MCNC: 2.6 GM/DL
GLUCOSE BLD-MCNC: 107 MG/DL (ref 70–100)
GLUCOSE BLD-MCNC: 115 MG/DL (ref 70–100)
HCT VFR BLD AUTO: 41.8 % (ref 38.9–50.9)
HCT VFR BLD AUTO: 43 % (ref 38.9–50.9)
HGB BLD-MCNC: 13.9 G/DL (ref 13.1–17.5)
HGB BLD-MCNC: 14.4 G/DL (ref 13.1–17.5)
IMM GRANULOCYTES # BLD: 0.05 10*3/MM3 (ref 0–0.03)
IMM GRANULOCYTES NFR BLD: 0.3 % (ref 0–0.6)
INR PPP: 1.81
INR PPP: 1.94
LYMPHOCYTES # BLD AUTO: 0.88 10*3/MM3 (ref 0.6–4.8)
LYMPHOCYTES NFR BLD AUTO: 5.2 % (ref 24–44)
MAGNESIUM SERPL-MCNC: 2 MG/DL (ref 1.3–2.7)
MCH RBC QN AUTO: 31.8 PG (ref 27–31)
MCH RBC QN AUTO: 31.9 PG (ref 27–31)
MCHC RBC AUTO-ENTMCNC: 33.3 G/DL (ref 32–36)
MCHC RBC AUTO-ENTMCNC: 33.5 G/DL (ref 32–36)
MCV RBC AUTO: 95.3 FL (ref 80–99)
MCV RBC AUTO: 95.7 FL (ref 80–99)
MONOCYTES # BLD AUTO: 1.2 10*3/MM3 (ref 0–1)
MONOCYTES NFR BLD AUTO: 7.1 % (ref 0–12)
NEUTROPHILS # BLD AUTO: 14.8 10*3/MM3 (ref 1.5–8.3)
NEUTROPHILS NFR BLD AUTO: 87.1 % (ref 41–71)
PLATELET # BLD AUTO: 143 10*3/MM3 (ref 150–450)
PLATELET # BLD AUTO: 165 10*3/MM3 (ref 150–450)
PMV BLD AUTO: 9.1 FL (ref 6–12)
PMV BLD AUTO: 9.4 FL (ref 6–12)
POTASSIUM BLD-SCNC: 3.7 MMOL/L (ref 3.5–5.5)
POTASSIUM BLD-SCNC: 3.7 MMOL/L (ref 3.5–5.5)
PROCALCITONIN SERPL-MCNC: 0.64 NG/ML
PROT SERPL-MCNC: 6.6 G/DL (ref 5.7–8.2)
PROTHROMBIN TIME: 20.1 SECONDS (ref 9.6–11.5)
PROTHROMBIN TIME: 21.6 SECONDS (ref 9.6–11.5)
RBC # BLD AUTO: 4.37 10*6/MM3 (ref 4.2–5.76)
RBC # BLD AUTO: 4.51 10*6/MM3 (ref 4.2–5.76)
SODIUM BLD-SCNC: 132 MMOL/L (ref 132–146)
SODIUM BLD-SCNC: 135 MMOL/L (ref 132–146)
TROPONIN I SERPL-MCNC: 0.01 NG/ML
TROPONIN I SERPL-MCNC: 0.02 NG/ML
WBC NRBC COR # BLD: 12.44 10*3/MM3 (ref 3.5–10.8)
WBC NRBC COR # BLD: 16.98 10*3/MM3 (ref 3.5–10.8)

## 2018-01-05 PROCEDURE — 93010 ELECTROCARDIOGRAM REPORT: CPT | Performed by: INTERNAL MEDICINE

## 2018-01-05 PROCEDURE — 93005 ELECTROCARDIOGRAM TRACING: CPT | Performed by: HOSPITALIST

## 2018-01-05 PROCEDURE — 80053 COMPREHEN METABOLIC PANEL: CPT | Performed by: EMERGENCY MEDICINE

## 2018-01-05 PROCEDURE — 74178 CT ABD&PLV WO CNTR FLWD CNTR: CPT

## 2018-01-05 PROCEDURE — 87804 INFLUENZA ASSAY W/OPTIC: CPT | Performed by: EMERGENCY MEDICINE

## 2018-01-05 PROCEDURE — 99223 1ST HOSP IP/OBS HIGH 75: CPT | Performed by: HOSPITALIST

## 2018-01-05 PROCEDURE — 63710000001 DIPHENHYDRAMINE PER 50 MG: Performed by: FAMILY MEDICINE

## 2018-01-05 PROCEDURE — 25010000002 VANCOMYCIN 10 G RECONSTITUTED SOLUTION

## 2018-01-05 PROCEDURE — 71045 X-RAY EXAM CHEST 1 VIEW: CPT

## 2018-01-05 PROCEDURE — 0 DIATRIZOATE MEGLUMINE & SODIUM PER 1 ML

## 2018-01-05 PROCEDURE — 85025 COMPLETE CBC W/AUTO DIFF WBC: CPT | Performed by: EMERGENCY MEDICINE

## 2018-01-05 PROCEDURE — 82550 ASSAY OF CK (CPK): CPT | Performed by: INTERNAL MEDICINE

## 2018-01-05 PROCEDURE — 25010000002 CEFTRIAXONE PER 250 MG: Performed by: NURSE PRACTITIONER

## 2018-01-05 PROCEDURE — 84145 PROCALCITONIN (PCT): CPT | Performed by: EMERGENCY MEDICINE

## 2018-01-05 PROCEDURE — 85610 PROTHROMBIN TIME: CPT | Performed by: NURSE PRACTITIONER

## 2018-01-05 PROCEDURE — 85610 PROTHROMBIN TIME: CPT | Performed by: EMERGENCY MEDICINE

## 2018-01-05 PROCEDURE — 87040 BLOOD CULTURE FOR BACTERIA: CPT | Performed by: EMERGENCY MEDICINE

## 2018-01-05 PROCEDURE — 86140 C-REACTIVE PROTEIN: CPT | Performed by: HOSPITALIST

## 2018-01-05 PROCEDURE — 25010000002 CEFTRIAXONE PER 250 MG: Performed by: EMERGENCY MEDICINE

## 2018-01-05 PROCEDURE — 85027 COMPLETE CBC AUTOMATED: CPT | Performed by: NURSE PRACTITIONER

## 2018-01-05 PROCEDURE — 83735 ASSAY OF MAGNESIUM: CPT | Performed by: HOSPITALIST

## 2018-01-05 PROCEDURE — 25010000002 PIPERACILLIN SOD-TAZOBACTAM PER 1 G: Performed by: HOSPITALIST

## 2018-01-05 PROCEDURE — 83605 ASSAY OF LACTIC ACID: CPT | Performed by: EMERGENCY MEDICINE

## 2018-01-05 PROCEDURE — 87070 CULTURE OTHR SPECIMN AEROBIC: CPT | Performed by: INTERNAL MEDICINE

## 2018-01-05 PROCEDURE — 87502 INFLUENZA DNA AMP PROBE: CPT | Performed by: EMERGENCY MEDICINE

## 2018-01-05 PROCEDURE — 87205 SMEAR GRAM STAIN: CPT | Performed by: INTERNAL MEDICINE

## 2018-01-05 PROCEDURE — 84484 ASSAY OF TROPONIN QUANT: CPT | Performed by: HOSPITALIST

## 2018-01-05 PROCEDURE — 0 IOPAMIDOL 61 % SOLUTION: Performed by: HOSPITALIST

## 2018-01-05 RX ORDER — ACETAMINOPHEN 325 MG/1
650 TABLET ORAL EVERY 4 HOURS PRN
Status: DISCONTINUED | OUTPATIENT
Start: 2018-01-05 | End: 2018-01-08 | Stop reason: HOSPADM

## 2018-01-05 RX ORDER — MAGNESIUM SULFATE HEPTAHYDRATE 40 MG/ML
2 INJECTION, SOLUTION INTRAVENOUS AS NEEDED
Status: DISCONTINUED | OUTPATIENT
Start: 2018-01-05 | End: 2018-01-08 | Stop reason: HOSPADM

## 2018-01-05 RX ORDER — SODIUM CHLORIDE 0.9 % (FLUSH) 0.9 %
10 SYRINGE (ML) INJECTION AS NEEDED
Status: DISCONTINUED | OUTPATIENT
Start: 2018-01-05 | End: 2018-01-08 | Stop reason: HOSPADM

## 2018-01-05 RX ORDER — FINASTERIDE 5 MG/1
5 TABLET, FILM COATED ORAL DAILY
Status: DISCONTINUED | OUTPATIENT
Start: 2018-01-05 | End: 2018-01-08 | Stop reason: HOSPADM

## 2018-01-05 RX ORDER — DILTIAZEM HYDROCHLORIDE 120 MG/1
240 CAPSULE, COATED, EXTENDED RELEASE ORAL DAILY
Status: DISCONTINUED | OUTPATIENT
Start: 2018-01-05 | End: 2018-01-06

## 2018-01-05 RX ORDER — ASPIRIN 81 MG/1
81 TABLET ORAL DAILY
Status: DISCONTINUED | OUTPATIENT
Start: 2018-01-05 | End: 2018-01-08 | Stop reason: HOSPADM

## 2018-01-05 RX ORDER — NITROGLYCERIN 0.4 MG/1
0.4 TABLET SUBLINGUAL
Status: DISCONTINUED | OUTPATIENT
Start: 2018-01-05 | End: 2018-01-08 | Stop reason: HOSPADM

## 2018-01-05 RX ORDER — CEFTRIAXONE SODIUM 1 G/50ML
1 INJECTION, SOLUTION INTRAVENOUS
Status: DISCONTINUED | OUTPATIENT
Start: 2018-01-05 | End: 2018-01-05

## 2018-01-05 RX ORDER — POTASSIUM CHLORIDE 1.5 G/1.77G
40 POWDER, FOR SOLUTION ORAL AS NEEDED
Status: DISCONTINUED | OUTPATIENT
Start: 2018-01-05 | End: 2018-01-08 | Stop reason: HOSPADM

## 2018-01-05 RX ORDER — ACETAMINOPHEN 500 MG
1000 TABLET ORAL ONCE
Status: COMPLETED | OUTPATIENT
Start: 2018-01-05 | End: 2018-01-05

## 2018-01-05 RX ORDER — DIAPER,BRIEF,INFANT-TODD,DISP
EACH MISCELLANEOUS EVERY 12 HOURS SCHEDULED
Status: DISCONTINUED | OUTPATIENT
Start: 2018-01-05 | End: 2018-01-08 | Stop reason: HOSPADM

## 2018-01-05 RX ORDER — SODIUM CHLORIDE 0.9 % (FLUSH) 0.9 %
1-10 SYRINGE (ML) INJECTION AS NEEDED
Status: DISCONTINUED | OUTPATIENT
Start: 2018-01-05 | End: 2018-01-08 | Stop reason: HOSPADM

## 2018-01-05 RX ORDER — ATORVASTATIN CALCIUM 10 MG/1
10 TABLET, FILM COATED ORAL NIGHTLY
Status: DISCONTINUED | OUTPATIENT
Start: 2018-01-05 | End: 2018-01-08 | Stop reason: HOSPADM

## 2018-01-05 RX ORDER — CEPHALEXIN 250 MG/1
250 CAPSULE ORAL 4 TIMES DAILY
COMMUNITY
End: 2018-01-08 | Stop reason: HOSPADM

## 2018-01-05 RX ORDER — DONEPEZIL HYDROCHLORIDE 10 MG/1
10 TABLET, FILM COATED ORAL NIGHTLY
Status: DISCONTINUED | OUTPATIENT
Start: 2018-01-05 | End: 2018-01-08 | Stop reason: HOSPADM

## 2018-01-05 RX ORDER — MAGNESIUM SULFATE HEPTAHYDRATE 40 MG/ML
4 INJECTION, SOLUTION INTRAVENOUS AS NEEDED
Status: DISCONTINUED | OUTPATIENT
Start: 2018-01-05 | End: 2018-01-08 | Stop reason: HOSPADM

## 2018-01-05 RX ORDER — WARFARIN SODIUM 2.5 MG/1
2.5 TABLET ORAL
Status: DISCONTINUED | OUTPATIENT
Start: 2018-01-05 | End: 2018-01-06

## 2018-01-05 RX ORDER — SODIUM CHLORIDE 9 MG/ML
75 INJECTION, SOLUTION INTRAVENOUS CONTINUOUS
Status: DISCONTINUED | OUTPATIENT
Start: 2018-01-05 | End: 2018-01-06

## 2018-01-05 RX ORDER — OSELTAMIVIR PHOSPHATE 75 MG/1
75 CAPSULE ORAL ONCE
Status: COMPLETED | OUTPATIENT
Start: 2018-01-05 | End: 2018-01-05

## 2018-01-05 RX ORDER — POTASSIUM CHLORIDE 750 MG/1
40 CAPSULE, EXTENDED RELEASE ORAL AS NEEDED
Status: DISCONTINUED | OUTPATIENT
Start: 2018-01-05 | End: 2018-01-08 | Stop reason: HOSPADM

## 2018-01-05 RX ORDER — CEFTRIAXONE SODIUM 1 G/50ML
1 INJECTION, SOLUTION INTRAVENOUS ONCE
Status: COMPLETED | OUTPATIENT
Start: 2018-01-05 | End: 2018-01-05

## 2018-01-05 RX ORDER — DIPHENHYDRAMINE HCL 25 MG
25 CAPSULE ORAL EVERY 6 HOURS PRN
Status: DISCONTINUED | OUTPATIENT
Start: 2018-01-05 | End: 2018-01-08 | Stop reason: HOSPADM

## 2018-01-05 RX ADMIN — ACETAMINOPHEN 650 MG: 325 TABLET, FILM COATED ORAL at 13:14

## 2018-01-05 RX ADMIN — ACETAMINOPHEN 650 MG: 325 TABLET, FILM COATED ORAL at 21:29

## 2018-01-05 RX ADMIN — DIPHENHYDRAMINE HYDROCHLORIDE 25 MG: 25 CAPSULE ORAL at 21:29

## 2018-01-05 RX ADMIN — DONEPEZIL HYDROCHLORIDE 10 MG: 10 TABLET, FILM COATED ORAL at 19:13

## 2018-01-05 RX ADMIN — WARFARIN SODIUM 2.5 MG: 2.5 TABLET ORAL at 17:19

## 2018-01-05 RX ADMIN — HYDROCORTISONE: 1 CREAM TOPICAL at 21:33

## 2018-01-05 RX ADMIN — POTASSIUM CHLORIDE 40 MEQ: 750 CAPSULE, EXTENDED RELEASE ORAL at 13:14

## 2018-01-05 RX ADMIN — ASPIRIN 81 MG: 81 TABLET, COATED ORAL at 19:13

## 2018-01-05 RX ADMIN — CEFTRIAXONE SODIUM 1 G: 1 INJECTION, SOLUTION INTRAVENOUS at 05:46

## 2018-01-05 RX ADMIN — METOPROLOL TARTRATE 25 MG: 25 TABLET ORAL at 21:30

## 2018-01-05 RX ADMIN — IOPAMIDOL 95 ML: 612 INJECTION, SOLUTION INTRAVENOUS at 16:26

## 2018-01-05 RX ADMIN — Medication: at 14:15

## 2018-01-05 RX ADMIN — VANCOMYCIN HYDROCHLORIDE 1750 MG: 100 INJECTION, POWDER, LYOPHILIZED, FOR SOLUTION INTRAVENOUS at 13:13

## 2018-01-05 RX ADMIN — ACETAMINOPHEN 650 MG: 325 TABLET, FILM COATED ORAL at 17:18

## 2018-01-05 RX ADMIN — OSELTAMIVIR PHOSPHATE 75 MG: 75 CAPSULE ORAL at 05:47

## 2018-01-05 RX ADMIN — CEFTRIAXONE SODIUM 1 G: 1 INJECTION, SOLUTION INTRAVENOUS at 11:31

## 2018-01-05 RX ADMIN — ATORVASTATIN CALCIUM 10 MG: 10 TABLET, FILM COATED ORAL at 21:29

## 2018-01-05 RX ADMIN — DILTIAZEM HYDROCHLORIDE 240 MG: 120 CAPSULE, COATED, EXTENDED RELEASE ORAL at 19:12

## 2018-01-05 RX ADMIN — METOPROLOL TARTRATE 12.5 MG: 25 TABLET, FILM COATED ORAL at 17:19

## 2018-01-05 RX ADMIN — TAZOBACTAM SODIUM AND PIPERACILLIN SODIUM 3.38 G: 375; 3 INJECTION, SOLUTION INTRAVENOUS at 17:18

## 2018-01-05 RX ADMIN — SODIUM CHLORIDE 1000 ML: 9 INJECTION, SOLUTION INTRAVENOUS at 03:33

## 2018-01-05 RX ADMIN — FINASTERIDE 5 MG: 5 TABLET, FILM COATED ORAL at 19:13

## 2018-01-05 RX ADMIN — ACETAMINOPHEN 1000 MG: 500 TABLET ORAL at 03:32

## 2018-01-05 RX ADMIN — SODIUM CHLORIDE 75 ML/HR: 9 INJECTION, SOLUTION INTRAVENOUS at 11:02

## 2018-01-05 NOTE — ED PROVIDER NOTES
"Subjective   HPI Comments: Mike Lucero Jr. is a 85 y.o. male with a hx of CAD, a fib, HTN, and HLD who presents to the ED with c/o an illness with onset last night. His wife states that he has been complaining of chills, lower back pain, and urinary frequency. She called his PCP who advised them to report to the ED. He denies cough, sore throat, rhinorrhea, congestion, or any other acute complaints at this time.    Patient is a 85 y.o. male presenting with general illness.   History provided by:  Patient and spouse  Illness   Severity:  Unable to specify  Onset quality:  Sudden  Duration: Onset last night.  Timing:  Constant  Progression:  Worsening  Chronicity:  New  Context:  Patient complains of back pain, chills, and urinary frequency with onset tonight  Relieved by:  None tried  Worsened by:  Nothing  Ineffective treatments:  None tried  Associated symptoms: no congestion, no cough, no fever, no rhinorrhea and no sore throat        Review of Systems   Constitutional: Positive for chills. Negative for fever.   HENT: Negative for congestion, rhinorrhea and sore throat.    Respiratory: Negative for cough.    Genitourinary: Positive for frequency.   Musculoskeletal: Positive for back pain (Lower).   All other systems reviewed and are negative.      Past Medical History:   Diagnosis Date   • Atrial fibrillation    • Cataract    • Chronic anticoagulation    • Coronary artery disease    • Diverticulosis    • Gallstones    • History of endocarditis    • HLD (hyperlipidemia)    • HTN (hypertension)    • Osteoarthritis    • Seasonal allergies     Seasonal allergies/rhinitis.        Allergies   Allergen Reactions   • Temazepam Other (See Comments)     Caused pt to \"sleep drive\"       Past Surgical History:   Procedure Laterality Date   • APPENDECTOMY     • CORONARY ARTERY BYPASS GRAFT  07/2006    CABG for 3-vessel disease, July 2006.   • INGUINAL HERNIA REPAIR     • KIDNEY SURGERY         Family History   Problem " Relation Age of Onset   • Alzheimer's disease Mother    • Dementia Mother    • Heart disease Father    • Stroke Father    • Heart disease Sister    • Heart disease Brother    • Stroke Brother        Social History     Social History   • Marital status:      Spouse name: Glenis   • Number of children: 2   • Years of education: JHERON     Occupational History   •  Retired     Social History Main Topics   • Smoking status: Former Smoker     Packs/day: 1.00     Years: 18.00     Types: Cigarettes     Quit date: 06/1970   • Smokeless tobacco: Never Used   • Alcohol use No   • Drug use: No   • Sexual activity: Not Currently     Partners: Female     Other Topics Concern   • None     Social History Narrative         Objective   Physical Exam   Constitutional: He is oriented to person, place, and time. He appears well-developed and well-nourished. No distress.   Warm to the touch. Patient is sweaty   HENT:   Head: Normocephalic and atraumatic.   Nose: Nose normal.   Eyes: Conjunctivae are normal. No scleral icterus.   Neck: Normal range of motion. Neck supple.   Cardiovascular: Normal rate, regular rhythm, normal heart sounds and intact distal pulses.    No murmur heard.  Pulmonary/Chest: Effort normal and breath sounds normal. No respiratory distress.   Abdominal: Soft. Bowel sounds are normal. There is no tenderness.   No flank tenderness to percussion   Musculoskeletal: Normal range of motion. He exhibits no edema.   Mildly increased erythema of the LLE taking up about 2/3 of the leg. 2+ edema to the knee of the BLE. Left calf is approximately 1 cm larger in circumference than the right.    Neurological: He is alert and oriented to person, place, and time.   Skin: Skin is warm. He is diaphoretic.   Psychiatric: He has a normal mood and affect. His behavior is normal.   Nursing note and vitals reviewed.      Procedures         ED Course  ED Course                     MDM  Number of Diagnoses or Management  Options  Acute viral syndrome: new and requires workup  Hypoxia: new and requires workup  Leukocytosis, unspecified type: new and requires workup  Diagnosis management comments: Patient presents with a complaint of body aches, chills, cough, dyspnea, fatigue.  He reports that he has been given Keflex for a left lower extremity cellulitis, but this has not changed in appearance.    Current systemic viral syndrome symptoms have developed over the last 1-2 days.    Rapid influenza screen was negative, chest x-ray clear does not show an acute infiltrate, urine is clean no signs infection.    Patient's oxygen saturations were recorded down to 85% on room air, patient does not have known respiratory illness and does not typically wear oxygen.    My concern is the patient has influenza, therefore influenza PCR test has been ordered.  Tamiflu has been initiated.  And Rocephin has been ordered.    Discussed the patient with the hospitalist, Dr. Canales, who will admit to telemetry.       Amount and/or Complexity of Data Reviewed  Clinical lab tests: ordered and reviewed  Tests in the radiology section of CPT®: ordered and reviewed  Decide to obtain previous medical records or to obtain history from someone other than the patient: yes  Obtain history from someone other than the patient: yes  Review and summarize past medical records: yes  Discuss the patient with other providers: yes  Independent visualization of images, tracings, or specimens: yes    Patient Progress  Patient progress: stable      Final diagnoses:   Hypoxia   Acute viral syndrome   Leukocytosis, unspecified type       Documentation assistance provided by nancy Méndez.  Information recorded by the scribe was done at my direction and has been verified and validated by me.     Frank Méndez  01/05/18 0236       Frank Méndez  01/05/18 0309       Cassie Castrejon MD  01/05/18 2920

## 2018-01-05 NOTE — PROGRESS NOTES
"Pharmacy Consult  -  Warfarin    Mike Lucero Jr. is a  85 y.o. male   Height - 170.2 cm (67\")  Weight - 90.7 kg (200 lb)    Consulting Provider: - Krill  Indication: - A. Fib  Goal INR: - 2-3  Home Regimen:   - Warfarin 2.5mg daily except 3.75mg on Wed    Bridge Therapy: No    Drug-Drug Interactions with current regimen:   Ceftriaxone-increased risk of bleeding    Warfarin Dosing During Admission:    Date  1/5           INR  1.94           Dose  2.5mg                Education Provided:  Patient follows with [facility]    Labs:      Results from last 7 days     Lab Units 01/05/18  0821 01/05/18  0308 01/03/18  1430   INR  --  1.94 2.1*   HEMOGLOBIN g/dL 13.9 14.4 --    HEMATOCRIT % 41.8 43.0 --    PLATELETS 10*3/mm3 143* 165 --        Results from last 7 days     Lab Units 01/05/18  0821 01/05/18  0308   SODIUM mmol/L 132 135   POTASSIUM mmol/L 3.7 3.7   CHLORIDE mmol/L 100 100   CO2 mmol/L 27.0 26.0   BUN mg/dL 10 14   CREATININE mg/dL 0.70 0.80   CALCIUM mg/dL 7.8* 8.8   BILIRUBIN mg/dL --  1.5*   ALK PHOS U/L --  61   ALT (SGPT) U/L --  17   AST (SGOT) U/L --  22   GLUCOSE mg/dL 107* 115*       Current dietary intake: Not documented      Assessment/Plan:     Patient follows with [facility] for warfarin management. Most recent visit this week on 1/3 recommended to continue patient on above home regimen. Will continue warfarin 2.5mg tonight and continue to monitor INR and clinical picture.       Thank you  Farrukh Escobedo RPH  1/5/2018  10:31 AM      "

## 2018-01-05 NOTE — CONSULTS
INFECTIOUS DISEASE CONSULT/INITIAL HOSPITAL VISIT    Mike Lucero Jr.  7/29/1932  [id number]    Date of consult: 1/5/2018    Admit date: 1/5/2018    Requesting Provider: No ref. provider found  Evaluating physician: James Vann MD  Reason for Consultation: Sepsis, cellulitis leg  Chief Complaint: Above      Subjective   History of present illness:  Patient is a 85 y.o.  Yr old male with a history of atrial fibrillation, chronic anticoagulation, coronary artery disease, diverticulosis, gallstones, history of endocarditis, hypertension, hyperlipidemia, DJD, who had been treated with Keflex for left lower leg cellulitis since 1/2/18 by Dr. Douglas.  Patient was worsening at home and was admitted through the emergency room at [facility] on 1/5/18 with hypoxic respiratory failure, increased weakness, shortness of air, and left lower leg redness.  He reported that the Keflex did not change his left leg redness much.  Patient injured his left leg wall on stairs on 12/25/17.  Chest x-ray on admission was negative.  His fevers were as high as 101 Fahrenheit.  I was consulted on 1/5/18 by Dr. Coyle for further evaluation and treatment.  The patient denies ill contacts, zoonotic exposures, TB, HIV, significant travel history.  No other localizing signs or symptoms of infection.  The patient was started empirically on vancomycin and Zosyn.  Occasional neck pain.    Past Medical History:   Diagnosis Date   • Atrial fibrillation    • Cataract    • Chronic anticoagulation    • Coronary artery disease    • Diverticulosis    • Gallstones    • History of endocarditis    • HLD (hyperlipidemia)    • HTN (hypertension)    • Osteoarthritis    • Seasonal allergies     Seasonal allergies/rhinitis.        Past Surgical History:   Procedure Laterality Date   • APPENDECTOMY     • CARDIAC SURGERY     • CORONARY ARTERY BYPASS GRAFT  07/2006    CABG for 3-vessel disease, July 2006.   • INGUINAL HERNIA REPAIR     • KIDNEY  "SURGERY         Pediatric History   Patient Guardian Status   • Not on file.     Other Topics Concern   • Not on file     Social History Narrative       family history includes Alzheimer's disease in his mother; Dementia in his mother; Heart disease in his brother, father, and sister; Stroke in his brother and father.    Allergies   Allergen Reactions   • Temazepam Other (See Comments)     Caused pt to \"sleep drive\"         There is no immunization history on file for this patient.    Medication:    Current Facility-Administered Medications:   •  acetaminophen (TYLENOL) tablet 650 mg, 650 mg, Oral, Q4H PRN, TIFFANY Krishnamurthy, 650 mg at 01/05/18 1314  •  Magnesium Sulfate 2 gram Bolus, followed by 8 gram infusion (total Mg dose 10 grams)- Mg less than or equal to 1mg/dL, 2 g, Intravenous, PRN **OR** Magnesium Sulfate 6 gram Infusion (2 gm x 3) -Mg 1.1 -1.5 mg/dL, 2 g, Intravenous, PRN **OR** magnesium sulfate 4 gram infusion- Mg 1.6-1.9 mg/dL, 4 g, Intravenous, PRN, Faiza Coyle MD  •  metoprolol tartrate (LOPRESSOR) half tablet 12.5 mg, 12.5 mg, Oral, Q12H, Faiza Coyle MD  •  nitroglycerin (NITROSTAT) SL tablet 0.4 mg, 0.4 mg, Sublingual, Q5 Min PRN, Faiza Coyle MD  •  Pharmacy to dose vancomycin, , Does not apply, Continuous PRN, TIFFANY Krishnamurthy  •  Pharmacy to dose warfarin, , Does not apply, Continuous PRN, TIFFANY Krishnamurthy  •  piperacillin-tazobactam (ZOSYN) 3.375 g in iso-osmotic dextrose 50 ml (premix), 3.375 g, Intravenous, Once, Faiza Coyle MD  •  piperacillin-tazobactam (ZOSYN) 3.375 g in iso-osmotic dextrose 50 ml (premix), 3.375 g, Intravenous, Q8H, Tracie Delarosa, [facility]  •  potassium & sodium phosphates (PHOS-NAK) 280-160-250 MG packet - for Phosphorus less than 1.25 mg/dL, 1 packet, Oral, Q6H PRN **OR** potassium & sodium phosphates (PHOS-NAK) 280-160-250 MG packet - for Phosphorus 1.25 - 2.1 mg/dL, 1 packet, Oral, Once PRN, Faiza Coyle MD  •  potassium chloride " (KLOR-CON) packet 40 mEq, 40 mEq, Oral, PRN, Faiza Coyle MD  •  potassium chloride (MICRO-K) CR capsule 40 mEq, 40 mEq, Oral, PRN, Faiza Coyle MD, 40 mEq at 01/05/18 1314  •  sodium chloride 0.9 % flush 1-10 mL, 1-10 mL, Intravenous, PRN, TIFFANY Krishnamurthy  •  Insert peripheral IV, , , Once **AND** sodium chloride 0.9 % flush 10 mL, 10 mL, Intravenous, PRN, Cassie Castrejon MD  •  sodium chloride 0.9 % infusion, 75 mL/hr, Intravenous, Continuous, TIFFANY Krishnamurthy, Last Rate: 75 mL/hr at 01/05/18 1102, 75 mL/hr at 01/05/18 1102  •  vancomycin (dosing per levels), , Does not apply, Daily, Farrukh Escobedo RPH  •  vancomycin 1750 mg/500 mL 0.9% NS IVPB ([facility]), 20 mg/kg, Intravenous, Once, Farrukh Escobedo RPH, 1,750 mg at 01/05/18 1313  •  warfarin (COUMADIN) tablet 2.5 mg, 2.5 mg, Oral, Daily, Farrukh Escobedo RPH    Please refer to the medical record for a full medication list    Review of Systems:    Constitutional-- Fever, no chills or sweats.  Appetite fair, and no malaise. No fatigue.  HEENT-- No new vision, hearing or throat complaints.  No epistaxis or oral sores.  Denies odynophagia or dysphagia.  No odynophagia or dysphagia. No headache, photophobia or neck stiffness, altho some neck pain.  CV-- No chest pain, palpitation or syncope  Resp-- some SOB/no cough/Hemoptysis  GI- No nausea, vomiting, or diarrhea.  No hematochezia, melena, or hematemesis. Denies jaundice or chronic liver disease.  -- No dysuria, hematuria, or flank pain.  Denies hesitancy, urgency or flank pain.  Lymph- no swollen lymph nodes in neck/axilla or groin.   Heme- No active bruising or bleeding; no Hx of DVT or PE.  MS-- no swelling or pain in the bones or joints of arms/legs.  No new back pain.  Neuro-- No acute focal weakness or numbness in the arms or legs.  No seizures.  Skin--No rashes or lesions.  Left lower leg redness as per history of present illness.    Physical Exam:   Vital Signs   Temp:  [98.2 °F (36.8 °C)-101.2  "°F (38.4 °C)] 98.2 °F (36.8 °C)  Heart Rate:  [68-88] 68  Resp:  [16] 16  BP: (103-171)/(56-90) 133/90    Temp  Min: 98.2 °F (36.8 °C)  Max: 101.2 °F (38.4 °C)  BP  Min: 103/60  Max: 171/81  Pulse  Min: 68  Max: 88  Resp  Min: 16  Max: 16  SpO2  Min: 85 %  Max: 97 %    Blood pressure 133/90, pulse 68, temperature 98.2 °F (36.8 °C), temperature source Oral, resp. rate 16, height 170.2 cm (67\"), weight 90.7 kg (200 lb), SpO2 96 %.  GENERAL: Awake and alert, in moderate distress. Appears older than stated age.  HEENT:  Normocephalic, atraumatic.  Oropharynx without thrush. No cervical adenopathy. No neck masses.  Ears externally normal, Nose externally normal.  Neck not erythematous.  EYES: PERRL. No conjunctival injection. No icterus. EOM full.  LYMPHATICS: No lymphadenopathy of the neck or axillary or inguinal regions.   HEART: No murmur, gallop, or pericardial friction rub. Reg rate rhythm, No JVD at 45 degrees.  LUNGS: Few rales left base. No respiratory distress, no use of accessory muscles.  ABDOMEN: Soft, nontender, nondistended. No appreciable HSM.  Bowel sounds normal.  GENITAL: No external lesions, breasts without masses, back straight, no CVAT, rectal external without lesions.   SKIN: Warm and dry without cutaneous eruptions.  No nodules.  Some venous stasis changes lower extremities left greater than right with increased erythema and warmth left lower leg.  No crepitus, bullae, fluctuance, lymphangitis.  Erythema extends approximately 6 x 4 cm from mid calf to ankle left leg, with a slight bulge (?old hematoma).  PSYCHIATRIC: Mental status lucid. No confusion.  EXT:  No cellulitic change.  NEURO: Oriented to name, CN 2 to 12 intact, DTR 1 + and symmetric, sensory intact to LT upper and lower extremitiy, motor 5/5 upper and lower extremity, cerebellar and gait not tested.      Results Review:   I reviewed the patient's new clinical results.  I reviewed the patient's new imaging results and agree with the " interpretation.  I reviewed the patient's other test results and agree with the interpretation.      Results from last 7 days  Lab Units 01/05/18  0821 01/05/18  0308   WBC 10*3/mm3 12.44* 16.98*   HEMOGLOBIN g/dL 13.9 14.4   HEMATOCRIT % 41.8 43.0   PLATELETS 10*3/mm3 143* 165       Results from last 7 days  Lab Units 01/05/18  0821   SODIUM mmol/L 132   POTASSIUM mmol/L 3.7   CHLORIDE mmol/L 100   CO2 mmol/L 27.0   BUN mg/dL 10   CREATININE mg/dL 0.70   GLUCOSE mg/dL 107*   CALCIUM mg/dL 7.8*       Results from last 7 days  Lab Units 01/05/18  0308   ALK PHOS U/L 61   BILIRUBIN mg/dL 1.5*   ALT (SGPT) U/L 17   AST (SGOT) U/L 22                   Results from last 7 days  Lab Units 01/05/18  0308   LACTATE mmol/L 1.0     Estimated Creatinine Clearance: 72.5 mL/min (by C-G formula based on Cr of 0.7).    Microbiology:  Microbiology Results Abnormal     Procedure Component Value - Date/Time    Influenza A & B, RT PCR - Swab, Nasopharynx [[id number]]  (Normal) Collected:  01/05/18 0536    Lab Status:  Final result Specimen:  Swab from Nasopharynx Updated:  01/05/18 0828     Influenza A PCR Not Detected     Influenza B PCR Not Detected    Influenza Antigen, Rapid - Swab, Nasopharynx [[id number]]  (Normal) Collected:  01/05/18 0329    Lab Status:  Final result Specimen:  Swab from Nasopharynx Updated:  01/05/18 0351     Influenza A Ag, EIA Negative     Influenza B Ag, EIA Negative          Radiology:  Imaging Results (last 72 hours)     Procedure Component Value Units Date/Time    XR Chest 1 View [[id number]] Collected:  01/05/18 0220     Updated:  01/05/18 0357    Narrative:       EXAM:    XR Chest, 1 View    EXAM DATE/TIME:    Exam ordered 1/5/2018 2:20 AM    CLINICAL HISTORY:    85 years old, male; Signs and symptoms; Shortness of breath and other:   Chills/back pain/body aches; Prior surgery; Surgery date: 6+ months; Surgery   type: Cabg; Patient HX: Chills/back pain/body aches HX: Non-hospital chronic   atrial  fibrillation essential hypertension hyperlipidemia coronary artery   disease permanent atrial fibrillation benign hypertension hypercholesterolemia   h/o endocarditis osteoarthritis cataract seasonal allergies insomnia daytime   sleepiness vandana (obstructive sleep apnea) snoring left leg cellulitis mild   cognitive impairment chronic anticoagulation    TECHNIQUE:    Frontal view of the chest.    COMPARISON:    CR - XR CHEST 1 VW 2017-10-22 14:32    FINDINGS:    Lungs:  Unremarkable.  No consolidation.    Pleural space:  Unremarkable.  No pneumothorax.    Heart:  Post CABG changes noted.  The cardiac silhouette appears mildly   enlarged.    Mediastinum:  Unremarkable.    Bones/joints:  Sternotomy changes are present.    Vasculature:  Aortic atherosclerosis and tortuosity is present.      Impression:         No acute findings.    THIS DOCUMENT HAS BEEN ELECTRONICALLY SIGNED BY NATALIE CONWAY MD          IMPRESSION:     1.  Left lower leg cellulitis failing clinical outpatient oral Keflex.  Usual organisms are staph and strep species.  Underlying venous stasis disease contributes to the lack of resolution.  2.  Fever, probably secondary to #1 versus other site.  3.  Sepsis, present on admission related to #1 versus other site.  4.  Acute hypoxic respiratory failure.  5.  History of mitral valve endocarditis 2009.  Increased risk for recurrent endocarditis.  6.  Atrial fibrillation.  7.  Leukocytosis, neutrophilic related to causes listed above.  8.  Thrombocytopenia, related to medications versus infection.  9.  Hypocalcemia, 7.8.  10.  Cholestasis, 1.5.    Recommendations:    1.  Diagnostically, continue to follow patient's physical exam, CBC, CMP, CRP, blood cultures ×2 obtained on 1/5, urinalysis and urine culture, sputum for culture and sensitivity, respiratory panel PCR, swab of left lower leg for routine cultures.  CT abd and pelvis with po contrast only.  2.  Therapeutically, continue on vancomycin and Zosyn pending  culture data.  Will likely need some compression therapies to left lower leg.  3.  Oxygen support therapy as needed.    I discussed the patients findings and my recommendations with patient, nursing staff and consulting provider.    Thank you for asking me to see Mike Lucero Jr..  Our group would be pleased to follow this patient over the course of their hospitalization and assist with outpatient antimicrobial therapy, as indicated.  Further recommendations depend on the results of the cultures and clinical course.    James Vann MD  1/5/2018

## 2018-01-05 NOTE — PROGRESS NOTES
Discharge Planning Assessment  [facility]     Patient Name: Mike Lucero Jr.  MRN: [id number]  Today's Date: 1/5/2018    Admit Date: 1/5/2018          Discharge Needs Assessment       01/05/18 1806    Living Environment    Lives With spouse    Living Arrangements house    Transportation Available car;family or friend will provide    Living Environment    Provides Primary Care For no one    Quality Of Family Relationships supportive;helpful;involved    Able to Return to Prior Living Arrangements yes    Discharge Needs Assessment    Concerns To Be Addressed no discharge needs identified;denies needs/concerns at this time    Readmission Within The Last 30 Days no previous admission in last 30 days    Anticipated Changes Related to Illness none    Equipment Currently Used at Home none    Equipment Needed After Discharge none    Discharge Disposition home or self-care    Discharge Contact Information if Applicable [phone]    Discharge Planning Comments home with wife            Discharge Plan       01/05/18 1807    Case Management/Social Work Plan    Plan home with wife to [place]    Patient/Family In Agreement With Plan yes    Additional Comments Mr. Lucero lives with his wife, [place]is in [facility]. He was very independent PTA, using no equipment nor home health services. He states he ample support post discharge, including children, stepchildren and grandchildren that live nearby. Voices no concerns or medication costs or rx coverage or insurance coverage. Has medicare a/b and anthem as secondary. Currently his plan is to return home with help from his family if needed. No concerns or needs noted at this time.         Discharge Placement     No information found                Demographic Summary       01/05/18 1805    Referral Information    Admission Type inpatient    Arrived From home or self-care    Referral Source admission list;physician    Reason For Consult discharge planning    Record Reviewed  clinical discipline documentation;history and physical;medical record    Contact Information    Permission Granted to Share Information With     Primary Care Physician Information    Name ashley philippe            Functional Status       01/05/18 4407    Functional Status Current    Ambulation 0-->independent    Transferring 0-->independent    Toileting 0-->independent    Bathing 0-->independent    Dressing 0-->independent    Eating 0-->independent    Communication 0-->understands/communicates without difficulty    Swallowing (if score 2 or more for any item, consult Rehab Services) 0-->swallows foods/liquids without difficulty    Change in Functional Status Since Onset of Current Illness/Injury no    Functional Status Prior    Ambulation 0-->independent    Transferring 0-->independent    Toileting 0-->independent    Bathing 0-->independent    Dressing 0-->independent    Eating 0-->independent    Communication 0-->understands/communicates without difficulty    Swallowing 0-->swallows foods/liquids without difficulty    IADL    Medications independent    Meal Preparation independent    Housekeeping independent    Laundry independent    Shopping independent    Oral Care independent    Activity Tolerance    Current Activity Limitations none    Usual Activity Tolerance moderate    Current Activity Tolerance moderate    Cognitive/Perceptual/Developmental    Current Mental Status/Cognitive Functioning no deficits noted    Recent Changes in Mental Status/Cognitive Functioning no changes    Employment/Financial    Financial Concerns none            Psychosocial     None            Abuse/Neglect     None            Legal     None            Substance Abuse     None            Patient Forms     None          Gunjan Aragon RN

## 2018-01-05 NOTE — PROGRESS NOTES
"Pharmacy Consult-Vancomycin Dosing  Mike Lucero Jr. is a  85 y.o. male receiving vancomycin therapy.     Indication: SSTI  Consulting Provider: Krill  ID Consult: No    Goal Trough: 10-15mcg/mL    Current Antimicrobial Therapy  Anti-Infectives       Ordered     Dose/Rate Route Frequency Start Stop      01/05/18 1012  cefTRIAXone (ROCEPHIN) IVPB 1 g     Ordering Provider:  TIFFANY Krishnamurthy    1 g  100 mL/hr over 30 Minutes Intravenous Every 24 Hours Scheduled 01/05/18 1100 01/12/18 0859    01/05/18 1020  vancomycin 1750 mg/500 mL 0.9% NS IVPB ([facility])     Ordering Provider:  Farrukh Escobedo RPH    20 mg/kg × 90.7 kg  over 120 Minutes Intravenous Once 01/05/18 1100      01/05/18 1020  vancomycin (dosing per levels)     Ordering Provider:  Farrukh Escobedo RPH     Does not apply Daily 01/05/18 1100 01/12/18 0859    01/05/18 1012  Pharmacy to dose vancomycin     Ordering Provider:  TIFFANY Krishnamurthy     Does not apply Continuous PRN 01/05/18 1012      01/05/18 0513  cefTRIAXone (ROCEPHIN) IVPB 1 g     Ordering Provider:  Cassie Castrejon MD    1 g  100 mL/hr over 30 Minutes Intravenous Once 01/05/18 0515 01/05/18 0623    01/05/18 0513  oseltamivir (TAMIFLU) capsule 75 mg     Ordering Provider:  Cassie Castrejon MD    75 mg Oral Once 01/05/18 0515 01/05/18 0547            Allergies  Allergies as of 01/04/2018 - Harish as Reviewed 01/04/2018   Allergen Reaction Noted   • Temazepam Other (See Comments) 09/12/2016       Labs      Results from last 7 days     Lab Units 01/05/18  0821 01/05/18  0308   BUN mg/dL 10 14   CREATININE mg/dL 0.70 0.80         Results from last 7 days     Lab Units 01/05/18  0821 01/05/18  0308   WBC 10*3/mm3 12.44* 16.98*       Evaluation of Dosing    Ht - 170.2 cm (67\")  Wt - 90.7 kg (200 lb)    Estimated Creatinine Clearance: 72.5 mL/min (by C-G formula based on Cr of 0.7).    Intake & Output (last 3 days)         01/02 0701 - 01/03 0700 01/03 0701 - 01/04 0700 01/04 0701 - 01/05 " 0700 01/05 0701 - 01/06 0700      IV Piggyback   1050     Total Intake(mL/kg)   1050 (11.6)     Net     +1050                      Microbiology and Radiology  Microbiology Results (last 10 days)       Procedure Component Value - Date/Time      Influenza A & B, RT PCR - Swab, Nasopharynx [[id number]]  (Normal) Collected:  01/05/18 0536    Lab Status:  Final result Specimen:  Swab from Nasopharynx Updated:  01/05/18 0828     Influenza A PCR Not Detected     Influenza B PCR Not Detected    Influenza Antigen, Rapid - Swab, Nasopharynx [[id number]]  (Normal) Collected:  01/05/18 0329    Lab Status:  Final result Specimen:  Swab from Nasopharynx Updated:  01/05/18 0351     Influenza A Ag, EIA Negative     Influenza B Ag, EIA Negative            Evaluation of Level    No results found for: EDGAR TORRES VANCORANDOM    Assessment/Plan:  1. PTD Vancomycin for SSTI, goal trough 10-15mcg/mL.  2. Will load patient with Vancomycin 1750mg IV X 1 and check a level with AM labs given patients age.  3. Pharmacy will continue to follow and adjust dosing based on renal function and clinical picture.    Farrukh Escobedo, PharmD  Pharmacy Resident  1/5/2018  10:23 AM

## 2018-01-05 NOTE — H&P
[facility] Medicine Services  HISTORY AND PHYSICAL    Patient Name: Mike Lucero Jr.  : 1932  MRN: [id number]  Primary Care Physician: Ike Douglas MD    Subjective   Subjective     Chief Complaint:  chills    HPI:  Mike Lucero Jr. is a 85 y.o. male that has been treated with keflex since Tuesday by his pcp for left lower extremity cellulitis, presents to the ED last night with complaints of chills, shortness of air, and generalized weakness that started acutely yesterday afternoon.  He denies headache, chest pain, cough, nausea, vomiting, abdominal pain, diarrhea, or dysuria.  Oxygen saturation upon arrival to the ED was 85% on room air.  He continues to have erythema to his left lower extremity that is unchanged since starting Keflex.  He reports injuring his left leg on the stairs on Jovi day, and shortly afterwards developed erythema, edema, and pain to the extremity.  Chest xray upon admission was negative for acute disease.  He was started on tamiflu and antibiotics in the ED, and is being admitted to the hospitalist for further evaluation and management.    Review of Systems   Constitutional: Positive for activity change, chills, diaphoresis, fatigue and fever. Negative for appetite change and unexpected weight change.   HENT: Negative.    Eyes: Negative.    Respiratory: Positive for shortness of breath. Negative for apnea, cough, choking, chest tightness, wheezing and stridor.    Cardiovascular: Positive for leg swelling. Negative for chest pain and palpitations.   Gastrointestinal: Negative.    Endocrine: Negative.    Genitourinary: Negative.    Musculoskeletal: Positive for myalgias. Negative for arthralgias, back pain, gait problem, joint swelling, neck pain and neck stiffness.   Skin: Positive for rash. Negative for color change and pallor.        Erythema to LLE   Allergic/Immunologic: Negative.    Neurological: Positive for weakness (generalized).  "Negative for dizziness, tremors, seizures, syncope, facial asymmetry, speech difficulty, light-headedness, numbness and headaches.   Hematological: Negative.    Psychiatric/Behavioral: Negative.       Otherwise 10-system ROS reviewed and is negative except as mentioned in the HPI.    Personal History     Past Medical History:   Diagnosis Date   • Atrial fibrillation    • Cataract    • Chronic anticoagulation    • Coronary artery disease    • Diverticulosis    • Gallstones    • History of endocarditis    • HLD (hyperlipidemia)    • HTN (hypertension)    • Osteoarthritis    • Seasonal allergies     Seasonal allergies/rhinitis.        Past Surgical History:   Procedure Laterality Date   • APPENDECTOMY     • CORONARY ARTERY BYPASS GRAFT  07/2006    CABG for 3-vessel disease, July 2006.   • INGUINAL HERNIA REPAIR     • KIDNEY SURGERY         Family History: family history includes Alzheimer's disease in his mother; Dementia in his mother; Heart disease in his brother, father, and sister; Stroke in his brother and father.     Social History:  reports that he quit smoking about 47 years ago. His smoking use included Cigarettes. He has a 18.00 pack-year smoking history. He has never used smokeless tobacco. He reports that he does not drink alcohol or use illicit drugs.  Social History     Social History Narrative       Medications:    (Not in a hospital admission)    Allergies   Allergen Reactions   • Temazepam Other (See Comments)     Caused pt to \"sleep drive\"       Objective   Objective     Vital Signs:   Temp:  [98.2 °F (36.8 °C)-101.2 °F (38.4 °C)] 101.2 °F (38.4 °C)  Heart Rate:  [88] 88  Resp:  [16] 16  BP: (122-171)/(68-81) 122/72        Physical Exam   Constitutional: He is oriented to person, place, and time. He appears well-developed and well-nourished. No distress.   HENT:   Head: Normocephalic.   Eyes: Pupils are equal, round, and reactive to light.   Neck: Normal range of motion. Neck supple. No JVD present. "   Cardiovascular: Normal rate, regular rhythm, normal heart sounds and intact distal pulses.  Exam reveals no gallop and no friction rub.    No murmur heard.  Pulmonary/Chest: Effort normal. No respiratory distress. He has no wheezes. He has no rales. He exhibits no tenderness.   Decreased in the bases   Abdominal: Soft. Bowel sounds are normal. He exhibits no distension. There is no tenderness. There is no rebound and no guarding.   Musculoskeletal: Normal range of motion. He exhibits edema (+1 BLE edema). He exhibits no tenderness or deformity.   Neurological: He is alert and oriented to person, place, and time. No cranial nerve deficit.   Skin: Skin is warm and dry. No rash noted. He is not diaphoretic. There is erythema (erythema to LLE from midcalf down to ankle). No pallor.   Psychiatric: He has a normal mood and affect. His behavior is normal. Judgment and thought content normal.          Results Reviewed:  I have personally reviewed current lab, radiology, and data and agree.      Results from last 7 days  Lab Units 01/05/18  0308   WBC 10*3/mm3 16.98*   HEMOGLOBIN g/dL 14.4   HEMATOCRIT % 43.0   PLATELETS 10*3/mm3 165   INR  1.94       Results from last 7 days  Lab Units 01/05/18  0308   SODIUM mmol/L 135   POTASSIUM mmol/L 3.7   CHLORIDE mmol/L 100   CO2 mmol/L 26.0   BUN mg/dL 14   CREATININE mg/dL 0.80   GLUCOSE mg/dL 115*   CALCIUM mg/dL 8.8   ALT (SGPT) U/L 17   AST (SGOT) U/L 22     Brief Urine Lab Results  (Last result in the past 365 days)      Color   Clarity   Blood   Leuk Est   Nitrite   Protein   CREAT   Urine HCG        01/04/18 2333 Yellow Clear Small (1+)(A) Negative Negative Negative             No results found for: BNP  No results found for: PHART  Imaging Results (last 24 hours)     Procedure Component Value Units Date/Time    XR Chest 1 View [[id number]] Collected:  01/05/18 0220     Updated:  01/05/18 0357    Narrative:       EXAM:    XR Chest, 1 View    EXAM DATE/TIME:    Exam ordered  1/5/2018 2:20 AM    CLINICAL HISTORY:    85 years old, male; Signs and symptoms; Shortness of breath and other:   Chills/back pain/body aches; Prior surgery; Surgery date: 6+ months; Surgery   type: Cabg; Patient HX: Chills/back pain/body aches HX: Non-hospital chronic   atrial fibrillation essential hypertension hyperlipidemia coronary artery   disease permanent atrial fibrillation benign hypertension hypercholesterolemia   h/o endocarditis osteoarthritis cataract seasonal allergies insomnia daytime   sleepiness vandana (obstructive sleep apnea) snoring left leg cellulitis mild   cognitive impairment chronic anticoagulation    TECHNIQUE:    Frontal view of the chest.    COMPARISON:    CR - XR CHEST 1 VW 2017-10-22 14:32    FINDINGS:    Lungs:  Unremarkable.  No consolidation.    Pleural space:  Unremarkable.  No pneumothorax.    Heart:  Post CABG changes noted.  The cardiac silhouette appears mildly   enlarged.    Mediastinum:  Unremarkable.    Bones/joints:  Sternotomy changes are present.    Vasculature:  Aortic atherosclerosis and tortuosity is present.      Impression:         No acute findings.    THIS DOCUMENT HAS BEEN ELECTRONICALLY SIGNED BY NATALIE CONWAY MD             Assessment/Plan   Assessment / Plan     Hospital Problem List     * (Principal)Acute respiratory failure with hypoxia    Left leg cellulitis    Influenza    Essential hypertension    Hyperlipidemia    Permanent atrial fibrillation    Overview Signed 9/13/2016  9:12 AM by Sarbjit Saunders     1. Permanent atrial fibrillation:  a. Anticoagulated on Coumadin.   b. Rate controlled with diltiazem.           Hypercholesterolemia    h/o Endocarditis    Overview Signed 9/13/2016  9:12 AM by Sarbjit Saunders       2. Endocarditis, September 2009, treated with IV antibiotics:  a. MILTON by Dr. Valladares, 09/11/2009:  Small nodular non-pedunculated density, possibly consistent with small vegetation of the posterior leaflet of the mitral valve.  b. Previously treated  with Dr. Vann.           RELL (obstructive sleep apnea)    Hypoxia            Assessment & Plan:      1.  Acute hypoxic respiratory failure  *oxygen saturation 85% on RA upon admission   -oxygen to keep O2 saturation >90%      5.  Hypertension   -continue home meds    6.  afib on coumadin with a subtherapeutic INR, rate controlled   -pharmacy to dose coumadin   -daily pt/inr    7.  H/o endocarditis    8.  RELL    DVT prophylaxis:  Lovenox, TEDS/SCUDS    CODE STATUS:  Prior      Vesta Alba, APRN   01/05/18   5:51 AM      Brief Attending Admission Attestation     I have seen and examined the patient, performing an independent face-to-face diagnostic evaluation with plan of care reviewed and developed with the advanced practice clinician (APC).      Brief Summary Statement/HPI:   Mike Lucero Jr. is a 85 y.o. male with CAD s/p remote-CABG and PCI who presented with worsening weakness. Admitted for LE cellulitis. Had a mechanical fall day before Christmas. Scraped both his shins. Took Keflex. Failed PO Keflex. However, on exam, LE wounds minimal. No erythema or warmth. LLE does have more edema compared to RLE. Had a fever after admission. Reports having chest pressure that started ON. Never had it before. Some SOB. No cough. No radiation of pressure. Unsure what makes it worse or better. No re-exam, pt denied chest pressure.    Presented to hospital due to chills and generalized weakness. No abdominal pain, constipation or diarrhea.     Attending Physical Exam:  Constitutional: No acute distress, awake, alert on RA 94%  Eyes: PERRLA, sclerae anicteric, no conjunctival injection  HENT: NCAT, mucous membranes moist  Neck: Supple, no thyromegaly, no lymphadenopathy, trachea midline  Respiratory: Clear to auscultation bilaterally, nonlabored respirations   Cardiovascular: RRR, no murmurs, rubs, or gallops, palpable pedal pulses bilaterally  Gastrointestinal: Positive bowel sounds, soft, moderately tender  diffusely, nondistended  Musculoskeletal: No bilateral ankle edema, no clubbing or cyanosis to extremities, small scab on RLE. Small knot on LLE anteriorly  Psychiatric: Appropriate affect, cooperative  Neurologic: Oriented x 3, strength symmetric in all extremities, Cranial Nerves grossly intact to confrontation, speech clear  Skin: No rashes    Brief Assessment/Plan :  See above for further detailed assessment and plan developed with APC which I have reviewed and/or edited.    - Sepsis: Unclear etiology. Doubt it is LE cellulitis with minimal erythema. UA, CXR unremarkable. No cough. Initially hypoxic but 94% on RA shortly after admission. BCx obtained. Empiric Vanc, Zosyn. Flu neg. Will obtain Resp PCR. Will obtain CT Abd/Pel given moderate tenderness on exam  - Chest pain: EKG, troponin  - A. Fib: Currently rate-controlled. Continue metoprolol. Keep K>4.0 and Mg>2.0. Warfarin.  - Hx of MV endocarditis in 2009    Full Code  Warfarin    I believe this patient meets INPATIENT status due to the need for care which can only be reasonably provided in an hospital setting such as aggressive/expedited ancillary services and/or consultation services, the necessity for IV medications, close physician monitoring and/or the possible need for procedures.  In such, I feel patient’s risk for adverse outcomes and need for care warrant INPATIENT evaluation and predict the patient’s care encounter to likely last beyond 2 midnights.    Faiza Coyle MD  01/05/18  11:37 AM

## 2018-01-05 NOTE — PLAN OF CARE
Problem: Patient Care Overview (Adult)  Goal: Plan of Care Review  Outcome: Ongoing (interventions implemented as appropriate)   01/05/18 0908   Coping/Psychosocial Response Interventions   Plan Of Care Reviewed With patient   Patient Care Overview   Progress no change   Outcome Evaluation   Outcome Summary/Follow up Plan WOC nurse consulted to evaluate wound LLE. Pt has small scabbed wounds bilateral anterior lower legs - left wound smaller with surrounding redness, swelling and tenderness. Both wounds cleaned with N/S, Xeroform applied, then covered with foam non-adherent dressing. See LDA's and orders for wound care. WOC nurse will s/o. Please contact WOC nurse as needed for concerns.

## 2018-01-06 LAB
ALBUMIN SERPL-MCNC: 3.5 G/DL (ref 3.2–4.8)
ALBUMIN/GLOB SERPL: 1.6 G/DL (ref 1.5–2.5)
ALP SERPL-CCNC: 47 U/L (ref 25–100)
ALT SERPL W P-5'-P-CCNC: 12 U/L (ref 7–40)
ANION GAP SERPL CALCULATED.3IONS-SCNC: 6 MMOL/L (ref 3–11)
AST SERPL-CCNC: 15 U/L (ref 0–33)
BASOPHILS # BLD AUTO: 0.01 10*3/MM3 (ref 0–0.2)
BASOPHILS NFR BLD AUTO: 0.1 % (ref 0–1)
BILIRUB SERPL-MCNC: 1.4 MG/DL (ref 0.3–1.2)
BUN BLD-MCNC: 13 MG/DL (ref 9–23)
BUN/CREAT SERPL: 16.3 (ref 7–25)
CALCIUM SPEC-SCNC: 8.1 MG/DL (ref 8.7–10.4)
CHLORIDE SERPL-SCNC: 100 MMOL/L (ref 99–109)
CO2 SERPL-SCNC: 27 MMOL/L (ref 20–31)
CREAT BLD-MCNC: 0.8 MG/DL (ref 0.6–1.3)
D-LACTATE SERPL-SCNC: 1 MMOL/L (ref 0.5–2)
DEPRECATED RDW RBC AUTO: 49 FL (ref 37–54)
EOSINOPHIL # BLD AUTO: 0.15 10*3/MM3 (ref 0–0.3)
EOSINOPHIL NFR BLD AUTO: 2.1 % (ref 0–3)
ERYTHROCYTE [DISTWIDTH] IN BLOOD BY AUTOMATED COUNT: 13.9 % (ref 11.3–14.5)
ERYTHROCYTE [SEDIMENTATION RATE] IN BLOOD: 8 MM/HR (ref 0–20)
GFR SERPL CREATININE-BSD FRML MDRD: 92 ML/MIN/1.73
GLOBULIN UR ELPH-MCNC: 2.2 GM/DL
GLUCOSE BLD-MCNC: 100 MG/DL (ref 70–100)
HCT VFR BLD AUTO: 43.9 % (ref 38.9–50.9)
HGB BLD-MCNC: 14.3 G/DL (ref 13.1–17.5)
IMM GRANULOCYTES # BLD: 0.04 10*3/MM3 (ref 0–0.03)
IMM GRANULOCYTES NFR BLD: 0.6 % (ref 0–0.6)
INR PPP: 1.52
LYMPHOCYTES # BLD AUTO: 0.44 10*3/MM3 (ref 0.6–4.8)
LYMPHOCYTES NFR BLD AUTO: 6.2 % (ref 24–44)
MCH RBC QN AUTO: 31.6 PG (ref 27–31)
MCHC RBC AUTO-ENTMCNC: 32.6 G/DL (ref 32–36)
MCV RBC AUTO: 97.1 FL (ref 80–99)
MONOCYTES # BLD AUTO: 0.73 10*3/MM3 (ref 0–1)
MONOCYTES NFR BLD AUTO: 10.3 % (ref 0–12)
NEUTROPHILS # BLD AUTO: 5.74 10*3/MM3 (ref 1.5–8.3)
NEUTROPHILS NFR BLD AUTO: 80.7 % (ref 41–71)
PHOSPHATE SERPL-MCNC: 3.3 MG/DL (ref 2.4–5.1)
PLATELET # BLD AUTO: 136 10*3/MM3 (ref 150–450)
PMV BLD AUTO: 10.1 FL (ref 6–12)
POTASSIUM BLD-SCNC: 3.7 MMOL/L (ref 3.5–5.5)
PROCALCITONIN SERPL-MCNC: 0.76 NG/ML
PROT SERPL-MCNC: 5.7 G/DL (ref 5.7–8.2)
PROTHROMBIN TIME: 16.8 SECONDS (ref 9.6–11.5)
RBC # BLD AUTO: 4.52 10*6/MM3 (ref 4.2–5.76)
SODIUM BLD-SCNC: 133 MMOL/L (ref 132–146)
TROPONIN I SERPL-MCNC: 0.01 NG/ML
TROPONIN I SERPL-MCNC: 0.01 NG/ML
VANCOMYCIN SERPL-MCNC: 9.1 MCG/ML (ref 5–40)
WBC NRBC COR # BLD: 7.11 10*3/MM3 (ref 3.5–10.8)

## 2018-01-06 PROCEDURE — 85652 RBC SED RATE AUTOMATED: CPT | Performed by: INTERNAL MEDICINE

## 2018-01-06 PROCEDURE — 80053 COMPREHEN METABOLIC PANEL: CPT | Performed by: INTERNAL MEDICINE

## 2018-01-06 PROCEDURE — 63710000001 DIPHENHYDRAMINE PER 50 MG: Performed by: FAMILY MEDICINE

## 2018-01-06 PROCEDURE — 25010000002 VANCOMYCIN

## 2018-01-06 PROCEDURE — 84100 ASSAY OF PHOSPHORUS: CPT | Performed by: HOSPITALIST

## 2018-01-06 PROCEDURE — 99232 SBSQ HOSP IP/OBS MODERATE 35: CPT | Performed by: HOSPITALIST

## 2018-01-06 PROCEDURE — 87798 DETECT AGENT NOS DNA AMP: CPT | Performed by: HOSPITALIST

## 2018-01-06 PROCEDURE — 85610 PROTHROMBIN TIME: CPT | Performed by: NURSE PRACTITIONER

## 2018-01-06 PROCEDURE — 25010000002 PIPERACILLIN SOD-TAZOBACTAM PER 1 G

## 2018-01-06 PROCEDURE — 87581 M.PNEUMON DNA AMP PROBE: CPT | Performed by: HOSPITALIST

## 2018-01-06 PROCEDURE — 87633 RESP VIRUS 12-25 TARGETS: CPT | Performed by: HOSPITALIST

## 2018-01-06 PROCEDURE — 87486 CHLMYD PNEUM DNA AMP PROBE: CPT | Performed by: HOSPITALIST

## 2018-01-06 PROCEDURE — 84145 PROCALCITONIN (PCT): CPT | Performed by: INTERNAL MEDICINE

## 2018-01-06 PROCEDURE — 84484 ASSAY OF TROPONIN QUANT: CPT | Performed by: HOSPITALIST

## 2018-01-06 PROCEDURE — 83605 ASSAY OF LACTIC ACID: CPT | Performed by: INTERNAL MEDICINE

## 2018-01-06 PROCEDURE — 80202 ASSAY OF VANCOMYCIN: CPT

## 2018-01-06 PROCEDURE — 85025 COMPLETE CBC W/AUTO DIFF WBC: CPT | Performed by: HOSPITALIST

## 2018-01-06 RX ORDER — WARFARIN SODIUM 5 MG/1
5 TABLET ORAL
Status: COMPLETED | OUTPATIENT
Start: 2018-01-06 | End: 2018-01-06

## 2018-01-06 RX ORDER — WARFARIN SODIUM 2.5 MG/1
2.5 TABLET ORAL
Status: DISCONTINUED | OUTPATIENT
Start: 2018-01-07 | End: 2018-01-07

## 2018-01-06 RX ADMIN — METOPROLOL TARTRATE 25 MG: 25 TABLET ORAL at 08:29

## 2018-01-06 RX ADMIN — VANCOMYCIN HYDROCHLORIDE 1250 MG: 10 INJECTION, POWDER, LYOPHILIZED, FOR SOLUTION INTRAVENOUS at 12:41

## 2018-01-06 RX ADMIN — TAZOBACTAM SODIUM AND PIPERACILLIN SODIUM 3.38 G: 375; 3 INJECTION, SOLUTION INTRAVENOUS at 23:44

## 2018-01-06 RX ADMIN — DIPHENHYDRAMINE HYDROCHLORIDE 25 MG: 25 CAPSULE ORAL at 21:50

## 2018-01-06 RX ADMIN — HYDROCORTISONE: 1 CREAM TOPICAL at 08:29

## 2018-01-06 RX ADMIN — ASPIRIN 81 MG: 81 TABLET, COATED ORAL at 08:29

## 2018-01-06 RX ADMIN — METOPROLOL TARTRATE 12.5 MG: 25 TABLET, FILM COATED ORAL at 21:50

## 2018-01-06 RX ADMIN — DONEPEZIL HYDROCHLORIDE 10 MG: 10 TABLET, FILM COATED ORAL at 21:50

## 2018-01-06 RX ADMIN — TAZOBACTAM SODIUM AND PIPERACILLIN SODIUM 3.38 G: 375; 3 INJECTION, SOLUTION INTRAVENOUS at 16:24

## 2018-01-06 RX ADMIN — ATORVASTATIN CALCIUM 10 MG: 10 TABLET, FILM COATED ORAL at 21:50

## 2018-01-06 RX ADMIN — POTASSIUM CHLORIDE 40 MEQ: 750 CAPSULE, EXTENDED RELEASE ORAL at 12:41

## 2018-01-06 RX ADMIN — FINASTERIDE 5 MG: 5 TABLET, FILM COATED ORAL at 08:29

## 2018-01-06 RX ADMIN — HYDROCORTISONE: 1 CREAM TOPICAL at 21:51

## 2018-01-06 RX ADMIN — TAZOBACTAM SODIUM AND PIPERACILLIN SODIUM 3.38 G: 375; 3 INJECTION, SOLUTION INTRAVENOUS at 08:29

## 2018-01-06 RX ADMIN — TAZOBACTAM SODIUM AND PIPERACILLIN SODIUM 3.38 G: 375; 3 INJECTION, SOLUTION INTRAVENOUS at 00:21

## 2018-01-06 RX ADMIN — WARFARIN SODIUM 5 MG: 5 TABLET ORAL at 17:13

## 2018-01-06 NOTE — PROGRESS NOTES
[facility] Progress Note    Admission Date: 1/5/2018    Mike Lucero Jr.  7/29/1932  [id number]    Date: 1/6/2018    Antibiotics:  Anti-Infectives     Ordered     Dose/Rate Route Frequency Start Stop    01/06/18 0744  vancomycin 1250 mg/250 mL 0.9% NS IVPB ([facility])     Ordering Provider:  Iain Godfrey RPH    1,250 mg Intravenous Once 01/06/18 1000      01/05/18 1918  piperacillin-tazobactam (ZOSYN) 3.375 g in iso-osmotic dextrose 50 ml (premix)     Comments:  retimed per nurse request, prior dose given at ~1718; dp   Ordering Provider:  Tracie Delarosa RPH    3.375 g  over 4 Hours Intravenous Every 8 Hours 01/06/18 0000 01/10/18 1559    01/05/18 1226  piperacillin-tazobactam (ZOSYN) 3.375 g in iso-osmotic dextrose 50 ml (premix)     Ordering Provider:  Faiza Coyle MD    3.375 g  over 30 Minutes Intravenous Once 01/05/18 1300 01/05/18 1748    01/05/18 1020  vancomycin 1750 mg/500 mL 0.9% NS IVPB ([facility])     Ordering Provider:  Farrukh Escobedo RPH    20 mg/kg × 90.7 kg  over 120 Minutes Intravenous Once 01/05/18 1100 01/05/18 1513    01/05/18 1020  vancomycin (dosing per levels)     Ordering Provider:  Farrukh Escobedo RPH     Does not apply Daily 01/05/18 1100 01/12/18 0859    01/05/18 1012  Pharmacy to dose vancomycin     Ordering Provider:  TIFFANY Krishnamurthy     Does not apply Continuous PRN 01/05/18 1012      01/05/18 0513  cefTRIAXone (ROCEPHIN) IVPB 1 g     Ordering Provider:  Cassie Castrejon MD    1 g  100 mL/hr over 30 Minutes Intravenous Once 01/05/18 0515 01/05/18 0623    01/05/18 0513  oseltamivir (TAMIFLU) capsule 75 mg     Ordering Provider:  Cassie Castrejon MD    75 mg Oral Once 01/05/18 0515 01/05/18 0547          CC:  Left leg cellulitis  History of present illness:  Patient is a 85 y.o.  old male known to Dr White with a history of atrial fibrillation, chronic anticoagulation, coronary artery disease, diverticulosis, gallstones, history of endocarditis, hypertension, hyperlipidemia,  REJI, who had been treated with Keflex for left lower leg cellulitis since 1/2/18 by Dr. Douglas.  Patient was worsening at home and was admitted through the emergency room at [facility] on 1/5/18 with hypoxic respiratory failure, increased weakness, shortness of air, and left lower leg redness.  He reported that the Keflex did not change his left leg redness much.  Patient injured his left leg wall on stairs on 12/25/17.  Chest x-ray on admission was negative.  His fevers were as high as 101 Fahrenheit.  I was consulted on 1/5/18 by Dr. Coyle for further evaluation and treatment.  The patient denies ill contacts, zoonotic exposures, TB, HIV, significant travel history.  No other localizing signs or symptoms of infection.  The patient was started empirically on vancomycin and Zosyn.  Occasional neck pain.    1/6/18 He reports decreased pain and erythema in his left leg  No temps recorded post ~ 8 pnm but he was afebrile prior to that and reports no fever thru the night     ROS:  No f/c/s. No n/v/d. No rash. No new ADR to Abx. Denies dyspnea or cough        PE:  Vital Signs  Temp  Min: 98.5 °F (36.9 °C)  Max: 98.5 °F (36.9 °C)  BP  Min: 118/65  Max: 139/78  Pulse  Min: 74  Max: 99  Resp  Min: 18  Max: 18  No Data Recorded      GENERAL: Awake and alert, in moderate distress.   HEENT:  Normocephalic, atraumatic.  Oropharynx without thrush  Ears externally normal, Nose externally normal.  Neck not erythematous.  EYES: PERRL. No conjunctival injection. No icterus. EOM full.    HEART: No murmur, gallop, or pericardial friction rub. Reg rate rhythm, No JVD at 45 degrees.  LUNGS: Few rales both bases base. No respiratory distress, no use of accessory muscles.  ABDOMEN: Soft, nontender, nondistended.      SKIN: Warm and dry without cutaneous eruptions.  No nodules.  Some venous stasis changes lower extremities left greater than right with increased erythema and warmth left lower leg.  No crepitus, bullae,  fluctuance, lymphangitis.  Erythema extends approximately 6 x 4 cm from mid calf to ankle left leg, with a slight bulge (?old hematoma).  PSYCHIATRIC: Mental status lucid. No confusion.  NEURO: Oriented to name,   Laboratory Data      Results from last 7 days  Lab Units 01/06/18  0540 01/05/18  0821 01/05/18  0308   WBC 10*3/mm3 7.11 12.44* 16.98*   HEMOGLOBIN g/dL 14.3 13.9 14.4   HEMATOCRIT % 43.9 41.8 43.0   PLATELETS 10*3/mm3 136* 143* 165       Results from last 7 days  Lab Units 01/06/18  0540   SODIUM mmol/L 133   POTASSIUM mmol/L 3.7   CHLORIDE mmol/L 100   CO2 mmol/L 27.0   BUN mg/dL 13   CREATININE mg/dL 0.80   GLUCOSE mg/dL 100   CALCIUM mg/dL 8.1*       Results from last 7 days  Lab Units 01/06/18  0540   ALK PHOS U/L 47   BILIRUBIN mg/dL 1.4*   ALT (SGPT) U/L 12   AST (SGOT) U/L 15       Results from last 7 days  Lab Units 01/06/18  0540   SED RATE mm/hr 8       Results from last 7 days  Lab Units 01/05/18  0308   CRP mg/dL 0.61       Estimated Creatinine Clearance: 72.5 mL/min (by C-G formula based on Cr of 0.8).      Microbiology:  Blood cultures negative; 1/5 left leg wound culture negative    Radiology:  Imaging Results (last 72 hours)     Procedure Component Value Units Date/Time    XR Chest 1 View [[id number]] Collected:  01/05/18 0220     Updated:  01/05/18 0357    Narrative:       EXAM:    XR Chest, 1 View    EXAM DATE/TIME:    Exam ordered 1/5/2018 2:20 AM    CLINICAL HISTORY:    85 years old, male; Signs and symptoms; Shortness of breath and other:   Chills/back pain/body aches; Prior surgery; Surgery date: 6+ months; Surgery   type: Cabg; Patient HX: Chills/back pain/body aches HX: Non-hospital chronic   atrial fibrillation essential hypertension hyperlipidemia coronary artery   disease permanent atrial fibrillation benign hypertension hypercholesterolemia   h/o endocarditis osteoarthritis cataract seasonal allergies insomnia daytime   sleepiness vandana (obstructive sleep apnea) snoring left leg  cellulitis mild   cognitive impairment chronic anticoagulation    TECHNIQUE:    Frontal view of the chest.    COMPARISON:    CR - XR CHEST 1 VW 2017-10-22 14:32    FINDINGS:    Lungs:  Unremarkable.  No consolidation.    Pleural space:  Unremarkable.  No pneumothorax.    Heart:  Post CABG changes noted.  The cardiac silhouette appears mildly   enlarged.    Mediastinum:  Unremarkable.    Bones/joints:  Sternotomy changes are present.    Vasculature:  Aortic atherosclerosis and tortuosity is present.      Impression:         No acute findings.    THIS DOCUMENT HAS BEEN ELECTRONICALLY SIGNED BY NATALIE CONWAY MD    CT Abdomen Pelvis With & Without Contrast [[id number]] Collected:  01/05/18 2126     Updated:  01/06/18 0813    Narrative:       EXAMINATION: CT ABDOMEN PELVIS W/WO CONTRAST - 01/05/2018      INDICATION:  R09.02-Hypoxemia; B34.9-Viral infection, unspecified;  D72.829-Elevated white blood cell count, unspecified.     TECHNIQUE: CT scan of abdomen and pelvis was performed prior to and  following intravenous contrast.     The radiation dose reduction device was turned on for each scan per the  ALARA (As Low as Reasonably Achievable) protocol.     COMPARISON: 10/22/2017.     FINDINGS: The heart is large. There is a small right pleural effusion.  The liver has a mildly heterogeneous pattern but with no definite or  focal abnormality. The spleen is normal. There are calcified gallstones.  The pancreas is normal. There is no adrenal mass. The left adrenal gland  appears surgically absent. There is no ascites, aneurysm or  retroperitoneal lymphadenopathy. There is no renal mass, stone or  obstruction. There are numerous diverticula, primarily in the sigmoid  colon, but no features of active diverticulitis. There is no inguinal  lymphadenopathy. There is once again a small ventral abdominal wall  hernia containing only fat which is unchanged.       Impression:       1. Cardiomegaly.  2. Calcified gallstones.  3. Sigmoid  diverticulosis without diverticulitis.     DICTATED:     01/05/2018  EDITED:          01/05/2018                 This report was finalized on 1/6/2018 8:11 AM by Dr. Alessandro Grullon MD.             I personally reviewed the radiographic studies       IMPRESSION:      1.  Left lower leg cellulitis failing clinical outpatient oral Keflex.  Usual organisms are staph and strep species.  Underlying venous stasis disease contributes to the lack of resolution.  2.  Fever, probably secondary to #1 versus other site.  3.  Sepsis, present on admission related to #1 versus other site.  4.  Hypoxemia- rales on exam but portable CXR in ED negative; denies dyspnea at present  5.  History of mitral valve endocarditis 2009.  Increased risk for recurrent endocarditis.  6.  Atrial fibrillation.  7.  Leukocytosis, neutrophilic related to causes listed above.  8.  Thrombocytopenia, related to medications versus infection.  9.  Hypocalcemia, 7.8.  10.  Cholestasis, 1.5.     Recommendations:     1.  Diagnostically, continue to follow patient's physical exam, CBC, CMP, CRP, blood cultures ×2 obtained on 1/5, urinalysis and urine culture, sputum for culture and sensitivity, respiratory panel PCR, swab of left lower leg for routine cultures.   2.  Therapeutically, continue on vancomycin and Zosyn pending culture data.  Will likely need some compression therapies to left lower leg.  3.  Oxygen support therapy as needed.  4   Repeat CXR    Tena Garza MD  1/6/2018

## 2018-01-06 NOTE — PROGRESS NOTES
[facility] Medicine Services  PROGRESS NOTE    Patient Name: Mike Lucero Jr.  : 1932  MRN: [id number]    Date of Admission: 2018  Length of Stay: 1  Primary Care Physician: Ike Douglas MD    Subjective   Subjective     CC:  FU LE pain    HPI:  Pt was bradycardic ON. HR decreased to 40s after metoprolol. SBP decreased to 100s. Pt reports feeling better. No CP, SOB, cough. No abdominal pain.     Review of Systems  Gen- No fevers, chills  CV- No chest pain, palpitations  Resp- No cough, dyspnea  GI- No N/V/D, abd pain    Otherwise ROS is negative except as mentioned in the HPI.    Objective   Objective     Vital Signs:   Temp:  [98.3 °F (36.8 °C)-98.5 °F (36.9 °C)] 98.3 °F (36.8 °C)  Heart Rate:  [61-99] 61  Resp:  [16-18] 16  BP: (118-139)/(65-87) 134/87        Physical Exam:  Constitutional: No acute distress, awake, alert on RA 96%  Eyes: PERRLA, sclerae anicteric, no conjunctival injection  HENT: NCAT, mucous membranes moist  Neck: Supple, no thyromegaly, no lymphadenopathy, trachea midline  Respiratory: Clear to auscultation bilaterally, nonlabored respirations   Cardiovascular: RRR, no murmurs, rubs, or gallops, palpable pedal pulses bilaterally  Gastrointestinal: Positive bowel sounds, soft, nontender, nondistended  Musculoskeletal: No bilateral ankle edema, no clubbing or cyanosis to extremities  Psychiatric: Appropriate affect, cooperative  Neurologic: Oriented x 3, strength symmetric in all extremities, Cranial Nerves grossly intact to confrontation, speech clear  Skin: No rashes    Results Reviewed:  I have personally reviewed current lab, radiology, and data and agree.      Results from last 7 days  Lab Units 18  0540 18  1026 18  0821 18  0308   WBC 10*3/mm3 7.11  --  12.44* 16.98*   HEMOGLOBIN g/dL 14.3  --  13.9 14.4   HEMATOCRIT % 43.9  --  41.8 43.0   PLATELETS 10*3/mm3 136*  --  143* 165   INR  1.52 1.81  --  1.94        Results from last 7 days  Lab Units 01/06/18  0540 01/06/18  0030 01/05/18  1927  01/05/18  0821 01/05/18  0308   SODIUM mmol/L 133  --   --   --  132 135   POTASSIUM mmol/L 3.7  --   --   --  3.7 3.7   CHLORIDE mmol/L 100  --   --   --  100 100   CO2 mmol/L 27.0  --   --   --  27.0 26.0   BUN mg/dL 13  --   --   --  10 14   CREATININE mg/dL 0.80  --   --   --  0.70 0.80   GLUCOSE mg/dL 100  --   --   --  107* 115*   CALCIUM mg/dL 8.1*  --   --   --  7.8* 8.8   ALT (SGPT) U/L 12  --   --   --   --  17   AST (SGOT) U/L 15  --   --   --   --  22   TROPONIN I ng/mL 0.012 0.012 0.018  < >  --   --    < > = values in this interval not displayed.  No results found for: BNP  No results found for: PHART    Microbiology Results Abnormal     Procedure Component Value - Date/Time    Wound Culture - Wound, Leg, Left [[id number]]  (Normal) Collected:  01/05/18 1716    Lab Status:  Preliminary result Specimen:  Wound from Leg, Left Updated:  01/06/18 0856     Wound Culture No growth     Gram Stain Result No WBCs or organisms seen    Blood Culture - Blood, [[id number]]  (Normal) Collected:  01/05/18 0300    Lab Status:  Preliminary result Specimen:  Blood from Arm, Right Updated:  01/06/18 0331     Blood Culture No growth at 24 hours    Blood Culture - Blood, [[id number]]  (Normal) Collected:  01/05/18 0305    Lab Status:  Preliminary result Specimen:  Blood from Arm, Left Updated:  01/06/18 0331     Blood Culture No growth at 24 hours    Influenza A & B, RT PCR - Swab, Nasopharynx [[id number]]  (Normal) Collected:  01/05/18 0536    Lab Status:  Final result Specimen:  Swab from Nasopharynx Updated:  01/05/18 0828     Influenza A PCR Not Detected     Influenza B PCR Not Detected    Influenza Antigen, Rapid - Swab, Nasopharynx [[id number]]  (Normal) Collected:  01/05/18 0329    Lab Status:  Final result Specimen:  Swab from Nasopharynx Updated:  01/05/18 0351     Influenza A Ag, EIA Negative     Influenza B Ag, EIA Negative           Imaging Results (last 24 hours)     Procedure Component Value Units Date/Time    CT Abdomen Pelvis With & Without Contrast [[id number]] Collected:  01/05/18 2126     Updated:  01/06/18 0813    Narrative:       EXAMINATION: CT ABDOMEN PELVIS W/WO CONTRAST - 01/05/2018      INDICATION:  R09.02-Hypoxemia; B34.9-Viral infection, unspecified;  D72.829-Elevated white blood cell count, unspecified.     TECHNIQUE: CT scan of abdomen and pelvis was performed prior to and  following intravenous contrast.     The radiation dose reduction device was turned on for each scan per the  ALARA (As Low as Reasonably Achievable) protocol.     COMPARISON: 10/22/2017.     FINDINGS: The heart is large. There is a small right pleural effusion.  The liver has a mildly heterogeneous pattern but with no definite or  focal abnormality. The spleen is normal. There are calcified gallstones.  The pancreas is normal. There is no adrenal mass. The left adrenal gland  appears surgically absent. There is no ascites, aneurysm or  retroperitoneal lymphadenopathy. There is no renal mass, stone or  obstruction. There are numerous diverticula, primarily in the sigmoid  colon, but no features of active diverticulitis. There is no inguinal  lymphadenopathy. There is once again a small ventral abdominal wall  hernia containing only fat which is unchanged.       Impression:       1. Cardiomegaly.  2. Calcified gallstones.  3. Sigmoid diverticulosis without diverticulitis.     DICTATED:     01/05/2018  EDITED:          01/05/2018                 This report was finalized on 1/6/2018 8:11 AM by Dr. Alessandro Grullon MD.                I have reviewed the medications.    Assessment/Plan   Assessment / Plan     Hospital Problem List     * (Principal)Acute respiratory failure with hypoxia    Essential hypertension    Hyperlipidemia    Permanent atrial fibrillation    Overview Signed 9/13/2016  9:12 AM by Sarbjit Saunders     1. Permanent atrial  fibrillation:  a. Anticoagulated on Coumadin.   b. Rate controlled with diltiazem.           Hypercholesterolemia    h/o Endocarditis    Overview Signed 9/13/2016  9:12 AM by Sarbjit Saunders       2. Endocarditis, September 2009, treated with IV antibiotics:  a. MILTON by Dr. Valladares, 09/11/2009:  Small nodular non-pedunculated density, possibly consistent with small vegetation of the posterior leaflet of the mitral valve.  b. Previously treated with Dr. Vann.           RELL (obstructive sleep apnea)    Left leg cellulitis    Influenza    Hypoxia        Assessment & Plan:  - Sepsis: Unclear etiology. LE cellulitis with minimal erythema. UA, CXR unremarkable. No cough. Initially hypoxic but 94% on RA shortly after admission. BCx obtained. Empiric Vanc, Zosyn. ID on board. Flu neg. Resp PCR pending. CT Abd/Pel no acute abnormalities  - A. Fib: Reduced metoprolol  12.5mg BID for bradycardia. Keep K>4.0 and Mg>2.0. Warfarin.  - HTN: DCed Dilt 240mg for borderline HoTN  - Hx of MV endocarditis in 2009    DVT Prophylaxis:  Warfarin    CODE STATUS: Full Code    Disposition: I expect the patient to be discharged TERE Coyle MD  01/06/18  3:10 PM

## 2018-01-06 NOTE — PLAN OF CARE
Problem: Infection, Risk/Actual (Adult)  Goal: Identify Related Risk Factors and Signs and Symptoms  Outcome: Ongoing (interventions implemented as appropriate)   01/06/18 0333   Infection, Risk/Actual   Infection, Risk/Actual: Related Risk Factors age extremes   Signs and Symptoms (Infection, Risk/Actual) lab value changes     Goal: Infection Prevention/Resolution  Outcome: Ongoing (interventions implemented as appropriate)   01/06/18 0333   Infection, Risk/Actual (Adult)   Infection Prevention/Resolution making progress toward outcome       Problem: Patient Care Overview (Adult)  Goal: Plan of Care Review  Outcome: Ongoing (interventions implemented as appropriate)   01/06/18 0333   Coping/Psychosocial Response Interventions   Plan Of Care Reviewed With patient   Patient Care Overview   Progress no change     Goal: Adult Individualization and Mutuality  Outcome: Ongoing (interventions implemented as appropriate)    Goal: Discharge Needs Assessment  Outcome: Ongoing (interventions implemented as appropriate)   01/06/18 0333   Discharge Needs Assessment   Concerns To Be Addressed no discharge needs identified;denies needs/concerns at this time   Readmission Within The Last 30 Days no previous admission in last 30 days   Equipment Needed After Discharge none   Discharge Disposition home or self-care   Current Health   Anticipated Changes Related to Illness none   Self-Care   Equipment Currently Used at Home none   Living Environment   Transportation Available car;family or friend will provide

## 2018-01-06 NOTE — PLAN OF CARE
Problem: Infection, Risk/Actual (Adult)  Goal: Identify Related Risk Factors and Signs and Symptoms  Outcome: Ongoing (interventions implemented as appropriate)      Problem: Patient Care Overview (Adult)  Goal: Plan of Care Review  Outcome: Ongoing (interventions implemented as appropriate)   01/06/18 1502   Coping/Psychosocial Response Interventions   Plan Of Care Reviewed With patient   Patient Care Overview   Progress improving   Outcome Evaluation   Outcome Summary/Follow up Plan pt orientation appropriate today. answering questions following commands appropriately. wbc decreasing daily. pt reports feeling better today

## 2018-01-06 NOTE — PROGRESS NOTES
[facility] Progress Note    Admission Date: 1/5/2018    Mike Lucero Jr.  7/29/1932  [id number]    Date: 1/6/2018    Meds:    Anti-Infectives     Ordered     Dose/Rate Route Frequency Start Stop    01/06/18 0744  vancomycin 1250 mg/250 mL 0.9% NS IVPB ([facility])     Ordering Provider:  Iain Godfrey RPH    1,250 mg Intravenous Once 01/06/18 1000      01/05/18 1918  piperacillin-tazobactam (ZOSYN) 3.375 g in iso-osmotic dextrose 50 ml (premix)     Comments:  retimed per nurse request, prior dose given at ~1718; dp   Ordering Provider:  Tracie Delarosa RPH    3.375 g  over 4 Hours Intravenous Every 8 Hours 01/06/18 0000 01/10/18 1559    01/05/18 1226  piperacillin-tazobactam (ZOSYN) 3.375 g in iso-osmotic dextrose 50 ml (premix)     Ordering Provider:  Faiza Coyle MD    3.375 g  over 30 Minutes Intravenous Once 01/05/18 1300 01/05/18 1748    01/05/18 1020  vancomycin 1750 mg/500 mL 0.9% NS IVPB ([facility])     Ordering Provider:  Farrukh Escobedo RPH    20 mg/kg × 90.7 kg  over 120 Minutes Intravenous Once 01/05/18 1100 01/05/18 1513    01/05/18 1020  vancomycin (dosing per levels)     Ordering Provider:  Farrukh Escobedo RPH     Does not apply Daily 01/05/18 1100 01/12/18 0859    01/05/18 1012  Pharmacy to dose vancomycin     Ordering Provider:  TIFFANY Krishnamurthy     Does not apply Continuous PRN 01/05/18 1012      01/05/18 0513  cefTRIAXone (ROCEPHIN) IVPB 1 g     Ordering Provider:  Cassie Castrejon MD    1 g  100 mL/hr over 30 Minutes Intravenous Once 01/05/18 0515 01/05/18 0623    01/05/18 0513  oseltamivir (TAMIFLU) capsule 75 mg     Ordering Provider:  Cassie Castrejon MD    75 mg Oral Once 01/05/18 0515 01/05/18 0547          CC:  Left Leg Cellulitis    History of Present Illness:  Patient is a 85 y.o.  old male known to Dr White with a history of atrial fibrillation, chronic anticoagulation, coronary artery disease, diverticulosis, gallstones, history of endocarditis, hypertension, hyperlipidemia, DJD,  who had been treated with Keflex for left lower leg cellulitis since 18 by Dr. Douglas.  Patient was worsening at home and was admitted through the emergency room at [facility] on 18 with hypoxic respiratory failure, increased weakness, shortness of air, and left lower leg redness.  He reported that the Keflex did not change his left leg redness much.  Patient injured his left leg wall on stairs on 17.  Chest x-ray on admission was negative.  His fevers were as high as 101 Fahrenheit.  I was consulted on 18 by Dr. Coyle for further evaluation and treatment.  The patient denies ill contacts, zoonotic exposures, TB, HIV, significant travel history.  No other localizing signs or symptoms of infection.  The patient was started empirically on vancomycin and Zosyn.  Occasional neck pain.     18 He reports decreased pain and erythema in his left leg  No temps recorded post ~ 8 pnm but he was afebrile prior to that and reports no fever thru the night        ROS:  No f/c/s. No n/v/d. No rash. No new ADR to Abx.     PE:   Vital Signs  Temp (24hrs), Av.5 °F (36.9 °C), Min:98.5 °F (36.9 °C), Max:98.5 °F (36.9 °C)    Temp  Min: 98.5 °F (36.9 °C)  Max: 98.5 °F (36.9 °C)  BP  Min: 118/65  Max: 139/78  Pulse  Min: 74  Max: 99  Resp  Min: 18  Max: 18  No Data Recorded    GENERAL: Awake and alert, in moderate distress.   HEENT:  Normocephalic, atraumatic.  Oropharynx without thrush  Ears externally normal, Nose externally normal.  Neck not erythematous.  EYES: PERRL. No conjunctival injection. No icterus. EOM full.     HEART: No murmur, gallop, or pericardial friction rub. Reg rate rhythm, No JVD at 45 degrees.  LUNGS: Few rales both bases base. No respiratory distress, no use of accessory muscles.  ABDOMEN: Soft, nontender, nondistended.       SKIN: Warm and dry without cutaneous eruptions.  No nodules.  Some venous stasis changes lower extremities left greater than right with increased  erythema and warmth left lower leg.  No crepitus, bullae, fluctuance, lymphangitis.  Erythema extends approximately 6 x 4 cm from mid calf to ankle left leg, with a slight bulge (?old hematoma).  PSYCHIATRIC: Mental status lucid. No confusion.  NEURO: Oriented to name,     Laboratory Data      Results from last 7 days  Lab Units 01/06/18  0540 01/05/18  0821 01/05/18  0308   WBC 10*3/mm3 7.11 12.44* 16.98*   HEMOGLOBIN g/dL 14.3 13.9 14.4   HEMATOCRIT % 43.9 41.8 43.0   PLATELETS 10*3/mm3 136* 143* 165       Results from last 7 days  Lab Units 01/06/18  0540   SODIUM mmol/L 133   POTASSIUM mmol/L 3.7   CHLORIDE mmol/L 100   CO2 mmol/L 27.0   BUN mg/dL 13   CREATININE mg/dL 0.80   GLUCOSE mg/dL 100   CALCIUM mg/dL 8.1*       Results from last 7 days  Lab Units 01/06/18  0540   ALK PHOS U/L 47   BILIRUBIN mg/dL 1.4*   ALT (SGPT) U/L 12   AST (SGOT) U/L 15       Results from last 7 days  Lab Units 01/06/18  0540   SED RATE mm/hr 8       Results from last 7 days  Lab Units 01/05/18  0308   CRP mg/dL 0.61       Results from last 7 days  Lab Units 01/06/18  0540   LACTATE mmol/L 1.0       Results from last 7 days  Lab Units 01/05/18  1210   CK TOTAL U/L 27       Results from last 7 days  Lab Units 01/06/18  0540   VANCOMYCIN RM mcg/mL 9.10     Estimated Creatinine Clearance: 72.5 mL/min (by C-G formula based on Cr of 0.8).    Microbiology:  Blood Culture   Date Value Ref Range Status   01/05/2018 No growth at 24 hours  Preliminary                       Wound Culture   Date Value Ref Range Status   01/05/2018 No growth  Preliminary       Radiology:  Imaging Results (last 72 hours)     Procedure Component Value Units Date/Time    XR Chest 1 View [[id number]] Collected:  01/05/18 0220     Updated:  01/05/18 0357    Narrative:       EXAM:    XR Chest, 1 View    EXAM DATE/TIME:    Exam ordered 1/5/2018 2:20 AM    CLINICAL HISTORY:    85 years old, male; Signs and symptoms; Shortness of breath and other:   Chills/back pain/body  aches; Prior surgery; Surgery date: 6+ months; Surgery   type: Cabg; Patient HX: Chills/back pain/body aches HX: Non-hospital chronic   atrial fibrillation essential hypertension hyperlipidemia coronary artery   disease permanent atrial fibrillation benign hypertension hypercholesterolemia   h/o endocarditis osteoarthritis cataract seasonal allergies insomnia daytime   sleepiness vandana (obstructive sleep apnea) snoring left leg cellulitis mild   cognitive impairment chronic anticoagulation    TECHNIQUE:    Frontal view of the chest.    COMPARISON:    CR - XR CHEST 1 VW 2017-10-22 14:32    FINDINGS:    Lungs:  Unremarkable.  No consolidation.    Pleural space:  Unremarkable.  No pneumothorax.    Heart:  Post CABG changes noted.  The cardiac silhouette appears mildly   enlarged.    Mediastinum:  Unremarkable.    Bones/joints:  Sternotomy changes are present.    Vasculature:  Aortic atherosclerosis and tortuosity is present.      Impression:         No acute findings.    THIS DOCUMENT HAS BEEN ELECTRONICALLY SIGNED BY NATALIE CONWAY MD    CT Abdomen Pelvis With & Without Contrast [[id number]] Collected:  01/05/18 2126     Updated:  01/06/18 0813    Narrative:       EXAMINATION: CT ABDOMEN PELVIS W/WO CONTRAST - 01/05/2018      INDICATION:  R09.02-Hypoxemia; B34.9-Viral infection, unspecified;  D72.829-Elevated white blood cell count, unspecified.     TECHNIQUE: CT scan of abdomen and pelvis was performed prior to and  following intravenous contrast.     The radiation dose reduction device was turned on for each scan per the  ALARA (As Low as Reasonably Achievable) protocol.     COMPARISON: 10/22/2017.     FINDINGS: The heart is large. There is a small right pleural effusion.  The liver has a mildly heterogeneous pattern but with no definite or  focal abnormality. The spleen is normal. There are calcified gallstones.  The pancreas is normal. There is no adrenal mass. The left adrenal gland  appears surgically absent. There is  no ascites, aneurysm or  retroperitoneal lymphadenopathy. There is no renal mass, stone or  obstruction. There are numerous diverticula, primarily in the sigmoid  colon, but no features of active diverticulitis. There is no inguinal  lymphadenopathy. There is once again a small ventral abdominal wall  hernia containing only fat which is unchanged.       Impression:       1. Cardiomegaly.  2. Calcified gallstones.  3. Sigmoid diverticulosis without diverticulitis.     DICTATED:     01/05/2018  EDITED:          01/05/2018                 This report was finalized on 1/6/2018 8:11 AM by Dr. Alessandro Grullon MD.             I personally read the radiographic studies      IMPRESSION:  1.  Left lower leg cellulitis failing clinical outpatient oral Keflex.  Usual organisms are staph and strep species.  Underlying venous stasis disease contributes to the lack of resolution.  2.  Fever, probably secondary to #1 versus other site.  3.  Sepsis, present on admission related to #1 versus other site.  4.  Hypoxemia- rales on exam but portable CXR in ED negative; denies dyspnea at present  5.  History of mitral valve endocarditis 2009.  Increased risk for recurrent endocarditis.  6.  Atrial fibrillation.  7.  Leukocytosis, neutrophilic related to causes listed above.  8.  Thrombocytopenia, related to medications versus infection.  9.  Hypocalcemia, 7.8.  10.  Cholestasis, 1.5.    RECOMMENDATIONS:    1.  Diagnostically, continue to follow patient's physical exam, CBC, CMP, CRP, blood cultures ×2 obtained on 1/5, urinalysis and urine culture, sputum for culture and sensitivity, respiratory panel PCR, swab of left lower leg for routine cultures.   2.  Therapeutically, continue on vancomycin and Zosyn pending culture data.  Will likely need some compression therapies to left lower leg.  3.  Oxygen support therapy as needed.  4   Repeat CXR         Adrianna Godwin, APRN  1/6/2018  9:17 AM

## 2018-01-07 ENCOUNTER — APPOINTMENT (OUTPATIENT)
Dept: GENERAL RADIOLOGY | Facility: HOSPITAL | Age: 83
End: 2018-01-07

## 2018-01-07 LAB
ALBUMIN SERPL-MCNC: 3.3 G/DL (ref 3.2–4.8)
ANION GAP SERPL CALCULATED.3IONS-SCNC: 7 MMOL/L (ref 3–11)
BACTERIA SPEC AEROBE CULT: NORMAL
BUN BLD-MCNC: 13 MG/DL (ref 9–23)
BUN/CREAT SERPL: 18.6 (ref 7–25)
CALCIUM SPEC-SCNC: 8.2 MG/DL (ref 8.7–10.4)
CHLORIDE SERPL-SCNC: 103 MMOL/L (ref 99–109)
CO2 SERPL-SCNC: 25 MMOL/L (ref 20–31)
CREAT BLD-MCNC: 0.7 MG/DL (ref 0.6–1.3)
DEPRECATED RDW RBC AUTO: 49.5 FL (ref 37–54)
ERYTHROCYTE [DISTWIDTH] IN BLOOD BY AUTOMATED COUNT: 13.8 % (ref 11.3–14.5)
GFR SERPL CREATININE-BSD FRML MDRD: 107 ML/MIN/1.73
GLUCOSE BLD-MCNC: 97 MG/DL (ref 70–100)
GRAM STN SPEC: NORMAL
HCT VFR BLD AUTO: 42.6 % (ref 38.9–50.9)
HGB BLD-MCNC: 13.6 G/DL (ref 13.1–17.5)
INR PPP: 1.51
MCH RBC QN AUTO: 31.4 PG (ref 27–31)
MCHC RBC AUTO-ENTMCNC: 31.9 G/DL (ref 32–36)
MCV RBC AUTO: 98.4 FL (ref 80–99)
PHOSPHATE SERPL-MCNC: 3.2 MG/DL (ref 2.4–5.1)
PLATELET # BLD AUTO: 127 10*3/MM3 (ref 150–450)
PMV BLD AUTO: 10 FL (ref 6–12)
POTASSIUM BLD-SCNC: 3.6 MMOL/L (ref 3.5–5.5)
PROTHROMBIN TIME: 16.7 SECONDS (ref 9.6–11.5)
RBC # BLD AUTO: 4.33 10*6/MM3 (ref 4.2–5.76)
SODIUM BLD-SCNC: 135 MMOL/L (ref 132–146)
VANCOMYCIN SERPL-MCNC: 6.8 MCG/ML (ref 5–40)
WBC NRBC COR # BLD: 7.18 10*3/MM3 (ref 3.5–10.8)

## 2018-01-07 PROCEDURE — 71046 X-RAY EXAM CHEST 2 VIEWS: CPT

## 2018-01-07 PROCEDURE — 25010000002 VANCOMYCIN PER 500 MG

## 2018-01-07 PROCEDURE — 85610 PROTHROMBIN TIME: CPT | Performed by: NURSE PRACTITIONER

## 2018-01-07 PROCEDURE — 80069 RENAL FUNCTION PANEL: CPT | Performed by: HOSPITALIST

## 2018-01-07 PROCEDURE — 80202 ASSAY OF VANCOMYCIN: CPT

## 2018-01-07 PROCEDURE — 99233 SBSQ HOSP IP/OBS HIGH 50: CPT | Performed by: HOSPITALIST

## 2018-01-07 PROCEDURE — 25010000002 PIPERACILLIN SOD-TAZOBACTAM PER 1 G

## 2018-01-07 PROCEDURE — 85027 COMPLETE CBC AUTOMATED: CPT | Performed by: HOSPITALIST

## 2018-01-07 RX ORDER — WARFARIN SODIUM 5 MG/1
5 TABLET ORAL
Status: COMPLETED | OUTPATIENT
Start: 2018-01-07 | End: 2018-01-07

## 2018-01-07 RX ORDER — LISINOPRIL 20 MG/1
20 TABLET ORAL
Status: DISCONTINUED | OUTPATIENT
Start: 2018-01-07 | End: 2018-01-08 | Stop reason: HOSPADM

## 2018-01-07 RX ORDER — VANCOMYCIN HYDROCHLORIDE 1 G/200ML
1000 INJECTION, SOLUTION INTRAVENOUS EVERY 12 HOURS
Status: DISCONTINUED | OUTPATIENT
Start: 2018-01-07 | End: 2018-01-08 | Stop reason: HOSPADM

## 2018-01-07 RX ORDER — WARFARIN SODIUM 2.5 MG/1
2.5 TABLET ORAL
Status: DISCONTINUED | OUTPATIENT
Start: 2018-01-08 | End: 2018-01-08

## 2018-01-07 RX ADMIN — WARFARIN SODIUM 5 MG: 5 TABLET ORAL at 17:17

## 2018-01-07 RX ADMIN — ASPIRIN 81 MG: 81 TABLET, COATED ORAL at 08:38

## 2018-01-07 RX ADMIN — METOPROLOL TARTRATE 12.5 MG: 25 TABLET, FILM COATED ORAL at 08:38

## 2018-01-07 RX ADMIN — LISINOPRIL 20 MG: 20 TABLET ORAL at 10:40

## 2018-01-07 RX ADMIN — TAZOBACTAM SODIUM AND PIPERACILLIN SODIUM 3.38 G: 375; 3 INJECTION, SOLUTION INTRAVENOUS at 08:38

## 2018-01-07 RX ADMIN — VANCOMYCIN HYDROCHLORIDE 1000 MG: 1 INJECTION, SOLUTION INTRAVENOUS at 15:43

## 2018-01-07 RX ADMIN — DONEPEZIL HYDROCHLORIDE 10 MG: 10 TABLET, FILM COATED ORAL at 20:32

## 2018-01-07 RX ADMIN — HYDROCORTISONE: 1 CREAM TOPICAL at 08:38

## 2018-01-07 RX ADMIN — ATORVASTATIN CALCIUM 10 MG: 10 TABLET, FILM COATED ORAL at 20:32

## 2018-01-07 RX ADMIN — FINASTERIDE 5 MG: 5 TABLET, FILM COATED ORAL at 08:38

## 2018-01-07 RX ADMIN — HYDROCORTISONE: 1 CREAM TOPICAL at 20:33

## 2018-01-07 RX ADMIN — METOPROLOL TARTRATE 12.5 MG: 25 TABLET, FILM COATED ORAL at 20:32

## 2018-01-07 RX ADMIN — POTASSIUM CHLORIDE 40 MEQ: 750 CAPSULE, EXTENDED RELEASE ORAL at 05:29

## 2018-01-07 RX ADMIN — TAZOBACTAM SODIUM AND PIPERACILLIN SODIUM 3.38 G: 375; 3 INJECTION, SOLUTION INTRAVENOUS at 17:17

## 2018-01-07 RX ADMIN — VANCOMYCIN HYDROCHLORIDE 1000 MG: 1 INJECTION, SOLUTION INTRAVENOUS at 23:21

## 2018-01-07 NOTE — PROGRESS NOTES
"Pharmacy Consult-Vancomycin Dosing  Mike Lucero Jr. is a  85 y.o. male receiving vancomycin therapy.     Indication: SSTI  Consulting Provider: Krill  ID Consult: No    Goal Trough: 10-15mcg/mL    Current Antimicrobial Therapy        * Vancomycin dosing per levels (day 3)        * Zosyn 3.375gm q8h (day 3)      Allergies  Allergies as of 01/04/2018 - Harish as Reviewed 01/04/2018   Allergen Reaction Noted   • Temazepam Other (See Comments) 09/12/2016       Labs    Temp Readings from Last 3 Encounters:   01/07/18 99.2 °F (37.3 °C) (Oral)   10/24/17 97.9 °F (36.6 °C)     Results from last 7 days   Lab Units 01/07/18  0427 01/06/18  0540 01/05/18  0821   WBC 10*3/mm3 7.18 7.11 12.44*   HEMOGLOBIN g/dL 13.6 14.3 13.9   HEMATOCRIT % 42.6 43.9 41.8   PLATELETS 10*3/mm3 127* 136* 143*    Results from last 7 days   Lab Units 01/07/18  0427 01/06/18  0540 01/05/18  0821   SODIUM mmol/L 135 133 132   POTASSIUM mmol/L 3.6 3.7 3.7   CHLORIDE mmol/L 103 100 100   CO2 mmol/L 25.0 27.0 27.0   BUN mg/dL 13 13 10   CREATININE mg/dL 0.70 0.80 0.70   GLUCOSE mg/dL 97 100 107*   CALCIUM mg/dL 8.2* 8.1* 7.8*      Evaluation of Dosing    Ht - 170.2 cm (67\")  Wt - 90.7 kg (200 lb)    Estimated Creatinine Clearance: 72.5 mL/min (by C-G formula based on Cr of 0.7).    Intake & Output (last 3 days)         01/04 0701 - 01/05 0700 01/05 0701 - 01/06 0700 01/06 0701 - 01/07 0700 01/07 0701 - 01/08 0700      P.O.   380     I.V. (mL/kg)  1000 (11)      IV Piggyback 1050 500 250     Total Intake(mL/kg) 1050 (11.6) 1500 (16.5) 630 (6.9)     Urine (mL/kg/hr)  350 (0.2) 550 (0.3)     Stool  0 (0) 0 (0)     Total Output   350 550      Net +1050 +1150 +80              Unmeasured Urine Occurrence  2 x 1 x     Unmeasured Stool Occurrence  4 x 2 x             Microbiology and Radiology  Microbiology Results (last 10 days)       Procedure Component Value - Date/Time      Wound Culture - Wound, Leg, Left [[id number]]  (Normal) Collected:  01/05/18 1716    " Lab Status:  Final result Specimen:  Wound from Leg, Left Updated:  01/07/18 0705     Wound Culture No growth at 2 days     Gram Stain Result No WBCs or organisms seen    Influenza A & B, RT PCR - Swab, Nasopharynx [[id number]]  (Normal) Collected:  01/05/18 0536    Lab Status:  Final result Specimen:  Swab from Nasopharynx Updated:  01/05/18 0828     Influenza A PCR Not Detected     Influenza B PCR Not Detected    Influenza Antigen, Rapid - Swab, Nasopharynx [[id number]]  (Normal) Collected:  01/05/18 0329    Lab Status:  Final result Specimen:  Swab from Nasopharynx Updated:  01/05/18 0351     Influenza A Ag, EIA Negative     Influenza B Ag, EIA Negative    Blood Culture - Blood, [[id number]]  (Normal) Collected:  01/05/18 0305    Lab Status:  Preliminary result Specimen:  Blood from Arm, Left Updated:  01/07/18 0331     Blood Culture No growth at 2 days    Blood Culture - Blood, [[id number]]  (Normal) Collected:  01/05/18 0300    Lab Status:  Preliminary result Specimen:  Blood from Arm, Right Updated:  01/07/18 0331     Blood Culture No growth at 2 days            Evaluation of Level    Lab Results   Component Value Date    VANCORANDOM 6.80 01/07/2018         Assessment/Plan:    -PTD Vancomycin for SSTI, goal trough / re-dose level 10-15mcg/mL.  - 1/5 Vancomycin 1750mg IV X 1 @1313  - 1/6 Vancomycin 1250mg IV x1 @1241  -1/7 Vancomycin random level - 6.8  Vancomycin random level drawn 14.5 hours following 2nd dose    Will adjust to Vancomycin 1000mg q12h x7 days, MD to evaluate and adjust duration as needed  Obtain vancomycin trough level 1/9 am prior to 12 noon dose to determine further dose adjustment  Nephrotoxicity risk includes advanced age - monitor SCr   Pharmacy will continue to follow and adjust dosing based on renal function and clinical picture.    Thanks,  Iain Godfrey [facility]  1/7/2018  9:12 AM

## 2018-01-07 NOTE — PROGRESS NOTES
[facility] Medicine Services  PROGRESS NOTE    Patient Name: Mike Lucero Jr.  : 1932  MRN: [id number]    Date of Admission: 2018  Length of Stay: 2  Primary Care Physician: Ike Douglas MD    Subjective   Subjective     CC:  FU LE pain    HPI:  Feels better. Not back to baseline. No cough, SOB. Ate the most he has had in days. Likes pancakes. No BM yet.     Review of Systems  Gen- No fevers, chills  CV- No chest pain, palpitations  Resp- No cough, dyspnea  GI- No N/V/D, abd pain    Otherwise ROS is negative except as mentioned in the HPI.    Objective   Objective     Vital Signs:   Temp:  [97.7 °F (36.5 °C)-99.2 °F (37.3 °C)] 99.2 °F (37.3 °C)  Heart Rate:  [61-80] 75  Resp:  [16-22] 22  BP: (132-159)/(74-98) 140/95        Physical Exam:  Constitutional: No acute distress, awake, alert on 2LNC  Eyes: PERRLA, sclerae anicteric, no conjunctival injection  HENT: NCAT, mucous membranes moist  Neck: Supple, no thyromegaly, no lymphadenopathy, trachea midline  Respiratory: Bronchial sounds posteriorly, no wheezing, nonlabored respirations   Cardiovascular: RRR, no murmurs, rubs, or gallops, palpable pedal pulses bilaterally  Gastrointestinal: Positive bowel sounds, soft, nontender, nondistended  Musculoskeletal: No bilateral ankle edema, no clubbing or cyanosis to extremities  Psychiatric: Appropriate affect, cooperative  Neurologic: Oriented x 3, strength symmetric in all extremities, Cranial Nerves grossly intact to confrontation, speech clear  Skin: No rashes    Results Reviewed:  I have personally reviewed current lab, radiology, and data and agree.      Results from last 7 days  Lab Units 18  0427 18  0540 18  1026 18  0821   WBC 10*3/mm3 7.18 7.11  --  12.44*   HEMOGLOBIN g/dL 13.6 14.3  --  13.9   HEMATOCRIT % 42.6 43.9  --  41.8   PLATELETS 10*3/mm3 127* 136*  --  143*   INR  1.51 1.52 1.81  --        Results from last 7 days  Lab Units  01/07/18  0427 01/06/18  0540 01/06/18  0030 01/05/18  1927  01/05/18  0821 01/05/18  0308   SODIUM mmol/L 135 133  --   --   --  132 135   POTASSIUM mmol/L 3.6 3.7  --   --   --  3.7 3.7   CHLORIDE mmol/L 103 100  --   --   --  100 100   CO2 mmol/L 25.0 27.0  --   --   --  27.0 26.0   BUN mg/dL 13 13  --   --   --  10 14   CREATININE mg/dL 0.70 0.80  --   --   --  0.70 0.80   GLUCOSE mg/dL 97 100  --   --   --  107* 115*   CALCIUM mg/dL 8.2* 8.1*  --   --   --  7.8* 8.8   ALT (SGPT) U/L  --  12  --   --   --   --  17   AST (SGOT) U/L  --  15  --   --   --   --  22   TROPONIN I ng/mL  --  0.012 0.012 0.018  < >  --   --    < > = values in this interval not displayed.  No results found for: BNP  No results found for: PHART    Microbiology Results Abnormal     Procedure Component Value - Date/Time    Wound Culture - Wound, Leg, Left [[id number]]  (Normal) Collected:  01/05/18 1716    Lab Status:  Final result Specimen:  Wound from Leg, Left Updated:  01/07/18 0705     Wound Culture No growth at 2 days     Gram Stain Result No WBCs or organisms seen    Blood Culture - Blood, [[id number]]  (Normal) Collected:  01/05/18 0300    Lab Status:  Preliminary result Specimen:  Blood from Arm, Right Updated:  01/07/18 0331     Blood Culture No growth at 2 days    Blood Culture - Blood, [[id number]]  (Normal) Collected:  01/05/18 0305    Lab Status:  Preliminary result Specimen:  Blood from Arm, Left Updated:  01/07/18 0331     Blood Culture No growth at 2 days    Influenza A & B, RT PCR - Swab, Nasopharynx [[id number]]  (Normal) Collected:  01/05/18 0536    Lab Status:  Final result Specimen:  Swab from Nasopharynx Updated:  01/05/18 0828     Influenza A PCR Not Detected     Influenza B PCR Not Detected    Influenza Antigen, Rapid - Swab, Nasopharynx [[id number]]  (Normal) Collected:  01/05/18 0329    Lab Status:  Final result Specimen:  Swab from Nasopharynx Updated:  01/05/18 0351     Influenza A Ag, EIA Negative     Influenza B  Ag, EIA Negative          Imaging Results (last 24 hours)     Procedure Component Value Units Date/Time    XR Chest PA & Lateral [[id number]] Updated:  01/07/18 0815             I have reviewed the medications.    Assessment/Plan   Assessment / Plan     Hospital Problem List     * (Principal)Acute respiratory failure with hypoxia    Essential hypertension    Hyperlipidemia    Permanent atrial fibrillation    Overview Signed 9/13/2016  9:12 AM by Sarbjit Saunders     1. Permanent atrial fibrillation:  a. Anticoagulated on Coumadin.   b. Rate controlled with diltiazem.           Hypercholesterolemia    h/o Endocarditis    Overview Signed 9/13/2016  9:12 AM by Sarbjit Saunders       2. Endocarditis, September 2009, treated with IV antibiotics:  a. MILOTN by Dr. Valladares, 09/11/2009:  Small nodular non-pedunculated density, possibly consistent with small vegetation of the posterior leaflet of the mitral valve.  b. Previously treated with Dr. Vann.           RELL (obstructive sleep apnea)    Left leg cellulitis    Influenza    Hypoxia        Assessment & Plan:  - Sepsis: Unclear etiology. LE cellulitis with minimal erythema. UA, CXR unremarkable. Repeat CXR today. Reviewed. +Spine sign but no respiratory sxs. All cultures NGTD. Empiric Vanc, Zosyn. ID on board. Flu neg. Resp PCR pending. CT Abd/Pel no acute abnormalities  - A. Fib: Rate controlled. Reduced metoprolol 12.5mg BID for bradycardia (40s). Keep K>4.0 and Mg>2.0. Warfarin.  - HTN: Continue to hold Dilt for borderline HR. Restart home lisinopril 20mg  - Hx of MV endocarditis in 2009    DVT Prophylaxis:  Warfarin    CODE STATUS: Full Code    Disposition: I expect the patient to be discharged TERE Coyle MD  01/07/18  10:23 AM

## 2018-01-07 NOTE — PLAN OF CARE
Problem: Infection, Risk/Actual (Adult)  Goal: Identify Related Risk Factors and Signs and Symptoms  Outcome: Ongoing (interventions implemented as appropriate)    Goal: Infection Prevention/Resolution  Outcome: Ongoing (interventions implemented as appropriate)      Problem: Patient Care Overview (Adult)  Goal: Plan of Care Review  Outcome: Ongoing (interventions implemented as appropriate)   01/07/18 1611   Coping/Psychosocial Response Interventions   Plan Of Care Reviewed With patient   Patient Care Overview   Progress improving   Outcome Evaluation   Outcome Summary/Follow up Plan Vital signs wnl. Oxygen sat greater than 90% on 3 liters. continue to wean off oxgyen. No complaints of pain.

## 2018-01-07 NOTE — PLAN OF CARE
Problem: Infection, Risk/Actual (Adult)  Goal: Identify Related Risk Factors and Signs and Symptoms  Outcome: Ongoing (interventions implemented as appropriate)    Goal: Infection Prevention/Resolution  Outcome: Ongoing (interventions implemented as appropriate)      Problem: Patient Care Overview (Adult)  Goal: Plan of Care Review  Outcome: Ongoing (interventions implemented as appropriate)   01/06/18 1502 01/06/18 2000 01/07/18 0309   Coping/Psychosocial Response Interventions   Plan Of Care Reviewed With --  patient --    Patient Care Overview   Progress improving --  --    Outcome Evaluation   Outcome Summary/Follow up Plan --  --  Pt A/O. Has slept fairly well tonight. Denies pain. VSS. Will continue to monitor.     Goal: Adult Individualization and Mutuality  Outcome: Ongoing (interventions implemented as appropriate)    Goal: Discharge Needs Assessment  Outcome: Ongoing (interventions implemented as appropriate)

## 2018-01-07 NOTE — PROGRESS NOTES
"Pharmacy Consult  -  Warfarin    Mike Lucero Jr. is a  85 y.o. male   Height - 170.2 cm (67\")  Weight - 90.7 kg (200 lb)    Consulting Provider: - Krill  Indication: - A. Fib  Goal INR: - 2-3  Home Regimen:   - Warfarin 2.5mg daily except 3.75mg on Wed    Bridge Therapy: No    Drug-Drug Interactions with current regimen:   No major drug-drug interactions with current regimen    Warfarin Dosing During Admission:    Date  1/5 1/6 1/7         INR  1.94 1.52 1.51         Dose  2.5mg 5mg 5mg              Education Provided:  Patient follows with [facility]    Labs:    Results from last 7 days   Lab Units 01/07/18  0427 01/06/18  0540 01/05/18  1026 01/05/18  0821 01/05/18  0308 01/03/18  1430   INR  1.51 1.52 1.81 --  1.94 2.1*   HEMOGLOBIN g/dL 13.6 14.3 --  13.9 14.4 --    HEMATOCRIT % 42.6 43.9 --  41.8 43.0 --    PLATELETS 10*3/mm3 127* 136* --  143* 165 --      Results from last 7 days   Lab Units 01/07/18  0427 01/06/18  0540 01/05/18  0821 01/05/18  0308   SODIUM mmol/L 135 133 132 135   POTASSIUM mmol/L 3.6 3.7 3.7 3.7   CHLORIDE mmol/L 103 100 100 100   CO2 mmol/L 25.0 27.0 27.0 26.0   BUN mg/dL 13 13 10 14   CREATININE mg/dL 0.70 0.80 0.70 0.80   CALCIUM mg/dL 8.2* 8.1* 7.8* 8.8   BILIRUBIN mg/dL --  1.4* --  1.5*   ALK PHOS U/L --  47 --  61   ALT (SGPT) U/L --  12 --  17   AST (SGOT) U/L --  15 --  22   GLUCOSE mg/dL 97 100 107* 115*       Current dietary intake: meal 50%  Diet Order   Procedures   • Diet Regular; Cardiac       Assessment/Plan:     Patient follows with [facility] for warfarin management   Historical INR range 1.9 to 3.0 for >4 months on Warfarin 2.5mg dailys   Most recent visit this week on 1/3 with INR - 2.1, recommended to continue patient on home regimen.      1/6 INR - 1.51 unchanged  [id number] VASc = 3  H/H - 13.6/42.6    Given warfarin 5mg 1/6  Will repeat Warfarin 5mg 1/7, then resume warfarin 2.5mg daily  Monitor s/s bleeding and drug-drug " interactions  Follow daily PT/INR and adjust dose accordingly      Thank you  Iain Godfrey [facility]  1/7/2018  7:24 AM

## 2018-01-08 ENCOUNTER — APPOINTMENT (OUTPATIENT)
Dept: PHARMACY | Facility: HOSPITAL | Age: 83
End: 2018-01-08

## 2018-01-08 ENCOUNTER — TELEPHONE (OUTPATIENT)
Dept: PHARMACY | Facility: HOSPITAL | Age: 83
End: 2018-01-08

## 2018-01-08 VITALS
RESPIRATION RATE: 20 BRPM | TEMPERATURE: 97.4 F | HEART RATE: 79 BPM | BODY MASS INDEX: 31.08 KG/M2 | OXYGEN SATURATION: 96 % | SYSTOLIC BLOOD PRESSURE: 157 MMHG | DIASTOLIC BLOOD PRESSURE: 106 MMHG | HEIGHT: 67 IN | WEIGHT: 198 LBS

## 2018-01-08 LAB
ALBUMIN SERPL-MCNC: 3.4 G/DL (ref 3.2–4.8)
ANION GAP SERPL CALCULATED.3IONS-SCNC: 6 MMOL/L (ref 3–11)
BUN BLD-MCNC: 12 MG/DL (ref 9–23)
BUN/CREAT SERPL: 15 (ref 7–25)
CALCIUM SPEC-SCNC: 8.4 MG/DL (ref 8.7–10.4)
CHLORIDE SERPL-SCNC: 101 MMOL/L (ref 99–109)
CO2 SERPL-SCNC: 30 MMOL/L (ref 20–31)
CREAT BLD-MCNC: 0.8 MG/DL (ref 0.6–1.3)
DEPRECATED RDW RBC AUTO: 47.9 FL (ref 37–54)
ERYTHROCYTE [DISTWIDTH] IN BLOOD BY AUTOMATED COUNT: 13.4 % (ref 11.3–14.5)
GFR SERPL CREATININE-BSD FRML MDRD: 92 ML/MIN/1.73
GLUCOSE BLD-MCNC: 101 MG/DL (ref 70–100)
HCT VFR BLD AUTO: 42.4 % (ref 38.9–50.9)
HGB BLD-MCNC: 13.7 G/DL (ref 13.1–17.5)
INR PPP: 1.61
MCH RBC QN AUTO: 31.3 PG (ref 27–31)
MCHC RBC AUTO-ENTMCNC: 32.3 G/DL (ref 32–36)
MCV RBC AUTO: 96.8 FL (ref 80–99)
PHOSPHATE SERPL-MCNC: 3.4 MG/DL (ref 2.4–5.1)
PLATELET # BLD AUTO: 158 10*3/MM3 (ref 150–450)
PMV BLD AUTO: 10.1 FL (ref 6–12)
POTASSIUM BLD-SCNC: 3.4 MMOL/L (ref 3.5–5.5)
PROTHROMBIN TIME: 17.8 SECONDS (ref 9.6–11.5)
RBC # BLD AUTO: 4.38 10*6/MM3 (ref 4.2–5.76)
SODIUM BLD-SCNC: 137 MMOL/L (ref 132–146)
WBC NRBC COR # BLD: 7.42 10*3/MM3 (ref 3.5–10.8)

## 2018-01-08 PROCEDURE — 25010000002 PIPERACILLIN SOD-TAZOBACTAM PER 1 G

## 2018-01-08 PROCEDURE — 85027 COMPLETE CBC AUTOMATED: CPT | Performed by: HOSPITALIST

## 2018-01-08 PROCEDURE — 80069 RENAL FUNCTION PANEL: CPT | Performed by: HOSPITALIST

## 2018-01-08 PROCEDURE — 85610 PROTHROMBIN TIME: CPT | Performed by: NURSE PRACTITIONER

## 2018-01-08 PROCEDURE — 25010000002 PIPERACILLIN SOD-TAZOBACTAM PER 1 G: Performed by: INTERNAL MEDICINE

## 2018-01-08 PROCEDURE — 99239 HOSP IP/OBS DSCHRG MGMT >30: CPT | Performed by: INTERNAL MEDICINE

## 2018-01-08 RX ORDER — WARFARIN SODIUM 4 MG/1
4 TABLET ORAL
Status: DISCONTINUED | OUTPATIENT
Start: 2018-01-08 | End: 2018-01-08 | Stop reason: HOSPADM

## 2018-01-08 RX ORDER — WARFARIN SODIUM 3 MG/1
3 TABLET ORAL
Status: DISCONTINUED | OUTPATIENT
Start: 2018-01-09 | End: 2018-01-08 | Stop reason: HOSPADM

## 2018-01-08 RX ORDER — DOXYCYCLINE HYCLATE 100 MG
100 TABLET ORAL EVERY 12 HOURS
Qty: 14 TABLET | Refills: 0 | Status: SHIPPED | OUTPATIENT
Start: 2018-01-08 | End: 2018-01-15

## 2018-01-08 RX ADMIN — TAZOBACTAM SODIUM AND PIPERACILLIN SODIUM 3.38 G: 375; 3 INJECTION, SOLUTION INTRAVENOUS at 00:23

## 2018-01-08 RX ADMIN — FINASTERIDE 5 MG: 5 TABLET, FILM COATED ORAL at 09:42

## 2018-01-08 RX ADMIN — LISINOPRIL 20 MG: 20 TABLET ORAL at 09:42

## 2018-01-08 RX ADMIN — TAZOBACTAM SODIUM AND PIPERACILLIN SODIUM 3.38 G: 375; 3 INJECTION, SOLUTION INTRAVENOUS at 09:42

## 2018-01-08 RX ADMIN — ASPIRIN 81 MG: 81 TABLET, COATED ORAL at 09:42

## 2018-01-08 RX ADMIN — HYDROCORTISONE: 1 CREAM TOPICAL at 09:42

## 2018-01-08 RX ADMIN — METOPROLOL TARTRATE 12.5 MG: 25 TABLET, FILM COATED ORAL at 09:42

## 2018-01-08 NOTE — PLAN OF CARE
Problem: Infection, Risk/Actual (Adult)  Goal: Identify Related Risk Factors and Signs and Symptoms  Outcome: Ongoing (interventions implemented as appropriate)    Goal: Infection Prevention/Resolution  Outcome: Ongoing (interventions implemented as appropriate)      Problem: Patient Care Overview (Adult)  Goal: Plan of Care Review  Outcome: Ongoing (interventions implemented as appropriate)   01/08/18 0633   Coping/Psychosocial Response Interventions   Plan Of Care Reviewed With patient   Patient Care Overview   Progress progress toward functional goals as expected   Outcome Evaluation   Outcome Summary/Follow up Plan Pt has had questions about when he will get to leave. Pt. has complained that he feels that he has some urinary retention. PVR was 0.. Was able to león pt. to 2 L of O2. No c/o pain.        Problem: Skin Integrity Impairment, Risk/Actual (Adult)  Goal: Identify Related Risk Factors and Signs and Symptoms  Outcome: Ongoing (interventions implemented as appropriate)    Goal: Skin Integrity/Wound Healing  Outcome: Ongoing (interventions implemented as appropriate)

## 2018-01-08 NOTE — PROGRESS NOTES
Continued Stay Note   Jesus     Patient Name: Mike Lucero Jr.  MRN: [id number]  Today's Date: 1/8/2018    Admit Date: 1/5/2018          Discharge Plan       01/08/18 1057    Case Management/Social Work Plan    Plan Home    Patient/Family In Agreement With Plan yes    Additional Comments I spoke with Mr. Lucero at the bedside. He is being discharged home today on oral antibiotics. He denies needs for [facility] or home health services. His wife will transport him home by car.              Discharge Codes       01/08/18 1058    Discharge Codes    Discharge Codes 01  Discharge to home        Expected Discharge Date and Time     Expected Discharge Date Expected Discharge Time    Jan 8, 2018             [place]

## 2018-01-08 NOTE — PROGRESS NOTES
"Pharmacy Consult  -  Warfarin    Mike Lucero Jr. is a  85 y.o. male   Height - 170.2 cm (67\")  Weight - 89.8 kg (198 lb)    Consulting Provider: - Krill  Indication: - A. Fib  Goal INR: - 2-3  Home Regimen:   - Warfarin 2.5mg daily except 3.75mg on Wed    Bridge Therapy: No    Drug-Drug Interactions with current regimen:   No major drug-drug interactions with current regimen    Warfarin Dosing During Admission:    Date  1/5 1/6 1/7 1/8        INR  1.94 1.52 1.51 1.61        Dose  2.5mg 5mg 5mg (4 mg)             Education Provided:  Patient follows with [facility]    Labs:    Results from last 7 days   Lab Units 01/08/18 0449 01/07/18 0427 01/06/18  0540 01/05/18  1026 01/05/18  0821 01/05/18  0308 01/03/18  1430   INR  1.61 1.51 1.52 1.81 --  1.94 2.1*   HEMOGLOBIN g/dL 13.7 13.6 14.3 --  13.9 14.4 --    HEMATOCRIT % 42.4 42.6 43.9 --  41.8 43.0 --    PLATELETS 10*3/mm3 158 127* 136* --  143* 165 --      Results from last 7 days   Lab Units 01/08/18 0449 01/07/18  0427 01/06/18  0540  01/05/18  0308   SODIUM mmol/L 137 135 133 < > 135   POTASSIUM mmol/L 3.4* 3.6 3.7 < > 3.7   CHLORIDE mmol/L 101 103 100 < > 100   CO2 mmol/L 30.0 25.0 27.0 < > 26.0   BUN mg/dL 12 13 13 < > 14   CREATININE mg/dL 0.80 0.70 0.80 < > 0.80   CALCIUM mg/dL 8.4* 8.2* 8.1* < > 8.8   BILIRUBIN mg/dL --  --  1.4* --  1.5*   ALK PHOS U/L --  --  47 --  61   ALT (SGPT) U/L --  --  12 --  17   AST (SGOT) U/L --  --  15 --  22   GLUCOSE mg/dL 101* 97 100 < > 115*   < > = values in this interval not displayed.       Current dietary intake: meal 50%  Diet Order   Procedures   • Diet Regular; Cardiac       Assessment/Plan:     Patient follows with [facility] for warfarin management. Historical INR range 1.9 to 3.0 for >4 months on warfarin 2.5mg daily's.     INR remains subtherapeutic today with only a slight increase from INR yesterday and after two previous days with doses of 5 mg. After reviewing " Anticoagulation Clinic Notes, patient received 20mg/week on his most recent visit 1/3 and had an INR of 2.1 which is on the lower end of the desired goal range. Therefore, will give warfarin 4 mg today and plan to start 3 mg tomorrow.    Monitor s/s bleeding and drug-drug interactions and pharmacy will follow daily PT/INR and adjust dose accordingly.    Thank you,  Juana Calvo, PharmD Candidate 2018  1/8/2018  8:53 AM      Lisa Francois PharmD  1/8/2018  9:15 AM

## 2018-01-08 NOTE — PROGRESS NOTES
Mike Lucero Jr.  7/29/1932  [id number]  1/8/2018    CC: Sepsis syndrome.    Mike Lucero Jr. is a 85 y.o. male here for fever, shaking rigors, and bilateral lower extremity cellulitis secondary to abrasions sustained while falling on a flight of stairs.      Past medical history:  Past Medical History:   Diagnosis Date   • Atrial fibrillation    • Cataract    • Chronic anticoagulation    • Coronary artery disease    • Diverticulosis    • Gallstones    • History of endocarditis    • HLD (hyperlipidemia)    • HTN (hypertension)    • Osteoarthritis    • Seasonal allergies     Seasonal allergies/rhinitis.        Medications:   Current Facility-Administered Medications:   •  [START ON 1/9/2018]  Vancomycin trough level prior to 12noon dose 1/9 . Please do not administer dose until level is evaluated for potential dose adjustments, , Does not apply, Once, Iain Godfrey, [facility]  •  acetaminophen (TYLENOL) tablet 650 mg, 650 mg, Oral, Q4H PRN, Snehal Khalil, APRN, 650 mg at 01/05/18 2129  •  aspirin EC tablet 81 mg, 81 mg, Oral, Daily, Snehal Khalil APRN, 81 mg at 01/07/18 0838  •  atorvastatin (LIPITOR) tablet 10 mg, 10 mg, Oral, Nightly, Snehal Khalil APRN, 10 mg at 01/07/18 2032  •  diphenhydrAMINE (BENADRYL) capsule 25 mg, 25 mg, Oral, Q6H PRN, Daniel Lemons DO, 25 mg at 01/06/18 2150  •  donepezil (ARICEPT) tablet 10 mg, 10 mg, Oral, Nightly, Snehal Khalil APRN, 10 mg at 01/07/18 2032  •  finasteride (PROSCAR) tablet 5 mg, 5 mg, Oral, Daily, Snehal Khalil APRN, 5 mg at 01/07/18 0838  •  hydrocortisone 1 % cream, , Topical, Q12H, Daniel Lemons DO  •  lisinopril (PRINIVIL,ZESTRIL) tablet 20 mg, 20 mg, Oral, Q24H, Faiza Coyle MD, 20 mg at 01/07/18 1040  •  Magnesium Sulfate 2 gram Bolus, followed by 8 gram infusion (total Mg dose 10 grams)- Mg less than or equal to 1mg/dL, 2 g, Intravenous, PRN **OR** Magnesium Sulfate 6 gram Infusion (2 gm x 3) -Mg 1.1 -1.5 mg/dL, 2 g, Intravenous, PRN  **OR** magnesium sulfate 4 gram infusion- Mg 1.6-1.9 mg/dL, 4 g, Intravenous, PRN, Faiza Coyle MD  •  metoprolol tartrate (LOPRESSOR) half tablet 12.5 mg, 12.5 mg, Oral, Q12H, Faiza Coyle MD, 12.5 mg at 01/07/18 2032  •  nitroglycerin (NITROSTAT) SL tablet 0.4 mg, 0.4 mg, Sublingual, Q5 Min PRN, Faiza Coyle MD  •  Pharmacy to dose vancomycin, , Does not apply, Continuous PRN, TIFFANY Krishnamurthy  •  Pharmacy to dose warfarin, , Does not apply, Continuous PRN, TIFFANY Krishnamurthy  •  piperacillin-tazobactam (ZOSYN) 3.375 g in iso-osmotic dextrose 50 ml (premix), 3.375 g, Intravenous, Q8H, Tena Garza MD, 3.375 g at 01/08/18 0023  •  potassium & sodium phosphates (PHOS-NAK) 280-160-250 MG packet - for Phosphorus less than 1.25 mg/dL, 1 packet, Oral, Q6H PRN **OR** potassium & sodium phosphates (PHOS-NAK) 280-160-250 MG packet - for Phosphorus 1.25 - 2.1 mg/dL, 1 packet, Oral, Once PRN, Faiza Coyle MD  •  potassium chloride (KLOR-CON) packet 40 mEq, 40 mEq, Oral, PRN, Faiza Coyle MD  •  potassium chloride (MICRO-K) CR capsule 40 mEq, 40 mEq, Oral, PRN, Faiza Coyle MD, 40 mEq at 01/07/18 0509  •  sodium chloride 0.9 % flush 1-10 mL, 1-10 mL, Intravenous, PRN, TIFFANY Krishnamurthy  •  Insert peripheral IV, , , Once **AND** sodium chloride 0.9 % flush 10 mL, 10 mL, Intravenous, PRN, Cassie Castrejon MD  •  vancomycin (VANCOCIN) in iso-osmotic dextrose IVPB 1 g (premix) 200 mL, 1,000 mg, Intravenous, Q12H, Iain Godfrey [facility], 1,000 mg at 01/07/18 2321  •  [START ON 1/9/2018] warfarin (COUMADIN) tablet 3 mg, 3 mg, Oral, Daily, Lisa Francois RPH  •  warfarin (COUMADIN) tablet 4 mg, 4 mg, Oral, Once, Lisa Francois RPH  Antibiotics:  Anti-Infectives     Ordered     Dose/Rate Route Frequency Start Stop    01/07/18 0921  vancomycin (VANCOCIN) in iso-osmotic dextrose IVPB 1 g (premix) 200 mL     Ordering Provider:  Iain Godfrey RPH    1,000 mg Intravenous Every 12 Hours  "01/07/18 1200 01/14/18 1159    01/06/18 0744  vancomycin 1250 mg/250 mL 0.9% NS IVPB ([facility])     Ordering Provider:  Iain Godfrey [facility]    1,250 mg Intravenous Once 01/06/18 1000 01/06/18 1241    01/05/18 1918  piperacillin-tazobactam (ZOSYN) 3.375 g in iso-osmotic dextrose 50 ml (premix)     Comments:  retimed per nurse request, prior dose given at ~1718; van Francois [facility] reviewed the order on 01/08/18 0843.   Ordering Provider:  Tena Garza MD    3.375 g  over 4 Hours Intravenous Every 8 Hours 01/06/18 0000 01/13/18 2359    01/05/18 1226  piperacillin-tazobactam (ZOSYN) 3.375 g in iso-osmotic dextrose 50 ml (premix)     Ordering Provider:  Faiza Coyle MD    3.375 g  over 30 Minutes Intravenous Once 01/05/18 1300 01/05/18 1748    01/05/18 1020  vancomycin 1750 mg/500 mL 0.9% NS IVPB ([facility])     Ordering Provider:  Farrukh Escobedo [facility]    20 mg/kg × 90.7 kg  over 120 Minutes Intravenous Once 01/05/18 1100 01/05/18 1513    01/05/18 1012  Pharmacy to dose vancomycin     Ordering Provider:  TIFFANY Krishnamurthy     Does not apply Continuous PRN 01/05/18 1012      01/05/18 0513  cefTRIAXone (ROCEPHIN) IVPB 1 g     Ordering Provider:  Cassie Castrejon MD    1 g  100 mL/hr over 30 Minutes Intravenous Once 01/05/18 0515 01/05/18 0623    01/05/18 0513  oseltamivir (TAMIFLU) capsule 75 mg     Ordering Provider:  Cassie Castrejon MD    75 mg Oral Once 01/05/18 0515 01/05/18 0547          Allergies:  is allergic to temazepam.    Review of Systems: All other reviewed and negative except as per HPI    Blood pressure (!) 157/106, pulse 79, temperature 97.4 °F (36.3 °C), temperature source Oral, resp. rate 20, height 170.2 cm (67\"), weight 89.8 kg (198 lb), SpO2 96 %.  GENERAL: Awake and alert, in no acute distress. Sitting up in a bedside recliner.  His son is present during the exam and interview.  He does not appear to be clinically toxic.  His mental status is lucid.  Nasal oxygen is in " place.  HEENT: Oropharynx without thrush. Sinuses nontender. Dentition in good repair. No cervical adenopathy. No carotid bruits/ jugular venous distention.   EYES: PERRL. No conjunctival injection. No icterus. EOMI.  LYMPHATICS: No lymphadenopathy of the neck or axillary or inguinal regions.   HEART: No murmur, gallop, or pericardial friction rub.   LUNGS: Clear to auscultation anteriorly. No percussion dullness.   ABDOMEN: Soft, nontender, nondistended. No appreciable HSM.    SKIN: Warm and dry without cutaneous eruptions. No embolic stigmata.  There are superficial abrasions over bilateral anterior tibial surfaces.  There is no gross purulence or malodorous changes.  There is a mild surrounding cellulitis.  PSYCHIATRIC: Mental status lucid. Cranial nerve function intact.       DIAGNOSTICS:  Lab Results   Component Value Date    WBC 7.42 01/08/2018    HGB 13.7 01/08/2018    HCT 42.4 01/08/2018     01/08/2018     Lab Results   Component Value Date    CRP 0.61 01/05/2018     Lab Results   Component Value Date    SEDRATE 8 01/06/2018     Lab Results   Component Value Date    GLUCOSE 101 (H) 01/08/2018    BUN 12 01/08/2018    CREATININE 0.80 01/08/2018    EGFRIFNONA 92 01/08/2018    BCR 15.0 01/08/2018    CO2 30.0 01/08/2018    CALCIUM 8.4 (L) 01/08/2018    ALBUMIN 3.40 01/08/2018    LABIL2 1.6 01/06/2018    AST 15 01/06/2018    ALT 12 01/06/2018       Microbiology: Influenza A and B rapid antigens negative.  Influenza PCR negative.  Blood cultures ×2 negative.  Pro-calcitonin level 0.76.  Gram stain of right tibial wound without white blood cells or dominant bacterial organism.    RADIOLOGY:  Imaging Results (last 72 hours)     Procedure Component Value Units Date/Time    CT Abdomen Pelvis With & Without Contrast [[id number]] Collected:  01/05/18 2126     Updated:  01/06/18 0813    Narrative:       EXAMINATION: CT ABDOMEN PELVIS W/WO CONTRAST - 01/05/2018      INDICATION:  R09.02-Hypoxemia; B34.9-Viral  infection, unspecified;  D72.829-Elevated white blood cell count, unspecified.     TECHNIQUE: CT scan of abdomen and pelvis was performed prior to and  following intravenous contrast.     The radiation dose reduction device was turned on for each scan per the  ALARA (As Low as Reasonably Achievable) protocol.     COMPARISON: 10/22/2017.     FINDINGS: The heart is large. There is a small right pleural effusion.  The liver has a mildly heterogeneous pattern but with no definite or  focal abnormality. The spleen is normal. There are calcified gallstones.  The pancreas is normal. There is no adrenal mass. The left adrenal gland  appears surgically absent. There is no ascites, aneurysm or  retroperitoneal lymphadenopathy. There is no renal mass, stone or  obstruction. There are numerous diverticula, primarily in the sigmoid  colon, but no features of active diverticulitis. There is no inguinal  lymphadenopathy. There is once again a small ventral abdominal wall  hernia containing only fat which is unchanged.       Impression:       1. Cardiomegaly.  2. Calcified gallstones.  3. Sigmoid diverticulosis without diverticulitis.     DICTATED:     01/05/2018  EDITED:          01/05/2018                 This report was finalized on 1/6/2018 8:11 AM by Dr. Alessandro Grullon MD.       XR Chest PA & Lateral [[id number]] Collected:  01/07/18 1610     Updated:  01/08/18 0906    Narrative:          EXAMINATION: XR CHEST PA AND LATERAL - 01/07/2018      INDICATION: R09.02-Hypoxemia; B34.9-Viral infection, unspecified;  D72.829-Elevated white blood cell count, unspecified.     COMPARISON: 01/05/2018.     FINDINGS: PA and lateral chest reveals small pleural effusions  identified bilaterally with air-fluid level seen in the large hiatal  hernia. Atelectatic change is seen the lung bases bilaterally. Heart is  enlarged. Degenerative change is seen within the spine. Vascular  calcifications are present.           Impression:       Small  bilateral pleural effusions present. Atelectatic  changes seen in lung bases. Pleural thickening identified along the  right lateral chest wall. Heart is enlarged with no new focal  parenchymal opacification present.     DICTATED:     01/07/2018  EDITED    :     01/07/2018      This report was finalized on 1/8/2018 9:03 AM by Dr. Alieda Riley MD.             Assessment and Plan: Sepsis syndrome.  Fever with shaking rigors.  Abrasions to bilateral anterior tibial surfaces.  Bilateral lower extremity cellulitis.  Maximum temperature over 24 hours is 97.4°.  Currently same.  Peripheral leukocyte count is 7420.  Plasma creatinine is 0.8.  Random vancomycin level is 7 µg.  Inflammatory markers show a sedimentation rate of 8 and C-reactive protein of 0.6.  Wound Gram stain reveals no dominant bacterial organism.  Blood cultures ×2 remain negative.  Influenza a and B by both rapid antigen and PCR are negative.  Utilization management: Acceptable for discharge home today.  Suggest doxycycline 100 mg by mouth twice a day #14.  Bactroban 2% 3 times a day to bilateral anterior tibial wounds.  Follow-up with me this Thursday in clinic.      Benny White MD  1/8/2018

## 2018-01-09 ENCOUNTER — ANTICOAGULATION VISIT (OUTPATIENT)
Dept: PHARMACY | Facility: HOSPITAL | Age: 83
End: 2018-01-09

## 2018-01-09 DIAGNOSIS — I48.20 CHRONIC ATRIAL FIBRILLATION (HCC): ICD-10-CM

## 2018-01-09 LAB
B PERT.PT PRMT NPH QL NAA+NON-PROBE: NOT DETECTED
C PNEUM DNA NPH QL NAA+NON-PROBE: NOT DETECTED
FLUAV H1 RNA NPH QL NAA+NON-PROBE: NOT DETECTED
FLUAV H3 RNA NPH QL NAA+NON-PROBE: NOT DETECTED
FLUAV RNA SPEC QL NAA+PROBE: NOT DETECTED
FLUBV RNA SPEC QL NAA+PROBE: NOT DETECTED
HADV DNA SPEC QL NAA+PROBE: NOT DETECTED
HCOV 229E RNA NPH QL NAA+NON-PROBE: NOT DETECTED
HCOV HKU1 RNA NPH QL NAA+NON-PROBE: NOT DETECTED
HCOV NL63 RNA NPH QL NAA+NON-PROBE: NOT DETECTED
HCOV OC43 RNA NPH QL NAA+NON-PROBE: NOT DETECTED
HMPV RNA SPEC QL NAA+PROBE: NOT DETECTED
HPIV1 RNA SPEC QL NAA+PROBE: NOT DETECTED
HPIV2 SPEC QL CULT: NOT DETECTED
HPIV3 SPEC QL CULT: NOT DETECTED
HPIV4 RNA NPH QL NAA+NON-PROBE: NOT DETECTED
INR PPP: NOT DETECTED
M PNEUMO DNA SPEC QL NAA+PROBE: NOT DETECTED
RHINOVIRUS RNA SPEC QL NAA+PROBE: NOT DETECTED
RSV AG SPEC QL: NOT DETECTED

## 2018-01-09 NOTE — PROGRESS NOTES
Anticoagulation Clinic Progress Note  Indication: afib  Referring Provider: Em  Goal INR: 2-3  Current Drug Interactions: aspirin, simvastatin, trazodone  Other: no bleeding per patient  [id number]:    Diet: Typically doesn't eat many Vit K foods    Anticoagulation Clinic INR History:  Date 9/14 10/12 11/9 12/7 1/11 2/15 3/22 4/26 5/31   Total Weekly Dose 17.5 mg 17.5 mg 17.5 mg 17.5 mg 17.5 mg 17.5 mg 17.5 mg 17.5 mg 17.5mg   INR 3.0 2.4 2.5 2.5 2.3 2.3 2.6 2.5 2.6   Notes               Date 7/5 8/9 8/30 9/18 10/16 11/15 11/29 12/13 1/3   Total Weekly Dose 17.5  mg 17.5 mg 17.5 mg 17.5 mg 17.5 mg 17.5 mg 18.75 mg 18.75mg 20mg   INR 2.2 1.9 2.1 2.0 2.2 1.8 1.9 2.1 2.1   Notes       abx   Keflex     Date 1/8           Total Weekly Dose 23.75 mg 23.75 mg          INR 1.61           Notes  hosp doxy            Clinic Interview:  Tablet Strength: 2.5mg tablets   Current Dose: 2.5mg daily except 3.75mg on Wed  Patient Findings      Positives Change in health, Extra doses, Change in medications, Hospital admission     Negatives Signs/symptoms of thrombosis, Signs/symptoms of bleeding, Laboratory test error suspected, Change in alcohol use, Change in activity, Upcoming invasive procedure, Emergency department visit, Upcoming dental procedure, Missed doses, Change in diet/appetite, Bruising, Other complaints     Comments Mr. Lucero was discharged from [facility] yesterday with sepsis syndrome, s/p fall with bilateral LE cellulitis. He was prescribed doxy 100mg BID x7 days and will see Dr. Whtie for follow up tomorrow.     Plan:  1. INR was subtherapeutic throughout Mr. Lucero's recent hospital admission. Given new rx for doxy and recent dose increases inpt, will proceed cautiously. Instructed Mrs. Lucero to have her  take warfarin 3.75mg tonight, 2.5mg tomorrow.  2. RTC after appt with Dr. White this Thursday, day 4 of doxy.   3. Verbal information provided over the phone. Mrs. Lucero expresses understanding with  teach back and has no further questions at this time.    Stefanie Hogue [facility]  1/9/2018  1:35 PM

## 2018-01-10 LAB
BACTERIA SPEC AEROBE CULT: NORMAL
BACTERIA SPEC AEROBE CULT: NORMAL

## 2018-01-11 ENCOUNTER — ANTICOAGULATION VISIT (OUTPATIENT)
Dept: PHARMACY | Facility: HOSPITAL | Age: 83
End: 2018-01-11

## 2018-01-11 DIAGNOSIS — I48.20 CHRONIC ATRIAL FIBRILLATION (HCC): ICD-10-CM

## 2018-01-11 LAB
INR PPP: 1.9 (ref 0.91–1.09)
PROTHROMBIN TIME: 22.8 SECONDS (ref 10–13.8)

## 2018-01-11 PROCEDURE — 36416 COLLJ CAPILLARY BLOOD SPEC: CPT

## 2018-01-11 PROCEDURE — G0463 HOSPITAL OUTPT CLINIC VISIT: HCPCS

## 2018-01-11 PROCEDURE — 85610 PROTHROMBIN TIME: CPT

## 2018-01-11 NOTE — PROGRESS NOTES
Anticoagulation Clinic Progress Note  Indication: afib  Referring Provider: Em  Goal INR: 2-3  Current Drug Interactions: aspirin, simvastatin, trazodone  Other: no bleeding per patient  [id number]: 4 (age, HTN, CAD)    Diet: Typically doesn't eat many Vit K foods     Anticoagulation Clinic INR History:  Date 9/14 10/12 11/9 12/7 1/11 2/15 3/22 4/26 5/31   Total Weekly Dose 17.5 mg 17.5 mg 17.5 mg 17.5 mg 17.5 mg 17.5 mg 17.5 mg 17.5 mg 17.5mg   INR 3.0 2.4 2.5 2.5 2.3 2.3 2.6 2.5 2.6   Notes               Date 7/5 8/9 8/30 9/18 10/16 11/15 11/29 12/13 1/3   Total Weekly Dose 17.5  mg 17.5 mg 17.5 mg 17.5 mg 17.5 mg 17.5 mg 18.75 mg 18.75mg 20mg   INR 2.2 1.9 2.1 2.0 2.2 1.8 1.9 2.1 2.1   Notes       abx   Keflex     Date 1/8 1/10          Total Weekly Dose 23.75 mg 23.75 mg          INR 1.61 1.9          Notes  hosp doxy            Clinic Interview:  Tablet Strength: 2.5mg tablets   Current Dose: 2.5mg daily except 3.75mg on Wed    Warfarin Dosing During Admission:  Date  1/5 1/6 1/7 1/8             INR  1.94 1.52 1.51 1.61             Dose  2.5mg 5mg 5mg                    Patient Findings      Positives Change in medications     Negatives Signs/symptoms of thrombosis, Signs/symptoms of bleeding, Laboratory test error suspected, Change in health, Change in alcohol use, Change in activity, Upcoming invasive procedure, Emergency department visit, Upcoming dental procedure, Missed doses, Extra doses, Change in diet/appetite, Hospital admission, Bruising, Other complaints     Comments Mr. Lucero was discharged from [facility] on 1/8 with sepsis syndrome, s/p fall with bilateral LE cellulitis. He was prescribed Doxy 100mg BID x7 days and will complete on Sunday 1/14. He saw Dr. White today and will see patient again in two weeks. Per patient, he believes that he took warfarin 2.5mg on Monday night after discharge from hospital. Patient was discharged before could receive 4mg that was discussed in pharmacist inpatient  note.      Plan:  1. INR remains slightly SUBtherapeutic today despite elevated doses administered in hospital and boost on tuesday. Given pt continues doxycycline through 1/14 and recent dose increases inpt, will proceed cautiously. Additionally patient was at lower end of goal prior to hospital admission. At this time, instructed  and Mr. Lucero to continue 2.5mg daily and 3.75mg Tue. Consider dose increase if patient returns to the clinic SUBtherapeutic following abx use.   2. RTC after appt with Dr. White this Thursday, day 4 of doxy.   3. Verbal information provided over the phone. Mrs. Lucero expresses understanding with teach back and has no further questions at this time.    Colleen Nolasco, PharmD  1/11/2018  11:44 AM

## 2018-01-16 PROBLEM — J11.1 INFLUENZA: Status: RESOLVED | Noted: 2018-01-05 | Resolved: 2018-01-16

## 2018-01-16 NOTE — DISCHARGE SUMMARY
[facility] Medicine Services  DISCHARGE SUMMARY    Patient Name: Mike Lucero Jr.  : 1932  MRN: [id number]    Date of Admission: 2018  Date of Discharge:    Primary Care Physician: Ike Douglas MD    Consults     Date and Time Order Name Status Description    2018 0717 Inpatient Consult to Infectious Diseases Completed         Hospital Course     Presenting Problem:   Hypoxia [R09.02]    Active Hospital Problems (** Indicates Principal Problem)    Diagnosis Date Noted   • **Acute respiratory failure with hypoxia [J96.01] 2018   • Left leg cellulitis [L03.116] 10/22/2017   • RELL (obstructive sleep apnea) [G47.33]    • Permanent atrial fibrillation [I48.2]    • Essential hypertension [I10] 2016      Resolved Hospital Problems    Diagnosis Date Noted Date Resolved   • Influenza [J11.1] 2018          Hospital Course:  Mike Lucero Jr. is a 85 y.o. male  Who presented from home.  He had recently been on keflex for a lower extremity cellulitis.  He developed chills and weakness and dsypnea and came to the ED with a sat of 85 percent.    Workup was largely unremarkable with clear CXR and all cultures remaining negative.  He was followed by ID on empiric vanc and zosyn, and changed to doxy at discharge feeling good.  Flu was negative.  CT of the abdomen and pelvis was unremarkable.     He was bradycardic and beta blocker was halved. Diltiazem was held.  He remained on warfarin for a fib.          Day of Discharge     HPI:   Feeling good, liked the pancakes, asking to go home.     Review of Systems   Gen- No fevers, chills  CV- No chest pain, palpitations  Resp- No cough, dyspnea  GI- No N/V/D, abd pain    Otherwise ROS is negative except as mentioned in the HPI.    Vital Signs:         Physical Exam:  Constitutional: No acute distress, awake, alert, up in chair on 2L nc.   Eyes: PERRLA, sclerae anicteric, no conjunctival  injection  HENT: NCAT, mucous membranes moist  Neck: Supple, no thyromegaly, no lymphadenopathy, trachea midline  Respiratory: Clear to auscultation bilaterally, nonlabored respirations   Cardiovascular: RRR, no murmurs, rubs, or gallops, palpable pedal pulses bilaterally  Gastrointestinal: Positive bowel sounds, soft, nontender, nondistended  Musculoskeletal: No bilateral ankle edema, no clubbing or cyanosis to extremities  Psychiatric: Appropriate affect, cooperative  Neurologic: Oriented x 3, strength symmetric in all extremities, Cranial Nerves grossly intact to confrontation, speech clear  Skin: No rashes      Pertinent  and/or Most Recent Results           Results from last 7 days  Lab Units 01/11/18  1140   PROTIME seconds 22.8*   INR  1.9*           Invalid input(s): TG, LDLREALC      Brief Urine Lab Results  (Last result in the past 365 days)      Color   Clarity   Blood   Leuk Est   Nitrite   Protein   CREAT   Urine HCG        01/04/18 2333 Yellow Clear Small (1+)(A) Negative Negative Negative               Microbiology Results Abnormal     Procedure Component Value - Date/Time    Blood Culture - Blood, [[id number]]  (Normal) Collected:  01/05/18 0300    Lab Status:  Final result Specimen:  Blood from Arm, Right Updated:  01/10/18 0331     Blood Culture No growth at 5 days    Blood Culture - Blood, [[id number]]  (Normal) Collected:  01/05/18 0305    Lab Status:  Final result Specimen:  Blood from Arm, Left Updated:  01/10/18 0331     Blood Culture No growth at 5 days    Respiratory Panel, PCR - Swab, Nasopharynx [[id number]] Collected:  01/06/18 0900    Lab Status:  Final result Specimen:  Swab from Nasopharynx Updated:  01/09/18 1514     Adenovirus Detection by PCR Not Detected     Coronavirus HKU1 Not Detected     Coronavirus NL63 Not Detected     Coronavirus 229E Not Detected     Coronavirus OC43 Not Detected     Human Metapneumovirus Not Detected     Human Rhinovirus/Enterovirus Not Detected     Influenza  A PCR Not Detected     Influenza A H1 Not Detected     Influenza 2009 H1N1 by PCR Not Detected     Influenza A H3 Not Detected     Influenza B PCR Not Detected     Parainfluenza Virus 1 Not Detected     Parainfluenza Virus 2 Not Detected     Parainfluenza Virus 3 Not Detected     Parainfluenza Virus 4 Not Detected     Respiratory Syncytial Virus Not Detected     Bordetella pertussis pcr Not Detected     Chlamydophila pneumoniae PCR Not Detected     Mycoplasma pneumo by PCR Not Detected    Narrative:       Performed at:  [address] [place] [address]  [id number]  : Kuldeep Calvo MD, Phone:  [phone]    Wound Culture - Wound, Leg, Left [[id number]]  (Normal) Collected:  01/05/18 1716    Lab Status:  Final result Specimen:  Wound from Leg, Left Updated:  01/07/18 0705     Wound Culture No growth at 2 days     Gram Stain Result No WBCs or organisms seen    Influenza A & B, RT PCR - Swab, Nasopharynx [[id number]]  (Normal) Collected:  01/05/18 0536    Lab Status:  Final result Specimen:  Swab from Nasopharynx Updated:  01/05/18 0828     Influenza A PCR Not Detected     Influenza B PCR Not Detected    Influenza Antigen, Rapid - Swab, Nasopharynx [[id number]]  (Normal) Collected:  01/05/18 0329    Lab Status:  Final result Specimen:  Swab from Nasopharynx Updated:  01/05/18 0351     Influenza A Ag, EIA Negative     Influenza B Ag, EIA Negative          Imaging Results (all)     Procedure Component Value Units Date/Time    XR Chest 1 View [[id number]] Collected:  01/05/18 0220     Updated:  01/05/18 0357    Narrative:       EXAM:    XR Chest, 1 View    EXAM DATE/TIME:    Exam ordered 1/5/2018 2:20 AM    CLINICAL HISTORY:    85 years old, male; Signs and symptoms; Shortness of breath and other:   Chills/back pain/body aches; Prior surgery; Surgery date: 6+ months; Surgery   type: Cabg; Patient HX: Chills/back pain/body aches HX: Non-hospital chronic   atrial fibrillation essential  hypertension hyperlipidemia coronary artery   disease permanent atrial fibrillation benign hypertension hypercholesterolemia   h/o endocarditis osteoarthritis cataract seasonal allergies insomnia daytime   sleepiness vandana (obstructive sleep apnea) snoring left leg cellulitis mild   cognitive impairment chronic anticoagulation    TECHNIQUE:    Frontal view of the chest.    COMPARISON:    CR - XR CHEST 1 VW 2017-10-22 14:32    FINDINGS:    Lungs:  Unremarkable.  No consolidation.    Pleural space:  Unremarkable.  No pneumothorax.    Heart:  Post CABG changes noted.  The cardiac silhouette appears mildly   enlarged.    Mediastinum:  Unremarkable.    Bones/joints:  Sternotomy changes are present.    Vasculature:  Aortic atherosclerosis and tortuosity is present.      Impression:         No acute findings.    THIS DOCUMENT HAS BEEN ELECTRONICALLY SIGNED BY NATALIE CONWAY MD    CT Abdomen Pelvis With & Without Contrast [[id number]] Collected:  01/05/18 2126     Updated:  01/06/18 0813    Narrative:       EXAMINATION: CT ABDOMEN PELVIS W/WO CONTRAST - 01/05/2018      INDICATION:  R09.02-Hypoxemia; B34.9-Viral infection, unspecified;  D72.829-Elevated white blood cell count, unspecified.     TECHNIQUE: CT scan of abdomen and pelvis was performed prior to and  following intravenous contrast.     The radiation dose reduction device was turned on for each scan per the  ALARA (As Low as Reasonably Achievable) protocol.     COMPARISON: 10/22/2017.     FINDINGS: The heart is large. There is a small right pleural effusion.  The liver has a mildly heterogeneous pattern but with no definite or  focal abnormality. The spleen is normal. There are calcified gallstones.  The pancreas is normal. There is no adrenal mass. The left adrenal gland  appears surgically absent. There is no ascites, aneurysm or  retroperitoneal lymphadenopathy. There is no renal mass, stone or  obstruction. There are numerous diverticula, primarily in the  sigmoid  colon, but no features of active diverticulitis. There is no inguinal  lymphadenopathy. There is once again a small ventral abdominal wall  hernia containing only fat which is unchanged.       Impression:       1. Cardiomegaly.  2. Calcified gallstones.  3. Sigmoid diverticulosis without diverticulitis.     DICTATED:     01/05/2018  EDITED:          01/05/2018                 This report was finalized on 1/6/2018 8:11 AM by Dr. Alessandro Grullon MD.       XR Chest PA & Lateral [[id number]] Collected:  01/07/18 1610     Updated:  01/08/18 0906    Narrative:          EXAMINATION: XR CHEST PA AND LATERAL - 01/07/2018      INDICATION: R09.02-Hypoxemia; B34.9-Viral infection, unspecified;  D72.829-Elevated white blood cell count, unspecified.     COMPARISON: 01/05/2018.     FINDINGS: PA and lateral chest reveals small pleural effusions  identified bilaterally with air-fluid level seen in the large hiatal  hernia. Atelectatic change is seen the lung bases bilaterally. Heart is  enlarged. Degenerative change is seen within the spine. Vascular  calcifications are present.           Impression:       Small bilateral pleural effusions present. Atelectatic  changes seen in lung bases. Pleural thickening identified along the  right lateral chest wall. Heart is enlarged with no new focal  parenchymal opacification present.     DICTATED:     01/07/2018  EDITED    :     01/07/2018      This report was finalized on 1/8/2018 9:03 AM by Dr. Aleida Riley MD.                    Discharge Details      Mike Lucero Jr.   Home Medication Instructions HAIDER:[id number]    Printed on:01/16/18 1542   Medication Information                      aspirin 81 MG EC tablet  Take 81 mg by mouth Daily.             diltiaZEM CD (CARDIZEM CD) 240 MG 24 hr capsule  Take 240 mg by mouth Daily.             donepezil (ARICEPT) 10 MG tablet  Take 1 tablet by mouth Daily.             lisinopril (PRINIVIL,ZESTRIL) 20 MG tablet  Take 20 mg by  mouth Daily.             memantine (NAMENDA TITRATION PACK) 5 (28)-10 (21) MG tablet pack  Follow package directions.             potassium chloride (K-DUR,KLOR-CON) 20 MEQ CR tablet  Take 20 mEq by mouth Daily.             simvastatin (ZOCOR) 10 MG tablet  Take 10 mg by mouth every night.             traZODone (DESYREL) 50 MG tablet  Take 50 mg by mouth every night.             warfarin (COUMADIN) 2.5 MG tablet  TAKE ONE TABLET BY MOUTH DAILY OR AS DIRECTED BY ANTICOAGULATION PHARMACIST               Tetracycline through 1/15    Discharge Disposition:  Home or Self Care    Discharge Diet: routine      Discharge Activity: routine        Future Appointments  Date Time Provider Department Center   1/18/2018 1:00 PM ANTI COAG CLINIC  [facility] None   5/21/2018 2:00 PM Snehal Lemus PA-C [facility] None   9/19/2018 1:30 PM Ike Strickland III, MD [facility] None   dr lin 1/11    Additional Instructions for the Follow-ups that You Need to Schedule     Discharge Follow-up with Specified Provider: riley    As directed    To:  riley    Follow Up Details:  thursday 1/ 11                     Time Spent on Discharge:  40 minutes    Pat Kelly MD  01/16/18  3:42 PM

## 2018-01-18 ENCOUNTER — ANTICOAGULATION VISIT (OUTPATIENT)
Dept: PHARMACY | Facility: HOSPITAL | Age: 83
End: 2018-01-18

## 2018-01-18 DIAGNOSIS — I48.20 CHRONIC ATRIAL FIBRILLATION (HCC): ICD-10-CM

## 2018-01-18 LAB
INR PPP: 2.5 (ref 0.91–1.09)
PROTHROMBIN TIME: 30.1 SECONDS (ref 10–13.8)

## 2018-01-18 PROCEDURE — G0463 HOSPITAL OUTPT CLINIC VISIT: HCPCS

## 2018-01-18 PROCEDURE — 85610 PROTHROMBIN TIME: CPT

## 2018-01-18 PROCEDURE — 36416 COLLJ CAPILLARY BLOOD SPEC: CPT

## 2018-01-18 NOTE — PROGRESS NOTES
Anticoagulation Clinic Progress Note  Indication: afib  Referring Provider: Em  Goal INR: 2-3  Current Drug Interactions: aspirin, simvastatin, trazodone  Other: no bleeding per patient  [id number]: 4 (age, HTN, CAD)    Diet: Typically doesn't eat many Vit K foods    Anticoagulation Clinic INR History:  Date 9/14 10/12 11/9 12/7 1/11 2/15 3/22 4/26 5/31   Total Weekly Dose 17.5 mg 17.5 mg 17.5 mg 17.5 mg 17.5 mg 17.5 mg 17.5 mg 17.5 mg 17.5mg   INR 3.0 2.4 2.5 2.5 2.3 2.3 2.6 2.5 2.6   Notes               Date 7/5 8/9 8/30 9/18 10/16 11/15 11/29 12/13 1/3   Total Weekly Dose 17.5  mg 17.5 mg 17.5 mg 17.5 mg 17.5 mg 17.5 mg 18.75 mg 18.75mg 20mg   INR 2.2 1.9 2.1 2.0 2.2 1.8 1.9 2.1 2.1   Notes       abx   Keflex     Date 1/8 1/10 1/18         Total Weekly Dose 23.75 mg 23.75 mg 18.75         INR 1.61 1.9 2.5         Notes  hosp doxy            Clinic Interview:  Tablet Strength: 2.5mg tablets   Current Dose: 2.5mg daily except 3.75mg on Wed  Patient Findings      Positives Change in medications     Negatives Signs/symptoms of thrombosis, Signs/symptoms of bleeding, Laboratory test error suspected, Change in health, Change in alcohol use, Change in activity, Upcoming invasive procedure, Emergency department visit, Upcoming dental procedure, Missed doses, Extra doses, Change in diet/appetite, Hospital admission, Bruising, Other complaints     Comments Patient reports completing use of Doxycycline on Monday. Patient does not report any weight gain and reports he is feeling better, however, he does report feeling slightly fatigued.     Plan:  1. INR remains therapeutic today. At this time, instructed  and Mr. Lucero to continue 2.5mg daily and 3.75mg Tue.   2. Discussed would like to repeat INR in two weeks given patient has not previously been therapeutic on this dose for a period of time. Patient reports he is seeing Dr. White next week and would prefer to RTC then.   3. Verbal information provided over the  phone. Mrs. Lucero expresses understanding with teach back and has no further questions at this time.    Colleen Nolasco, PharmD  1/18/2018  1:07 PM

## 2018-01-22 RX ORDER — DILTIAZEM HYDROCHLORIDE 240 MG/1
CAPSULE, EXTENDED RELEASE ORAL
Qty: 90 CAPSULE | Refills: 1 | Status: SHIPPED | OUTPATIENT
Start: 2018-01-22 | End: 2018-07-22 | Stop reason: SDUPTHER

## 2018-01-25 ENCOUNTER — ANTICOAGULATION VISIT (OUTPATIENT)
Dept: PHARMACY | Facility: HOSPITAL | Age: 83
End: 2018-01-25

## 2018-01-25 DIAGNOSIS — I48.20 CHRONIC ATRIAL FIBRILLATION (HCC): ICD-10-CM

## 2018-01-25 LAB
INR PPP: 2.3 (ref 0.91–1.09)
PROTHROMBIN TIME: 27.2 SECONDS (ref 10–13.8)

## 2018-01-25 PROCEDURE — 36416 COLLJ CAPILLARY BLOOD SPEC: CPT

## 2018-01-25 PROCEDURE — G0463 HOSPITAL OUTPT CLINIC VISIT: HCPCS

## 2018-01-25 PROCEDURE — 85610 PROTHROMBIN TIME: CPT

## 2018-01-25 NOTE — PROGRESS NOTES
Anticoagulation Clinic Progress Note  Indication: afib  Referring Provider: Em  Goal INR: 2-3  Current Drug Interactions: aspirin, simvastatin, trazodone  Other: no bleeding per patient  [id number]: 4 (age, HTN, CAD)    Diet: Typically doesn't eat many Vit K foods    Anticoagulation Clinic INR History:  Date 9/14 10/12 11/9 12/7 1/11 2/15 3/22 4/26 5/31   Total Weekly Dose 17.5 mg 17.5 mg 17.5 mg 17.5 mg 17.5 mg 17.5 mg 17.5 mg 17.5 mg 17.5mg   INR 3.0 2.4 2.5 2.5 2.3 2.3 2.6 2.5 2.6   Notes               Date 7/5 8/9 8/30 9/18 10/16 11/15 11/29 12/13 1/3   Total Weekly Dose 17.5  mg 17.5 mg 17.5 mg 17.5 mg 17.5 mg 17.5 mg 18.75 mg 18.75mg 20mg   INR 2.2 1.9 2.1 2.0 2.2 1.8 1.9 2.1 2.1   Notes       abx   Keflex     Date 1/8 1/10 1/18 1/25        Total Weekly Dose 23.75 mg 23.75 mg 18.75 18.75        INR 1.61 1.9 2.5 2.3        [facility]  [facility]            Clinic Interview:  Tablet Strength: 2.5mg tablets   Current Dose: 2.5mg daily except 3.75mg on Tue  Patient Findings      Negatives Signs/symptoms of thrombosis, Signs/symptoms of bleeding, Laboratory test error suspected, Change in health, Change in alcohol use, Change in activity, Upcoming invasive procedure, Emergency department visit, Upcoming dental procedure, Missed doses, Extra doses, Change in medications, Change in diet/appetite, Hospital admission, Bruising, Other complaints     Comments Patient reports all patient findings to be negative at this time.     Plan:  1. INR remains therapeutic today. At this time, instructed  and Mr. Lucero to continue 2.5mg daily and 3.75mg Tue.   2. Discussed would like to repeat INR in two weeks. He has an appointment with ID on 2/27.   3. Verbal information provided over the phone. Mrs. Luecro expresses understanding with teach back and has no further questions at this time.    Colleen Nolasco PharmD  1/25/2018  12:21 PM

## 2018-02-09 ENCOUNTER — ANTICOAGULATION VISIT (OUTPATIENT)
Dept: PHARMACY | Facility: HOSPITAL | Age: 83
End: 2018-02-09

## 2018-02-09 DIAGNOSIS — I48.20 CHRONIC ATRIAL FIBRILLATION (HCC): ICD-10-CM

## 2018-02-09 LAB
INR PPP: 2.2 (ref 0.91–1.09)
PROTHROMBIN TIME: 26.1 SECONDS (ref 10–13.8)

## 2018-02-09 PROCEDURE — G0463 HOSPITAL OUTPT CLINIC VISIT: HCPCS

## 2018-02-09 PROCEDURE — 85610 PROTHROMBIN TIME: CPT

## 2018-02-09 PROCEDURE — 36416 COLLJ CAPILLARY BLOOD SPEC: CPT

## 2018-02-09 NOTE — PROGRESS NOTES
Anticoagulation Clinic Progress Note  Indication: afib  Referring Provider: Em  Goal INR: 2-3  Current Drug Interactions: aspirin, simvastatin, trazodone  Other: no bleeding per patient  [id number]: 4 (age, HTN, CAD)    Diet: Typically doesn't eat many Vit K foods    Anticoagulation Clinic INR History:  Date 9/14 10/12 11/9 12/7 1/11 2/15 3/22 4/26 5/31   Total Weekly Dose 17.5 mg 17.5 mg 17.5 mg 17.5 mg 17.5 mg 17.5 mg 17.5 mg 17.5 mg 17.5mg   INR 3.0 2.4 2.5 2.5 2.3 2.3 2.6 2.5 2.6   Notes               Date 7/5 8/9 8/30 9/18 10/16 11/15 11/29 12/13 1/3   Total Weekly Dose 17.5  mg 17.5 mg 17.5 mg 17.5 mg 17.5 mg 17.5 mg 18.75 mg 18.75mg 20mg   INR 2.2 1.9 2.1 2.0 2.2 1.8 1.9 2.1 2.1   Notes       abx   Keflex     Date 1/8 1/10 1/18 1/25 2/9       Total Weekly Dose 23.75 mg 23.75 mg 18.75 18.75 18.75       INR 1.61 1.9 2.5 2.3 2.2       Notes  hosp doxy            Clinic Interview:  Tablet Strength: 2.5mg tablets   Current Dose: 2.5mg daily except 3.75mg on Tue    Patient Findings      Negatives Signs/symptoms of thrombosis, Signs/symptoms of bleeding, Laboratory test error suspected, Change in health, Change in alcohol use, Change in activity, Upcoming invasive procedure, Emergency department visit, Upcoming dental procedure, Missed doses, Extra doses, Change in medications, Change in diet/appetite, Hospital admission, Bruising, Other complaints     Comments Patient reports usually feeling congested in the mornings but feels better as the day goes on. Denies any other changes in medications, health, and diet. Patient has an ID f/up appointment in the coming weeks and was instructed to call the clinic if started on Abx.      Plan:  1. INR remains therapeutic today at 2.2. Instructed Mr. Lucero to continue 2.5mg daily except 3.75mg Tues.   2. RTC in 3 weeks for repeat INR.  3. Written and Verbal information provided. Mr. Lucero expresses understanding with teach back and has no further questions at this  time.    Jose M Oden, Pharmacy Intern  2/9/2018  1:19 PM     I, Tracie Delarosa, [facility], assisted in the patient interview. I have reviewed the note in full and agree with the assessment and plan.  02/09/18  3:34 PM

## 2018-02-12 RX ORDER — POTASSIUM CHLORIDE 20 MEQ/1
TABLET, EXTENDED RELEASE ORAL
Qty: 90 TABLET | Refills: 0 | Status: SHIPPED | OUTPATIENT
Start: 2018-02-12 | End: 2018-04-23 | Stop reason: SDUPTHER

## 2018-03-02 ENCOUNTER — ANTICOAGULATION VISIT (OUTPATIENT)
Dept: PHARMACY | Facility: HOSPITAL | Age: 83
End: 2018-03-02

## 2018-03-02 DIAGNOSIS — I48.20 CHRONIC ATRIAL FIBRILLATION (HCC): ICD-10-CM

## 2018-03-02 LAB
INR PPP: 1.7 (ref 0.91–1.09)
PROTHROMBIN TIME: 20.9 SECONDS (ref 10–13.8)

## 2018-03-02 PROCEDURE — 36416 COLLJ CAPILLARY BLOOD SPEC: CPT

## 2018-03-02 PROCEDURE — 85610 PROTHROMBIN TIME: CPT

## 2018-03-02 PROCEDURE — G0463 HOSPITAL OUTPT CLINIC VISIT: HCPCS

## 2018-03-02 NOTE — PROGRESS NOTES
Anticoagulation Clinic Progress Note  Indication: afib  Referring Provider: Em  Goal INR: 2-3  Current Drug Interactions: aspirin, simvastatin, trazodone  Other: no bleeding per patient  [id number]: 4 (age, HTN, CAD)    Diet: Typically doesn't eat many Vit K foods    Anticoagulation Clinic INR History:  Date 9/14 10/12 11/9 12/7 1/11 2/15 3/22 4/26 5/31   Total Weekly Dose 17.5 mg 17.5 mg 17.5 mg 17.5 mg 17.5 mg 17.5 mg 17.5 mg 17.5 mg 17.5mg   INR 3.0 2.4 2.5 2.5 2.3 2.3 2.6 2.5 2.6   Notes               Date 7/5 8/9 8/30 9/18 10/16 11/15 11/29 12/13 1/3   Total Weekly Dose 17.5  mg 17.5 mg 17.5 mg 17.5 mg 17.5 mg 17.5 mg 18.75 mg 18.75mg 20mg   INR 2.2 1.9 2.1 2.0 2.2 1.8 1.9 2.1 2.1   Notes       abx   Keflex     Date 1/8 1/10 1/18 1/25 2/9 3/2      Total Weekly Dose 23.75 mg 23.75 mg 18.75 mg 18.75 mg 18.75 mg 18.75 mg 20mg     INR 1.61 1.9 2.5 2.3 2.2 1.7      Notes  hosp doxy            Clinic Interview:  Tablet Strength: 2.5mg tablets   Current Dose: 2.5mg daily except 3.75mg on Tues  Patient Findings      Negatives Signs/symptoms of thrombosis, Signs/symptoms of bleeding, Laboratory test error suspected, Change in health, Change in alcohol use, Change in activity, Upcoming invasive procedure, Emergency department visit, Upcoming dental procedure, Missed doses, Extra doses, Change in medications, Change in diet/appetite, Hospital admission, Bruising, Other complaints     Comments Mr. Lucero reports he has been taking his warfarin as instructed (1.5 tabs on Tues), and he does not believe he has missed any doses since last visit. He does think he forgot a dose one night, but he is certain he took that dose the next AM (late, not missed). He denies changes in his diet -- discussed possible alternative reasons for INR decline, but could not determine the cause for INR decline today.      Plan:  1. INR is subtherapeutic today, uncertain the cause. For now, will have Mr. Lucero increase his dose ~7% to warfarin  2.5mg daily except 3.75mg on TuesFri.   2. RTC in 2 weeks to reassess.  3. Written and verbal information provided in clinic. Mr. Lucero expresses understanding with teach back and has no further questions at this time.    Stefanie Hogue [facility]  3/2/2018  1:07 PM

## 2018-03-08 ENCOUNTER — APPOINTMENT (OUTPATIENT)
Dept: GENERAL RADIOLOGY | Facility: HOSPITAL | Age: 83
End: 2018-03-08

## 2018-03-08 ENCOUNTER — HOSPITAL ENCOUNTER (OUTPATIENT)
Facility: HOSPITAL | Age: 83
Setting detail: OBSERVATION
Discharge: HOME | End: 2018-03-10
Attending: EMERGENCY MEDICINE | Admitting: INTERNAL MEDICINE

## 2018-03-08 DIAGNOSIS — I10 ESSENTIAL HYPERTENSION: ICD-10-CM

## 2018-03-08 DIAGNOSIS — M79.662 PAIN AND SWELLING OF LEFT LOWER LEG: ICD-10-CM

## 2018-03-08 DIAGNOSIS — Z71.1 CONCERN ABOUT INFECTIOUS DISEASE WITHOUT DIAGNOSIS: ICD-10-CM

## 2018-03-08 DIAGNOSIS — I48.20 CHRONIC ATRIAL FIBRILLATION (HCC): ICD-10-CM

## 2018-03-08 DIAGNOSIS — Z79.01 CHRONIC ANTICOAGULATION: ICD-10-CM

## 2018-03-08 DIAGNOSIS — M79.89 PAIN AND SWELLING OF LEFT LOWER LEG: ICD-10-CM

## 2018-03-08 DIAGNOSIS — R50.9 ACUTE FEBRILE ILLNESS: Primary | ICD-10-CM

## 2018-03-08 PROBLEM — D72.829 LEUKOCYTOSIS: Status: ACTIVE | Noted: 2018-03-08

## 2018-03-08 PROBLEM — T78.40XA ALLERGIC REACTION CAUSED BY A DRUG: Status: ACTIVE | Noted: 2018-03-08

## 2018-03-08 LAB
ALBUMIN SERPL-MCNC: 4.4 G/DL (ref 3.2–4.8)
ALBUMIN/GLOB SERPL: 1.4 G/DL (ref 1.5–2.5)
ALP SERPL-CCNC: 74 U/L (ref 25–100)
ALT SERPL W P-5'-P-CCNC: 22 U/L (ref 7–40)
ANION GAP SERPL CALCULATED.3IONS-SCNC: 11 MMOL/L (ref 3–11)
AST SERPL-CCNC: 33 U/L (ref 0–33)
BACTERIA UR QL AUTO: ABNORMAL /HPF
BASOPHILS # BLD AUTO: 0.01 10*3/MM3 (ref 0–0.2)
BASOPHILS NFR BLD AUTO: 0.1 % (ref 0–1)
BILIRUB SERPL-MCNC: 0.9 MG/DL (ref 0.3–1.2)
BILIRUB UR QL STRIP: NEGATIVE
BUN BLD-MCNC: 10 MG/DL (ref 9–23)
BUN/CREAT SERPL: 12.5 (ref 7–25)
CALCIUM SPEC-SCNC: 8.9 MG/DL (ref 8.7–10.4)
CHLORIDE SERPL-SCNC: 92 MMOL/L (ref 99–109)
CLARITY UR: CLEAR
CO2 SERPL-SCNC: 31 MMOL/L (ref 20–31)
COLOR UR: YELLOW
CREAT BLD-MCNC: 0.8 MG/DL (ref 0.6–1.3)
D-LACTATE SERPL-SCNC: 1.7 MMOL/L (ref 0.5–2)
DEPRECATED RDW RBC AUTO: 47.6 FL (ref 37–54)
EOSINOPHIL # BLD AUTO: 0.12 10*3/MM3 (ref 0–0.3)
EOSINOPHIL NFR BLD AUTO: 1 % (ref 0–3)
ERYTHROCYTE [DISTWIDTH] IN BLOOD BY AUTOMATED COUNT: 13.7 % (ref 11.3–14.5)
GFR SERPL CREATININE-BSD FRML MDRD: 92 ML/MIN/1.73
GLOBULIN UR ELPH-MCNC: 3.2 GM/DL
GLUCOSE BLD-MCNC: 112 MG/DL (ref 70–100)
GLUCOSE UR STRIP-MCNC: NEGATIVE MG/DL
HCT VFR BLD AUTO: 46.8 % (ref 38.9–50.9)
HGB BLD-MCNC: 15.7 G/DL (ref 13.1–17.5)
HGB UR QL STRIP.AUTO: ABNORMAL
HYALINE CASTS UR QL AUTO: ABNORMAL /LPF
IMM GRANULOCYTES # BLD: 0.05 10*3/MM3 (ref 0–0.03)
IMM GRANULOCYTES NFR BLD: 0.4 % (ref 0–0.6)
INR PPP: 1.85 (ref 0.91–1.09)
KETONES UR QL STRIP: NEGATIVE
LEUKOCYTE ESTERASE UR QL STRIP.AUTO: NEGATIVE
LYMPHOCYTES # BLD AUTO: 0.64 10*3/MM3 (ref 0.6–4.8)
LYMPHOCYTES NFR BLD AUTO: 5.5 % (ref 24–44)
MCH RBC QN AUTO: 31.7 PG (ref 27–31)
MCHC RBC AUTO-ENTMCNC: 33.5 G/DL (ref 32–36)
MCV RBC AUTO: 94.4 FL (ref 80–99)
MONOCYTES # BLD AUTO: 0.73 10*3/MM3 (ref 0–1)
MONOCYTES NFR BLD AUTO: 6.3 % (ref 0–12)
NEUTROPHILS # BLD AUTO: 10.05 10*3/MM3 (ref 1.5–8.3)
NEUTROPHILS NFR BLD AUTO: 86.7 % (ref 41–71)
NITRITE UR QL STRIP: NEGATIVE
PH UR STRIP.AUTO: 7 [PH] (ref 5–8)
PLATELET # BLD AUTO: 135 10*3/MM3 (ref 150–450)
PMV BLD AUTO: 9 FL (ref 6–12)
POTASSIUM BLD-SCNC: 3.9 MMOL/L (ref 3.5–5.5)
PROT SERPL-MCNC: 7.6 G/DL (ref 5.7–8.2)
PROT UR QL STRIP: NEGATIVE
PROTHROMBIN TIME: 19.4 SECONDS (ref 9.6–11.5)
RBC # BLD AUTO: 4.96 10*6/MM3 (ref 4.2–5.76)
RBC # UR: ABNORMAL /HPF
REF LAB TEST METHOD: ABNORMAL
S PYO AG THROAT QL: NEGATIVE
SODIUM BLD-SCNC: 134 MMOL/L (ref 132–146)
SP GR UR STRIP: 1.01 (ref 1–1.03)
SQUAMOUS #/AREA URNS HPF: ABNORMAL /HPF
TROPONIN I SERPL-MCNC: 0 NG/ML (ref 0–0.07)
UROBILINOGEN UR QL STRIP: ABNORMAL
WBC NRBC COR # BLD: 11.6 10*3/MM3 (ref 3.5–10.8)
WBC UR QL AUTO: ABNORMAL /HPF

## 2018-03-08 PROCEDURE — 87081 CULTURE SCREEN ONLY: CPT | Performed by: EMERGENCY MEDICINE

## 2018-03-08 PROCEDURE — G0378 HOSPITAL OBSERVATION PER HR: HCPCS

## 2018-03-08 PROCEDURE — 96365 THER/PROPH/DIAG IV INF INIT: CPT

## 2018-03-08 PROCEDURE — 71045 X-RAY EXAM CHEST 1 VIEW: CPT

## 2018-03-08 PROCEDURE — 81001 URINALYSIS AUTO W/SCOPE: CPT | Performed by: EMERGENCY MEDICINE

## 2018-03-08 PROCEDURE — 85610 PROTHROMBIN TIME: CPT | Performed by: EMERGENCY MEDICINE

## 2018-03-08 PROCEDURE — 87880 STREP A ASSAY W/OPTIC: CPT | Performed by: EMERGENCY MEDICINE

## 2018-03-08 PROCEDURE — 85025 COMPLETE CBC W/AUTO DIFF WBC: CPT | Performed by: EMERGENCY MEDICINE

## 2018-03-08 PROCEDURE — 84484 ASSAY OF TROPONIN QUANT: CPT

## 2018-03-08 PROCEDURE — 99284 EMERGENCY DEPT VISIT MOD MDM: CPT

## 2018-03-08 PROCEDURE — 87040 BLOOD CULTURE FOR BACTERIA: CPT | Performed by: EMERGENCY MEDICINE

## 2018-03-08 PROCEDURE — 83605 ASSAY OF LACTIC ACID: CPT | Performed by: EMERGENCY MEDICINE

## 2018-03-08 PROCEDURE — 96361 HYDRATE IV INFUSION ADD-ON: CPT

## 2018-03-08 PROCEDURE — 96375 TX/PRO/DX INJ NEW DRUG ADDON: CPT

## 2018-03-08 PROCEDURE — 80053 COMPREHEN METABOLIC PANEL: CPT | Performed by: EMERGENCY MEDICINE

## 2018-03-08 PROCEDURE — 25010000002 DIPHENHYDRAMINE PER 50 MG: Performed by: EMERGENCY MEDICINE

## 2018-03-08 PROCEDURE — 99220 PR INITIAL OBSERVATION CARE/DAY 70 MINUTES: CPT | Performed by: INTERNAL MEDICINE

## 2018-03-08 RX ORDER — POTASSIUM CHLORIDE 750 MG/1
40 CAPSULE, EXTENDED RELEASE ORAL AS NEEDED
Status: DISCONTINUED | OUTPATIENT
Start: 2018-03-08 | End: 2018-03-10 | Stop reason: HOSPADM

## 2018-03-08 RX ORDER — TRAZODONE HYDROCHLORIDE 50 MG/1
50 TABLET ORAL NIGHTLY
Status: DISCONTINUED | OUTPATIENT
Start: 2018-03-08 | End: 2018-03-10 | Stop reason: HOSPADM

## 2018-03-08 RX ORDER — POTASSIUM CHLORIDE 7.45 MG/ML
10 INJECTION INTRAVENOUS
Status: DISCONTINUED | OUTPATIENT
Start: 2018-03-08 | End: 2018-03-10 | Stop reason: HOSPADM

## 2018-03-08 RX ORDER — MAGNESIUM SULFATE HEPTAHYDRATE 40 MG/ML
4 INJECTION, SOLUTION INTRAVENOUS AS NEEDED
Status: DISCONTINUED | OUTPATIENT
Start: 2018-03-08 | End: 2018-03-10 | Stop reason: HOSPADM

## 2018-03-08 RX ORDER — SODIUM CHLORIDE 9 MG/ML
100 INJECTION, SOLUTION INTRAVENOUS CONTINUOUS
Status: DISCONTINUED | OUTPATIENT
Start: 2018-03-08 | End: 2018-03-10 | Stop reason: HOSPADM

## 2018-03-08 RX ORDER — ACETAMINOPHEN 325 MG/1
650 TABLET ORAL EVERY 4 HOURS PRN
Status: DISCONTINUED | OUTPATIENT
Start: 2018-03-08 | End: 2018-03-10 | Stop reason: HOSPADM

## 2018-03-08 RX ORDER — ONDANSETRON 4 MG/1
4 TABLET, FILM COATED ORAL EVERY 6 HOURS PRN
Status: DISCONTINUED | OUTPATIENT
Start: 2018-03-08 | End: 2018-03-10 | Stop reason: HOSPADM

## 2018-03-08 RX ORDER — WARFARIN SODIUM 2.5 MG/1
3.75 TABLET ORAL 2 TIMES WEEKLY
COMMUNITY
End: 2018-08-01 | Stop reason: SDUPTHER

## 2018-03-08 RX ORDER — ASPIRIN 81 MG/1
81 TABLET ORAL DAILY
Status: DISCONTINUED | OUTPATIENT
Start: 2018-03-09 | End: 2018-03-10 | Stop reason: HOSPADM

## 2018-03-08 RX ORDER — ATORVASTATIN CALCIUM 10 MG/1
10 TABLET, FILM COATED ORAL DAILY
Status: DISCONTINUED | OUTPATIENT
Start: 2018-03-08 | End: 2018-03-10 | Stop reason: HOSPADM

## 2018-03-08 RX ORDER — HYDROCODONE BITARTRATE AND ACETAMINOPHEN 5; 325 MG/1; MG/1
1 TABLET ORAL EVERY 4 HOURS PRN
Status: DISCONTINUED | OUTPATIENT
Start: 2018-03-08 | End: 2018-03-10 | Stop reason: HOSPADM

## 2018-03-08 RX ORDER — WARFARIN SODIUM 7.5 MG/1
3.75 TABLET ORAL
Status: DISCONTINUED | OUTPATIENT
Start: 2018-03-09 | End: 2018-03-10 | Stop reason: HOSPADM

## 2018-03-08 RX ORDER — SENNA AND DOCUSATE SODIUM 50; 8.6 MG/1; MG/1
2 TABLET, FILM COATED ORAL NIGHTLY
Status: DISCONTINUED | OUTPATIENT
Start: 2018-03-08 | End: 2018-03-10 | Stop reason: HOSPADM

## 2018-03-08 RX ORDER — POTASSIUM CHLORIDE 1.5 G/1.77G
40 POWDER, FOR SOLUTION ORAL AS NEEDED
Status: DISCONTINUED | OUTPATIENT
Start: 2018-03-08 | End: 2018-03-10 | Stop reason: HOSPADM

## 2018-03-08 RX ORDER — WARFARIN SODIUM 2.5 MG/1
2.5 TABLET ORAL
Status: DISCONTINUED | OUTPATIENT
Start: 2018-03-08 | End: 2018-03-10 | Stop reason: HOSPADM

## 2018-03-08 RX ORDER — CLINDAMYCIN PHOSPHATE 900 MG/50ML
900 INJECTION INTRAVENOUS EVERY 8 HOURS
Status: DISCONTINUED | OUTPATIENT
Start: 2018-03-09 | End: 2018-03-08 | Stop reason: SDUPTHER

## 2018-03-08 RX ORDER — MAGNESIUM SULFATE HEPTAHYDRATE 40 MG/ML
2 INJECTION, SOLUTION INTRAVENOUS AS NEEDED
Status: DISCONTINUED | OUTPATIENT
Start: 2018-03-08 | End: 2018-03-10 | Stop reason: HOSPADM

## 2018-03-08 RX ORDER — WARFARIN SODIUM 2.5 MG/1
2.5 TABLET ORAL SEE ADMIN INSTRUCTIONS
COMMUNITY
End: 2018-08-01 | Stop reason: SDUPTHER

## 2018-03-08 RX ORDER — DIPHENHYDRAMINE HYDROCHLORIDE 50 MG/ML
25 INJECTION INTRAMUSCULAR; INTRAVENOUS ONCE
Status: COMPLETED | OUTPATIENT
Start: 2018-03-08 | End: 2018-03-08

## 2018-03-08 RX ORDER — DONEPEZIL HYDROCHLORIDE 10 MG/1
10 TABLET, FILM COATED ORAL DAILY
Status: DISCONTINUED | OUTPATIENT
Start: 2018-03-09 | End: 2018-03-10 | Stop reason: HOSPADM

## 2018-03-08 RX ORDER — DILTIAZEM HYDROCHLORIDE 240 MG/1
240 CAPSULE, COATED, EXTENDED RELEASE ORAL
Status: DISCONTINUED | OUTPATIENT
Start: 2018-03-09 | End: 2018-03-10 | Stop reason: HOSPADM

## 2018-03-08 RX ORDER — LISINOPRIL 20 MG/1
20 TABLET ORAL DAILY
Status: DISCONTINUED | OUTPATIENT
Start: 2018-03-09 | End: 2018-03-10 | Stop reason: HOSPADM

## 2018-03-08 RX ORDER — ACETAMINOPHEN 500 MG
1000 TABLET ORAL ONCE
Status: COMPLETED | OUTPATIENT
Start: 2018-03-08 | End: 2018-03-08

## 2018-03-08 RX ORDER — L.ACID,PARA/B.BIFIDUM/S.THERM 8B CELL
1 CAPSULE ORAL DAILY
Status: DISCONTINUED | OUTPATIENT
Start: 2018-03-09 | End: 2018-03-10 | Stop reason: HOSPADM

## 2018-03-08 RX ORDER — FUROSEMIDE 40 MG/1
40 TABLET ORAL DAILY
COMMUNITY
End: 2018-03-08

## 2018-03-08 RX ORDER — CLINDAMYCIN PHOSPHATE 900 MG/50ML
900 INJECTION INTRAVENOUS ONCE
Status: COMPLETED | OUTPATIENT
Start: 2018-03-08 | End: 2018-03-08

## 2018-03-08 RX ORDER — SODIUM CHLORIDE 0.9 % (FLUSH) 0.9 %
1-10 SYRINGE (ML) INJECTION AS NEEDED
Status: DISCONTINUED | OUTPATIENT
Start: 2018-03-08 | End: 2018-03-10 | Stop reason: HOSPADM

## 2018-03-08 RX ORDER — CLINDAMYCIN PHOSPHATE 900 MG/50ML
900 INJECTION INTRAVENOUS EVERY 8 HOURS
Status: DISCONTINUED | OUTPATIENT
Start: 2018-03-09 | End: 2018-03-09

## 2018-03-08 RX ORDER — FAMOTIDINE 20 MG/1
40 TABLET, FILM COATED ORAL ONCE
Status: COMPLETED | OUTPATIENT
Start: 2018-03-08 | End: 2018-03-08

## 2018-03-08 RX ORDER — SODIUM CHLORIDE 0.9 % (FLUSH) 0.9 %
10 SYRINGE (ML) INJECTION AS NEEDED
Status: DISCONTINUED | OUTPATIENT
Start: 2018-03-08 | End: 2018-03-10 | Stop reason: HOSPADM

## 2018-03-08 RX ORDER — ONDANSETRON 2 MG/ML
4 INJECTION INTRAMUSCULAR; INTRAVENOUS EVERY 6 HOURS PRN
Status: DISCONTINUED | OUTPATIENT
Start: 2018-03-08 | End: 2018-03-10 | Stop reason: HOSPADM

## 2018-03-08 RX ADMIN — ATORVASTATIN CALCIUM 10 MG: 10 TABLET, FILM COATED ORAL at 21:42

## 2018-03-08 RX ADMIN — FAMOTIDINE 40 MG: 20 TABLET, FILM COATED ORAL at 18:03

## 2018-03-08 RX ADMIN — CLINDAMYCIN PHOSPHATE 900 MG: 18 INJECTION, SOLUTION INTRAVENOUS at 18:51

## 2018-03-08 RX ADMIN — ACETAMINOPHEN 1000 MG: 500 TABLET ORAL at 18:03

## 2018-03-08 RX ADMIN — Medication 2 TABLET: at 21:42

## 2018-03-08 RX ADMIN — TRAZODONE HYDROCHLORIDE 50 MG: 50 TABLET, FILM COATED ORAL at 21:42

## 2018-03-08 RX ADMIN — SODIUM CHLORIDE 100 ML/HR: 9 INJECTION, SOLUTION INTRAVENOUS at 21:43

## 2018-03-08 RX ADMIN — DIPHENHYDRAMINE HYDROCHLORIDE 25 MG: 50 INJECTION, SOLUTION INTRAMUSCULAR; INTRAVENOUS at 18:24

## 2018-03-08 NOTE — ED PROVIDER NOTES
Subjective   HPI Comments: Mike Lucero Jr. is a 85 y.o.male who presents to the ED with c/o oral swelling that suddenly onset today at 1530 after receiving a course of antibiotics while at Dr. Christopher MD office today at 1200. Per his wife, he fell on 12/25/17 sustaining a left lower extremity injury that became infected. She is not sure if his infection grew MRSA or staphylococcus and she is unsure what antibiotic he was treated with. He saw Dr. Christopher MD and was treated for recurrent infection for 2 months. He and his wife are unsure what antibiotics he was treated with at during that time or what type of infection he had in his left leg. He reports that he stopped receiving antibiotic treatments 2 weeks ago because his left leg was no longer swollen. He reports that his left leg started swelling again 2 days ago so he saw Dr. Christopher MD today at 1200 for new antibiotic therapy but is unsure what he received. He states that he went to his office at 1530 today for a co-worker's birthday party then had a sudden onset of lip, tongue and throat swelling. He states that it was painful to swallow but denies any shortness of breath. He decided to go home to rest then his wife came home and noticed that he was tremulous, chilling and experiencing oral swelling so she called Dr. White's office. She spoke with a nurse at the office who then referred him to the ED for his oral swelling. He c/o chills, tremulous activity, sore throat, tongue swelling, lip swelling, rhinorrhea, abdominal pain but denies headache, neck pain, confusion, shortness of breath, chest pain, nausea, vomiting, constipation, diarrhea or any other complaints at this time. He reports a history of A-fib and is on Warfarin and HTN and is on Lisinopril. He states that he has taken both Warfarin and Lisinopril for many years without complications.      Patient is a 85 y.o. male presenting with allergic reaction.   History provided by:   "Patient  Allergic Reaction   Presenting symptoms: difficulty swallowing and swelling    Presenting symptoms: no difficulty breathing    Severity:  Mild  Prior allergic episodes:  No prior episodes  Context: medications    Relieved by:  Nothing  Worsened by:  Nothing  Ineffective treatments:  None tried      Review of Systems   Constitutional: Positive for chills. Negative for fever.   HENT: Positive for rhinorrhea, sore throat and trouble swallowing.    Respiratory: Negative for cough and shortness of breath.    Cardiovascular: Positive for leg swelling. Negative for chest pain.   Gastrointestinal: Negative for blood in stool, constipation, diarrhea, nausea and vomiting.   Genitourinary: Negative for dysuria.   Neurological: Positive for tremors. Negative for headaches.   Psychiatric/Behavioral: Negative for confusion.   All other systems reviewed and are negative.      Past Medical History:   Diagnosis Date   • Atrial fibrillation    • Cataract    • Chronic anticoagulation    • Coronary artery disease    • Diverticulosis    • Gallstones    • History of endocarditis    • HLD (hyperlipidemia)    • HTN (hypertension)    • Osteoarthritis    • Seasonal allergies     Seasonal allergies/rhinitis.        Allergies   Allergen Reactions   • Temazepam Other (See Comments)     Caused pt to \"sleep drive\"       Past Surgical History:   Procedure Laterality Date   • APPENDECTOMY     • CARDIAC SURGERY     • CORONARY ARTERY BYPASS GRAFT  07/2006    CABG for 3-vessel disease, July 2006.   • INGUINAL HERNIA REPAIR     • KIDNEY SURGERY         Family History   Problem Relation Age of Onset   • Alzheimer's disease Mother    • Dementia Mother    • Heart disease Father    • Stroke Father    • Heart disease Sister    • Heart disease Brother    • Stroke Brother        Social History     Social History   • Marital status:      Spouse name: Glenis   • Number of children: 2   • Years of education: J.D.     Occupational History   • "  Retired     Social History Main Topics   • Smoking status: Former Smoker     Packs/day: 1.00     Years: 18.00     Types: Cigarettes     Quit date: 06/1970   • Smokeless tobacco: Never Used   • Alcohol use No   • Drug use: No   • Sexual activity: Not Currently     Partners: Female         Objective   Physical Exam   Constitutional: He is oriented to person, place, and time. He appears well-developed and well-nourished. No distress.   Tremulous activity and states he is excessively cold.    HENT:   Head: Normocephalic and atraumatic.   Right Ear: External ear normal.   Left Ear: External ear normal.   Nose: Nose normal.   Tongue is mildly swollen and erythematous.    Eyes: Conjunctivae are normal. No scleral icterus.   Neck: Normal range of motion. Neck supple.   Cardiovascular: Normal rate, regular rhythm, normal heart sounds and intact distal pulses.  Exam reveals no friction rub.    No murmur heard.  Distant heart sounds.    Pulmonary/Chest: Effort normal and breath sounds normal. No respiratory distress. He has no wheezes. He has no rales.   Abdominal: Soft. Bowel sounds are normal. He exhibits no distension. There is tenderness.   Diffuse abdominal tenderness.    Musculoskeletal: Normal range of motion. He exhibits no edema or tenderness.   1+ edema to the left lower extremity.   Neurological: He is alert and oriented to person, place, and time.   Neurovascular intact.    Skin: Skin is warm and dry. He is not diaphoretic.   Mild ecchymosis to anterior left lower extremity.     Psychiatric: He has a normal mood and affect. His behavior is normal.   Nursing note and vitals reviewed.      Procedures         ED Course  Recent Results (from the past 24 hour(s))   Urinalysis With / Culture If Indicated - Urine, Clean Catch    Collection Time: 03/08/18  6:15 PM   Result Value Ref Range    Color, UA Yellow Yellow, Straw    Appearance, UA Clear Clear    pH, UA 7.0 5.0 - 8.0    Specific Gravity, UA 1.012 1.001 -  1.030    Glucose, UA Negative Negative    Ketones, UA Negative Negative    Bilirubin, UA Negative Negative    Blood, UA Small (1+) (A) Negative    Protein, UA Negative Negative    Leuk Esterase, UA Negative Negative    Nitrite, UA Negative Negative    Urobilinogen, UA 0.2 E.U./dL 0.2 - 1.0 E.U./dL   Urinalysis, Microscopic Only - Urine, Clean Catch    Collection Time: 03/08/18  6:15 PM   Result Value Ref Range    RBC, UA 7-12 (A) None Seen, 0-2 /HPF    WBC, UA None Seen None Seen, 0-2 /HPF    Bacteria, UA None Seen None Seen, Trace /HPF    Squamous Epithelial Cells, UA 0-2 None Seen, 0-2 /HPF    Hyaline Casts, UA None Seen 0 - 6 /LPF    Methodology Automated Microscopy    Protime-INR    Collection Time: 03/08/18  6:23 PM   Result Value Ref Range    Protime 19.4 (H) 9.6 - 11.5 Seconds    INR 1.85 (H) 0.91 - 1.09   Comprehensive Metabolic Panel    Collection Time: 03/08/18  6:23 PM   Result Value Ref Range    Glucose 112 (H) 70 - 100 mg/dL    BUN 10 9 - 23 mg/dL    Creatinine 0.80 0.60 - 1.30 mg/dL    Sodium 134 132 - 146 mmol/L    Potassium 3.9 3.5 - 5.5 mmol/L    Chloride 92 (L) 99 - 109 mmol/L    CO2 31.0 20.0 - 31.0 mmol/L    Calcium 8.9 8.7 - 10.4 mg/dL    Total Protein 7.6 5.7 - 8.2 g/dL    Albumin 4.40 3.20 - 4.80 g/dL    ALT (SGPT) 22 7 - 40 U/L    AST (SGOT) 33 0 - 33 U/L    Alkaline Phosphatase 74 25 - 100 U/L    Total Bilirubin 0.9 0.3 - 1.2 mg/dL    eGFR Non African Amer 92 >60 mL/min/1.73    Globulin 3.2 gm/dL    A/G Ratio 1.4 (L) 1.5 - 2.5 g/dL    BUN/Creatinine Ratio 12.5 7.0 - 25.0    Anion Gap 11.0 3.0 - 11.0 mmol/L   Lactic Acid, Plasma    Collection Time: 03/08/18  6:23 PM   Result Value Ref Range    Lactate 1.7 0.5 - 2.0 mmol/L   CBC Auto Differential    Collection Time: 03/08/18  6:23 PM   Result Value Ref Range    WBC 11.60 (H) 3.50 - 10.80 10*3/mm3    RBC 4.96 4.20 - 5.76 10*6/mm3    Hemoglobin 15.7 13.1 - 17.5 g/dL    Hematocrit 46.8 38.9 - 50.9 %    MCV 94.4 80.0 - 99.0 fL    MCH 31.7 (H) 27.0  - 31.0 pg    MCHC 33.5 32.0 - 36.0 g/dL    RDW 13.7 11.3 - 14.5 %    RDW-SD 47.6 37.0 - 54.0 fl    MPV 9.0 6.0 - 12.0 fL    Platelets 135 (L) 150 - 450 10*3/mm3    Neutrophil % 86.7 (H) 41.0 - 71.0 %    Lymphocyte % 5.5 (L) 24.0 - 44.0 %    Monocyte % 6.3 0.0 - 12.0 %    Eosinophil % 1.0 0.0 - 3.0 %    Basophil % 0.1 0.0 - 1.0 %    Immature Grans % 0.4 0.0 - 0.6 %    Neutrophils, Absolute 10.05 (H) 1.50 - 8.30 10*3/mm3    Lymphocytes, Absolute 0.64 0.60 - 4.80 10*3/mm3    Monocytes, Absolute 0.73 0.00 - 1.00 10*3/mm3    Eosinophils, Absolute 0.12 0.00 - 0.30 10*3/mm3    Basophils, Absolute 0.01 0.00 - 0.20 10*3/mm3    Immature Grans, Absolute 0.05 (H) 0.00 - 0.03 10*3/mm3   POC Troponin, Rapid    Collection Time: 03/08/18  6:35 PM   Result Value Ref Range    Troponin I 0.00 0.00 - 0.07 ng/mL   Rapid Strep A Screen - Swab, Throat    Collection Time: 03/08/18  6:55 PM   Result Value Ref Range    Strep A Ag Negative Negative     Note: In addition to lab results from this visit, the labs listed above may include labs taken at another facility or during a different encounter within the last 24 hours. Please correlate lab times with ED admission and discharge times for further clarification of the services performed during this visit.    XR Chest 1 View   Final Result     Chronic cardiopulmonary changes with progressive chronic RIGHT lung volume    loss and scarring without evidence of an active lung parenchymal lesion.       THIS DOCUMENT HAS BEEN ELECTRONICALLY SIGNED BY JOHNNIE TORREZ MD        Vitals:    03/08/18 1901 03/08/18 2000 03/08/18 2028 03/08/18 2035   BP:  135/80 131/66 136/72   BP Location:   Left arm Right arm   Patient Position:   Lying Lying   Pulse: 64 67 76    Resp:   19    Temp:   100 °F (37.8 °C)    TempSrc:   Oral    SpO2: 95% 96% 95%    Weight:   68.3 kg (150 lb 8 oz)    Height:         Medications   sodium chloride 0.9 % flush 10 mL (not administered)   clindamycin (CLEOCIN) 900 mg in dextrose 5% 50  mL IVPB (premix) (not administered)   aspirin EC tablet 81 mg (not administered)   diltiaZEM CD (CARDIZEM CD) 24 hr capsule 240 mg (not administered)   donepezil (ARICEPT) tablet 10 mg (not administered)   lisinopril (PRINIVIL,ZESTRIL) tablet 20 mg (not administered)   atorvastatin (LIPITOR) tablet 10 mg (10 mg Oral Given 3/8/18 2142)   traZODone (DESYREL) tablet 50 mg (50 mg Oral Given 3/8/18 2142)   Pharmacy Consult - Pharmacy to dose (not administered)   lactobacillus acidophilus (RISAQUAD) capsule 1 capsule (not administered)   sodium chloride 0.9 % flush 1-10 mL (not administered)   sodium chloride 0.9 % infusion (100 mL/hr Intravenous New Bag 3/8/18 2143)   acetaminophen (TYLENOL) tablet 650 mg (not administered)   HYDROcodone-acetaminophen (NORCO) 5-325 MG per tablet 1 tablet (not administered)   potassium chloride (MICRO-K) CR capsule 40 mEq (not administered)     Or   potassium chloride (KLOR-CON) packet 40 mEq (not administered)     Or   potassium chloride 10 mEq in 100 mL IVPB (not administered)   Magnesium Sulfate 2 gram Bolus, followed by 8 gram infusion (total Mg dose 10 grams)- Mg less than or equal to 1mg/dL (not administered)     Or   Magnesium Sulfate 6 gram Infusion (2 gm x 3) -Mg 1.1 -1.5 mg/dL (not administered)     Or   magnesium sulfate 4 gram infusion- Mg 1.6-1.9 mg/dL (not administered)   sennosides-docusate sodium (SENOKOT-S) 8.6-50 MG tablet 2 tablet (2 tablets Oral Given 3/8/18 2142)   ondansetron (ZOFRAN) tablet 4 mg (not administered)     Or   ondansetron (ZOFRAN) injection 4 mg (not administered)   Pharmacy to dose warfarin (not administered)   warfarin (COUMADIN) tablet 2.5 mg (2.5 mg Oral Not Given 3/8/18 2138)   warfarin (COUMADIN) tablet 3.75 mg (not administered)   famotidine (PEPCID) tablet 40 mg (40 mg Oral Given 3/8/18 1803)   diphenhydrAMINE (BENADRYL) injection 25 mg (25 mg Intravenous Given 3/8/18 1824)   acetaminophen (TYLENOL) tablet 1,000 mg (1,000 mg Oral Given 3/8/18  1803)   clindamycin (CLEOCIN) 900 mg in dextrose 5% 50 mL IVPB (premix) (0 mg Intravenous Stopped 3/8/18 1945)     ECG/EMG Results (last 24 hours)     ** No results found for the last 24 hours. **          ED Course   Value Comment By Time    Dr. White Paged. Jose Shi MD 03/08 1735   Temp: (!) 102.5 °F (39.2 °C) (Reviewed) Jose Shi MD 03/08 1735    I was unable to talk with Dr. White and so I called the on-call infectious disease doctor, Dr. Stone.  He requests that we start clindamycin and advises admission to the hospital for further evaluation. Jose Sih MD 03/08 1804    Dr. Shi spoke with Dr. White who agrees with the plan. Patrick Steve 03/08 1823    Case discussed with Dr. Lai who agrees with plan to admit. Jose Shi MD 03/08 1909                     MDM  Number of Diagnoses or Management Options  Acute febrile illness:   Chronic anticoagulation:   Chronic atrial fibrillation:   Concern about infectious disease without diagnosis:   Essential hypertension:   Pain and swelling of left lower leg:   Diagnosis management comments: ECG/EMG Results (last 24 hours)     ** No results found for the last 24 hours. **             Amount and/or Complexity of Data Reviewed  Clinical lab tests: reviewed  Tests in the radiology section of CPT®: reviewed  Review and summarize past medical records: yes  Discuss the patient with other providers: yes  Independent visualization of images, tracings, or specimens: yes        Final diagnoses:   Acute febrile illness   Chronic anticoagulation   Pain and swelling of left lower leg   Chronic atrial fibrillation   Essential hypertension   Concern about infectious disease without diagnosis       Documentation assistance provided by nancy Steve.  Information recorded by the nancy was done at my direction and has been verified and validated by me.     Patrick Steve  03/08/18 1830       Patrick Steve  03/08/18  1904       Jose Shi MD  03/09/18 0012

## 2018-03-09 LAB
ANION GAP SERPL CALCULATED.3IONS-SCNC: 10 MMOL/L (ref 3–11)
BUN BLD-MCNC: 11 MG/DL (ref 9–23)
BUN/CREAT SERPL: 13.8 (ref 7–25)
CALCIUM SPEC-SCNC: 8.1 MG/DL (ref 8.7–10.4)
CHLORIDE SERPL-SCNC: 95 MMOL/L (ref 99–109)
CK SERPL-CCNC: 37 U/L (ref 26–174)
CO2 SERPL-SCNC: 29 MMOL/L (ref 20–31)
CREAT BLD-MCNC: 0.8 MG/DL (ref 0.6–1.3)
DEPRECATED RDW RBC AUTO: 48.3 FL (ref 37–54)
ERYTHROCYTE [DISTWIDTH] IN BLOOD BY AUTOMATED COUNT: 14.1 % (ref 11.3–14.5)
FLUAV SUBTYP SPEC NAA+PROBE: NOT DETECTED
FLUBV RNA ISLT QL NAA+PROBE: NOT DETECTED
GFR SERPL CREATININE-BSD FRML MDRD: 92 ML/MIN/1.73
GLUCOSE BLD-MCNC: 109 MG/DL (ref 70–100)
HCT VFR BLD AUTO: 40.9 % (ref 38.9–50.9)
HGB BLD-MCNC: 13.4 G/DL (ref 13.1–17.5)
INR PPP: 1.86 (ref 0.91–1.09)
MCH RBC QN AUTO: 30.7 PG (ref 27–31)
MCHC RBC AUTO-ENTMCNC: 32.8 G/DL (ref 32–36)
MCV RBC AUTO: 93.6 FL (ref 80–99)
PLATELET # BLD AUTO: 119 10*3/MM3 (ref 150–450)
PMV BLD AUTO: 9.5 FL (ref 6–12)
POTASSIUM BLD-SCNC: 3.8 MMOL/L (ref 3.5–5.5)
PROTHROMBIN TIME: 19.5 SECONDS (ref 9.6–11.5)
RBC # BLD AUTO: 4.37 10*6/MM3 (ref 4.2–5.76)
SODIUM BLD-SCNC: 134 MMOL/L (ref 132–146)
WBC NRBC COR # BLD: 7.23 10*3/MM3 (ref 3.5–10.8)

## 2018-03-09 PROCEDURE — 96367 TX/PROPH/DG ADDL SEQ IV INF: CPT

## 2018-03-09 PROCEDURE — 96375 TX/PRO/DX INJ NEW DRUG ADDON: CPT

## 2018-03-09 PROCEDURE — 82550 ASSAY OF CK (CPK): CPT | Performed by: PHYSICIAN ASSISTANT

## 2018-03-09 PROCEDURE — 85610 PROTHROMBIN TIME: CPT | Performed by: INTERNAL MEDICINE

## 2018-03-09 PROCEDURE — 99226 PR SBSQ OBSERVATION CARE/DAY 35 MINUTES: CPT | Performed by: HOSPITALIST

## 2018-03-09 PROCEDURE — G0378 HOSPITAL OBSERVATION PER HR: HCPCS

## 2018-03-09 PROCEDURE — 85027 COMPLETE CBC AUTOMATED: CPT | Performed by: INTERNAL MEDICINE

## 2018-03-09 PROCEDURE — 87502 INFLUENZA DNA AMP PROBE: CPT | Performed by: NURSE PRACTITIONER

## 2018-03-09 PROCEDURE — 25010000002 ONDANSETRON PER 1 MG: Performed by: INTERNAL MEDICINE

## 2018-03-09 PROCEDURE — 94799 UNLISTED PULMONARY SVC/PX: CPT

## 2018-03-09 PROCEDURE — 94660 CPAP INITIATION&MGMT: CPT

## 2018-03-09 PROCEDURE — 80048 BASIC METABOLIC PNL TOTAL CA: CPT | Performed by: INTERNAL MEDICINE

## 2018-03-09 PROCEDURE — 25010000002 DAPTOMYCIN PER 1 MG: Performed by: PHYSICIAN ASSISTANT

## 2018-03-09 PROCEDURE — 96361 HYDRATE IV INFUSION ADD-ON: CPT

## 2018-03-09 RX ADMIN — WARFARIN SODIUM 2.5 MG: 2.5 TABLET ORAL at 00:47

## 2018-03-09 RX ADMIN — SODIUM CHLORIDE 100 ML/HR: 9 INJECTION, SOLUTION INTRAVENOUS at 08:40

## 2018-03-09 RX ADMIN — LISINOPRIL 20 MG: 20 TABLET ORAL at 08:41

## 2018-03-09 RX ADMIN — Medication 2 TABLET: at 20:08

## 2018-03-09 RX ADMIN — HYDROCODONE BITARTRATE AND ACETAMINOPHEN 1 TABLET: 5; 325 TABLET ORAL at 04:44

## 2018-03-09 RX ADMIN — WARFARIN SODIUM 3.75 MG: 7.5 TABLET ORAL at 17:39

## 2018-03-09 RX ADMIN — ASPIRIN 81 MG: 81 TABLET, COATED ORAL at 08:42

## 2018-03-09 RX ADMIN — ONDANSETRON 4 MG: 2 INJECTION INTRAMUSCULAR; INTRAVENOUS at 00:32

## 2018-03-09 RX ADMIN — ACETAMINOPHEN 650 MG: 325 TABLET ORAL at 00:27

## 2018-03-09 RX ADMIN — TRAZODONE HYDROCHLORIDE 50 MG: 50 TABLET, FILM COATED ORAL at 20:07

## 2018-03-09 RX ADMIN — ACETAMINOPHEN 650 MG: 325 TABLET ORAL at 14:14

## 2018-03-09 RX ADMIN — DILTIAZEM HYDROCHLORIDE 240 MG: 240 CAPSULE, COATED, EXTENDED RELEASE ORAL at 08:42

## 2018-03-09 RX ADMIN — Medication 1 CAPSULE: at 08:41

## 2018-03-09 RX ADMIN — CLINDAMYCIN PHOSPHATE 900 MG: 18 INJECTION, SOLUTION INTRAVENOUS at 02:35

## 2018-03-09 RX ADMIN — ATORVASTATIN CALCIUM 10 MG: 10 TABLET, FILM COATED ORAL at 20:08

## 2018-03-09 RX ADMIN — DAPTOMYCIN 250 MG: 500 INJECTION, POWDER, LYOPHILIZED, FOR SOLUTION INTRAVENOUS at 12:00

## 2018-03-09 RX ADMIN — DONEPEZIL HYDROCHLORIDE 10 MG: 10 TABLET, FILM COATED ORAL at 08:42

## 2018-03-09 NOTE — PROGRESS NOTES
"Pharmacy Consult  -  Warfarin    Mike Lucero Jr. is a  85 y.o. male   Height - 170.2 cm (67\")  Weight - 68.3 kg (150 lb 8 oz)    Consulting Provider: - Dr Lai([facility])  Indication: - dysrhythmia   Goal INR: - 2-3  Home Regimen:   - 2.5 mg Sunday, Monday, Wednesday, Thursday, Saturday    - 3.75 mg Tuesday and Friday     Bridge Therapy: no       Drug-Drug Interactions with current regimen:   Increased bleeding aspirin     Warfarin Dosing During Admission:    Date  3/8 3/9          INR  1.85 1.86          Dose  2.5 mg (3.75 mg)               Education Provided:  Pt on warfarin prior to admission; will f/u and answer questions as needed.    Labs:    Results from last 7 days   Lab Units 03/09/18  0624 03/08/18  1823 03/02/18  1305   INR  1.86* 1.85* 1.7*   HEMOGLOBIN g/dL 13.4 15.7 --    HEMATOCRIT % 40.9 46.8 --    PLATELETS 10*3/mm3 119* 135* --      Results from last 7 days   Lab Units 03/09/18  0624 03/08/18  1823   SODIUM mmol/L 134 134   POTASSIUM mmol/L 3.8 3.9   CHLORIDE mmol/L 95* 92*   CO2 mmol/L 29.0 31.0   BUN mg/dL 11 10   CREATININE mg/dL 0.80 0.80   CALCIUM mg/dL 8.1* 8.9   BILIRUBIN mg/dL --  0.9   ALK PHOS U/L --  74   ALT (SGPT) U/L --  22   AST (SGOT) U/L --  33   GLUCOSE mg/dL 109* 112*       Current dietary intake: regular diet ordered (intake not charted)      Assessment/Plan:   1. INR today is SUBtherapeutic at 1.86, home dose of warfarin restarted overnight.  Will CONTINUE with current dose of warfarin (scheduled to get higher dose of 3.75 mg tonight).  If INR doesn't begin to increase over the weekend, consider giving patient a booster dose.   2. Continue to check INR daily, dietary intake, drug-drug interactions and s/sx of bleeding or clotting.  3. Pharmacy will continue to follow.       Thank you  Sary Torres [facility]  3/9/2018  11:49 AM        "

## 2018-03-09 NOTE — PLAN OF CARE
Problem: Patient Care Overview (Adult)  Goal: Plan of Care Review  Outcome: Ongoing (interventions implemented as appropriate)    Goal: Discharge Needs Assessment  Outcome: Ongoing (interventions implemented as appropriate)      Problem: Fall Risk (Adult)  Goal: Identify Related Risk Factors and Signs and Symptoms  Outcome: Ongoing (interventions implemented as appropriate)    Goal: Absence of Falls  Outcome: Ongoing (interventions implemented as appropriate)

## 2018-03-09 NOTE — CONSULTS
INFECTIOUS DISEASE CONSULT/INITIAL HOSPITAL VISIT    Mike Lucero Jr.  7/29/1932  [id number]    Date of Consult: 3/9/2018    Admission Date: 3/8/2018      Requesting Provider: Michell Lai II, DO  Evaluating Physician: Harish Funes MD    Reason for Consultation: Recurrent fever    History of present illness:    Patient is a 85 y.o. male, known to Dr. Benny White of our group, with h/o afib/chronic anticoagulation, BLE cellulitis, CAD/CABG, HTN, and OA who presented to [facility] with swelling of lips and tongue.  He was seen by Dr. White in our office yesterday for recurrent LLE swelling and cellulitis that began 2 days prior to office visit and was unable to wear his compression hose because of discomfort.  He had pitting edema yesterday.  He received a dose of Rocephin 2 GM IV which he was to receive for 5 days as well as Lasix 20 mg PO.  Around noon yesterday, he began to experience swelling of lips and tongue and came to the ED.  He was given Benadryl and Pepcid in ED with improvements of his tongue swelling.  He was noted to have fever of 102.5 with shaking chills.  ED contacted Dr. Stone who advised to admit patient and start him on Clindamycin.  Blood cultures are negative to date.  FluA/B PCR is negative and Strep A Ag and cx were negative. His UA showed no WBC and serum WBC was 12,000 with 87% neutrophils.  His lactic acid was 1.7.  His CXR showed chronic changes with progressive chronic right lung volume loss and scarring.  ID was asked to evaluate and manage his antibiotic therapy.     Past Medical History:   Diagnosis Date   • Atrial fibrillation    • Cataract    • Chronic anticoagulation    • Coronary artery disease    • Diverticulosis    • Gallstones    • History of endocarditis    • HLD (hyperlipidemia)    • HTN (hypertension)    • Osteoarthritis    • Seasonal allergies     Seasonal allergies/rhinitis.        Past Surgical History:   Procedure Laterality Date   • APPENDECTOMY     • CARDIAC  "SURGERY     • CORONARY ARTERY BYPASS GRAFT  07/2006    CABG for 3-vessel disease, July 2006.   • INGUINAL HERNIA REPAIR     • KIDNEY SURGERY         Family History   Problem Relation Age of Onset   • Alzheimer's disease Mother    • Dementia Mother    • Heart disease Father    • Stroke Father    • Heart disease Sister    • Heart disease Brother    • Stroke Brother        Social History     Social History   • Marital status:      Spouse name: Glenis   • Number of children: 2   • Years of education: J.D.     Occupational History   •  Retired     Social History Main Topics   • Smoking status: Former Smoker     Packs/day: 1.00     Years: 18.00     Types: Cigarettes     Quit date: 06/1970   • Smokeless tobacco: Never Used   • Alcohol use No   • Drug use: No   • Sexual activity: Not Currently     Partners: Female     Other Topics Concern   • Not on file     Social History Narrative       Allergies   Allergen Reactions   • Rocephin [Ceftriaxone] Angioedema     Tongue and lip swelling, sore throat   • Temazepam Other (See Comments)     Caused pt to \"sleep drive\"         Medication:    Current Facility-Administered Medications:   •  acetaminophen (TYLENOL) tablet 650 mg, 650 mg, Oral, Q4H PRN, Michell M Joelle II, DO, 650 mg at 03/09/18 1414  •  aspirin EC tablet 81 mg, 81 mg, Oral, Daily, Michell M Joelle II, DO, 81 mg at 03/09/18 0842  •  atorvastatin (LIPITOR) tablet 10 mg, 10 mg, Oral, Daily, Michell M Joelle II, DO, 10 mg at 03/08/18 2142  •  DAPTOmycin (CUBICIN) 250 mg in sterile water (preservative free) 5 mL IV syringe, 4 mg/kg, Intravenous, Q24H, ZULY Sanchez, 250 mg at 03/09/18 1200  •  diltiaZEM CD (CARDIZEM CD) 24 hr capsule 240 mg, 240 mg, Oral, Q24H, Michell M Joelle II, DO, 240 mg at 03/09/18 0842  •  donepezil (ARICEPT) tablet 10 mg, 10 mg, Oral, Daily, Michell M Joelle II, DO, 10 mg at 03/09/18 0842  •  HYDROcodone-acetaminophen (NORCO) 5-325 MG per tablet 1 tablet, 1 tablet, Oral, Q4H PRN, Michell " M Joelle II, DO, 1 tablet at 03/09/18 0444  •  lactobacillus acidophilus (RISAQUAD) capsule 1 capsule, 1 capsule, Oral, Daily, Michell M Joelle II, DO, 1 capsule at 03/09/18 0841  •  lisinopril (PRINIVIL,ZESTRIL) tablet 20 mg, 20 mg, Oral, Daily, Michell M Joelle II, DO, 20 mg at 03/09/18 0841  •  Magnesium Sulfate 2 gram Bolus, followed by 8 gram infusion (total Mg dose 10 grams)- Mg less than or equal to 1mg/dL, 2 g, Intravenous, PRN **OR** Magnesium Sulfate 6 gram Infusion (2 gm x 3) -Mg 1.1 -1.5 mg/dL, 2 g, Intravenous, PRN **OR** magnesium sulfate 4 gram infusion- Mg 1.6-1.9 mg/dL, 4 g, Intravenous, PRN, Michell M Joelle II, DO  •  ondansetron (ZOFRAN) tablet 4 mg, 4 mg, Oral, Q6H PRN **OR** ondansetron (ZOFRAN) injection 4 mg, 4 mg, Intravenous, Q6H PRN, Michell M Joelle II, DO, 4 mg at 03/09/18 0032  •  Pharmacy Consult - Pharmacy to dose, , Does not apply, Continuous PRN, Michell M Joelle II, DO  •  Pharmacy to dose warfarin, , Does not apply, Continuous PRN, Michell M Joelle II, DO  •  potassium chloride (MICRO-K) CR capsule 40 mEq, 40 mEq, Oral, PRN **OR** potassium chloride (KLOR-CON) packet 40 mEq, 40 mEq, Oral, PRN **OR** potassium chloride 10 mEq in 100 mL IVPB, 10 mEq, Intravenous, Q1H PRN, Michell M Joelle II, DO  •  sennosides-docusate sodium (SENOKOT-S) 8.6-50 MG tablet 2 tablet, 2 tablet, Oral, Nightly, Michell M Joelle II, DO, 2 tablet at 03/08/18 7693  •  sodium chloride 0.9 % flush 1-10 mL, 1-10 mL, Intravenous, PRN, Michell M Joelle II, DO  •  Insert peripheral IV, , , Once **AND** sodium chloride 0.9 % flush 10 mL, 10 mL, Intravenous, PRN, Jose Shi MD  •  sodium chloride 0.9 % infusion, 100 mL/hr, Intravenous, Continuous, Michell Lai II, , Last Rate: 100 mL/hr at 03/09/18 1409, 100 mL/hr at 03/09/18 1409  •  traZODone (DESYREL) tablet 50 mg, 50 mg, Oral, Nightly, Michell Lai II, DO, 50 mg at 03/08/18 1872  •  warfarin (COUMADIN) tablet 2.5 mg, 2.5 mg, Oral, Once per day on Sun   Sat, [facility], 2.5 mg at 18 0047  •  warfarin (COUMADIN) tablet 3.75 mg, 3.75 mg, Oral, Once per day on , [facility]    Antibiotics:  Anti-Infectives     Ordered     Dose/Rate Route Frequency Start Stop    18 1008  DAPTOmycin (CUBICIN) 250 mg in sterile water (preservative free) 5 mL IV syringe     Ordering Provider:  ZULY Sanchez    4 mg/kg × 68.3 kg  over 2 Minutes Intravenous Every 24 Hours Scheduled 18 1115 18 0859    18 1805  clindamycin (CLEOCIN) 900 mg in dextrose 5% 50 mL IVPB (premix)     Ordering Provider:  Jose Shi MD    900 mg  50 mL/hr over 60 Minutes Intravenous Once 18 1807 18 194            Review of Systems:  Constitutional-- No Fever, chills, or sweats.  Appetite good, and no malaise. No fatigue.  HEENT-- No new vision, hearing or throat complaints.  No epistaxis or oral sores.  Denies odynophagia or dysphagia. No headache, photophobia or neck stiffness.  CV-- No chest pain, palpitation or syncope  Resp-- No SOB/cough/Hemoptysis  GI- No nausea, vomiting, or diarrhea.  No hematochezia, melena, or hematemesis. Denies jaundice or chronic liver disease.  -- No dysuria, hematuria, or flank pain.  Denies hesitancy, urgency or flank pain.  Lymph- no swollen lymph nodes in neck/axilla or groin.   Heme- No active bruising or bleeding; no Hx of DVT or PE.  MS-- no swelling or pain in the bones or joints of arms/legs.  No new back pain.  Swelling of LLE with pretib tenderness.  Neuro-- No acute focal weakness or numbness in the arms or legs.  No seizures.  Skin--No rashes or lesions.  Swelling of tongue and lips.      Physical Exam:   Vital Signs  Temp (24hrs), Av.9 °F (37.7 °C), Min:98.5 °F (36.9 °C), Max:102.5 °F (39.2 °C)    Temp  Min: 98.5 °F (36.9 °C)  Max: 102.5 °F (39.2 °C)  BP  Min: 95/64  Max: 179/74  Pulse  Min: 50  Max: 95  Resp  Min: 18  Max: 20  SpO2  Min: 92 %  Max: 97 %    GENERAL: Awake  and alert, in no acute distress.   HEENT: Normocephalic, atraumatic.  PERRL. EOMI. No conjunctival injection. No icterus. Oropharynx clear without evidence of thrush or exudate. No evidence of peridontal disease.    NECK: Supple without nuchal rigidity. No mass.  LYMPH: No cervical, axillary or inguinal lymphadenopathy.  HEART: RRR; No murmur, rubs, gallops.   LUNGS: Clear to auscultation bilaterally without wheezing, rales, rhonchi. Normal respiratory effort. Nonlabored. No dullness.  ABDOMEN: Soft, nontender, nondistended. Positive bowel sounds. No rebound or guarding. NO mass or HSM. Obese.  EXT:  No cyanosis, clubbing or edema. No cord.  : Genitalia generally unremarkable.  Without Zacarias catheter.  MSK: FROM without joint effusions noted arms/legs.  1+ pitting edema of LLE.  SKIN: Warm and dry without cutaneous eruptions on Inspection/palpation.  LLE without erythema, warmth, crepitus, fluctuance, or induration.  Mildly tender with palpitation.   NEURO: Oriented to PPT. No focal deficits on motor/sensory exam at arms/legs.  PSYCHIATRIC: Normal insight and judgement. Cooperative with PE    Laboratory Data      Results from last 7 days  Lab Units 03/09/18  0624 03/08/18  1823   WBC 10*3/mm3 7.23 11.60*   HEMOGLOBIN g/dL 13.4 15.7   HEMATOCRIT % 40.9 46.8   PLATELETS 10*3/mm3 119* 135*       Results from last 7 days  Lab Units 03/09/18  0624   SODIUM mmol/L 134   POTASSIUM mmol/L 3.8   CHLORIDE mmol/L 95*   CO2 mmol/L 29.0   BUN mg/dL 11   CREATININE mg/dL 0.80   GLUCOSE mg/dL 109*   CALCIUM mg/dL 8.1*       Results from last 7 days  Lab Units 03/08/18  1823   ALK PHOS U/L 74   BILIRUBIN mg/dL 0.9   ALT (SGPT) U/L 22   AST (SGOT) U/L 33               Results from last 7 days  Lab Units 03/08/18  1823   LACTATE mmol/L 1.7       Results from last 7 days  Lab Units 03/09/18  1057   CK TOTAL U/L 37         Estimated Creatinine Clearance: 65.2 mL/min (by C-G formula based on Cr of 0.8).      Microbiology:  Blood  Culture   Date Value Ref Range Status   03/08/2018 No growth at less than 24 hours  Preliminary   03/08/2018 No growth at less than 24 hours  Preliminary     Strep A screen and cx negative  Influ A/B PCR negative                        Radiology:  Imaging Results (last 72 hours)     Procedure Component Value Units Date/Time    XR Chest 1 View [[id number]] Collected:  03/08/18 1846     Updated:  03/08/18 2138    Narrative:       EXAM:    XR Chest, 1 View    CLINICAL HISTORY:    85 years old, male; Essential (primary) hypertension; Chronic atrial   fibrillation; Pain in left lower leg; Other specified soft tissue disorders;   Fever, unspecified; Person with feared health complaint in whom no diagnosis is   made; Long term (current) use of anticoagulants; Signs and symptoms; Fever and   other: Febrile illness    TECHNIQUE:    Frontal view of the chest.    COMPARISON:    CR - XR CHEST PA AND LATERAL 2018-01-07 08:13    FINDINGS:    Asymmetric decreased RIGHT lung volume with upper chest wall deformity.    Patchy scarring and atelectasis in the RIGHT lung without definite   consolidation. No pleural effusion or pneumothorax.  Cardiomegaly with central   pulmonary vascular congestion.  Tortuous calcific aortic arch and descending   thoracic aorta.  Evidence of mediastinal surgery with sternotomy wires and   CABG.       Impression:         Chronic cardiopulmonary changes with progressive chronic RIGHT lung volume   loss and scarring without evidence of an active lung parenchymal lesion.     THIS DOCUMENT HAS BEEN ELECTRONICALLY SIGNED BY JOHNNIE TORREZ MD            Impression:   1.  LLE cellulitis-This appears to a substantially improved after he received his intravenous Rocephin but unfortunately he experienced a substantial reaction to the Rocephin.  We will obviously need to leave him off of Rocephin.  I will give him a dose of daptomycin today.  We will probably be able to discharge him tomorrow on oral antibiotic  therapy.  2.  Chronic L>R lymphedema  3.  Leukocytosis/neutrophilia secondary to #1  4.  Fever secondary to #1 vs drug reaction  5.  Angioedema of lip/tongue, sore throat probably secondary to Rocephin KAVITHA  6.  Afib/chronic anticoagulation  7.  CAD/CABG hx  8.  HTN  9.  Dementia  10.  Rocephin allergy (angioedema)--added to allergies    PLAN/RECOMMENDATIONS:   Thank you for asking us to see Mike Lucero Jr., I recommend the followin.  Monitor cultures, labs, KAVITHA  2.  D/c Clindamycin  3.  Start Daptomycin 250 mg IV daily.  Check CPK.  4.  LLE Compression wrap when tolerated.    Can likely d/c soon, probably tomorrow if he tolerates Daptomycin.    Harish Funes MD saw and examined patient, verified hx and PE, read all radiographic studies, reviewed labs and micro data, and formulated dx, plan for treatment and all medical decision making.      Cristian Garcia PA-C for Harish Funes MD    I discussed his situation with case management today.         Harish Funes MD  3/9/2018  3:42 PM

## 2018-03-09 NOTE — PROGRESS NOTES
Discharge Planning Assessment  [facility]     Patient Name: Mike Lucero Jr.  MRN: [id number]  Today's Date: 3/9/2018    Admit Date: 3/8/2018          Discharge Needs Assessment       03/09/18 1042    Living Environment    Lives With spouse    Living Arrangements house    Home Accessibility no concerns    Type of Financial/Environmental Concern none    Transportation Available car;family or friend will provide    Living Environment    Provides Primary Care For no one    Quality Of Family Relationships supportive;helpful;involved    Able to Return to Prior Living Arrangements yes    Discharge Needs Assessment    Concerns To Be Addressed discharge planning concerns    Readmission Within The Last 30 Days no previous admission in last 30 days    Equipment Currently Used at Home bipap/ cpap    Discharge Disposition still a patient    Discharge Contact Information if Applicable Glenis Lucero, wife, [phone] (H), [phone] (M)            Discharge Plan       03/09/18 1043    Case Management/Social Work Plan    Plan Home with outpt IV abx vs home with home health    Patient/Family In Agreement With Plan yes    Additional Comments Met with patient in the room to initiate discharge planning. Patient lives with his wife in a home in [facility]. He is independent with ADLs and mobility and wears a CPAP qhs. Patient's goal is home at discharge with assistance from his wife. Per ID note, patient may discharge on daily IV daptomycin. Please advise if this will be done in the office or home and what care will be associated with the plan (PICC vs peripheral access, labs, possible home health, etc.) so that CM can assist in arrangements if needed. Thank you. CM will continue to follow. Nilda ext. [phone]        Discharge Placement     No information found        Expected Discharge Date and Time     Expected Discharge Date Expected Discharge Time    Mar 10, 2018               Demographic Summary       03/09/18 1040     Referral Information    Admission Type observation    Referral Source admission list    Reason For Consult discharge planning    Record Reviewed history and physical;medical record;clinical discipline documentation;plan of care    Contact Information    Permission Granted to Share Information With ;family/designee   wife, Glenis Lucero    Primary Care Physician Information    Name Ike Douglas            Functional Status       03/09/18 1041    Functional Status Prior    Ambulation 0-->independent    Transferring 0-->independent    Toileting 0-->independent    Bathing 0-->independent    Dressing 0-->independent    Eating 0-->independent    Communication 0-->understands/communicates without difficulty    Swallowing 0-->swallows foods/liquids without difficulty    IADL    Medications independent    Meal Preparation independent    Housekeeping independent    Laundry independent    Shopping independent    Oral Care independent    Employment/Financial    Employment/Finance Comments Confirmed patient has medical coverage through Medicare A & B and [place] [employer]; rx coverage through Medicare D; no issues affording meds            Psychosocial     None            Abuse/Neglect     None            Legal     None            Substance Abuse     None            Patient Forms     None          Nilda Roque

## 2018-03-09 NOTE — H&P
[facility] Medicine Services  HISTORY AND PHYSICAL    Patient Name: Mike Lucero Jr.  : 1932  MRN: [id number]  Primary Care Physician: Ike Douglas MD    Subjective   Subjective     Chief Complaint: lip swelling, fever    HPI:  Mike Lucero Jr. is a 85 y.o. male who was seen today in Dr. White's clinic for left leg cellulitis where he was infused an unknown IV antibiotic (unknown to the patient due to his dementia.) Around 1200 he notes his tongue and lips felt like they were swelling which caused him to present to ED. In ED he was given benadryl and pepcid and now says his tongue swelling feels better.    Once in the ED he was noted to have a fever to 102.5 and to have shaking chills. Dr. Stone was contacted by the ED who recommended admission and IV clinda.    Review of Systems   Full ROS unobtainable due to patient's dementia.    Otherwise 10-system ROS reviewed and is negative except as mentioned in the HPI.    Personal History     Past Medical History:   Diagnosis Date   • Atrial fibrillation    • Cataract    • Chronic anticoagulation    • Coronary artery disease    • Diverticulosis    • Gallstones    • History of endocarditis    • HLD (hyperlipidemia)    • HTN (hypertension)    • Osteoarthritis    • Seasonal allergies     Seasonal allergies/rhinitis.        Past Surgical History:   Procedure Laterality Date   • APPENDECTOMY     • CARDIAC SURGERY     • CORONARY ARTERY BYPASS GRAFT  2006    CABG for 3-vessel disease, 2006.   • INGUINAL HERNIA REPAIR     • KIDNEY SURGERY         Family History: family history includes Alzheimer's disease in his mother; Dementia in his mother; Heart disease in his brother, father, and sister; Stroke in his brother and father.     Social History:  reports that he quit smoking about 47 years ago. His smoking use included Cigarettes. He has a 18.00 pack-year smoking history. He has never used smokeless tobacco. He reports that  "he does not drink alcohol or use illicit drugs.  Social History     Social History Narrative       Medications:    (Not in a hospital admission)    Allergies   Allergen Reactions   • Temazepam Other (See Comments)     Caused pt to \"sleep drive\"       Objective   Objective     Vital Signs:   Temp:  [98.5 °F (36.9 °C)-102.5 °F (39.2 °C)] 102.5 °F (39.2 °C)  Heart Rate:  [50-95] 67  Resp:  [20] 20  BP: (125-179)/(72-97) 135/80        Physical Exam   Constitutional: No acute distress, awake, alert, appears uncomfortable  Eyes: PERRLA, sclerae anicteric, no conjunctival injection  HENT: NCAT, mucous membranes moist  Neck: Supple, no thyromegaly, no lymphadenopathy, trachea midline  Respiratory: Clear to auscultation bilaterally, nonlabored respirations   Cardiovascular: RRR, no murmurs, rubs, or gallops, palpable pedal pulses bilaterally  Gastrointestinal: Positive bowel sounds, soft, nontender, nondistended  Musculoskeletal: Left leg with redness and warmth extending to knee, some mild edema  Psychiatric: Appropriate affect, cooperative  Neurologic: Oriented x 3, strength symmetric in all extremities, Cranial Nerves grossly intact to confrontation, speech clear  Skin: No rashes    Results Reviewed:  I have personally reviewed current lab, radiology, and data and agree.      Results from last 7 days  Lab Units 03/08/18  1823   WBC 10*3/mm3 11.60*   HEMOGLOBIN g/dL 15.7   HEMATOCRIT % 46.8   PLATELETS 10*3/mm3 135*   INR  1.85*       Results from last 7 days  Lab Units 03/08/18  1823   SODIUM mmol/L 134   POTASSIUM mmol/L 3.9   CHLORIDE mmol/L 92*   CO2 mmol/L 31.0   BUN mg/dL 10   CREATININE mg/dL 0.80   GLUCOSE mg/dL 112*   CALCIUM mg/dL 8.9   ALT (SGPT) U/L 22   AST (SGOT) U/L 33     Estimated Creatinine Clearance: 72.2 mL/min (by C-G formula based on Cr of 0.8).  Brief Urine Lab Results  (Last result in the past 365 days)      Color   Clarity   Blood   Leuk Est   Nitrite   Protein   CREAT   Urine HCG        03/08/18 " 1815 Yellow Clear Small (1+)(A) Negative Negative Negative             No results found for: BNP  No results found for: PHART    Recent imaging including CXR, CT A/P, echo reviewed and unremarkable. Agree with interpretation.     Assessment/Plan   Assessment / Plan     Hospital Problem List     * (Principal)Fever    Permanent atrial fibrillation    Overview Signed 9/13/2016  9:12 AM by Sarbjit Saunders     1. Permanent atrial fibrillation:  a. Anticoagulated on Coumadin.   b. Rate controlled with diltiazem.           Left leg cellulitis    Leukocytosis    Acute febrile illness    Allergic reaction caused by a drug        86 y/o male followed by ID for recent LLE cellulitis presenting to ED with recurrent LLE cellulitis. In addition there is concern from IV antibiotic which he received today.    Assessment & Plan:    LLE cellulitis  Leukocytosis  --Continue IV Clinda as directed by ID. They will see tomorrow. Cultures pending.    Allergic reaction  --Responded well to benadryl and pepcid. Can repeat benadryl if needed. Will also need to clarify which antibiotic he received in ID clinic today.    PAF  --Home meds with coumadin dosing per pharmacy.    Dementia    DVT prophylaxis: Coumadin    CODE STATUS: Full    Admission Status:  I believe this patient meets OBSERVATION status, however if further evaluation or treatment plans warrant, status may change.  Based upon current information, I predict patient's care encounter to be less than or equal to 2 midnights.      Electronically signed by Michell Lai II, DO, 03/08/18, 7:59 PM.

## 2018-03-09 NOTE — PROGRESS NOTES
[facility] Medicine Services  PROGRESS NOTE    Patient Name: Mike Lucero Jr.  : 1932  MRN: [id number]    Date of Admission: 3/8/2018  Length of Stay: 0  Primary Care Physician: kIe Douglas MD    Subjective   Subjective     CC:  F/U left leg cellulitis    HPI:  Patient seen this morning. He complains of pain and swelling in his left leg. Otherwise no complaints. His allergic reaction has resolved, tongue swelling back to normal.     Review of Systems  Gen-no fevers, no chills  CV-no chest pain, no palpitations  Resp-no cough, no dyspnea  GI-no N/V/D, no abd pain      Otherwise ROS is negative except as mentioned in the HPI.    Objective   Objective     Vital Signs:   Temp:  [98.5 °F (36.9 °C)-102.5 °F (39.2 °C)] 98.6 °F (37 °C)  Heart Rate:  [50-95] 69  Resp:  [18-20] 20  BP: ()/(56-97) 95/64        Physical Exam:  Gen-no acute distress  CV-RRR, S1 S2 normal, no m/r/g  Resp-CTAB, no wheezes  Abd-soft, NT, ND, +BS  Ext-LLE with faint erythema and warm overlying the shin down above ankle, no significant edema noted  Neuro-A&Ox3, no focal deficits  Psych-appropriate mood    Results Reviewed:  I have personally reviewed current lab, radiology, and data and agree.      Results from last 7 days  Lab Units 18  0624 18  1823   WBC 10*3/mm3 7.23 11.60*   HEMOGLOBIN g/dL 13.4 15.7   HEMATOCRIT % 40.9 46.8   PLATELETS 10*3/mm3 119* 135*   INR  1.86* 1.85*       Results from last 7 days  Lab Units 18  0624 18  1823   SODIUM mmol/L 134 134   POTASSIUM mmol/L 3.8 3.9   CHLORIDE mmol/L 95* 92*   CO2 mmol/L 29.0 31.0   BUN mg/dL 11 10   CREATININE mg/dL 0.80 0.80   GLUCOSE mg/dL 109* 112*   CALCIUM mg/dL 8.1* 8.9   ALT (SGPT) U/L  --  22   AST (SGOT) U/L  --  33     Estimated Creatinine Clearance: 65.2 mL/min (by C-G formula based on Cr of 0.8).  No results found for: BNP  No results found for: PHART    Microbiology Results Abnormal     Procedure Component  Value - Date/Time    Beta Strep Culture, Throat - Swab, Throat [[id number]]  (Normal) Collected:  03/08/18 1855    Lab Status:  Preliminary result Specimen:  Swab from Throat Updated:  03/09/18 0751     Throat Culture, Beta Strep No Beta Hemolytic Streptococcus Isolated    Influenza A & B, RT PCR - Swab, Nasopharynx [[id number]]  (Normal) Collected:  03/09/18 0147    Lab Status:  Final result Specimen:  Swab from Nasopharynx Updated:  03/09/18 0745     Influenza A PCR Not Detected     Influenza B PCR Not Detected    Blood Culture - Blood, [[id number]]  (Normal) Collected:  03/08/18 1820    Lab Status:  Preliminary result Specimen:  Blood from Arm, Right Updated:  03/09/18 0701     Blood Culture No growth at less than 24 hours    Blood Culture - Blood, [[id number]]  (Normal) Collected:  03/08/18 1825    Lab Status:  Preliminary result Specimen:  Blood from Arm, Left Updated:  03/09/18 0701     Blood Culture No growth at less than 24 hours    Rapid Strep A Screen - Swab, Throat [[id number]]  (Normal) Collected:  03/08/18 1855    Lab Status:  Final result Specimen:  Swab from Throat Updated:  03/1934     Strep A Ag Negative    Narrative:         Test performed by Direct Antigen Testing.          Imaging Results (last 24 hours)     Procedure Component Value Units Date/Time    XR Chest 1 View [[id number]] Collected:  03/08/18 1846     Updated:  03/08/18 2138    Narrative:       EXAM:    XR Chest, 1 View    CLINICAL HISTORY:    85 years old, male; Essential (primary) hypertension; Chronic atrial   fibrillation; Pain in left lower leg; Other specified soft tissue disorders;   Fever, unspecified; Person with feared health complaint in whom no diagnosis is   made; Long term (current) use of anticoagulants; Signs and symptoms; Fever and   other: Febrile illness    TECHNIQUE:    Frontal view of the chest.    COMPARISON:    CR - XR CHEST PA AND LATERAL 2018-01-07 08:13    FINDINGS:    Asymmetric decreased RIGHT lung volume with  upper chest wall deformity.    Patchy scarring and atelectasis in the RIGHT lung without definite   consolidation. No pleural effusion or pneumothorax.  Cardiomegaly with central   pulmonary vascular congestion.  Tortuous calcific aortic arch and descending   thoracic aorta.  Evidence of mediastinal surgery with sternotomy wires and   CABG.       Impression:         Chronic cardiopulmonary changes with progressive chronic RIGHT lung volume   loss and scarring without evidence of an active lung parenchymal lesion.     THIS DOCUMENT HAS BEEN ELECTRONICALLY SIGNED BY JOHNNIE TORREZ MD             I have reviewed the medications.    Assessment/Plan   Assessment / Plan     Principal Problem:    Fever  Active Problems:    Left leg cellulitis    Leukocytosis    Allergic reaction caused by a drug    Permanent atrial fibrillation         Brief Hospital Course to date:  Mike Lucero Jr. is a 85 y.o. male followed by ID for LLE cellulitis presents with an allergic reaction from IV Rocephin he received at [facility]. He has a fever and chills. Given Benadryl and Pepcid and admitted for further management.      Assessment & Plan:  --Started on IV Clindamycin per Dr. White's recommendations. ID following, switching to IV Daptomycin today.  --Follow cultures.  --Home soon (tomorrow?) likely on PO ATBx.    DVT Prophylaxis:  Coumadin    CODE STATUS: Full Code    Disposition: I expect the patient to be discharged home in ~1 day      Electronically signed by Cynthia Cervantes MD, 03/09/18, 1:44 PM.

## 2018-03-09 NOTE — PROGRESS NOTES
Continued Stay Note  [facility]     Patient Name: Mike Lucero Jr.  MRN: [id number]  Today's Date: 3/9/2018    Admit Date: 3/8/2018          Discharge Plan       03/09/18 1143    Case Management/Social Work Plan    Plan Home    Additional Comments Spoke with Dr. Funes, and the plan is to discharge patient home on oral abx. CM will continue to follow.       03/09/18 1043    Case Management/Social Work Plan    Plan Home with outpt IV abx vs home with home health    Patient/Family In Agreement With Plan yes    Additional Comments Met with patient in the room to initiate discharge planning. Patient lives with his wife in a home in [facility]. He is independent with ADLs and mobility and wears a CPAP qhs. Patient's goal is home at discharge with his wife to assist. Per ID note, patient may discharge on daily IV Daptomycin. Please advise if this will be done in the office or home and what care will be associated with the plan (PICC vs peripheral, labs, possible home health, etc.) so that CM can assist in arrangements if needed. Thank you. CM will continue to follow.               Discharge Codes       03/09/18 1142    Discharge Codes    Discharge Codes 01  Discharge to home        Expected Discharge Date and Time     Expected Discharge Date Expected Discharge Time    Mar 10, 2018             Nilda Roque

## 2018-03-10 VITALS
DIASTOLIC BLOOD PRESSURE: 83 MMHG | HEART RATE: 67 BPM | TEMPERATURE: 98.3 F | SYSTOLIC BLOOD PRESSURE: 121 MMHG | WEIGHT: 150.5 LBS | HEIGHT: 67 IN | OXYGEN SATURATION: 96 % | RESPIRATION RATE: 18 BRPM | BODY MASS INDEX: 23.62 KG/M2

## 2018-03-10 PROBLEM — R50.9 FEVER: Status: RESOLVED | Noted: 2018-03-08 | Resolved: 2018-03-10

## 2018-03-10 PROBLEM — T78.40XA ALLERGIC REACTION CAUSED BY A DRUG: Status: RESOLVED | Noted: 2018-03-08 | Resolved: 2018-03-10

## 2018-03-10 PROBLEM — D72.829 LEUKOCYTOSIS: Status: RESOLVED | Noted: 2018-03-08 | Resolved: 2018-03-10

## 2018-03-10 LAB
ANION GAP SERPL CALCULATED.3IONS-SCNC: 6 MMOL/L (ref 3–11)
BACTERIA SPEC AEROBE CULT: NORMAL
BUN BLD-MCNC: 14 MG/DL (ref 9–23)
BUN/CREAT SERPL: 17.5 (ref 7–25)
CALCIUM SPEC-SCNC: 8.5 MG/DL (ref 8.7–10.4)
CHLORIDE SERPL-SCNC: 99 MMOL/L (ref 99–109)
CO2 SERPL-SCNC: 30 MMOL/L (ref 20–31)
CREAT BLD-MCNC: 0.8 MG/DL (ref 0.6–1.3)
DEPRECATED RDW RBC AUTO: 48.8 FL (ref 37–54)
ERYTHROCYTE [DISTWIDTH] IN BLOOD BY AUTOMATED COUNT: 14 % (ref 11.3–14.5)
GFR SERPL CREATININE-BSD FRML MDRD: 92 ML/MIN/1.73
GLUCOSE BLD-MCNC: 94 MG/DL (ref 70–100)
HCT VFR BLD AUTO: 42 % (ref 38.9–50.9)
HGB BLD-MCNC: 13.6 G/DL (ref 13.1–17.5)
INR PPP: 2.06 (ref 0.91–1.09)
MCH RBC QN AUTO: 30.8 PG (ref 27–31)
MCHC RBC AUTO-ENTMCNC: 32.4 G/DL (ref 32–36)
MCV RBC AUTO: 95.2 FL (ref 80–99)
PLATELET # BLD AUTO: 114 10*3/MM3 (ref 150–450)
PMV BLD AUTO: 9.5 FL (ref 6–12)
POTASSIUM BLD-SCNC: 3.8 MMOL/L (ref 3.5–5.5)
PROTHROMBIN TIME: 21.6 SECONDS (ref 9.6–11.5)
RBC # BLD AUTO: 4.41 10*6/MM3 (ref 4.2–5.76)
SODIUM BLD-SCNC: 135 MMOL/L (ref 132–146)
WBC NRBC COR # BLD: 6.21 10*3/MM3 (ref 3.5–10.8)

## 2018-03-10 PROCEDURE — 80048 BASIC METABOLIC PNL TOTAL CA: CPT | Performed by: HOSPITALIST

## 2018-03-10 PROCEDURE — 99217 PR OBSERVATION CARE DISCHARGE MANAGEMENT: CPT | Performed by: NURSE PRACTITIONER

## 2018-03-10 PROCEDURE — 85027 COMPLETE CBC AUTOMATED: CPT | Performed by: HOSPITALIST

## 2018-03-10 PROCEDURE — 85610 PROTHROMBIN TIME: CPT | Performed by: INTERNAL MEDICINE

## 2018-03-10 PROCEDURE — G0378 HOSPITAL OBSERVATION PER HR: HCPCS

## 2018-03-10 PROCEDURE — 25010000002 DAPTOMYCIN PER 1 MG: Performed by: PHYSICIAN ASSISTANT

## 2018-03-10 RX ORDER — L.ACID,PARA/B.BIFIDUM/S.THERM 8B CELL
1 CAPSULE ORAL DAILY
Qty: 30 CAPSULE | Refills: 0 | Status: SHIPPED | OUTPATIENT
Start: 2018-03-11

## 2018-03-10 RX ORDER — DOXYCYCLINE HYCLATE 100 MG/1
100 CAPSULE ORAL 2 TIMES DAILY
Qty: 20 CAPSULE | Refills: 0
Start: 2018-03-10 | End: 2018-03-20

## 2018-03-10 RX ADMIN — DILTIAZEM HYDROCHLORIDE 240 MG: 240 CAPSULE, COATED, EXTENDED RELEASE ORAL at 08:17

## 2018-03-10 RX ADMIN — Medication 1 CAPSULE: at 08:17

## 2018-03-10 RX ADMIN — LISINOPRIL 20 MG: 20 TABLET ORAL at 08:17

## 2018-03-10 RX ADMIN — DONEPEZIL HYDROCHLORIDE 10 MG: 10 TABLET, FILM COATED ORAL at 08:17

## 2018-03-10 RX ADMIN — ASPIRIN 81 MG: 81 TABLET, COATED ORAL at 08:17

## 2018-03-10 RX ADMIN — DAPTOMYCIN 250 MG: 500 INJECTION, POWDER, LYOPHILIZED, FOR SOLUTION INTRAVENOUS at 08:17

## 2018-03-10 NOTE — DISCHARGE SUMMARY
[facility] Medicine Services  DISCHARGE SUMMARY    Patient Name: Mike Lucero Jr.  : 1932  MRN: [id number]    Date of Admission: 3/8/2018  Date of Discharge:  03/10/2018  Primary Care Physician: Ike Douglas MD    Consults     Date and Time Order Name Status Description    3/8/2018 2043 Inpatient Consult to Infectious Diseases Completed     3/8/2018 1804 Inpatient Infectious Diseases Consult Completed         Hospital Course     Presenting Problem:   Acute febrile illness [R50.9]    Active Hospital Problems (** Indicates Principal Problem)    Diagnosis Date Noted   • Left leg cellulitis [L03.116] 10/22/2017   • Permanent atrial fibrillation [I48.2]       Resolved Hospital Problems    Diagnosis Date Noted Date Resolved   • **Fever [R50.9] 2018 03/10/2018   • Leukocytosis [D72.829] 2018 03/10/2018   • Allergic reaction caused by a drug [T78.40XA] 2018 03/10/2018          Hospital Course:  Mike Lucero Jr. is a 85 y.o. male with history of atrophic fibrillation on warfarin, CAD status post CABG, hypertension, lower extremity cellulitis who presented to [facility] emergency department with complaints of swelling of his lips and tongue.  He follows with Dr. White with infectious disease due to his recurrent cellulitis.  Dr. White started him on Rocephin for cellulitis.  The patient then experienced swelling of his lips and tongue and presented to the emergency department.  In emergency department he was given Benadryl and Pepcid.  Symptoms improved.  He also was noted to have a temperature of 102.5 with shaking chills.  He did have leukocytosis.  He was negative for flu and strep.  Blood cultures have shown no growth to date.  Chest x-ray showed no pneumonia.  Urinalysis unremarkable.  Infectious disease was consulted and he was started on daptomycin of which she is tolerated.  His fever was likely due to cellulitis versus drug reaction.   Cellulitis is improving.  Dr. Funes has seen patient today and is okay with him discharging home on twice-daily doxycycline for 10 days.  He needs to follow-up with Dr. White as scheduled on Tuesday.           Day of Discharge     HPI:   Feels better, wants to go home    Review of Systems  Gen- No fevers, chills  CV- No chest pain, palpitations  Resp- No cough, dyspnea  GI- No N/V/D, abd pain      Otherwise ROS is negative except as mentioned in the HPI.    Vital Signs:   Temp:  [98.3 °F (36.8 °C)-98.9 °F (37.2 °C)] 98.3 °F (36.8 °C)  Heart Rate:  [50-75] 67  Resp:  [18] 18  BP: (110-123)/(59-83) 121/83     Physical Exam:  Gen-no acute distress, resting in bed  CV-RRR, S1 S2 normal, no m/r/g  Resp-CTAB, no wheezes  Abd-soft, NT, ND, +BS  Ext-no edema, PPP, very minimal erythema of LE over his shin  Neuro-A&Ox3, no focal deficits  Psych-appropriate mood      Pertinent  and/or Most Recent Results       Results from last 7 days  Lab Units 03/10/18  0705 03/09/18  0624 03/08/18  1823   WBC 10*3/mm3 6.21 7.23 11.60*   HEMOGLOBIN g/dL 13.6 13.4 15.7   HEMATOCRIT % 42.0 40.9 46.8   PLATELETS 10*3/mm3 114* 119* 135*   SODIUM mmol/L 135 134 134   POTASSIUM mmol/L 3.8 3.8 3.9   CHLORIDE mmol/L 99 95* 92*   CO2 mmol/L 30.0 29.0 31.0   BUN mg/dL 14 11 10   CREATININE mg/dL 0.80 0.80 0.80   GLUCOSE mg/dL 94 109* 112*   CALCIUM mg/dL 8.5* 8.1* 8.9       Results from last 7 days  Lab Units 03/10/18  0705 03/09/18  0624 03/08/18  1823   BILIRUBIN mg/dL  --   --  0.9   ALK PHOS U/L  --   --  74   ALT (SGPT) U/L  --   --  22   AST (SGOT) U/L  --   --  33   PROTIME Seconds 21.6* 19.5* 19.4*   INR  2.06* 1.86* 1.85*           Brief Urine Lab Results  (Last result in the past 365 days)      Color   Clarity   Blood   Leuk Est   Nitrite   Protein   CREAT   Urine HCG        03/08/18 1815 Yellow Clear Small (1+)(A) Negative Negative Negative               Microbiology Results Abnormal     Procedure Component Value - Date/Time    Beta  Strep Culture, Throat - Swab, Throat [[id number]]  (Normal) Collected:  03/08/18 1855    Lab Status:  Final result Specimen:  Swab from Throat Updated:  03/10/18 0733     Throat Culture, Beta Strep No Beta Hemolytic Streptococcus Isolated    Blood Culture - Blood, [[id number]]  (Normal) Collected:  03/08/18 1820    Lab Status:  Preliminary result Specimen:  Blood from Arm, Right Updated:  03/09/18 1901     Blood Culture No growth at 24 hours    Blood Culture - Blood, [[id number]]  (Normal) Collected:  03/08/18 1825    Lab Status:  Preliminary result Specimen:  Blood from Arm, Left Updated:  03/09/18 1901     Blood Culture No growth at 24 hours    Influenza A & B, RT PCR - Swab, Nasopharynx [[id number]]  (Normal) Collected:  03/09/18 0147    Lab Status:  Final result Specimen:  Swab from Nasopharynx Updated:  03/09/18 0745     Influenza A PCR Not Detected     Influenza B PCR Not Detected    Rapid Strep A Screen - Swab, Throat [[id number]]  (Normal) Collected:  03/08/18 1855    Lab Status:  Final result Specimen:  Swab from Throat Updated:  03/1934     Strep A Ag Negative    Narrative:         Test performed by Direct Antigen Testing.          Imaging Results (all)     Procedure Component Value Units Date/Time    XR Chest 1 View [[id number]] Collected:  03/08/18 1846     Updated:  03/08/18 2138    Narrative:       EXAM:    XR Chest, 1 View    CLINICAL HISTORY:    85 years old, male; Essential (primary) hypertension; Chronic atrial   fibrillation; Pain in left lower leg; Other specified soft tissue disorders;   Fever, unspecified; Person with feared health complaint in whom no diagnosis is   made; Long term (current) use of anticoagulants; Signs and symptoms; Fever and   other: Febrile illness    TECHNIQUE:    Frontal view of the chest.    COMPARISON:    CR - XR CHEST PA AND LATERAL 2018-01-07 08:13    FINDINGS:    Asymmetric decreased RIGHT lung volume with upper chest wall deformity.    Patchy scarring and  atelectasis in the RIGHT lung without definite   consolidation. No pleural effusion or pneumothorax.  Cardiomegaly with central   pulmonary vascular congestion.  Tortuous calcific aortic arch and descending   thoracic aorta.  Evidence of mediastinal surgery with sternotomy wires and   CABG.       Impression:         Chronic cardiopulmonary changes with progressive chronic RIGHT lung volume   loss and scarring without evidence of an active lung parenchymal lesion.     THIS DOCUMENT HAS BEEN ELECTRONICALLY SIGNED BY JOHNNIE TORREZ MD                Order Current Status    Blood Culture - Blood, Preliminary result    Blood Culture - Blood, Preliminary result        Discharge Details      Mike Lucero Jr.   Home Medication Instructions HAIDER:[id number]    Printed on:03/10/18 1222   Medication Information                      aspirin 81 MG EC tablet  Take 81 mg by mouth Daily.             CARTIA  MG 24 hr capsule  TAKE ONE CAPSULE BY MOUTH DAILY             donepezil (ARICEPT) 10 MG tablet  Take 1 tablet by mouth Daily.             doxycycline (VIBRAMYCIN) 100 MG capsule  Take 1 capsule by mouth 2 (Two) Times a Day for 10 days.             lactobacillus acidophilus (RISAQUAD) capsule capsule  Take 1 capsule by mouth Daily.             lisinopril (PRINIVIL,ZESTRIL) 20 MG tablet  Take 20 mg by mouth Daily.             potassium chloride (K-DUR,KLOR-CON) 20 MEQ CR tablet  TAKE ONE TABLET BY MOUTH DAILY             simvastatin (ZOCOR) 10 MG tablet  Take 10 mg by mouth every night.             traZODone (DESYREL) 50 MG tablet  Take 50 mg by mouth every night.             warfarin (COUMADIN) 2.5 MG tablet  Take 2.5 mg by mouth See Admin Instructions. Sundays, Mondays, Wednesdays, Thursdays, Saturdays             warfarin (COUMADIN) 2.5 MG tablet  Take 3.75 mg by mouth 2 (Two) Times a Week. Take 1.5 tablets (3.75mg) on Tuesdays and Fridays                   Discharge Disposition:  Home or Self Care    Discharge Diet:  cardiac as tolerated  Diet Instructions     As tolerated.                 Discharge Activity: as tolerated  Activity Instructions     As tolerated.                 Future Appointments  Date Time Provider Department Center   3/16/2018 1:00 PM [facility]IC  [facility] None   5/21/2018 2:00 PM Snehal Lemus PA-C [facility] None   9/19/2018 1:30 PM Ike Strickland III, MD [facility] None       Additional Instructions for the Follow-ups that You Need to Schedule     Discharge Follow-up with Specialty: Dr. White as scheduled on Tuesday    As directed      Specialty:  Dr. White as scheduled on Tuesday               Time Spent on Discharge: 38 minutes    Electronically signed by TIFFANY Serra, 03/10/18, 11:26 AM.

## 2018-03-10 NOTE — PROGRESS NOTES
"Pharmacy Consult  -  Warfarin    Mike Lucero Jr. is a  85 y.o. male   Height - 170.2 cm (67\")  Weight - 68.3 kg (150 lb 8 oz)    Consulting Provider: - Dr Lai([facility] medicine)  Indication: - dysrhythmia   Goal INR: - 2-3  Home Regimen:   - 2.5 mg Sunday, Monday, Wednesday, Thursday, Saturday    - 3.75 mg Tuesday and Friday     Bridge Therapy: no       Drug-Drug Interactions with current regimen:   Increased bleeding aspirin     Warfarin Dosing During Admission:    Date  3/8 3/9 3/10         INR  1.85 1.86 2.06         Dose  2.5 mg  3.75 mg  2.5mg            Education Provided:  Pt on warfarin prior to admission; will f/u and answer questions as needed.    Labs:    Results from last 7 days   Lab Units 03/10/18  0705 03/09/18  0624 03/08/18  1823   INR  2.06* 1.86* 1.85*   HEMOGLOBIN g/dL 13.6 13.4 15.7   HEMATOCRIT % 42.0 40.9 46.8   PLATELETS 10*3/mm3 114* 119* 135*   Results from last 7 days   Lab Units 03/10/18  0705 03/09/18  0624 03/08/18  1823   SODIUM mmol/L 135 134 134   POTASSIUM mmol/L 3.8 3.8 3.9   CHLORIDE mmol/L 99 95* 92*   CO2 mmol/L 30.0 29.0 31.0   BUN mg/dL 14 11 10   CREATININE mg/dL 0.80 0.80 0.80   CALCIUM mg/dL 8.5* 8.1* 8.9   BILIRUBIN mg/dL --  --  0.9   ALK PHOS U/L --  --  74   ALT (SGPT) U/L --  --  22   AST (SGOT) U/L --  --  33   GLUCOSE mg/dL 94 109* 112*     Current dietary intake: regular diet ordered (intake not charted)  Assessment/Plan:   1. INR today is therapeutic at 2.06, home dose of warfarin restarted overnight.  Will CONTINUE with current dose of warfarin (scheduled to get higher dose of 3.75 mg tonight).  If INR doesn't begin to increase over the weekend, consider giving patient a booster dose.   2. Continue to check INR daily, dietary intake, drug-drug interactions and s/sx of bleeding or clotting.  3. Pharmacy will continue to follow.     Ike Marcum RPH  3/10/2018  10:12 AM          "

## 2018-03-10 NOTE — PLAN OF CARE
Problem: Patient Care Overview (Adult)  Goal: Plan of Care Review  Outcome: Ongoing (interventions implemented as appropriate)   03/10/18 0021   Coping/Psychosocial Response Interventions   Plan Of Care Reviewed With patient       Problem: Fall Risk (Adult)  Goal: Identify Related Risk Factors and Signs and Symptoms  Outcome: Ongoing (interventions implemented as appropriate)    Goal: Absence of Falls  Outcome: Ongoing (interventions implemented as appropriate)

## 2018-03-10 NOTE — PROGRESS NOTES
[facility] Progress Note    Admission Date: 3/8/2018    Mike Lucero Jr.  7/29/1932  [id number]    Date: 3/10/2018    Antibiotics:  Anti-Infectives     Ordered     Dose/Rate Route Frequency Start Stop    03/09/18 1008  DAPTOmycin (CUBICIN) 250 mg in sterile water (preservative free) 5 mL IV syringe     Ordering Provider:  ZULY Sanchez    4 mg/kg × 68.3 kg  over 2 Minutes Intravenous Every 24 Hours Scheduled 03/09/18 1115 03/14/18 0859    03/08/18 1805  clindamycin (CLEOCIN) 900 mg in dextrose 5% 50 mL IVPB (premix)     Ordering Provider:  Jose Shi MD    900 mg  50 mL/hr over 60 Minutes Intravenous Once 03/08/18 1807 03/08/18 1945          CC:  Left leg cellulitis  HPI:  3/9/18:Patient is a 85 y.o. male, known to Dr. Benny White of our group, with h/o afib/chronic anticoagulation, BLE cellulitis, CAD/CABG, HTN, and OA who presented to [facility] with swelling of lips and tongue.  He was seen by Dr. White in our office yesterday for recurrent LLE swelling and cellulitis that began 2 days prior to office visit and was unable to wear his compression hose because of discomfort.  He had pitting edema yesterday.  He received a dose of Rocephin 2 GM IV which he was to receive for 5 days as well as Lasix 20 mg PO.  Around noon yesterday, he began to experience swelling of lips and tongue and came to the ED.  He was given Benadryl and Pepcid in ED with improvements of his tongue swelling.  He was noted to have fever of 102.5 with shaking chills.  ED contacted Dr. Stone who advised to admit patient and start him on Clindamycin.  Blood cultures are negative to date.  FluA/B PCR is negative and Strep A Ag and cx were negative. His UA showed no WBC and serum WBC was 12,000 with 87% neutrophils.  His lactic acid was 1.7.  His CXR showed chronic changes with progressive chronic right lung volume loss and scarring.  ID was asked to evaluate and manage his antibiotic therapy.   3/10/18: He feels much better and would like  to discharge today.  He denies increased left leg pain.  He denies pruritus.  He has remained afebrile.  He has previously tolerated doxycycline.         PE:  Vital Signs  Temp  Min: 98.3 °F (36.8 °C)  Max: 98.9 °F (37.2 °C)  BP  Min: 95/64  Max: 123/76  Pulse  Min: 50  Max: 75  Resp  Min: 18  Max: 20  SpO2  Min: 92 %  Max: 96 %    GENERAL: Awake and alert, in no acute distress.   HEENT: Normocephalic, atraumatic.  PERRL. EOMI. No conjunctival injection. No icterus. Oropharynx clear without evidence of thrush or exudate. No evidence of peridontal disease.    NECK: Supple without nuchal rigidity. No mass.  LYMPH: No cervical, axillary or inguinal lymphadenopathy.  HEART: RRR; No murmur, rubs, gallops.   LUNGS: Clear to auscultation bilaterally without wheezing, rales, rhonchi. Normal respiratory effort. Nonlabored. No dullness.  ABDOMEN: Soft, nontender, nondistended. Positive bowel sounds. No rebound or guarding. NO mass or HSM.  EXT:  No cyanosis, clubbing or edema. No cord.  : Genitalia generally unremarkable.  Without Zacarias catheter.  MSK: FROM without joint effusions noted arms/legs.    SKIN: No generalized rash present   NEURO: Oriented to PPT. No focal deficits on motor/sensory exam at arms/legs.  He is mildly confused and has obvious memory loss    Laboratory Data      Results from last 7 days  Lab Units 03/10/18  0705 03/09/18  0624 03/08/18  1823   WBC 10*3/mm3 6.21 7.23 11.60*   HEMOGLOBIN g/dL 13.6 13.4 15.7   HEMATOCRIT % 42.0 40.9 46.8   PLATELETS 10*3/mm3 114* 119* 135*       Results from last 7 days  Lab Units 03/10/18  0705   SODIUM mmol/L 135   POTASSIUM mmol/L 3.8   CHLORIDE mmol/L 99   CO2 mmol/L 30.0   BUN mg/dL 14   CREATININE mg/dL 0.80   GLUCOSE mg/dL 94   CALCIUM mg/dL 8.5*       Results from last 7 days  Lab Units 03/08/18  1823   ALK PHOS U/L 74   BILIRUBIN mg/dL 0.9   ALT (SGPT) U/L 22   AST (SGOT) U/L 33               Estimated Creatinine Clearance: 65.2 mL/min (by C-G formula based on  SCr of 0.8 mg/dL).      Microbiology:    Radiology:  Imaging Results (last 72 hours)     Procedure Component Value Units Date/Time    XR Chest 1 View [[id number]] Collected:  03/08/18 1846     Updated:  03/08/18 2138    Narrative:       EXAM:    XR Chest, 1 View    CLINICAL HISTORY:    85 years old, male; Essential (primary) hypertension; Chronic atrial   fibrillation; Pain in left lower leg; Other specified soft tissue disorders;   Fever, unspecified; Person with feared health complaint in whom no diagnosis is   made; Long term (current) use of anticoagulants; Signs and symptoms; Fever and   other: Febrile illness    TECHNIQUE:    Frontal view of the chest.    COMPARISON:    CR - XR CHEST PA AND LATERAL 2018-01-07 08:13    FINDINGS:    Asymmetric decreased RIGHT lung volume with upper chest wall deformity.    Patchy scarring and atelectasis in the RIGHT lung without definite   consolidation. No pleural effusion or pneumothorax.  Cardiomegaly with central   pulmonary vascular congestion.  Tortuous calcific aortic arch and descending   thoracic aorta.  Evidence of mediastinal surgery with sternotomy wires and   CABG.       Impression:         Chronic cardiopulmonary changes with progressive chronic RIGHT lung volume   loss and scarring without evidence of an active lung parenchymal lesion.     THIS DOCUMENT HAS BEEN ELECTRONICALLY SIGNED BY JOHNNIE TORREZ MD              Microbiology:    Radiology:  Imaging Results (last 72 hours)     Procedure Component Value Units Date/Time    XR Chest 1 View [[id number]] Collected:  03/08/18 1846     Updated:  03/08/18 2138    Narrative:       EXAM:    XR Chest, 1 View    CLINICAL HISTORY:    85 years old, male; Essential (primary) hypertension; Chronic atrial   fibrillation; Pain in left lower leg; Other specified soft tissue disorders;   Fever, unspecified; Person with feared health complaint in whom no diagnosis is   made; Long term (current) use of anticoagulants; Signs and  symptoms; Fever and   other: Febrile illness    TECHNIQUE:    Frontal view of the chest.    COMPARISON:    CR - XR CHEST PA AND LATERAL 2018-01-07 08:13    FINDINGS:    Asymmetric decreased RIGHT lung volume with upper chest wall deformity.    Patchy scarring and atelectasis in the RIGHT lung without definite   consolidation. No pleural effusion or pneumothorax.  Cardiomegaly with central   pulmonary vascular congestion.  Tortuous calcific aortic arch and descending   thoracic aorta.  Evidence of mediastinal surgery with sternotomy wires and   CABG.       Impression:         Chronic cardiopulmonary changes with progressive chronic RIGHT lung volume   loss and scarring without evidence of an active lung parenchymal lesion.     THIS DOCUMENT HAS BEEN ELECTRONICALLY SIGNED BY JOHNNIE TORREZ MD          I personally reviewed the radiographic studies     IMPRESSION:   1.  LLE cellulitis-This appears to a substantially improved after he received his intravenous Rocephin but unfortunately he experienced a substantial reaction to the Rocephin.  We will obviously need to leave him off of Rocephin.  I will give him a dose of daptomycin today.  We will probably be able to discharge him tomorrow on oral antibiotic therapy.  2.  Chronic L>R lymphedema  3.  Leukocytosis/neutrophilia secondary to #1  4.  Fever secondary to #1 vs drug reaction  5.  Angioedema of lip/tongue, sore throat probably secondary to Rocephin KAVITHA  6.  Afib/chronic anticoagulation  7.  CAD/CABG hx  8.  HTN  9.  Dementia  10.  Rocephin allergy (angioedema)--added to allergies    RECOMMENDATIONS:   1.  Discontinue daptomycin  2.  Doxycycline 100 mg by mouth 3 times a day ×10 days-I placed a prescription on his chart  3.  Discharge to home today  4.  Follow-up with Dr. White on Tuesday 3/13 at  1045-this appointment has been made      UM/YANDEL: I discussed his disposition with the hospitalist service today.  I discussed his disposition with the nursing staff.  I  coordinated his care.  Harish Funes MD  3/10/2018

## 2018-03-13 LAB
BACTERIA SPEC AEROBE CULT: NORMAL
BACTERIA SPEC AEROBE CULT: NORMAL

## 2018-03-14 RX ORDER — WARFARIN SODIUM 2.5 MG/1
TABLET ORAL
Qty: 35 TABLET | Refills: 4 | Status: SHIPPED | OUTPATIENT
Start: 2018-03-14 | End: 2018-08-06 | Stop reason: SDUPTHER

## 2018-03-16 ENCOUNTER — ANTICOAGULATION VISIT (OUTPATIENT)
Dept: PHARMACY | Facility: HOSPITAL | Age: 83
End: 2018-03-16

## 2018-03-16 DIAGNOSIS — I48.20 CHRONIC ATRIAL FIBRILLATION (HCC): ICD-10-CM

## 2018-03-16 LAB
INR PPP: 1.6 (ref 0.91–1.09)
PROTHROMBIN TIME: 19.4 SECONDS (ref 10–13.8)

## 2018-03-16 PROCEDURE — G0463 HOSPITAL OUTPT CLINIC VISIT: HCPCS

## 2018-03-16 PROCEDURE — 36416 COLLJ CAPILLARY BLOOD SPEC: CPT

## 2018-03-16 PROCEDURE — 85610 PROTHROMBIN TIME: CPT

## 2018-03-16 NOTE — PROGRESS NOTES
Anticoagulation Clinic Progress Note  Indication: afib  Referring Provider: Em  Goal INR: 2-3  Current Drug Interactions: aspirin, simvastatin, trazodone  Other: no bleeding per patient  [id number]: 4 (age, HTN, CAD)    Diet: Typically doesn't eat many Vit K foods    Anticoagulation Clinic INR History:  Date 9/14 10/12 11/9 12/7 1/11 2/15 3/22 4/26 5/31   Total Weekly Dose 17.5 mg 17.5 mg 17.5 mg 17.5 mg 17.5 mg 17.5 mg 17.5 mg 17.5 mg 17.5mg   INR 3.0 2.4 2.5 2.5 2.3 2.3 2.6 2.5 2.6   Notes               Date 7/5 8/9 8/30 9/18 10/16 11/15 11/29 12/13 1/3   Total Weekly Dose 17.5  mg 17.5 mg 17.5 mg 17.5 mg 17.5 mg 17.5 mg 18.75 mg 18.75mg 20mg   INR 2.2 1.9 2.1 2.0 2.2 1.8 1.9 2.1 2.1   Notes       abx   Keflex     Date 1/8 1/10 1/18 1/25 2/9 3/2 3/16     Total Weekly Dose 23.75 mg 23.75 mg 18.75 mg 18.75 mg 18.75 mg 18.75 mg 20mg     INR 1.61 1.9 2.5 2.3 2.2 1.7 1.6     Notes  hosp doxy     Doxy start 3/10       Clinic Interview:  Tablet Strength: 2.5mg tablets   Current Dose: 2.5mg daily except 3.75mg on Tues  Patient Findings   Positives Change in health, Change in medications, Hospital admission   Negatives Signs/symptoms of thrombosis, Signs/symptoms of bleeding, Laboratory test error suspected, Change in alcohol use, Change in activity, Upcoming invasive procedure, Emergency department visit, Upcoming dental procedure, Missed doses, Extra doses, Change in diet/appetite, Bruising, Other complaints   Comments Mr. Lucero was admitted last week 2/2 possible allergic reaction vs cellulitis (presented with swollen tongue and throat + fever and chills -- cx NG). He received dapto inpt and was discharged home on PO doxy x10 days.      Plan:  1. INR is subtherapeutic today post-hospital discharge. It does not appear that Mr. Lucero missed any doses inpt, and he denies missed doses since discharge / confirms he increased his weekly dose as instructed at his last anticoag appt. He is now on doxy, but this has not  affected his INR in the past -- pt's warfarin dose was actually higher than usual with doxy in January. Given all this, will have Mr. Lucero increase his dose conservatively, another ~7% to warfarin 2.5mg daily except 3.75mg on MonWedFri.    2. RTC in 1 week.  3. Written and verbal information provided in clinic. Mr. Lucero expresses understanding with teach back and has no further questions at this time.    Stefanie Hogue [facility]  3/16/2018  1:08 PM

## 2018-03-23 ENCOUNTER — ANTICOAGULATION VISIT (OUTPATIENT)
Dept: PHARMACY | Facility: HOSPITAL | Age: 83
End: 2018-03-23

## 2018-03-23 DIAGNOSIS — I48.20 CHRONIC ATRIAL FIBRILLATION (HCC): ICD-10-CM

## 2018-03-23 LAB
INR PPP: 2.3 (ref 0.91–1.09)
PROTHROMBIN TIME: 28.2 SECONDS (ref 10–13.8)

## 2018-03-23 PROCEDURE — 36416 COLLJ CAPILLARY BLOOD SPEC: CPT

## 2018-03-23 PROCEDURE — 85610 PROTHROMBIN TIME: CPT

## 2018-03-23 PROCEDURE — G0463 HOSPITAL OUTPT CLINIC VISIT: HCPCS

## 2018-04-10 ENCOUNTER — ANTICOAGULATION VISIT (OUTPATIENT)
Dept: PHARMACY | Facility: HOSPITAL | Age: 83
End: 2018-04-10

## 2018-04-10 DIAGNOSIS — I48.20 CHRONIC ATRIAL FIBRILLATION (HCC): ICD-10-CM

## 2018-04-10 LAB
INR PPP: 1.9 (ref 0.91–1.09)
PROTHROMBIN TIME: 22.9 SECONDS (ref 10–13.8)

## 2018-04-10 PROCEDURE — 85610 PROTHROMBIN TIME: CPT

## 2018-04-10 PROCEDURE — 36416 COLLJ CAPILLARY BLOOD SPEC: CPT

## 2018-04-10 PROCEDURE — G0463 HOSPITAL OUTPT CLINIC VISIT: HCPCS

## 2018-04-10 NOTE — PROGRESS NOTES
Anticoagulation Clinic Progress Note  Indication: afib  Referring Provider: mE  Goal INR: 2-3  Current Drug Interactions: aspirin, simvastatin, trazodone  Other: no bleeding per patient  [id number]: 4 (age, HTN, CAD)    Diet: Typically doesn't eat many Vit K foods    Anticoagulation Clinic INR History:  Date 9/14 10/12 11/9 12/7 1/11 2/15 3/22 4/26 5/31   Total Weekly Dose 17.5 mg 17.5 mg 17.5 mg 17.5 mg 17.5 mg 17.5 mg 17.5 mg 17.5 mg 17.5mg   INR 3.0 2.4 2.5 2.5 2.3 2.3 2.6 2.5 2.6     Date 7/5 8/9 8/30 9/18 10/16 11/15 11/29 12/13 1/3   Total Weekly Dose 17.5  mg 17.5 mg 17.5 mg 17.5 mg 17.5 mg 17.5 mg 18.75 mg 18.75mg 20mg   INR 2.2 1.9 2.1 2.0 2.2 1.8 1.9 2.1 2.1   Notes       abx   Keflex     Date 1/8 1/10 1/18 1/25 2/9 3/2 3/16 3/23 4/10   Total Weekly Dose 23.75 mg 23.75 mg 18.75 mg 18.75 mg 18.75 mg 18.75 mg 20mg 21.25 mg 20mg   INR 1.61 1.9 2.5 2.3 2.2 1.7 1.6 2.3 1.9   Notes  hosp doxy     Doxy start 3/10 Doxy finish 3/20      Clinic Interview:  Tablet Strength: 2.5mg tablets   Current Dose: 2.5mg daily except 3.75mg on SunTuesFri    Patient Findings   Positives Change in activity   Negatives Signs/symptoms of thrombosis, Signs/symptoms of bleeding, Laboratory test error suspected, Change in health, Change in alcohol use, Upcoming invasive procedure, Emergency department visit, Upcoming dental procedure, Missed doses, Extra doses, Change in medications, Change in diet/appetite, Hospital admission, Bruising, Other complaints   Comments Mr. Lucero is not on any more antibiotics. He saw Dr. White today and they are going to follow up with him in a month on 5/10. He reports that he has been sleeping ok and has had an increase in energy.      Plan:  1. INR is slightly SUBtherapeutic today. Given recent history, instructed Mr. Lucero to increase dose to warfarin 2.5mg daily except 3.75mg on SunTuesFri.  2. RTC in 1.5 weeks.  3. Written and verbal information provided in clinic. Mr. Lucero expresses  understanding with teach back and has no further questions at this time.    Colleen Nolasco, PharmD  4/10/2018  1:00 PM

## 2018-04-23 ENCOUNTER — ANTICOAGULATION VISIT (OUTPATIENT)
Dept: PHARMACY | Facility: HOSPITAL | Age: 83
End: 2018-04-23

## 2018-04-23 DIAGNOSIS — I48.20 CHRONIC ATRIAL FIBRILLATION (HCC): ICD-10-CM

## 2018-04-23 LAB
INR PPP: 2 (ref 0.91–1.09)
PROTHROMBIN TIME: 24.5 SECONDS (ref 10–13.8)

## 2018-04-23 PROCEDURE — 85610 PROTHROMBIN TIME: CPT

## 2018-04-23 PROCEDURE — 36416 COLLJ CAPILLARY BLOOD SPEC: CPT

## 2018-04-23 PROCEDURE — G0463 HOSPITAL OUTPT CLINIC VISIT: HCPCS

## 2018-04-23 RX ORDER — POTASSIUM CHLORIDE 20 MEQ/1
TABLET, EXTENDED RELEASE ORAL
Qty: 90 TABLET | Refills: 0 | Status: SHIPPED | OUTPATIENT
Start: 2018-04-23 | End: 2018-08-12 | Stop reason: SDUPTHER

## 2018-04-23 NOTE — PROGRESS NOTES
"Anticoagulation Clinic Progress Note  Indication: afib  Referring Provider: Em  Goal INR: 2-3  Current Drug Interactions: aspirin, simvastatin, trazodone  Other: no bleeding per patient  [id number]: 4 (age, HTN, CAD)    Diet: Typically doesn't eat many Vit K foods    Anticoagulation Clinic INR History:  Date 9/14 10/12 11/9 12/7 1/11 2/15 3/22 4/26 5/31   Total Weekly Dose 17.5 mg 17.5 mg 17.5 mg 17.5 mg 17.5 mg 17.5 mg 17.5 mg 17.5 mg 17.5mg   INR 3.0 2.4 2.5 2.5 2.3 2.3 2.6 2.5 2.6     Date 7/5 8/9 8/30 9/18 10/16 11/15 11/29 12/13 1/3   Total Weekly Dose 17.5  mg 17.5 mg 17.5 mg 17.5 mg 17.5 mg 17.5 mg 18.75 mg 18.75mg 20mg   INR 2.2 1.9 2.1 2.0 2.2 1.8 1.9 2.1 2.1   Notes       abx   Keflex     Date 1/8 1/10 1/18 1/25 2/9 3/2 3/16 3/23 4/10   Total Weekly Dose 23.75 mg 23.75 mg 18.75 mg 18.75 mg 18.75 mg 18.75 mg 20mg 21.25 mg 20mg   INR 1.61 1.9 2.5 2.3 2.2 1.7 1.6 2.3 1.9   Notes  hosp doxy     Doxy start 3/10 Doxy finish 3/20      Date 4/23           Total Weekly Dose 21.25mg           INR 2.0           Notes               Clinic Interview:  Tablet Strength: 2.5mg tablets   Current Dose: 2.5mg daily except 3.75mg on SunTuesFri    Patient Findings   Negatives Signs/symptoms of thrombosis, Signs/symptoms of bleeding, Laboratory test error suspected, Change in health, Change in alcohol use, Change in activity, Upcoming invasive procedure, Emergency department visit, Upcoming dental procedure, Missed doses, Extra doses, Change in medications, Change in diet/appetite, Hospital admission, Bruising, Other complaints   Comments Pt states that he may have taken 2.5mg last night (4/22) instead of his planned 3.75mg. Pt says he avoids the \"green stuff\" and therefore doesn't eat salads/GLV. He says he eats a lot of fruit (apples, grapes, tangerines).      Plan:  1. INR is therapeutic today at 2.0, albeit on the low end. Pt thinks me may have taken only 2.5mg instead of the planned 3.75mg last night, although unlikely " that would have much of an effect on his INR. Due to being therapeutic and being unsure if he took his dose correctly, instructed Mr. Lucero to continue warfarin 2.5mg daily except 3.75mg on SunTuesFri.  2. RTC in 2 weeks.  3. Written and verbal information provided in clinic. Mr. Lucero expresses understanding with teach back and has no further questions at this time.    Mannie Jones, Pharmacy Intern  4/23/2018  1:09 PM     IStefanie, [facility], have reviewed the note in full and agree with the assessment and plan.  04/23/18  1:41 PM

## 2018-05-07 ENCOUNTER — ANTICOAGULATION VISIT (OUTPATIENT)
Dept: PHARMACY | Facility: HOSPITAL | Age: 83
End: 2018-05-07

## 2018-05-07 DIAGNOSIS — I48.20 CHRONIC ATRIAL FIBRILLATION (HCC): ICD-10-CM

## 2018-05-07 LAB
INR PPP: 2.4 (ref 0.91–1.09)
PROTHROMBIN TIME: 29 SECONDS (ref 10–13.8)

## 2018-05-07 PROCEDURE — 36416 COLLJ CAPILLARY BLOOD SPEC: CPT

## 2018-05-07 PROCEDURE — 85610 PROTHROMBIN TIME: CPT

## 2018-05-07 PROCEDURE — G0463 HOSPITAL OUTPT CLINIC VISIT: HCPCS | Performed by: PHARMACIST

## 2018-05-07 NOTE — PROGRESS NOTES
Anticoagulation Clinic Progress Note  Indication: afib  Referring Provider: Em  Goal INR: 2-3  Current Drug Interactions: aspirin, simvastatin, trazodone  Other: no bleeding per patient  [id number]: 4 (age, HTN, CAD)    Diet: Typically doesn't eat many Vit K foods    Anticoagulation Clinic INR History:  Date 9/14 10/12 11/9 12/7 1/11 2/15 3/22 4/26 5/31   Total Weekly Dose 17.5 mg 17.5 mg 17.5 mg 17.5 mg 17.5 mg 17.5 mg 17.5 mg 17.5 mg 17.5mg   INR 3.0 2.4 2.5 2.5 2.3 2.3 2.6 2.5 2.6     Date 7/5 8/9 8/30 9/18 10/16 11/15 11/29 12/13 1/3   Total Weekly Dose 17.5  mg 17.5 mg 17.5 mg 17.5 mg 17.5 mg 17.5 mg 18.75 mg 18.75mg 20mg   INR 2.2 1.9 2.1 2.0 2.2 1.8 1.9 2.1 2.1   Notes       abx   Keflex     Date 1/8 1/10 1/18 1/25 2/9 3/2 3/16 3/23 4/10   Total Weekly Dose 23.75 mg 23.75 mg 18.75 mg 18.75 mg 18.75 mg 18.75 mg 20mg 21.25 mg 20mg   INR 1.61 1.9 2.5 2.3 2.2 1.7 1.6 2.3 1.9   Notes  hosp doxy     Doxy start 3/10 Doxy finish 3/20      Date 4/23 5/7          Total Weekly Dose 21.25mg 21.25 mg          INR 2.0 2.4          Notes               Clinic Interview:  Tablet Strength: 2.5mg tablets   Current Dose: 2.5mg daily except 3.75mg on SunTuesFri    Patient Findings     Negatives Signs/symptoms of thrombosis, Signs/symptoms of bleeding, Laboratory test error suspected, Change in health, Change in alcohol use, Change in activity, Upcoming invasive procedure, Emergency department visit, Upcoming dental procedure, Missed doses, Extra doses, Change in medications, Change in diet/appetite, Hospital admission, Bruising, Other complaints   Comments Uses cpap machine, he has some congestion and is using a nasal spray twice daily and also saline, but cannot recall the name of the medication. The air from the cpap machine may increase congestion and this appears to not be related to seasonal allergies.     He eats apples, oranges, grape and other fuits as well as peanut butter and crackers.        Plan:  1. INR is  therapeutic today at 2.4, Instructed Mr. Lucero to continue warfarin 2.5mg daily except 3.75mg on SunTuesFri.  2. RTC in 4 weeks.  3. Written and verbal information provided in clinic. Mr. Lucero expresses understanding with teach back and has no further questions at this time.  4. Patient declines warfarin refills.    Sarbjit Morales [facility]  5/7/2018  1:14 PM

## 2018-05-21 ENCOUNTER — OFFICE VISIT (OUTPATIENT)
Dept: NEUROLOGY | Facility: CLINIC | Age: 83
End: 2018-05-21

## 2018-05-21 VITALS
HEIGHT: 67 IN | DIASTOLIC BLOOD PRESSURE: 58 MMHG | BODY MASS INDEX: 24.33 KG/M2 | WEIGHT: 155 LBS | SYSTOLIC BLOOD PRESSURE: 120 MMHG

## 2018-05-21 DIAGNOSIS — G31.84 MILD COGNITIVE IMPAIRMENT: Primary | ICD-10-CM

## 2018-05-21 PROCEDURE — 99214 OFFICE O/P EST MOD 30 MIN: CPT | Performed by: PHYSICIAN ASSISTANT

## 2018-05-21 RX ORDER — MEMANTINE HYDROCHLORIDE 10 MG/1
10 TABLET ORAL 2 TIMES DAILY
Qty: 60 TABLET | Refills: 11 | Status: SHIPPED | OUTPATIENT
Start: 2018-05-21 | End: 2018-11-12 | Stop reason: SINTOL

## 2018-05-21 NOTE — PROGRESS NOTES
"Subjective     Chief Complaint: memory loss      History of Present Illness   Mike Lucero Jr. is a 85 y.o. male who returns to clinic today with a history of possible Mild Cognitive Impairment (MCI) . He was previously followed by Dr. Whyte. He has noted symptoms for at least several years marked initially by forgetfulness as well as naming and word-finding difficulties. This has gradually worsened  over time. Additional symptoms have included impairments in orientation and executive function. There have been no associated  symptoms of anxiety and depression. He denies  impairments in ADL's. He manages his medications and finances. He continues to drive. He is currently residing at home with wife.      Prior evaluation has included screening blood work  and an MRI of the brain which were essentially unremarkable. He is currently taking donepezil which was started primarily based on the strength of the history from family supporting a diagnosis of MCI.      It should be noted that he has a history of RELL and has recently had his CPAP pressure adjusted after a titration study in 4/17 and is followed by Dr. Vieira.    Today: Since his last visit in 11/17, he feels essentially unchanged cognitively and his family agrees.       I have reviewed and confirmed the past family, social and medical history as accurate on 5/21/18.     Review of Systems   Constitutional: Negative.    HENT: Positive for tinnitus.    Eyes: Negative.    Respiratory: Negative.    Cardiovascular: Negative.    Gastrointestinal: Negative.    Endocrine: Negative.    Genitourinary: Negative.    Musculoskeletal: Negative.    Skin: Negative.    Allergic/Immunologic: Negative.    Hematological: Negative.    Psychiatric/Behavioral: Negative.        Objective     /58   Ht 170.2 cm (67.01\")   Wt 70.3 kg (155 lb)   BMI 24.27 kg/m²     General appearance today is normal.      Physical Exam   Constitutional: He is oriented to person, place, " and time.   Neurological: He is oriented to person, place, and time. He has normal strength. He has a normal Finger-Nose-Finger Test. Gait normal.   Psychiatric: His speech is normal.        Neurologic Exam     Mental Status   Oriented to person, place, and time.   Registration: recalls 3 of 3 objects. Recall at 5 minutes: recalls 1 of 3 objects. Follows 3 step commands.   Attention: normal.   Speech: speech is normal   Level of consciousness: alert  Able to name object. Able to read. Able to repeat. Able to write. Normal comprehension.     Cranial Nerves   Cranial nerves II through XII intact.     Motor Exam   Muscle bulk: normal  Overall muscle tone: normal    Strength   Strength 5/5 throughout.     Sensory Exam   Light touch normal.     Gait, Coordination, and Reflexes     Gait  Gait: normal    Coordination   Finger to nose coordination: normal    Tremor   Resting tremor: absent        Results  MMSE=28 (30 in 11/17)       Assessment/Plan   Mike was seen today for memory loss.    Diagnoses and all orders for this visit:    Mild cognitive impairment    Other orders  -     memantine (NAMENDA) 10 MG tablet; Take 1 tablet by mouth 2 (Two) Times a Day.          Discussion/Summary   Mike Lucero Jr. returns to clinic today  with a history potentially consistent with Mild Cognitive Impairment (MCI). This was discussed in detail with the patient and his family. I again reviewed his current status and treatment options. After discussing potential treatment options, it was elected to add  memantine and continue on donepezil unchanged. He will then follow up in 6 months, or sooner if needed.   I spent 20 minutes out of 30  minutes face to face with the patient and family and discussing diagnosis, prognosis, diagnostic testing, evaluation, current status, treatment options and management.      As part of this visit I obtained additional history from the family which is incorporated in the HPI.      Snehal Lemus PA-C

## 2018-06-04 ENCOUNTER — ANTICOAGULATION VISIT (OUTPATIENT)
Dept: PHARMACY | Facility: HOSPITAL | Age: 83
End: 2018-06-04

## 2018-06-04 DIAGNOSIS — I48.20 CHRONIC ATRIAL FIBRILLATION (HCC): ICD-10-CM

## 2018-06-04 LAB
INR PPP: 2.4 (ref 0.91–1.09)
PROTHROMBIN TIME: 28.7 SECONDS (ref 10–13.8)

## 2018-06-04 PROCEDURE — 36416 COLLJ CAPILLARY BLOOD SPEC: CPT

## 2018-06-04 PROCEDURE — 85610 PROTHROMBIN TIME: CPT

## 2018-06-04 PROCEDURE — G0463 HOSPITAL OUTPT CLINIC VISIT: HCPCS

## 2018-06-04 NOTE — PROGRESS NOTES
Anticoagulation Clinic Progress Note  Indication: afib  Referring Provider: Em  Goal INR: 2-3  Current Drug Interactions: aspirin, simvastatin, trazodone  Other: no bleeding per patient  [id number]: 4 (age, HTN, CAD)    Diet: Typically doesn't eat many Vit K foods    Anticoagulation Clinic INR History:  Date 9/14 10/12 11/9 12/7 1/11 2/15 3/22 4/26 5/31   Total Weekly Dose 17.5 mg 17.5 mg 17.5 mg 17.5 mg 17.5 mg 17.5 mg 17.5 mg 17.5 mg 17.5mg   INR 3.0 2.4 2.5 2.5 2.3 2.3 2.6 2.5 2.6     Date 7/5 8/9 8/30 9/18 10/16 11/15 11/29 12/13 1/3   Total Weekly Dose 17.5  mg 17.5 mg 17.5 mg 17.5 mg 17.5 mg 17.5 mg 18.75 mg 18.75mg 20mg   INR 2.2 1.9 2.1 2.0 2.2 1.8 1.9 2.1 2.1   Notes       abx   Keflex     Date 1/8 1/10 1/18 1/25 2/9 3/2 3/16 3/23 4/10   Total Weekly Dose 23.75 mg 23.75 mg 18.75 mg 18.75 mg 18.75 mg 18.75 mg 20mg 21.25 mg 20mg   INR 1.61 1.9 2.5 2.3 2.2 1.7 1.6 2.3 1.9   Notes  hosp doxy     Doxy start 3/10 Doxy finish 3/20      Date 4/23 5/7 6/4 6/11        Total Weekly Dose 21.25mg 21.25 mg 21.25 mg 20 mg        INR 2.0 2.4 2.4         Notes    doxy           Clinic Interview:  Tablet Strength: 2.5mg tablets   Current Dose: 2.5mg daily except 3.75mg on SunTuesFri    Patient Findings     Positives:   Change in medications   Negatives:   Signs/symptoms of thrombosis, Signs/symptoms of bleeding, Laboratory test error suspected, Change in health, Change in alcohol use, Change in activity, Upcoming invasive procedure, Emergency department visit, Upcoming dental procedure, Missed doses, Extra doses, Change in diet/appetite, Hospital admission, Bruising, Other complaints   Comments:   Mr Lucero stated that he just completed his IV antibiotics and is starting his oral antibiotics today. He was unsure of what antibiotic had been prescribed.  Contacted [facility] in [place] to find out which antibiotic -  Doxycycline 100 mg oral BID x 7 days.  Denies other changes.     Plan:  1. INR is therapeutic today  at 2.4.  Due to DDI, instructed Mr. Lucero to take warfarin 2.5mg daily except 3.75mg on SunTues (5.9% reduction) and then come back Monday;  if INR therapeutic, should be able to resume warfarin 2.5mg daily except 3.75mg on SunTuesFri.   2. RTC in 1 week, on Monday, 6/11.  3. Written and verbal information provided in clinic. Mr. Lucero expresses understanding with teach back and has no further questions at this time.  4. Patient declines warfarin refills.    Lizzeth Abraham, PharmD  6/4/2018  1:28 PM

## 2018-06-11 ENCOUNTER — ANTICOAGULATION VISIT (OUTPATIENT)
Dept: PHARMACY | Facility: HOSPITAL | Age: 83
End: 2018-06-11

## 2018-06-11 DIAGNOSIS — I48.20 CHRONIC ATRIAL FIBRILLATION (HCC): ICD-10-CM

## 2018-06-11 LAB
INR PPP: 2.2 (ref 0.91–1.09)
PROTHROMBIN TIME: 26.6 SECONDS (ref 10–13.8)

## 2018-06-11 PROCEDURE — 36416 COLLJ CAPILLARY BLOOD SPEC: CPT

## 2018-06-11 PROCEDURE — 85610 PROTHROMBIN TIME: CPT

## 2018-06-11 PROCEDURE — G0463 HOSPITAL OUTPT CLINIC VISIT: HCPCS

## 2018-06-11 NOTE — PROGRESS NOTES
Anticoagulation Clinic Progress Note  Indication: afib  Referring Provider: Em  Goal INR: 2-3  Current Drug Interactions: aspirin, simvastatin, trazodone  Other: no bleeding per patient  [id number]: 4 (age, HTN, CAD)    Diet: Typically doesn't eat many Vit K foods    Anticoagulation Clinic INR History:  Date 9/14 10/12 11/9 12/7 1/11 2/15 3/22 4/26 5/31   Total Weekly Dose 17.5 mg 17.5 mg 17.5 mg 17.5 mg 17.5 mg 17.5 mg 17.5 mg 17.5 mg 17.5mg   INR 3.0 2.4 2.5 2.5 2.3 2.3 2.6 2.5 2.6     Date 7/5 8/9 8/30 9/18 10/16 11/15 11/29 12/13 1/3   Total Weekly Dose 17.5  mg 17.5 mg 17.5 mg 17.5 mg 17.5 mg 17.5 mg 18.75 mg 18.75mg 20mg   INR 2.2 1.9 2.1 2.0 2.2 1.8 1.9 2.1 2.1   Notes       abx   Keflex     Date 1/8 1/10 1/18 1/25 2/9 3/2 3/16 3/23 4/10   Total Weekly Dose 23.75 mg 23.75 mg 18.75 mg 18.75 mg 18.75 mg 18.75 mg 20mg 21.25 mg 20mg   INR 1.61 1.9 2.5 2.3 2.2 1.7 1.6 2.3 1.9   Notes  hosp doxy     Doxy start 3/10 Doxy finish 3/20      Date 4/23 5/7 6/4 6/11        Total Weekly Dose 21.25mg 21.25 mg 21.25 mg 20 mg        INR 2.0 2.4 2.4 2.2        Notes    doxy doxy          Clinic Interview:  Tablet Strength: 2.5mg tablets   Current Dose: 2.5mg daily except 3.75mg on SunTuesFri    Patient Findings   Negatives:   Signs/symptoms of thrombosis, Signs/symptoms of bleeding, Laboratory test error suspected, Change in health, Change in alcohol use, Change in activity, Upcoming invasive procedure, Emergency department visit, Upcoming dental procedure, Missed doses, Extra doses, Change in medications, Change in diet/appetite, Hospital admission, Bruising, Other complaints   Comments:   Patient complains of allergies- he denies that it causes his appetite to decrease. He reports that his last dose of doxycycline was yesterday. All patient findings were found to be negative upon patient interview. Mr. Lucero will call the clinic if he is started on any more antibiotics tomorrow at MD appointment.     Plan:  1. INR is  therapeutic today at 2.2 following antibiotic use. Instructed Mr. Lucero to take continue warfarin 2.5mg daily except 3.75mg on SunTuesFri.   2. RTC in 2 week, on Monday, 6/25.  3. Written and verbal information provided in clinic. Mr. Lucero expresses understanding with teach back and has no further questions at this time.  4. Patient declines warfarin refills.    Colleen Nolasco, PharmD  6/11/2018  1:27 PM

## 2018-06-25 ENCOUNTER — ANTICOAGULATION VISIT (OUTPATIENT)
Dept: PHARMACY | Facility: HOSPITAL | Age: 83
End: 2018-06-25

## 2018-06-25 DIAGNOSIS — I48.20 CHRONIC ATRIAL FIBRILLATION (HCC): ICD-10-CM

## 2018-06-25 LAB
INR PPP: 3 (ref 0.91–1.09)
PROTHROMBIN TIME: 35.7 SECONDS (ref 10–13.8)

## 2018-06-25 PROCEDURE — 85610 PROTHROMBIN TIME: CPT

## 2018-06-25 PROCEDURE — G0463 HOSPITAL OUTPT CLINIC VISIT: HCPCS

## 2018-06-25 PROCEDURE — 36416 COLLJ CAPILLARY BLOOD SPEC: CPT

## 2018-06-25 NOTE — PROGRESS NOTES
Anticoagulation Clinic Progress Note  Indication: afib  Referring Provider: Em Higuera INR: 2-3  Current Drug Interactions: aspirin, simvastatin, trazodone  Other: no bleeding per patient  [id number]: 4 (age, HTN, CAD)    Diet: Typically doesn't eat many Vit K foods    Anticoagulation Clinic INR History:  Date 9/14 10/12 11/9 12/7 1/11 2/15 3/22 4/26 5/31   Total Weekly Dose 17.5 mg 17.5 mg 17.5 mg 17.5 mg 17.5 mg 17.5 mg 17.5 mg 17.5 mg 17.5mg   INR 3.0 2.4 2.5 2.5 2.3 2.3 2.6 2.5 2.6     Date 7/5 8/9 8/30 9/18 10/16 11/15 11/29 12/13 1/3   Total Weekly Dose 17.5  mg 17.5 mg 17.5 mg 17.5 mg 17.5 mg 17.5 mg 18.75 mg 18.75mg 20mg   INR 2.2 1.9 2.1 2.0 2.2 1.8 1.9 2.1 2.1   Notes       abx   Keflex     Date 1/8 1/10 1/18 1/25 2/9 3/2 3/16 3/23 4/10   Total Weekly Dose 23.75 mg 23.75 mg 18.75 mg 18.75 mg 18.75 mg 18.75 mg 20mg 21.25 mg 20mg   INR 1.61 1.9 2.5 2.3 2.2 1.7 1.6 2.3 1.9   Notes  hosp Doxy     Doxy start 3/10 Doxy finish 3/20      Date 4/23 5/7 6/4 6/11 6/25       Total Weekly Dose 21.25mg 21.25 mg 21.25 mg 20 mg 21.75       INR 2.0 2.4 2.4 2.2 3.0       Notes    Doxy start 6/4 Doxy finish 6/10          Clinic Interview:  Verbal Release Authorization signed on 6/25/18 -- may speak with Glenis Lucero (wife), Marge Cox (daughter)  Tablet Strength: 2.5mg tablets   Current Dose: 2.5mg daily except 3.75mg on SunTuesFri    Patient Findings:  Negatives:   Signs/symptoms of thrombosis, Signs/symptoms of bleeding, Laboratory test error suspected, Change in health, Change in alcohol use, Change in activity, Upcoming invasive procedure, Emergency department visit, Upcoming dental procedure, Missed doses, Extra doses, Change in medications, Change in diet/appetite, Hospital admission, Bruising, Other complaints   Comments:   Patient denies any changes. Patient will try to eat GLV tonight and once next week.     Plan:  1. INR is therapeutic today at 3.0 following antibiotic use. For now, instructed Mr. Lucero to  take continue warfarin 2.5mg daily except 3.75mg on SunTuesFri.   2. RTC in 2 weeks, before [facility] appt 7/10.  3. Written and verbal information provided in clinic. Mr. Lucero expresses understanding with teach back and has no further questions at this time.  4. Patient declines warfarin refills.    Donat Aleman, PharmD Candidate 2019 6/25/2018  1:27 PM     I, Stefanie Hogue [facility], assisted in the patient interview. I have reviewed the note in full and agree with the assessment and plan.  06/26/18  7:54 AM

## 2018-07-10 ENCOUNTER — ANTICOAGULATION VISIT (OUTPATIENT)
Dept: PHARMACY | Facility: HOSPITAL | Age: 83
End: 2018-07-10

## 2018-07-10 DIAGNOSIS — I48.20 CHRONIC ATRIAL FIBRILLATION (HCC): ICD-10-CM

## 2018-07-10 LAB
INR PPP: 2.7 (ref 0.91–1.09)
PROTHROMBIN TIME: 32.9 SECONDS (ref 10–13.8)

## 2018-07-10 PROCEDURE — 85610 PROTHROMBIN TIME: CPT

## 2018-07-10 PROCEDURE — 36416 COLLJ CAPILLARY BLOOD SPEC: CPT

## 2018-07-10 PROCEDURE — G0463 HOSPITAL OUTPT CLINIC VISIT: HCPCS

## 2018-07-10 NOTE — PROGRESS NOTES
Anticoagulation Clinic Progress Note  Indication: afib  Referring Provider: Em Higuera INR: 2-3  Current Drug Interactions: aspirin, simvastatin, trazodone  Other: no bleeding per patient  [id number]: 4 (age, HTN, CAD)    Diet: Typically doesn't eat many Vit K foods    Anticoagulation Clinic INR History:  Date 9/14 10/12 11/9 12/7 1/11 2/15 3/22 4/26 5/31   Total Weekly Dose 17.5 mg 17.5 mg 17.5 mg 17.5 mg 17.5 mg 17.5 mg 17.5 mg 17.5 mg 17.5mg   INR 3.0 2.4 2.5 2.5 2.3 2.3 2.6 2.5 2.6     Date 7/5 8/9 8/30 9/18 10/16 11/15 11/29 12/13 1/3   Total Weekly Dose 17.5  mg 17.5 mg 17.5 mg 17.5 mg 17.5 mg 17.5 mg 18.75 mg 18.75mg 20mg   INR 2.2 1.9 2.1 2.0 2.2 1.8 1.9 2.1 2.1   Notes       abx   Keflex     Date 1/8 1/10 1/18 1/25 2/9 3/2 3/16 3/23 4/10   Total Weekly Dose 23.75 mg 23.75 mg 18.75 mg 18.75 mg 18.75 mg 18.75 mg 20mg 21.25 mg 20mg   INR 1.61 1.9 2.5 2.3 2.2 1.7 1.6 2.3 1.9   Notes  hosp Doxy     Doxy start 3/10 Doxy finish 3/20      Date 4/23 5/7 6/4 6/11 6/25 7/10      Total Weekly Dose 21.25mg 21.25 mg 21.25 mg 20 mg 21.75 21.75mg      INR 2.0 2.4 2.4 2.2 3.0 2.7      Notes    Doxy start 6/4 Doxy finish 6/10          Clinic Interview:  Verbal Release Authorization signed on 6/25/18 -- may speak with Glenis Lucero (wife), Marge Cox (daughter)  Tablet Strength: 2.5mg tablets   Current Dose: 2.5mg daily except 3.75mg on SunTuesFri    Patient Findings     Positives:   Change in diet/appetite   Negatives:   Signs/symptoms of thrombosis, Signs/symptoms of bleeding, Laboratory test error suspected, Change in health, Change in alcohol use, Change in activity, Upcoming invasive procedure, Emergency department visit, Upcoming dental procedure, Missed doses, Extra doses, Change in medications, Hospital admission, Bruising, Other complaints   Comments:   Ate GLV 3-4 times since last visit, doesn't remember which veggies but wife made some to help INR decrease. Patient will try to be consistent with greens and  will tell wife to keep cooking them the same.     Plan:  1. INR is therapeutic today at 2.7. For now, instructed Mr. Lucero to take continue warfarin 2.5mg daily except 3.75mg on SunTuesFri.   2. RTC in 3.5 weeks.  3. Written and verbal information provided in clinic. Mr. Lucero expresses understanding with teach back and has no further questions at this time.    Donta Aleman, PharmD Candidate 2019  7/10/2018  12:12 PM     IColleen, PharmD, have reviewed the note in full and agree with the assessment and plan.  07/10/18  12:46 PM

## 2018-07-23 RX ORDER — DILTIAZEM HYDROCHLORIDE 240 MG/1
CAPSULE, EXTENDED RELEASE ORAL
Qty: 90 CAPSULE | Refills: 0 | Status: SHIPPED | OUTPATIENT
Start: 2018-07-23 | End: 2018-10-21 | Stop reason: SDUPTHER

## 2018-08-01 ENCOUNTER — ANTICOAGULATION VISIT (OUTPATIENT)
Dept: PHARMACY | Facility: HOSPITAL | Age: 83
End: 2018-08-01

## 2018-08-01 DIAGNOSIS — I48.20 CHRONIC ATRIAL FIBRILLATION (HCC): ICD-10-CM

## 2018-08-01 LAB
INR PPP: 2.5 (ref 0.91–1.09)
PROTHROMBIN TIME: 29.7 SECONDS (ref 10–13.8)

## 2018-08-01 PROCEDURE — G0463 HOSPITAL OUTPT CLINIC VISIT: HCPCS

## 2018-08-01 PROCEDURE — 85610 PROTHROMBIN TIME: CPT

## 2018-08-01 PROCEDURE — 36416 COLLJ CAPILLARY BLOOD SPEC: CPT

## 2018-08-01 RX ORDER — ECHINACEA PURPUREA EXTRACT 125 MG
1 TABLET ORAL AS NEEDED
COMMUNITY

## 2018-08-01 NOTE — PROGRESS NOTES
Anticoagulation Clinic Progress Note  Indication: afib  Referring Provider: Em Higuera INR: 2-3  Current Drug Interactions: aspirin, simvastatin, trazodone  Other: no bleeding per patient  [id number]: 4 (age, HTN, CAD)    Diet: Typically doesn't eat many Vit K foods    Anticoagulation Clinic INR History:  Date 9/14 10/12 11/9 12/7 1/11 2/15 3/22 4/26 5/31   Total Weekly Dose 17.5 mg 17.5 mg 17.5 mg 17.5 mg 17.5 mg 17.5 mg 17.5 mg 17.5 mg 17.5mg   INR 3.0 2.4 2.5 2.5 2.3 2.3 2.6 2.5 2.6     Date 7/5 8/9 8/30 9/18 10/16 11/15 11/29 12/13 1/3   Total Weekly Dose 17.5  mg 17.5 mg 17.5 mg 17.5 mg 17.5 mg 17.5 mg 18.75 mg 18.75mg 20mg   INR 2.2 1.9 2.1 2.0 2.2 1.8 1.9 2.1 2.1   Notes       abx   Keflex     Date 1/8 1/10 1/18 1/25 2/9 3/2 3/16 3/23 4/10   Total Weekly Dose 23.75 mg 23.75 mg 18.75 mg 18.75 mg 18.75 mg 18.75 mg 20mg 21.25 mg 20mg   INR 1.61 1.9 2.5 2.3 2.2 1.7 1.6 2.3 1.9   Notes  hosp Doxy     Doxy start 3/10 Doxy finish 3/20      Date 4/23 5/7 6/4 6/11 6/25 7/10 8/1     Total Weekly Dose 21.25mg 21.25 mg 21.25 mg 20 mg 21.25 mg 21.25mg 21.25 mg     INR 2.0 2.4 2.4 2.2 3.0 2.7 2.5     Notes    Doxy start 6/4 Doxy finish 6/10          Clinic Interview:  Verbal Release Authorization signed on 6/25/18 -- may speak with Glenis Nic (wife), Marge Kenny (daughter)  Tablet Strength: 2.5mg tablets   Current Dose: 2.5mg daily except 3.75mg on SunTuesFri    Patient Findings:  Negatives:   Signs/symptoms of thrombosis, Signs/symptoms of bleeding, Laboratory test error suspected, Change in health, Change in alcohol use, Change in activity, Upcoming invasive procedure, Emergency department visit, Upcoming dental procedure, Missed doses, Extra doses, Change in medications, Change in diet/appetite, Hospital admission, Bruising, Other complaints   Comments:   Mr. Lucero has continued to eat some green beans and salads -- he denies changes in medications, diet or health since last visit.     Plan:  1. INR is  therapeutic again today, so instructed Mr. Lucero to continue warfarin 2.5mg daily except 3.75mg on SunTuesFri.   2. RTC in 4 weeks.  3. Written and verbal information provided in clinic. Mr. Lucero expresses understanding with teach back and has no further questions at this time.    Ann Cowden Mayer, PharmD  8/1/2018  1:28 PM

## 2018-08-06 RX ORDER — WARFARIN SODIUM 2.5 MG/1
TABLET ORAL
Qty: 45 TABLET | Refills: 3 | Status: SHIPPED | OUTPATIENT
Start: 2018-08-06 | End: 2019-01-07 | Stop reason: SDUPTHER

## 2018-08-14 RX ORDER — POTASSIUM CHLORIDE 20 MEQ/1
TABLET, EXTENDED RELEASE ORAL
Qty: 90 TABLET | Refills: 0 | Status: SHIPPED | OUTPATIENT
Start: 2018-08-14 | End: 2018-11-12 | Stop reason: SDUPTHER

## 2018-08-20 RX ORDER — DONEPEZIL HYDROCHLORIDE 10 MG/1
TABLET, FILM COATED ORAL
Qty: 30 TABLET | Refills: 10 | Status: SHIPPED | OUTPATIENT
Start: 2018-08-20 | End: 2019-07-14 | Stop reason: SDUPTHER

## 2018-08-24 ENCOUNTER — OFFICE VISIT (OUTPATIENT)
Dept: FAMILY MEDICINE CLINIC | Facility: CLINIC | Age: 83
End: 2018-08-24

## 2018-08-24 VITALS
BODY MASS INDEX: 31.8 KG/M2 | SYSTOLIC BLOOD PRESSURE: 124 MMHG | TEMPERATURE: 98.1 F | HEIGHT: 67 IN | DIASTOLIC BLOOD PRESSURE: 70 MMHG | WEIGHT: 202.6 LBS

## 2018-08-24 DIAGNOSIS — J30.2 CHRONIC SEASONAL ALLERGIC RHINITIS, UNSPECIFIED TRIGGER: ICD-10-CM

## 2018-08-24 DIAGNOSIS — I10 ESSENTIAL HYPERTENSION: ICD-10-CM

## 2018-08-24 DIAGNOSIS — G31.84 MILD COGNITIVE IMPAIRMENT: ICD-10-CM

## 2018-08-24 DIAGNOSIS — I25.10 CORONARY ARTERY DISEASE INVOLVING NATIVE CORONARY ARTERY OF NATIVE HEART WITHOUT ANGINA PECTORIS: Primary | ICD-10-CM

## 2018-08-24 DIAGNOSIS — I48.20 CHRONIC ATRIAL FIBRILLATION (HCC): ICD-10-CM

## 2018-08-24 DIAGNOSIS — G47.33 OSA (OBSTRUCTIVE SLEEP APNEA): ICD-10-CM

## 2018-08-24 PROBLEM — J96.01 ACUTE RESPIRATORY FAILURE WITH HYPOXIA (HCC): Status: RESOLVED | Noted: 2018-01-05 | Resolved: 2018-08-24

## 2018-08-24 PROCEDURE — 99214 OFFICE O/P EST MOD 30 MIN: CPT | Performed by: PHYSICIAN ASSISTANT

## 2018-08-24 RX ORDER — DOXYCYCLINE HYCLATE 100 MG/1
CAPSULE ORAL
COMMUNITY
Start: 2018-05-31 | End: 2018-09-19

## 2018-08-24 RX ORDER — TRAZODONE HYDROCHLORIDE 50 MG/1
50 TABLET ORAL NIGHTLY
Qty: 90 TABLET | Refills: 1 | Status: SHIPPED | OUTPATIENT
Start: 2018-08-24 | End: 2019-02-17 | Stop reason: SDUPTHER

## 2018-08-24 RX ORDER — LORATADINE 10 MG/1
10 TABLET ORAL DAILY
Qty: 30 TABLET | Refills: 5 | Status: SHIPPED | OUTPATIENT
Start: 2018-08-24 | End: 2019-02-17 | Stop reason: SDUPTHER

## 2018-08-24 NOTE — PATIENT INSTRUCTIONS
"-Begin using \"ocean\" nasal saline spray twice daily and continue other nasal sprays  -Begin claritin daily  -Make an appointment with your sleep specialist to determine if you need a new CPAP machine  "

## 2018-08-24 NOTE — PROGRESS NOTES
I have reviewed the notes, assessments, and/or procedures performed by ZULY Reeves, I concur with her/his documentation of Mike Lucero Jr..

## 2018-08-24 NOTE — PROGRESS NOTES
"Chief Complaint   Patient presents with   • Establish Care       HPI     Mike Lucero Jr. is a pleasant 86 y.o. male with PMH of dementia, atrial fibrillation, RELL, hypertension, and seasonal allergies who is here to establish care and for follow-up of his chronic conditions. He is a former patient of Dr. Douglas and [facility] He was scheduled for an appointment there in late May but he canceled this per his report.      Patient reports he has generally felt well of late aside from nasal congestion, intermittent clear rhinorrhea, and \"sinus issues\" for 1-2 years. This is his primary concern today. He states symptoms are mornings and clear up as day progresses. He uses a nasal spray which seems to help but cannot recall the name of it.  At one time he used nasal saline but is not using it currently. He believes his current CPAP machine is at least 15 years old but seems to work well. He cleans the hose, reservoir, and mask daily. He states it has been about 1 year since he last saw his sleep specialist.     He also follows with neurology for his mild cognitive impairment. We reviewed his note dated 5/21/18.     Had hospital admission for leg cellulitis/sepsis last winter and saw ID outpatient.    He has an appointment next month with his cardiologist. Prior CABG. He denies HOLT, chest pain, lightheadedness, syncope, or bleeding problems on coumadin.     Past Medical History:   Diagnosis Date   • Atrial fibrillation (CMS/HCC)    • Cataract    • Chronic anticoagulation    • Coronary artery disease    • Diverticulosis    • Gallstones    • History of endocarditis    • HLD (hyperlipidemia)    • HTN (hypertension)    • Osteoarthritis    • Seasonal allergies     Seasonal allergies/rhinitis.        Past Surgical History:   Procedure Laterality Date   • ADRENAL GLAND SURGERY  1996     Jj Kee   • APPENDECTOMY  1950   • BLEPHAROPLASTY  08/06/2014    Dr. Santamaria   • CARDIAC SURGERY     • COLONOSCOPY  " "2009   • CORONARY ARTERY BYPASS GRAFT  07/2006    CABG for 3-vessel disease, July 2006.   • INGUINAL HERNIA REPAIR Left     1963 and 09/2012   • KIDNEY SURGERY     • TONSILLECTOMY  01/1958       Family History   Problem Relation Age of Onset   • Alzheimer's disease Mother    • Dementia Mother    • Heart disease Father    • Stroke Father    • Heart disease Sister    • Heart disease Brother    • Stroke Brother        Social History     Social History   • Marital status:      Spouse name: Glenis   • Number of children: 2   • Years of education: J.D.     Occupational History   •  Retired     Social History Main Topics   • Smoking status: Former Smoker     Packs/day: 1.00     Years: 18.00     Types: Cigarettes     Quit date: 06/1970   • Smokeless tobacco: Never Used   • Alcohol use Yes      Comment: rare   • Drug use: No   • Sexual activity: Not Currently     Partners: Female     Other Topics Concern   • Not on file     Social History Narrative   • No narrative on file       Allergies   Allergen Reactions   • Rocephin [Ceftriaxone] Angioedema     Tongue and lip swelling, sore throat   • Temazepam Other (See Comments)     Caused pt to \"sleep drive\"   • Wellbutrin [Bupropion] Mental Status Change and Dizziness     Memory loss       ROS    Review of Systems   Constitutional: Negative for chills and fever.   HENT: Positive for congestion, postnasal drip, rhinorrhea and sinus pressure.    Respiratory: Negative for cough, shortness of breath and wheezing.    Cardiovascular: Negative for chest pain.   Neurological: Negative for syncope and light-headedness.       Vitals:    08/24/18 1126   BP: 124/70   Temp: 98.1 °F (36.7 °C)         Current Outpatient Prescriptions:   •  aspirin 81 MG EC tablet, Take 81 mg by mouth Daily., Disp: , Rfl:   •  CARTIA  MG 24 hr capsule, TAKE ONE CAPSULE BY MOUTH DAILY, Disp: 90 capsule, Rfl: 0  •  donepezil (ARICEPT) 10 MG tablet, TAKE ONE TABLET BY MOUTH DAILY, Disp: 30 " tablet, Rfl: 10  •  doxycycline (VIBRAMYCIN) 100 MG capsule, , Disp: , Rfl:   •  lactobacillus acidophilus (RISAQUAD) capsule capsule, Take 1 capsule by mouth Daily., Disp: 30 capsule, Rfl: 0  •  lisinopril (PRINIVIL,ZESTRIL) 20 MG tablet, Take 20 mg by mouth Daily., Disp: , Rfl:   •  memantine (NAMENDA) 10 MG tablet, Take 1 tablet by mouth 2 (Two) Times a Day., Disp: 60 tablet, Rfl: 11  •  potassium chloride (K-DUR,KLOR-CON) 20 MEQ CR tablet, TAKE ONE TABLET BY MOUTH DAILY, Disp: 90 tablet, Rfl: 0  •  simvastatin (ZOCOR) 10 MG tablet, Take 10 mg by mouth every night., Disp: , Rfl:   •  sodium chloride (OCEAN) 0.65 % nasal spray, 1 spray into the nostril(s) as directed by provider As Needed for Congestion., Disp: , Rfl:   •  traZODone (DESYREL) 50 MG tablet, Take 1 tablet by mouth Every Night., Disp: 90 tablet, Rfl: 1  •  warfarin (COUMADIN) 2.5 MG tablet, TAKE ONE TABLET BY MOUTH DAILY OR AS DIRECTED BY ANTICOAGULATION PHARMACIST, Disp: 45 tablet, Rfl: 3  •  loratadine (CLARITIN) 10 MG tablet, Take 1 tablet by mouth Daily., Disp: 30 tablet, Rfl: 5    PE    Physical Exam   Constitutional: He appears well-developed and well-nourished. No distress.   HENT:   Head: Normocephalic.   Right Ear: Ear canal normal. Tympanic membrane is not erythematous. A middle ear effusion (trace serous fluid) is present.   Left Ear: Ear canal normal. Tympanic membrane is not erythematous. A middle ear effusion (trace serous fluid) is present.   Nose: Mucosal edema (and mild erythema ) present. Right sinus exhibits no maxillary sinus tenderness and no frontal sinus tenderness. Left sinus exhibits no maxillary sinus tenderness and no frontal sinus tenderness.   Mouth/Throat: Oropharynx is clear and moist and mucous membranes are normal. No posterior oropharyngeal erythema. No tonsillar exudate.   Eyes: Conjunctivae are normal. Right eye exhibits no discharge. Left eye exhibits no discharge.   Neck: Normal range of motion. Neck supple.    Cardiovascular: Normal rate and normal heart sounds.  An irregularly irregular rhythm present.   No murmur heard.  Pulmonary/Chest: Effort normal and breath sounds normal. No respiratory distress. He has no wheezes. He has no rhonchi. He has no rales.     Vascular Status -  His right foot exhibits no edema. His left foot exhibits no edema.  Lymphadenopathy:     He has no cervical adenopathy.   Neurological: He is alert. Gait normal.   Patient has difficulty recalling names of specialists, dates, medications, and timing of PMH   Skin: Skin is warm and dry. He is not diaphoretic.   Psychiatric: He has a normal mood and affect. His behavior is normal.   Nursing note and vitals reviewed.      Results    Results for orders placed or performed in visit on 08/01/18   POC Protime / INR   Result Value Ref Range    Protime 29.7 (H) 10.0 - 13.8 seconds    INR 2.5 (H) 0.91 - 1.09       A/P    Problem List Items Addressed This Visit        Cardiovascular and Mediastinum    Chronic atrial fibrillation (CMS/HCC)  -Rate controlled, anticoagulated on coumadin.       Essential hypertension  -Good control today  -Labs 3/10/18 reviewed  -We will request records from [facility]. Patient unsure date last AWV      Coronary artery disease - Primary    Overview     1. Coronary artery disease:  a. Preserved LV systolic function.   b. CABG for 3-vessel disease, July 2006.  -Current asymptomatic            Respiratory    Seasonal allergies    Overview     -Add nasal saline and claritin  -Continue current nasal spray, likely ICS  -?CPAP contribution with sx worse in AM.          RELL (obstructive sleep apnea)  -Followed by Dr. Vieira         Nervous and Auditory    Mild cognitive impairment  -Stable. Continue present management per neurology.           Plan of care reviewed with patient at the conclusion of today's visit. Education was provided regarding diagnosis, management and any prescribed or recommended OTC medications.  Patient  verbalizes understanding of and agreement with management plan.        ZULY Sparrow

## 2018-08-29 ENCOUNTER — ANTICOAGULATION VISIT (OUTPATIENT)
Dept: PHARMACY | Facility: HOSPITAL | Age: 83
End: 2018-08-29

## 2018-08-29 DIAGNOSIS — I48.20 CHRONIC ATRIAL FIBRILLATION (HCC): ICD-10-CM

## 2018-08-29 LAB
INR PPP: 2 (ref 0.91–1.09)
PROTHROMBIN TIME: 23.5 SECONDS (ref 10–13.8)

## 2018-08-29 PROCEDURE — 85610 PROTHROMBIN TIME: CPT

## 2018-08-29 PROCEDURE — 36416 COLLJ CAPILLARY BLOOD SPEC: CPT

## 2018-08-29 PROCEDURE — G0463 HOSPITAL OUTPT CLINIC VISIT: HCPCS

## 2018-08-29 NOTE — PROGRESS NOTES
Anticoagulation Clinic Progress Note  Indication: afib  Referring Provider: Em Higuera INR: 2-3  Current Drug Interactions: aspirin, simvastatin, trazodone  Other: no bleeding per patient  [id number]: 4 (age, HTN, CAD)    Diet: Typically doesn't eat many Vit K foods    Anticoagulation Clinic INR History:  Date 9/14 10/12 11/9 12/7 1/11 2/15 3/22 4/26 5/31   Total Weekly Dose 17.5 mg 17.5 mg 17.5 mg 17.5 mg 17.5 mg 17.5 mg 17.5 mg 17.5 mg 17.5mg   INR 3.0 2.4 2.5 2.5 2.3 2.3 2.6 2.5 2.6     Date 7/5 8/9 8/30 9/18 10/16 11/15 11/29 12/13 1/3   Total Weekly Dose 17.5  mg 17.5 mg 17.5 mg 17.5 mg 17.5 mg 17.5 mg 18.75 mg 18.75mg 20mg   INR 2.2 1.9 2.1 2.0 2.2 1.8 1.9 2.1 2.1   Notes       abx   Keflex     Date 1/8 1/10 1/18 1/25 2/9 3/2 3/16 3/23 4/10   Total Weekly Dose 23.75 mg 23.75 mg 18.75 mg 18.75 mg 18.75 mg 18.75 mg 20mg 21.25 mg 20mg   INR 1.61 1.9 2.5 2.3 2.2 1.7 1.6 2.3 1.9   Notes  hosp Doxy     Doxy start 3/10 Doxy finish 3/20      Date 4/23 5/7 6/4 6/11 6/25 7/10 8/1 8/29    Total Weekly Dose 21.25mg 21.25 mg 21.25 mg 20 mg 21.25 mg 21.25mg 21.25 mg     INR 2.0 2.4 2.4 2.2 3.0 2.7 2.5 2    Notes    Doxy start 6/4 Doxy finish 6/10          Clinic Interview:  Verbal Release Authorization signed on 6/25/18 -- may speak with Glenis Nic (wife), Marge Cox (daughter)  Tablet Strength: 2.5mg tablets   Current Dose: 2.5mg daily except 3.75mg on SunTuesFri    Patient Findings   Positives:   Change in health, Missed doses, Change in diet/appetite   Negatives:   Signs/symptoms of thrombosis, Signs/symptoms of bleeding, Laboratory test error suspected, Change in alcohol use, Change in activity, Upcoming invasive procedure, Emergency department visit, Upcoming dental procedure, Extra doses, Change in medications, Hospital admission, Bruising, Other complaints   Comments:   Mr Lucero stated that he missed his warfarin dose this past Monday.  He stated that he had eaten a couple servings of GLV this past week.    He is a little congested today due to allergies.  Denies other changes.     Plan:  1. INR is on low end of therapeutic range today, likely due to missed dose on Monday.  Instructed Mr. Lucero to continue warfarin 2.5mg daily except 3.75mg on SunTuesFri. Encouraged Mr. Lucero to take his warfarin as prescribed.  2. RTC in 3 weeks prior to appointment with Dr. Strickland.  3. Written and verbal information provided in clinic. Mr. Lucero expresses understanding with teach back and has no further questions at this time.    Lizzeth Abraham, PharmD  8/29/2018  1:15 PM

## 2018-09-19 ENCOUNTER — OFFICE VISIT (OUTPATIENT)
Dept: CARDIOLOGY | Facility: CLINIC | Age: 83
End: 2018-09-19

## 2018-09-19 ENCOUNTER — ANTICOAGULATION VISIT (OUTPATIENT)
Dept: PHARMACY | Facility: HOSPITAL | Age: 83
End: 2018-09-19

## 2018-09-19 VITALS
WEIGHT: 200 LBS | BODY MASS INDEX: 31.39 KG/M2 | DIASTOLIC BLOOD PRESSURE: 68 MMHG | HEART RATE: 63 BPM | SYSTOLIC BLOOD PRESSURE: 132 MMHG | HEIGHT: 67 IN

## 2018-09-19 DIAGNOSIS — I48.21 PERMANENT ATRIAL FIBRILLATION (HCC): ICD-10-CM

## 2018-09-19 DIAGNOSIS — I25.10 CORONARY ARTERY DISEASE INVOLVING NATIVE CORONARY ARTERY OF NATIVE HEART WITHOUT ANGINA PECTORIS: Primary | ICD-10-CM

## 2018-09-19 DIAGNOSIS — E78.00 HYPERCHOLESTEROLEMIA: ICD-10-CM

## 2018-09-19 DIAGNOSIS — I10 BENIGN HYPERTENSION: ICD-10-CM

## 2018-09-19 DIAGNOSIS — I48.20 CHRONIC ATRIAL FIBRILLATION (HCC): ICD-10-CM

## 2018-09-19 LAB
INR PPP: 3.7 (ref 0.91–1.09)
PROTHROMBIN TIME: 44.1 SECONDS (ref 10–13.8)

## 2018-09-19 PROCEDURE — 99214 OFFICE O/P EST MOD 30 MIN: CPT | Performed by: INTERNAL MEDICINE

## 2018-09-19 PROCEDURE — 36416 COLLJ CAPILLARY BLOOD SPEC: CPT

## 2018-09-19 PROCEDURE — G0463 HOSPITAL OUTPT CLINIC VISIT: HCPCS

## 2018-09-19 PROCEDURE — 85610 PROTHROMBIN TIME: CPT

## 2018-09-19 RX ORDER — TRIAMCINOLONE ACETONIDE 55 UG/1
2 SPRAY, METERED NASAL DAILY
COMMUNITY
End: 2021-01-12

## 2018-09-19 NOTE — PROGRESS NOTES
Anticoagulation Clinic Progress Note  Indication: afib  Referring Provider: Em  Goal INR: 2-3  Current Drug Interactions: aspirin, simvastatin, trazodone  Other: no bleeding per patient  [id number]: 4 (age, HTN, CAD)    Diet: Typically doesn't eat many Vit K foods    Anticoagulation Clinic INR History:  Date 9/14 10/12 11/9 12/7 1/11 2/15 3/22 4/26 5/31   Total Weekly Dose 17.5 mg 17.5 mg 17.5 mg 17.5 mg 17.5 mg 17.5 mg 17.5 mg 17.5 mg 17.5mg   INR 3.0 2.4 2.5 2.5 2.3 2.3 2.6 2.5 2.6     Date 7/5 8/9 8/30 9/18 10/16 11/15 11/29 12/13 1/3   Total Weekly Dose 17.5  mg 17.5 mg 17.5 mg 17.5 mg 17.5 mg 17.5 mg 18.75 mg 18.75mg 20mg   INR 2.2 1.9 2.1 2.0 2.2 1.8 1.9 2.1 2.1   Notes       abx   Keflex     Date 1/8 1/10 1/18 1/25 2/9 3/2 3/16 3/23 4/10   Total Weekly Dose 23.75 mg 23.75 mg 18.75 mg 18.75 mg 18.75 mg 18.75 mg 20mg 21.25 mg 20mg   INR 1.61 1.9 2.5 2.3 2.2 1.7 1.6 2.3 1.9   Notes  hosp Doxy     Doxy start 3/10 Doxy finish 3/20      Date 4/23 5/7 6/4 6/11 6/25 7/10 8/1 8/29 9/19   Total Weekly Dose 21.25mg 21.25 mg 21.25 mg 20 mg 21.25 mg 21.25mg 21.25 mg 18.75 mg 21.25 mg   INR 2.0 2.4 2.4 2.2 3.0 2.7 2.5 2.0 3.7   Notes    Doxy start 6/4 Doxy finish 6/10    1 missed dose      Clinic Interview:  Verbal Release Authorization signed on 6/25/18 -- may speak with Glenis Lucero (wife), Marge Cox (daughter)  Tablet Strength: 2.5mg tablets   Current Dose: 2.5mg daily except 3.75mg on SunTuesFri    Patient Findings:   Positives:   Change in medications   Negatives:   Signs/symptoms of thrombosis, Signs/symptoms of bleeding, Laboratory test error suspected, Change in health, Change in alcohol use, Change in activity, Upcoming invasive procedure, Emergency department visit, Upcoming dental procedure, Missed doses, Extra doses, Change in diet/appetite, Hospital admission, Bruising, Other complaints   Comments:   Dr. White started Mr. Lucero on a a new medication about three weeks ago -- he cannot remember the  name at this time. Called [facility] -- Nasacort prescribed for congestion, and Mr. Lucero reports he's been using this nightly before bed.     Plan:  1. INR is supratherapeutic today, so instructed Mr. Lucero to take 1.25mg tonight, then lower his dose to warfarin 2.5mg daily except 3.75mg on SunTues.  2. RTC in 2 weeks to reassess.  3. Written and verbal information provided in clinic. Mr. Lucero expresses understanding with teach back and has no further questions at this time.    Ann Cowden Mayer, PharmD  9/19/2018  1:01 PM

## 2018-09-19 NOTE — PROGRESS NOTES
[facility] at [facility]  Office visit  Mike Lucero Jr.  7/29/1932  [phone]  VISIT DATE:  09/18/2017    PCP: Fabien Merino, ZULY ELLIOTT [facility] [id number]    CC:  Chief Complaint   Patient presents with   • Chronic atrial fibrillation (CMS/HCC)       PROBLEM LIST:  1.  Coronary artery disease:  a. Preserved LV systolic function.   b.  CABG for 3-vessel disease, July 2006.  2. Paroxysmal atrial fibrillation,  anticoagulated on Coumadin.   3. Benign hypertension.   4. Hypercholesterolemia.   5. Endocarditis, September 2009, treated with IV antibiotics:  a.  MILTON by Dr. Valladares, 09/11/2009:  Small nodular nonpedunculated density, possibly consistent with small vegetation of the posterior leaflet of the mitral valve.  b. Previously treated with Dr. Vann.  6. Remote tobacco abuse.   7. Osteoarthritis.   8. Cataracts.   9. Seasonal allergies/rhinitis.   10. Overmedication reaction secondary to temazepam with neurologic  effects now resolved      ASSESSMENT:   Diagnosis Plan   1. Coronary artery disease involving native coronary artery of native heart without angina pectoris     2. Permanent atrial fibrillation (CMS/HCC)     3. Benign hypertension     4. Hypercholesterolemia         PLAN:  Coronary artery disease: Continue low-dose aspirin, statin and afterload reduction  Permanent atrial fibrillation: Continue oral anticoagulation with a goal INR of 2-3 for stroke prophylaxis, Continue rate control with combination of beta blockade and diltiazem  Hypertension: Goal less than 130/80 mmHg.  Currently well-controlled.  Continue current antihypertensive regimen  Hyperlipidemia: Continue current statin therapy, goal LDL less than 100.  Repeat fasting lipid panel pending.    Subjective  Reports stable functional capacity.  Denies dyspnea on exertion or chest pain.  No PND or orthopnea.  Blood pressures up and running less than 140/90 mmHg.  He denies myalgias on statin  "therapy. He is compliant with medical therapy.  Does appear to be developing some memory issues however INRs have not been significantly erratic so I do think he is consistently taking his medications.    PHYSICAL EXAMINATION:  There were no vitals filed for this visit.  General Appearance:    Alert, cooperative, no distress, appears stated age   Head:    Normocephalic, without obvious abnormality, atraumatic   Eyes:    conjunctiva/corneas clear   Nose:   Nares normal, septum midline, mucosa normal, no drainage   Throat:   Lips, teeth and gums normal   Neck:   Supple, symmetrical, trachea midline, no carotid    bruit or JVD   Lungs:     Clear to auscultation bilaterally, respirations unlabored   Chest Wall:    No tenderness or deformity    Heart:   Irregularly irregular, S1 and S2 normal, no murmur, rub   or gallop, normal carotid impulse bilaterally without bruit.   Abdomen:     Soft, non-tender   Extremities:   Extremities normal, atraumatic, no cyanosis or edema   Pulses:   2+ and symmetric all extremities   Skin:   Skin color, texture, turgor normal, no rashes or lesions       Diagnostic Data:  Procedures  No results found for: CHLPL, TRIG, HDL, LDLDIRECT  Lab Results   Component Value Date    GLUCOSE 94 03/10/2018    BUN 14 03/10/2018    CREATININE 0.80 03/10/2018     03/10/2018    K 3.8 03/10/2018    CL 99 03/10/2018    CO2 30.0 03/10/2018     Lab Results   Component Value Date    HGBA1C 5.50 10/23/2017     Lab Results   Component Value Date    WBC 6.21 03/10/2018    HGB 13.6 03/10/2018    HCT 42.0 03/10/2018     (L) 03/10/2018       Allergies  Allergies   Allergen Reactions   • Rocephin [Ceftriaxone] Angioedema     Tongue and lip swelling, sore throat   • Temazepam Other (See Comments)     Caused pt to \"sleep drive\"   • Wellbutrin [Bupropion] Mental Status Change and Dizziness     Memory loss       Current Medications    Current Outpatient Prescriptions:   •  aspirin 81 MG EC tablet, Take 81 mg " by mouth Daily., Disp: , Rfl:   •  CARTIA  MG 24 hr capsule, TAKE ONE CAPSULE BY MOUTH DAILY, Disp: 90 capsule, Rfl: 0  •  donepezil (ARICEPT) 10 MG tablet, TAKE ONE TABLET BY MOUTH DAILY, Disp: 30 tablet, Rfl: 10  •  lactobacillus acidophilus (RISAQUAD) capsule capsule, Take 1 capsule by mouth Daily., Disp: 30 capsule, Rfl: 0  •  lisinopril (PRINIVIL,ZESTRIL) 20 MG tablet, Take 20 mg by mouth Daily., Disp: , Rfl:   •  loratadine (CLARITIN) 10 MG tablet, Take 1 tablet by mouth Daily., Disp: 30 tablet, Rfl: 5  •  memantine (NAMENDA) 10 MG tablet, Take 1 tablet by mouth 2 (Two) Times a Day., Disp: 60 tablet, Rfl: 11  •  potassium chloride (K-DUR,KLOR-CON) 20 MEQ CR tablet, TAKE ONE TABLET BY MOUTH DAILY, Disp: 90 tablet, Rfl: 0  •  simvastatin (ZOCOR) 10 MG tablet, Take 10 mg by mouth every night., Disp: , Rfl:   •  sodium chloride (OCEAN) 0.65 % nasal spray, 1 spray into the nostril(s) as directed by provider As Needed for Congestion., Disp: , Rfl:   •  traZODone (DESYREL) 50 MG tablet, Take 1 tablet by mouth Every Night., Disp: 90 tablet, Rfl: 1  •  Triamcinolone Acetonide (NASACORT ALLERGY 24HR) 55 MCG/ACT nasal inhaler, 2 sprays into the nostril(s) as directed by provider Daily., Disp: , Rfl:   •  warfarin (COUMADIN) 2.5 MG tablet, TAKE ONE TABLET BY MOUTH DAILY OR AS DIRECTED BY ANTICOAGULATION PHARMACIST, Disp: 45 tablet, Rfl: 3          ROS  Review of Systems   Constitution: Negative for diaphoresis and malaise/fatigue.   Cardiovascular: Negative for chest pain, claudication, cyanosis, dyspnea on exertion, irregular heartbeat, leg swelling and near-syncope.   Respiratory: Positive for sputum production. Negative for cough and hemoptysis.        SOCIAL HX  Social History     Social History   • Marital status:      Spouse name: Glenis   • Number of children: 2   • Years of education: J.D.     Occupational History   •  Retired     Social History Main Topics   • Smoking status: Former Smoker      Packs/day: 1.00     Years: 18.00     Types: Cigarettes     Quit date: 06/1970   • Smokeless tobacco: Never Used   • Alcohol use Yes      Comment: rare   • Drug use: No   • Sexual activity: Not Currently     Partners: Female     Other Topics Concern   • Not on file     Social History Narrative   • No narrative on file       FAMILY HX  Family History   Problem Relation Age of Onset   • Alzheimer's disease Mother    • Dementia Mother    • Heart disease Father    • Stroke Father    • Heart disease Sister    • Heart disease Brother    • Stroke Brother              Ike Strickland III, MD, FACC

## 2018-09-21 ENCOUNTER — LAB (OUTPATIENT)
Dept: LAB | Facility: HOSPITAL | Age: 83
End: 2018-09-21

## 2018-09-21 DIAGNOSIS — E78.00 HYPERCHOLESTEROLEMIA: ICD-10-CM

## 2018-09-21 DIAGNOSIS — I48.21 PERMANENT ATRIAL FIBRILLATION (HCC): ICD-10-CM

## 2018-09-21 LAB
ALBUMIN SERPL-MCNC: 4.3 G/DL (ref 3.2–4.8)
ALBUMIN/GLOB SERPL: 2.2 G/DL (ref 1.5–2.5)
ALP SERPL-CCNC: 55 U/L (ref 25–100)
ALT SERPL W P-5'-P-CCNC: 22 U/L (ref 7–40)
ANION GAP SERPL CALCULATED.3IONS-SCNC: 4 MMOL/L (ref 3–11)
ARTICHOKE IGE QN: 77 MG/DL (ref 0–130)
AST SERPL-CCNC: 23 U/L (ref 0–33)
BILIRUB SERPL-MCNC: 1.4 MG/DL (ref 0.3–1.2)
BUN BLD-MCNC: 12 MG/DL (ref 9–23)
BUN/CREAT SERPL: 14 (ref 7–25)
CALCIUM SPEC-SCNC: 9 MG/DL (ref 8.7–10.4)
CHLORIDE SERPL-SCNC: 99 MMOL/L (ref 99–109)
CHOLEST SERPL-MCNC: 128 MG/DL (ref 0–200)
CO2 SERPL-SCNC: 34 MMOL/L (ref 20–31)
CREAT BLD-MCNC: 0.86 MG/DL (ref 0.6–1.3)
DEPRECATED RDW RBC AUTO: 48.4 FL (ref 37–54)
ERYTHROCYTE [DISTWIDTH] IN BLOOD BY AUTOMATED COUNT: 13.5 % (ref 11.3–14.5)
GFR SERPL CREATININE-BSD FRML MDRD: 84 ML/MIN/1.73
GLOBULIN UR ELPH-MCNC: 2 GM/DL
GLUCOSE BLD-MCNC: 97 MG/DL (ref 70–100)
HCT VFR BLD AUTO: 49.4 % (ref 38.9–50.9)
HDLC SERPL-MCNC: 40 MG/DL (ref 40–60)
HGB BLD-MCNC: 16.5 G/DL (ref 13.1–17.5)
MCH RBC QN AUTO: 32.2 PG (ref 27–31)
MCHC RBC AUTO-ENTMCNC: 33.4 G/DL (ref 32–36)
MCV RBC AUTO: 96.3 FL (ref 80–99)
PLATELET # BLD AUTO: 200 10*3/MM3 (ref 150–450)
PMV BLD AUTO: 9.7 FL (ref 6–12)
POTASSIUM BLD-SCNC: 3.9 MMOL/L (ref 3.5–5.5)
PROT SERPL-MCNC: 6.3 G/DL (ref 5.7–8.2)
RBC # BLD AUTO: 5.13 10*6/MM3 (ref 4.2–5.76)
SODIUM BLD-SCNC: 137 MMOL/L (ref 132–146)
TRIGL SERPL-MCNC: 137 MG/DL (ref 0–150)
WBC NRBC COR # BLD: 8.87 10*3/MM3 (ref 3.5–10.8)

## 2018-09-21 PROCEDURE — 85027 COMPLETE CBC AUTOMATED: CPT

## 2018-09-21 PROCEDURE — 36415 COLL VENOUS BLD VENIPUNCTURE: CPT

## 2018-09-21 PROCEDURE — 80053 COMPREHEN METABOLIC PANEL: CPT

## 2018-09-21 PROCEDURE — 80061 LIPID PANEL: CPT

## 2018-10-03 ENCOUNTER — ANTICOAGULATION VISIT (OUTPATIENT)
Dept: PHARMACY | Facility: HOSPITAL | Age: 83
End: 2018-10-03

## 2018-10-03 DIAGNOSIS — I48.20 CHRONIC ATRIAL FIBRILLATION (HCC): ICD-10-CM

## 2018-10-03 LAB
INR PPP: 3.1 (ref 0.91–1.09)
PROTHROMBIN TIME: 36.9 SECONDS (ref 10–13.8)

## 2018-10-03 PROCEDURE — 36416 COLLJ CAPILLARY BLOOD SPEC: CPT

## 2018-10-03 PROCEDURE — 85610 PROTHROMBIN TIME: CPT

## 2018-10-03 PROCEDURE — G0463 HOSPITAL OUTPT CLINIC VISIT: HCPCS

## 2018-10-03 NOTE — PROGRESS NOTES
Anticoagulation Clinic Progress Note  Indication: afib  Referring Provider: Em  Goal INR: 2-3  Current Drug Interactions: aspirin, simvastatin, trazodone  Other: no bleeding per patient  [id number]: 4 (age, HTN, CAD)    Diet: Typically doesn't eat many Vit K foods    Anticoagulation Clinic INR History:  Date 9/14 10/12 11/9 12/7 1/11 2/15 3/22 4/26 5/31   Total Weekly Dose 17.5 mg 17.5 mg 17.5 mg 17.5 mg 17.5 mg 17.5 mg 17.5 mg 17.5 mg 17.5mg   INR 3.0 2.4 2.5 2.5 2.3 2.3 2.6 2.5 2.6     Date 7/5 8/9 8/30 9/18 10/16 11/15 11/29 12/13 1/3   Total Weekly Dose 17.5  mg 17.5 mg 17.5 mg 17.5 mg 17.5 mg 17.5 mg 18.75 mg 18.75mg 20mg   INR 2.2 1.9 2.1 2.0 2.2 1.8 1.9 2.1 2.1   Notes       abx   Keflex     Date 1/8 1/10 1/18 1/25 2/9 3/2 3/16 3/23 4/10   Total Weekly Dose 23.75 mg 23.75 mg 18.75 mg 18.75 mg 18.75 mg 18.75 mg 20mg 21.25 mg 20mg   INR 1.61 1.9 2.5 2.3 2.2 1.7 1.6 2.3 1.9   Notes  hosp Doxy     Doxy start 3/10 Doxy finish 3/20      Date 4/23 5/7 6/4 6/11 6/25 7/10 8/1 8/29 9/19   Total Weekly Dose 21.25mg 21.25 mg 21.25 mg 20 mg 21.25 mg 21.25mg 21.25 mg 18.75 mg 21.25 mg   INR 2.0 2.4 2.4 2.2 3.0 2.7 2.5 2.0 3.7   Notes    Doxy start 6/4 Doxy finish 6/10    1 missed dose      Date 10/3           Total Weekly Dose 20mg           INR 3.1           Notes                 Clinic Interview:  Verbal Release Authorization signed on 6/25/18 -- may speak with Glenis Lucero (wife), Marge Cox (daughter)  Tablet Strength: 2.5mg tablets   Current Dose: 2.5mg daily except 3.75mg on SunTuesFri    Patient Findings   Negatives:   Signs/symptoms of thrombosis, Signs/symptoms of bleeding, Laboratory test error suspected, Change in health, Change in alcohol use, Change in activity, Upcoming invasive procedure, Emergency department visit, Upcoming dental procedure, Missed doses, Extra doses, Change in medications, Change in diet/appetite, Hospital admission, Bruising, Other complaints   Comments:   Mr. Lucero reports that  he has a stopped up nose in the morning after wearing CPAP.      Plan:   1. INR is slightly supratherapeutic today. Instructed Mr. Lucero to spread out higher doses of 3.75mg therefore, Mr. Lucero will take warfarin 2.5mg daily except 3.75mg on SunWed starting next week.  2. RTC in 2 weeks to reassess.  3. Written and verbal information provided in clinic. Mr. Lucero expresses understanding with teach back and has no further questions at this time.    Colleen Nolasco, PharmD  10/3/2018  1:13 PM

## 2018-10-05 ENCOUNTER — TELEPHONE (OUTPATIENT)
Dept: FAMILY MEDICINE CLINIC | Facility: CLINIC | Age: 83
End: 2018-10-05

## 2018-10-05 RX ORDER — SIMVASTATIN 10 MG
10 TABLET ORAL NIGHTLY
Qty: 90 TABLET | Refills: 1 | Status: SHIPPED | OUTPATIENT
Start: 2018-10-05 | End: 2019-03-31 | Stop reason: SDUPTHER

## 2018-10-05 NOTE — TELEPHONE ENCOUNTER
PT NEEDS SIMVASTATIN 10MG 1PO QD AT [facility] 90 DAY SUPPLY. PT IS OUT AN NEEDS FOR TONIGHT.

## 2018-10-17 ENCOUNTER — ANTICOAGULATION VISIT (OUTPATIENT)
Dept: PHARMACY | Facility: HOSPITAL | Age: 83
End: 2018-10-17

## 2018-10-17 DIAGNOSIS — I48.20 CHRONIC ATRIAL FIBRILLATION (HCC): ICD-10-CM

## 2018-10-17 LAB
INR PPP: 3.5 (ref 0.91–1.09)
PROTHROMBIN TIME: 42.3 SECONDS (ref 10–13.8)

## 2018-10-17 PROCEDURE — G0463 HOSPITAL OUTPT CLINIC VISIT: HCPCS

## 2018-10-17 PROCEDURE — 36416 COLLJ CAPILLARY BLOOD SPEC: CPT

## 2018-10-17 PROCEDURE — 85610 PROTHROMBIN TIME: CPT

## 2018-10-17 NOTE — PROGRESS NOTES
Anticoagulation Clinic Progress Note  Indication: afib  Referring Provider: Em  Goal INR: 2-3  Current Drug Interactions: aspirin, simvastatin, trazodone  Other: no bleeding per patient  [id number]: 4 (age, HTN, CAD)    Diet: Typically doesn't eat many Vit K foods    Anticoagulation Clinic INR History:  Date 9/14 10/12 11/9 12/7 1/11 2/15 3/22 4/26 5/31   Total Weekly Dose 17.5 mg 17.5 mg 17.5 mg 17.5 mg 17.5 mg 17.5 mg 17.5 mg 17.5 mg 17.5mg   INR 3.0 2.4 2.5 2.5 2.3 2.3 2.6 2.5 2.6     Date 7/5 8/9 8/30 9/18 10/16 11/15 11/29 12/13 1/3   Total Weekly Dose 17.5  mg 17.5 mg 17.5 mg 17.5 mg 17.5 mg 17.5 mg 18.75 mg 18.75mg 20mg   INR 2.2 1.9 2.1 2.0 2.2 1.8 1.9 2.1 2.1   Notes       abx   Keflex     Date 1/8 1/10 1/18 1/25 2/9 3/2 3/16 3/23 4/10   Total Weekly Dose 23.75 mg 23.75 mg 18.75 mg 18.75 mg 18.75 mg 18.75 mg 20mg 21.25 mg 20mg   INR 1.61 1.9 2.5 2.3 2.2 1.7 1.6 2.3 1.9   Notes  hosp Doxy     Doxy start 3/10 Doxy finish 3/20      Date 4/23 5/7 6/4 6/11 6/25 7/10 8/1 8/29 9/19   Total Weekly Dose 21.25mg 21.25 mg 21.25 mg 20 mg 21.25 mg 21.25mg 21.25 mg 18.75 mg 21.25 mg   INR 2.0 2.4 2.4 2.2 3.0 2.7 2.5 2.0 3.7   Notes    Doxy start 6/4 Doxy finish 6/10    1 missed dose      Date 10/3 10/17          Total Weekly Dose 20mg 20mg          INR 3.1 3.5          Notes   Dec GLV?              Clinic Interview:  Verbal Release Authorization signed on 6/25/18 -- may speak with Glenis Lucero (wife), Marge Cox (daughter)  Tablet Strength: 2.5mg tablets   Current Dose: 2.5mg daily except 3.75mg on SunTuesFri    Patient Findings:  Positives:   Change in diet/appetite   Negatives:   Signs/symptoms of thrombosis, Signs/symptoms of bleeding, Laboratory test error suspected, Change in health, Change in alcohol use, Change in activity, Upcoming invasive procedure, Emergency department visit, Upcoming dental procedure, Missed doses, Extra doses, Change in medications, Hospital admission, Bruising, Other complaints    Comments:   Mr. Lucero reports no changes in medication, no s/sx of bruising/bleeding, no missed/extra doses, no upcoming procedures/surgeries. He reports eating green beans once last week and thinks he hasn't been eating as many GLV, saying he usually eats what his wife makes him.      Plan:   1. INR is supratherapeutic again today at 3.5, and increased from 3.1 last week. Instructed Mr. Lucero to decrease his dose tonight to warfarin 2.5mg then continue his usual regimen warfarin 2.5mg daily except 3.75mg on SunWed until next INR check. If INR therapeutic and GLV intake remains low, may consider new total weekly dose.   2. RTC in 1 week, 10/24.   3. Written and verbal information provided in clinic. Mr. Lucero expresses understanding with teach back and has no further questions at this time.    Juana Calvo, PharmD  Pharmacy Resident  10/17/2018  1:49 PM

## 2018-10-22 RX ORDER — DILTIAZEM HYDROCHLORIDE 240 MG/1
CAPSULE, EXTENDED RELEASE ORAL
Qty: 90 CAPSULE | Refills: 3 | Status: SHIPPED | OUTPATIENT
Start: 2018-10-22 | End: 2019-10-14 | Stop reason: SDUPTHER

## 2018-10-24 ENCOUNTER — ANTICOAGULATION VISIT (OUTPATIENT)
Dept: PHARMACY | Facility: HOSPITAL | Age: 83
End: 2018-10-24

## 2018-10-24 DIAGNOSIS — I48.20 CHRONIC ATRIAL FIBRILLATION (HCC): ICD-10-CM

## 2018-10-24 LAB
INR PPP: 2.4 (ref 0.91–1.09)
PROTHROMBIN TIME: 29.3 SECONDS (ref 10–13.8)

## 2018-10-24 PROCEDURE — 85610 PROTHROMBIN TIME: CPT

## 2018-10-24 PROCEDURE — G0463 HOSPITAL OUTPT CLINIC VISIT: HCPCS

## 2018-10-24 PROCEDURE — 36416 COLLJ CAPILLARY BLOOD SPEC: CPT

## 2018-10-24 NOTE — PROGRESS NOTES
Anticoagulation Clinic Progress Note  Indication: afib  Referring Provider: Em  Goal INR: 2-3  Current Drug Interactions: aspirin, simvastatin, trazodone  Other: no bleeding per patient  [id number]: 4 (age, HTN, CAD)    Diet: Typically doesn't eat many Vit K foods    Anticoagulation Clinic INR History:  Date 9/14 10/12 11/9 12/7 1/11 2/15 3/22 4/26 5/31   Total Weekly Dose 17.5 mg 17.5 mg 17.5 mg 17.5 mg 17.5 mg 17.5 mg 17.5 mg 17.5 mg 17.5mg   INR 3.0 2.4 2.5 2.5 2.3 2.3 2.6 2.5 2.6     Date 7/5 8/9 8/30 9/18 10/16 11/15 11/29 12/13 1/3   Total Weekly Dose 17.5  mg 17.5 mg 17.5 mg 17.5 mg 17.5 mg 17.5 mg 18.75 mg 18.75mg 20mg   INR 2.2 1.9 2.1 2.0 2.2 1.8 1.9 2.1 2.1   Notes       abx   Keflex     Date 1/8 1/10 1/18 1/25 2/9 3/2 3/16 3/23 4/10   Total Weekly Dose 23.75 mg 23.75 mg 18.75 mg 18.75 mg 18.75 mg 18.75 mg 20mg 21.25 mg 20mg   INR 1.61 1.9 2.5 2.3 2.2 1.7 1.6 2.3 1.9   Notes  hosp Doxy     Doxy start 3/10 Doxy finish 3/20      Date 4/23 5/7 6/4 6/11 6/25 7/10 8/1 8/29 9/19   Total Weekly Dose 21.25mg 21.25 mg 21.25 mg 20 mg 21.25 mg 21.25mg 21.25 mg 18.75 mg 21.25 mg   INR 2.0 2.4 2.4 2.2 3.0 2.7 2.5 2.0 3.7   Notes    Doxy start 6/4 Doxy finish 6/10    1 missed dose      Date 10/3 10/17 10/24         Total Weekly Dose 20mg? 20mg? 20mg         INR 3.1 3.5 2.4         Notes   Dec GLV?              Clinic Interview:  Verbal Release Authorization signed on 6/25/18 -- may speak with Glenis Lucero (wife), Marge Cox (daughter)  Tablet Strength: 2.5mg tablets   Current Dose: 2.5mg daily except 3.75mg on SunWed     Patient Findings   Positives:   Change in diet/appetite   Negatives:   Signs/symptoms of thrombosis, Signs/symptoms of bleeding, Laboratory test error suspected, Change in health, Change in alcohol use, Change in activity, Upcoming invasive procedure, Emergency department visit, Upcoming dental procedure, Missed doses, Extra doses, Change in medications, Hospital admission, Bruising, Other  complaints   Comments:   Mr. Lucero reports no changes in medication, no s/sx of bruising/bleeding, no missed/extra doses, no upcoming procedures/surgeries. Mr. Lucero reports that he was compliant with GLV. He reports that he will go back to normal GLV (which is very little). Mr. Lucero reports that he has been taking 3.75mg 3x/week and last week he only took the higher dose on Sunday and Wednesday. He seems slightly confused, but upon questioning and he reaffirms at different times that he cut the pill in half and took 2.5mg daily except 3.75mg on Wednesday. Given confusion, will continue with this dose and if patient returns SUPRAtherapeutic then will decrease to 3.75mg one weekly. He continues to use his pillbox     Plan:   1. INR is therapeutic following slight GLV increase and possible dose decrease. Refer to patient findings. Instructed Mr. Lucero to continue warfarin 2.5mg daily except 3.75mg on SunWed until next INR check.  2. RTC in 1 week, 10/31.   3. Written and verbal information provided in clinic. Mr. Lucero expresses understanding with teach back and has no further questions at this time.      Colleen Nolasco, PharmD  10/24/2018  2:54 PM

## 2018-10-31 ENCOUNTER — ANTICOAGULATION VISIT (OUTPATIENT)
Dept: PHARMACY | Facility: HOSPITAL | Age: 83
End: 2018-10-31

## 2018-10-31 DIAGNOSIS — I48.20 CHRONIC ATRIAL FIBRILLATION (HCC): ICD-10-CM

## 2018-10-31 LAB
INR PPP: 2.6 (ref 0.91–1.09)
PROTHROMBIN TIME: 31.1 SECONDS (ref 10–13.8)

## 2018-10-31 PROCEDURE — 85610 PROTHROMBIN TIME: CPT

## 2018-10-31 PROCEDURE — 36416 COLLJ CAPILLARY BLOOD SPEC: CPT

## 2018-10-31 PROCEDURE — G0463 HOSPITAL OUTPT CLINIC VISIT: HCPCS

## 2018-10-31 NOTE — PROGRESS NOTES
Anticoagulation Clinic Progress Note  Indication: afib  Referring Provider: Em  Goal INR: 2-3  Current Drug Interactions: aspirin, simvastatin, trazodone  Other: no bleeding per patient  [id number]: 4 (age, HTN, CAD)    Diet: Typically doesn't eat many Vit K foods    Anticoagulation Clinic INR History:  Date 9/14 10/12 11/9 12/7 1/11 2/15 3/22 4/26 5/31   Total Weekly Dose 17.5 mg 17.5 mg 17.5 mg 17.5 mg 17.5 mg 17.5 mg 17.5 mg 17.5 mg 17.5mg   INR 3.0 2.4 2.5 2.5 2.3 2.3 2.6 2.5 2.6     Date 7/5 8/9 8/30 9/18 10/16 11/15 11/29 12/13 1/3   Total Weekly Dose 17.5  mg 17.5 mg 17.5 mg 17.5 mg 17.5 mg 17.5 mg 18.75 mg 18.75mg 20mg   INR 2.2 1.9 2.1 2.0 2.2 1.8 1.9 2.1 2.1   Notes       abx   Keflex     Date 1/8 1/10 1/18 1/25 2/9 3/2 3/16 3/23 4/10   Total Weekly Dose 23.75 mg 23.75 mg 18.75 mg 18.75 mg 18.75 mg 18.75 mg 20mg 21.25 mg 20mg   INR 1.61 1.9 2.5 2.3 2.2 1.7 1.6 2.3 1.9   Notes  hosp Doxy     Doxy start 3/10 Doxy finish 3/20      Date 4/23 5/7 6/4 6/11 6/25 7/10 8/1 8/29 9/19   Total Weekly Dose 21.25mg 21.25 mg 21.25 mg 20 mg 21.25 mg 21.25mg 21.25 mg 18.75 mg 21.25 mg   INR 2.0 2.4 2.4 2.2 3.0 2.7 2.5 2.0 3.7   Notes    Doxy start 6/4 Doxy finish 6/10    1 missed dose      Date 10/3 10/17 10/24 10/31        Total Weekly Dose 20mg? 20mg? 20mg 20mg        INR 3.1 3.5 2.4 2.6        Notes   Dec GLV?              Clinic Interview:  Verbal Release Authorization signed on 6/25/18 -- may speak with Glenis Lucero (wife), Marge Cox (daughter)  Tablet Strength: 2.5mg tablets   Current Dose: 2.5mg daily except 3.75mg on SunWed     Patient Findings   Negatives:   Signs/symptoms of thrombosis, Signs/symptoms of bleeding, Laboratory test error suspected, Change in health, Change in alcohol use, Change in activity, Upcoming invasive procedure, Emergency department visit, Upcoming dental procedure, Missed doses, Extra doses, Change in medications, Change in diet/appetite, Hospital admission, Bruising, Other  complaints   Comments:   All patient findings found to be negative upon patient interview.     Plan:   1. INR is therapeutic. Instructed Mr. Lucero to continue warfarin 2.5mg daily except 3.75mg on SunWed.   2. RTC in 2 weeks if patient returns WNL, if therapeutic, consider q4 weeks.  3. Written and verbal information provided in clinic. Mr. Lucero expresses understanding with teach back and has no further questions at this time.    Colleen Nolasco, PharmD  10/31/2018  1:08 PM

## 2018-11-12 ENCOUNTER — OFFICE VISIT (OUTPATIENT)
Dept: FAMILY MEDICINE CLINIC | Facility: CLINIC | Age: 83
End: 2018-11-12

## 2018-11-12 VITALS
HEART RATE: 69 BPM | DIASTOLIC BLOOD PRESSURE: 76 MMHG | BODY MASS INDEX: 32.3 KG/M2 | HEIGHT: 67 IN | WEIGHT: 205.8 LBS | OXYGEN SATURATION: 95 % | SYSTOLIC BLOOD PRESSURE: 130 MMHG | TEMPERATURE: 98.6 F

## 2018-11-12 DIAGNOSIS — I48.20 CHRONIC ATRIAL FIBRILLATION (HCC): ICD-10-CM

## 2018-11-12 DIAGNOSIS — Z79.01 CHRONIC ANTICOAGULATION: ICD-10-CM

## 2018-11-12 DIAGNOSIS — Z00.00 MEDICARE ANNUAL WELLNESS VISIT, SUBSEQUENT: Primary | ICD-10-CM

## 2018-11-12 DIAGNOSIS — E55.9 VITAMIN D DEFICIENCY: ICD-10-CM

## 2018-11-12 DIAGNOSIS — I25.10 CORONARY ARTERY DISEASE INVOLVING NATIVE CORONARY ARTERY OF NATIVE HEART WITHOUT ANGINA PECTORIS: ICD-10-CM

## 2018-11-12 DIAGNOSIS — G31.84 MILD COGNITIVE IMPAIRMENT: ICD-10-CM

## 2018-11-12 DIAGNOSIS — G47.33 OSA (OBSTRUCTIVE SLEEP APNEA): ICD-10-CM

## 2018-11-12 DIAGNOSIS — I10 ESSENTIAL HYPERTENSION: ICD-10-CM

## 2018-11-12 DIAGNOSIS — R35.1 BENIGN PROSTATIC HYPERPLASIA WITH NOCTURIA: ICD-10-CM

## 2018-11-12 DIAGNOSIS — N40.1 BENIGN PROSTATIC HYPERPLASIA WITH NOCTURIA: ICD-10-CM

## 2018-11-12 PROCEDURE — G0439 PPPS, SUBSEQ VISIT: HCPCS | Performed by: PHYSICIAN ASSISTANT

## 2018-11-12 RX ORDER — ALFUZOSIN HYDROCHLORIDE 10 MG/1
10 TABLET, EXTENDED RELEASE ORAL DAILY
Qty: 30 TABLET | Refills: 5 | Status: SHIPPED | OUTPATIENT
Start: 2018-11-12 | End: 2019-06-06 | Stop reason: SDUPTHER

## 2018-11-12 NOTE — PROGRESS NOTES
QUICK REFERENCE INFORMATION:  The ABCs of the Annual Wellness Visit    Medicare Subsequent Wellness Visit    Chief Complaint   Patient presents with   • Medicare Wellness-subsequent        HPI     Mike Lucero Jr. is a 86 y.o. male presenting for subsequent annual wellness visit. Accompanied by his wife today. Patient reports he has overall felt well and offers no complaint. No dyspnea, soa, palpitations, or bleeding concerns. He does not have the motivation to exercise like he once did but is active walking around his home. No issues with recurrence in LLE cellulitis after admissions in January and March. He does feel his memory has declined in the past year. No difficulty with ADLs. Namenda was started by neurology but he was unable to tolerate it. His long-standing nocturia and urinary frequency is about the same. Prior urology evaluation including cystoscopy. He is unsure of last tetanus vaccination.     Past Medical History:   Diagnosis Date   • Atrial fibrillation (CMS/HCC)    • Cataract    • Chronic anticoagulation    • Coronary artery disease    • Diverticulosis    • Gallstones    • History of endocarditis    • HLD (hyperlipidemia)    • HTN (hypertension)    • Osteoarthritis    • Seasonal allergies     Seasonal allergies/rhinitis.       Past Surgical History:   Procedure Laterality Date   • ADRENAL GLAND SURGERY  1996    Dr. Jj Kee   • APPENDECTOMY  1950   • BLEPHAROPLASTY  08/06/2014    Dr. Santamaria   • CARDIAC SURGERY     • COLONOSCOPY  2009   • CORONARY ARTERY BYPASS GRAFT  07/2006    CABG for 3-vessel disease, July 2006.   • INGUINAL HERNIA REPAIR Left     1963 and 09/2012   • KIDNEY SURGERY     • TONSILLECTOMY  01/1958     Family History   Problem Relation Age of Onset   • Alzheimer's disease Mother    • Dementia Mother    • Heart disease Father    • Stroke Father    • Heart disease Sister    • Heart disease Brother    • Stroke Brother       Social History     Socioeconomic History   • Marital  "status:      Spouse name: Glenis   • Number of children: 2   • Years of education: J.D.   • Highest education level: Not on file   Social Needs   • Financial resource strain: Not on file   • Food insecurity - worry: Not on file   • Food insecurity - inability: Not on file   • Transportation needs - medical: Not on file   • Transportation needs - non-medical: Not on file   Occupational History   • Occupation:      Employer: RETIRED   Tobacco Use   • Smoking status: Former Smoker     Packs/day: 1.00     Years: 18.00     Pack years: 18.00     Types: Cigarettes     Last attempt to quit: 1970     Years since quittin.4   • Smokeless tobacco: Never Used   Substance and Sexual Activity   • Alcohol use: Yes     Comment: rare   • Drug use: No   • Sexual activity: Not Currently     Partners: Female   Other Topics Concern   • Not on file   Social History Narrative   • Not on file      Allergies   Allergen Reactions   • Rocephin [Ceftriaxone] Angioedema     Tongue and lip swelling, sore throat   • Temazepam Other (See Comments)     Caused pt to \"sleep drive\"   • Wellbutrin [Bupropion] Mental Status Change and Dizziness     Memory loss      Outpatient Medications Prior to Visit   Medication Sig Dispense Refill   • aspirin 81 MG EC tablet Take 81 mg by mouth Daily.     • CARTIA  MG 24 hr capsule TAKE ONE CAPSULE BY MOUTH DAILY 90 capsule 3   • donepezil (ARICEPT) 10 MG tablet TAKE ONE TABLET BY MOUTH DAILY 30 tablet 10   • lactobacillus acidophilus (RISAQUAD) capsule capsule Take 1 capsule by mouth Daily. 30 capsule 0   • lisinopril (PRINIVIL,ZESTRIL) 20 MG tablet Take 20 mg by mouth Daily.     • loratadine (CLARITIN) 10 MG tablet Take 1 tablet by mouth Daily. 30 tablet 5   • potassium chloride (K-DUR,KLOR-CON) 20 MEQ CR tablet TAKE ONE TABLET BY MOUTH DAILY 90 tablet 0   • simvastatin (ZOCOR) 10 MG tablet Take 1 tablet by mouth Every Night. 90 tablet 1   • sodium chloride (OCEAN) 0.65 % nasal spray 1 " spray into the nostril(s) as directed by provider As Needed for Congestion.     • traZODone (DESYREL) 50 MG tablet Take 1 tablet by mouth Every Night. 90 tablet 1   • Triamcinolone Acetonide (NASACORT ALLERGY 24HR) 55 MCG/ACT nasal inhaler 2 sprays into the nostril(s) as directed by provider Daily.     • warfarin (COUMADIN) 2.5 MG tablet TAKE ONE TABLET BY MOUTH DAILY OR AS DIRECTED BY ANTICOAGULATION PHARMACIST 45 tablet 3   • memantine (NAMENDA) 10 MG tablet Take 1 tablet by mouth 2 (Two) Times a Day. 60 tablet 11     No facility-administered medications prior to visit.        Reviewed use of high risk medication in the elderly: yes  Reviewed for potential of harmful drug interactions in the elderly: yes    The following portions of the patient's history were reviewed and updated as appropriate: allergies, current medications, past family history, past medical history, past social history, past surgical history and problem list.    Review of Systems   Constitutional: Negative for chills, fever and unexpected weight loss.   HENT: Negative for congestion and trouble swallowing.    Eyes: Negative for visual disturbance.   Respiratory: Negative for cough, shortness of breath and wheezing.    Cardiovascular: Negative for chest pain, palpitations and leg swelling.   Gastrointestinal: Negative for abdominal pain, blood in stool, constipation, diarrhea and GERD.   Endocrine: Negative for polydipsia and polyuria.   Genitourinary: Positive for frequency and nocturia (3-4 x). Negative for dysuria and hematuria.   Musculoskeletal: Positive for back pain (in AM. Improves later in day). Negative for arthralgias and myalgias.   Skin: Negative for rash and skin lesions.   Allergic/Immunologic: Negative for environmental allergies.   Neurological: Positive for memory problem. Negative for dizziness, light-headedness and headache.   Hematological: Negative for adenopathy. Bruises/bleeds easily (none uncontrolled bleeding).  "  Psychiatric/Behavioral: Negative for depressed mood and stress. The patient is not nervous/anxious.         Vitals:    11/12/18 1404   BP: 130/76   Pulse: 69   Temp: 98.6 °F (37 °C)   TempSrc: Temporal   SpO2: 95%   Weight: 93.4 kg (205 lb 12.8 oz)   Height: 170.2 cm (67\")   PainSc:   2       Objective    Physical Exam   Constitutional: He is oriented to person, place, and time. He appears well-developed and well-nourished.   Obese   HENT:   Head: Normocephalic and atraumatic.   Right Ear: External ear and ear canal normal. Decreased hearing is noted.   Left Ear: External ear and ear canal normal. Decreased hearing is noted.   Nose: Nose normal.   Mouth/Throat: Oropharynx is clear and moist and mucous membranes are normal. He has dentures (upper).   Eyes: Conjunctivae and EOM are normal. Pupils are equal, round, and reactive to light.   Neck: Normal range of motion. Neck supple. No JVD present. Carotid bruit is not present. No thyroid mass and no thyromegaly present.   Cardiovascular: Normal rate, regular rhythm, S1 normal, S2 normal, normal heart sounds and intact distal pulses.   No murmur heard.  Pulses:       Radial pulses are 2+ on the right side, and 2+ on the left side.        Dorsalis pedis pulses are 2+ on the right side, and 2+ on the left side.   Pulmonary/Chest: Effort normal and breath sounds normal.   Abdominal: Soft. Bowel sounds are normal. He exhibits no abdominal bruit and no mass. There is no hepatosplenomegaly. There is no tenderness.   Abdominal diastasis, small umbilical hernia-reducible.    Genitourinary: Rectum normal, prostate normal and penis normal. Prostate is not tender. Right testis shows no mass and no tenderness. Left testis shows no tenderness. Uncircumcised.   Genitourinary Comments: Hydrocele on left   Musculoskeletal: Normal range of motion. He exhibits no edema.   Lymphadenopathy:     He has no cervical adenopathy.        Right: No inguinal and no supraclavicular adenopathy " present.        Left: No inguinal and no supraclavicular adenopathy present.   Neurological: He is alert and oriented to person, place, and time. He has normal strength. He displays normal reflexes. No cranial nerve deficit or sensory deficit. Gait normal.   Reflex Scores:       Bicep reflexes are 1+ on the right side and 1+ on the left side.       Brachioradialis reflexes are 0 on the right side and 0 on the left side.       Patellar reflexes are 1+ on the right side and 1+ on the left side.       Achilles reflexes are 1+ on the right side and 1+ on the left side.  Skin: Skin is warm and dry. No rash noted. No cyanosis. Nails show no clubbing.   No suspicious lesions.    Psychiatric: He has a normal mood and affect. His speech is normal and behavior is normal.   Vitals reviewed.       HEALTH RISK ASSESSMENT    1932    Recent Hospitalizations:  Recently treated at the following:  [facility]. LLE cellulitis      Current Medical Providers:  Patient Care Team:  Fabien Merino PA as PCP - General (Physician Assistant)  Mic Vieira MD as PCP - Claims Attributed  Stefanie Hogue, [facility] as Pharmacist (Pharmacy)  Colleen Nolasco PharmD as Pharmacist (Pharmacy)      Smoking Status:  Social History     Tobacco Use   Smoking Status Former Smoker   • Packs/day: 1.00   • Years: 18.00   • Pack years: 18.00   • Types: Cigarettes   • Last attempt to quit: 1970   • Years since quittin.4   Smokeless Tobacco Never Used       Alcohol Consumption:  Social History     Substance and Sexual Activity   Alcohol Use Yes    Comment: rare       Depression Screen:   PHQ-2/PHQ-9 Depression Screening 2018   Little interest or pleasure in doing things 3   Feeling down, depressed, or hopeless 0   Trouble falling or staying asleep, or sleeping too much 0   Feeling tired or having little energy 3   Poor appetite or overeating 0   Feeling bad about yourself - or that you are a failure or have let yourself or  your family down 0   Trouble concentrating on things, such as reading the newspaper or watching television 0   Moving or speaking so slowly that other people could have noticed. Or the opposite - being so fidgety or restless that you have been moving around a lot more than usual 0   Thoughts that you would be better off dead, or of hurting yourself in some way 0   Total Score 6   If you checked off any problems, how difficult have these problems made it for you to do your work, take care of things at home, or get along with other people? Somewhat difficult       Health Habits and Functional and Cognitive Screening:  Functional & Cognitive Status 11/12/2018   Do you have difficulty preparing food and eating? No   Do you have difficulty bathing yourself, getting dressed or grooming yourself? No   Do you have difficulty using the toilet? No   Do you have trouble with steps or getting out of a bed or a chair? No   In the past year have you fallen or experienced a near fall? No   Current Diet Well Balanced Diet   Dental Exam Up to date   Eye Exam Up to date   Exercise (times per week) 0 times per week   Current Exercise Activities Include None   Do you need help using the phone?  Yes   Are you deaf or do you have serious difficulty hearing?  Yes   Do you need help with transportation? No   Do you need help shopping? No   Do you need help preparing meals?  No   Do you need help with housework?  No   Do you need help with laundry? No   Do you need help taking your medications? No   Do you need help managing money? No   Do you ever drive or ride in a car without wearing a seat belt? No   Have you felt unusual stress, anger or loneliness in the last month? No   Who do you live with? Spouse   If you need help, do you have trouble finding someone available to you? No   Have you been bothered in the last four weeks by sexual problems? No   Do you have difficulty concentrating, remembering or making decisions? Yes           Does  the patient have evidence of cognitive impairment? Yes    Aspirin use counseling? Taking ASA appropriately as indicated      Recent Lab Results:  CMP:  Lab Results   Component Value Date    BUN 12 09/21/2018    CREATININE 0.86 09/21/2018    EGFRIFNONA 84 09/21/2018    BCR 14.0 09/21/2018     09/21/2018    K 3.9 09/21/2018    CO2 34.0 (H) 09/21/2018    CALCIUM 9.0 09/21/2018    ALBUMIN 4.30 09/21/2018    BILITOT 1.4 (H) 09/21/2018    ALKPHOS 55 09/21/2018    AST 23 09/21/2018    ALT 22 09/21/2018     Lipid Panel:  Lab Results   Component Value Date    CHOL 128 09/21/2018    TRIG 137 09/21/2018    HDL 40 09/21/2018     HbA1c:  Lab Results   Component Value Date    HGBA1C 5.50 10/23/2017       Visual Acuity:  Current with eye exam    Age-appropriate Screening Schedule:  Refer to the list below for future screening recommendations based on patient's age, sex and/or medical conditions. Orders for these recommended tests are listed in the plan section. The patient has been provided with a written plan.    Health Maintenance   Topic Date Due   • TDAP/TD VACCINES (1 - Tdap) 07/29/1951   • ZOSTER VACCINE (2 of 3) 09/11/2013   • LIPID PANEL  09/21/2019   • INFLUENZA VACCINE  Completed   • PNEUMOCOCCAL VACCINES (65+ LOW/MEDIUM RISK)  Completed          Advance Care Planning:  has an advance directive - a copy HAS NOT been provided. Have asked the patient to send this to us to add to record.    Identification of Risk Factors:  Risk factors include: weight , cardiovascular risk, inactivity, cognitive impairment and hearing limitations.    Compared to one year ago, the patient feels his physical health is the same.  Compared to one year ago, the patient feels his mental health is worse.      Mike was seen today for medicare wellness-subsequent.    Diagnoses and all orders for this visit:    Medicare annual wellness visit, subsequent  -Request date of last Tdap from previous PCP  -9/2018 labs reviewed    Chronic  anticoagulation  -No bleeding difficulty on warfarin    Chronic atrial fibrillation (CMS/HCC)  -Anticoagulated/rate controlled    Coronary artery disease involving native coronary artery of native heart without angina pectoris  -Presently asymptomatic    Essential hypertension  -Controlled    Mild cognitive impairment  -Follows with neurology, Dr. Rodriguez. Upcoming appointment 11/26/18    RELL (obstructive sleep apnea)  -CPAP    Benign prostatic hyperplasia with nocturia  -Patient and wife agreeable to trial alfuzosin after discussion.     Obesity  -Patient counseled on diet/exercise. See letter to follow for details.       Procedure   Procedures       Patient Self-Management and Personalized Health Advice  The patient has been provided with information about: exercise, weight management, designing advance directives and mental health concerns and preventive services including:   · Advance directive, Exercise counseling provided, Shingrix.      Follow Up:  Return in about 6 months (around 5/12/2019).     An After Visit Summary and PPPS with all of these plans were given to the patient.

## 2018-11-13 RX ORDER — POTASSIUM CHLORIDE 20 MEQ/1
TABLET, EXTENDED RELEASE ORAL
Qty: 90 TABLET | Refills: 1 | Status: SHIPPED | OUTPATIENT
Start: 2018-11-13 | End: 2019-05-12 | Stop reason: SDUPTHER

## 2018-11-14 ENCOUNTER — ANTICOAGULATION VISIT (OUTPATIENT)
Dept: PHARMACY | Facility: HOSPITAL | Age: 83
End: 2018-11-14

## 2018-11-14 LAB
INR PPP: 2.7 (ref 0.91–1.09)
PROTHROMBIN TIME: 32.6 SECONDS (ref 10–13.8)

## 2018-11-14 PROCEDURE — 85610 PROTHROMBIN TIME: CPT

## 2018-11-14 PROCEDURE — 36416 COLLJ CAPILLARY BLOOD SPEC: CPT

## 2018-11-14 PROCEDURE — G0463 HOSPITAL OUTPT CLINIC VISIT: HCPCS

## 2018-11-14 NOTE — PROGRESS NOTES
Anticoagulation Clinic Progress Note  Indication: afib  Referring Provider: Em  Goal INR: 2-3  Current Drug Interactions: aspirin, simvastatin, trazodone  Other: no bleeding per patient  [id number]: 4 (age, HTN, CAD)    Diet: Typically doesn't eat many Vit K foods    Anticoagulation Clinic INR History:  Date 9/14 10/12 11/9 12/7 1/11 2/15 3/22 4/26 5/31   Total Weekly Dose 17.5 mg 17.5 mg 17.5 mg 17.5 mg 17.5 mg 17.5 mg 17.5 mg 17.5 mg 17.5mg   INR 3.0 2.4 2.5 2.5 2.3 2.3 2.6 2.5 2.6     Date 7/5 8/9 8/30 9/18 10/16 11/15 11/29 12/13 1/3   Total Weekly Dose 17.5  mg 17.5 mg 17.5 mg 17.5 mg 17.5 mg 17.5 mg 18.75 mg 18.75mg 20mg   INR 2.2 1.9 2.1 2.0 2.2 1.8 1.9 2.1 2.1   Notes       abx   Keflex     Date 1/8 1/10 1/18 1/25 2/9 3/2 3/16 3/23 4/10   Total Weekly Dose 23.75 mg 23.75 mg 18.75 mg 18.75 mg 18.75 mg 18.75 mg 20mg 21.25 mg 20mg   INR 1.61 1.9 2.5 2.3 2.2 1.7 1.6 2.3 1.9   Notes  hosp Doxy     Doxy start 3/10 Doxy finish 3/20      Date 4/23 5/7 6/4 6/11 6/25 7/10 8/1 8/29 9/19   Total Weekly Dose 21.25mg 21.25 mg 21.25 mg 20 mg 21.25 mg 21.25mg 21.25 mg 18.75 mg 21.25 mg   INR 2.0 2.4 2.4 2.2 3.0 2.7 2.5 2.0 3.7   Notes    Doxy start 6/4 Doxy finish 6/10    1 missed dose      Date 10/3 10/17 10/24 10/31 11/14       Total Weekly Dose 20mg? 20mg? 20mg 20mg 20mg       INR 3.1 3.5 2.4 2.6 2.7       Notes   Dec GLV?              Clinic Interview:  Verbal Release Authorization signed on 6/25/18 -- may speak with Glenis Lucero (wife), Marge Cox (daughter)  Tablet Strength: 2.5mg tablets   Current Dose: 2.5mg daily except 3.75mg on SunWed     Patient Findings   Negatives:   Signs/symptoms of thrombosis, Signs/symptoms of bleeding, Laboratory test error suspected, Change in health, Change in alcohol use, Change in activity, Upcoming invasive procedure, Emergency department visit, Upcoming dental procedure, Missed doses, Extra doses, Change in medications, Change in diet/appetite, Hospital admission, Bruising,  Other complaints   Comments:   All patient findings found to be negative upon patient interview. He does report that he has eaten green beans. Discussed cranberry DDI with warfarin given Thanksgiving coming up.      Plan:   1. INR is therapeutic. Instructed Mr. Lucero to continue warfarin 2.5mg daily except 3.75mg on SunWed.   2. RTC in 4 weeks.  3. Written and verbal information provided in clinic. Mr. Lucero expresses understanding with teach back and has no further questions at this time.    Colleen Nolasco, PharmD  11/14/2018  1:11 PM

## 2018-12-12 ENCOUNTER — ANTICOAGULATION VISIT (OUTPATIENT)
Dept: PHARMACY | Facility: HOSPITAL | Age: 83
End: 2018-12-12

## 2018-12-12 LAB
INR PPP: 2.9 (ref 0.91–1.09)
PROTHROMBIN TIME: 34.8 SECONDS (ref 10–13.8)

## 2018-12-12 PROCEDURE — G0463 HOSPITAL OUTPT CLINIC VISIT: HCPCS

## 2018-12-12 PROCEDURE — 36416 COLLJ CAPILLARY BLOOD SPEC: CPT

## 2018-12-12 PROCEDURE — 85610 PROTHROMBIN TIME: CPT

## 2018-12-12 NOTE — PROGRESS NOTES
Anticoagulation Clinic Progress Note  Indication: afib  Referring Provider: Em  Goal INR: 2-3  Current Drug Interactions: aspirin, simvastatin, trazodone  Other: no bleeding per patient  [id number]: 4 (age, HTN, CAD)    Diet: Typically doesn't eat many Vit K foods    Anticoagulation Clinic INR History:  Date 9/14 10/12 11/9 12/7 1/11 2/15 3/22 4/26 5/31   Total Weekly Dose 17.5 mg 17.5 mg 17.5 mg 17.5 mg 17.5 mg 17.5 mg 17.5 mg 17.5 mg 17.5mg   INR 3.0 2.4 2.5 2.5 2.3 2.3 2.6 2.5 2.6     Date 7/5 8/9 8/30 9/18 10/16 11/15 11/29 12/13 1/3   Total Weekly Dose 17.5  mg 17.5 mg 17.5 mg 17.5 mg 17.5 mg 17.5 mg 18.75 mg 18.75mg 20mg   INR 2.2 1.9 2.1 2.0 2.2 1.8 1.9 2.1 2.1   Notes       abx   Keflex     Date 1/8 1/10 1/18 1/25 2/9 3/2 3/16 3/23 4/10   Total Weekly Dose 23.75 mg 23.75 mg 18.75 mg 18.75 mg 18.75 mg 18.75 mg 20mg 21.25 mg 20mg   INR 1.61 1.9 2.5 2.3 2.2 1.7 1.6 2.3 1.9   Notes  hosp Doxy     Doxy start 3/10 Doxy finish 3/20      Date 4/23 5/7 6/4 6/11 6/25 7/10 8/1 8/29 9/19   Total Weekly Dose 21.25mg 21.25 mg 21.25 mg 20 mg 21.25 mg 21.25mg 21.25 mg 18.75 mg 21.25 mg   INR 2.0 2.4 2.4 2.2 3.0 2.7 2.5 2.0 3.7   Notes    Doxy start 6/4 Doxy finish 6/10    1 missed dose      Date 10/3 10/17 10/24 10/31 11/14 12/12      Total Weekly Dose 20mg? 20mg? 20mg 20mg 20mg 20mg      INR 3.1 3.5 2.4 2.6 2.7 2.9      Notes   Dec GLV?            Clinic Interview:  Verbal Release Authorization signed on 6/25/18 -- may speak with Glenis Lucero (wife), Marge Cox (daughter)  Tablet Strength: 2.5mg tablets      Patient Findings:  Positives:  Change in medications, Change in diet/appetite   Negatives:  Signs/symptoms of thrombosis, Signs/symptoms of bleeding, Laboratory test error suspected, Change in health, Change in alcohol use, Change in activity, Upcoming invasive procedure, Emergency department visit, Upcoming dental procedure, Missed doses, Extra doses, Hospital admission, Bruising, Other complaints   Comments:   Mr. Lucero has been eating fewer GLV this past month, so encouraged him to re-incorporate a small amount into his weekly diet (lettuce, green beans). He was also started on alfuzosin to help limit nocturia. He reports this has seemed to help.     Plan:   1. INR is therapeutic again today, so instructed Mr. Lucero to continue warfarin 2.5mg daily except 3.75mg on SunWed.   2. RTC in 4 weeks.  3. Written and verbal information provided in clinic. Mr. Lucero expresses understanding with teach back and has no further questions at this time.    Stefanie Hogue [facility]  12/12/2018  1:10 PM

## 2019-01-09 ENCOUNTER — ANTICOAGULATION VISIT (OUTPATIENT)
Dept: PHARMACY | Facility: HOSPITAL | Age: 84
End: 2019-01-09

## 2019-01-09 LAB
INR PPP: 3.4 (ref 0.91–1.09)
PROTHROMBIN TIME: 40.9 SECONDS (ref 10–13.8)

## 2019-01-09 PROCEDURE — 36416 COLLJ CAPILLARY BLOOD SPEC: CPT

## 2019-01-09 PROCEDURE — G0463 HOSPITAL OUTPT CLINIC VISIT: HCPCS

## 2019-01-09 PROCEDURE — 85610 PROTHROMBIN TIME: CPT

## 2019-01-09 RX ORDER — WARFARIN SODIUM 2.5 MG/1
TABLET ORAL
Qty: 30 TABLET | Refills: 2 | Status: SHIPPED | OUTPATIENT
Start: 2019-01-09 | End: 2019-03-31 | Stop reason: SDUPTHER

## 2019-01-09 NOTE — PROGRESS NOTES
Anticoagulation Clinic Progress Note  Indication: afib  Referring Provider: Em  Goal INR: 2-3  Current Drug Interactions: aspirin, simvastatin, trazodone  Other: no bleeding per patient  [id number]: 4 (age, HTN, CAD)    Diet: Typically doesn't eat many Vit K foods    Anticoagulation Clinic INR History:  Date 9/14 10/12 11/9 12/7 1/11 2/15 3/22 4/26 5/31   Total Weekly Dose 17.5 mg 17.5 mg 17.5 mg 17.5 mg 17.5 mg 17.5 mg 17.5 mg 17.5 mg 17.5mg   INR 3.0 2.4 2.5 2.5 2.3 2.3 2.6 2.5 2.6     Date 7/5 8/9 8/30 9/18 10/16 11/15 11/29 12/13 1/3   Total Weekly Dose 17.5  mg 17.5 mg 17.5 mg 17.5 mg 17.5 mg 17.5 mg 18.75 mg 18.75mg 20mg   INR 2.2 1.9 2.1 2.0 2.2 1.8 1.9 2.1 2.1   Notes       abx   Keflex     Date 1/8 1/10 1/18 1/25 2/9 3/2 3/16 3/23 4/10   Total Weekly Dose 23.75 mg 23.75 mg 18.75 mg 18.75 mg 18.75 mg 18.75 mg 20mg 21.25 mg 20mg   INR 1.61 1.9 2.5 2.3 2.2 1.7 1.6 2.3 1.9   Notes  hosp Doxy     Doxy start 3/10 Doxy finish 3/20      Date 4/23 5/7 6/4 6/11 6/25 7/10 8/1 8/29 9/19   Total Weekly Dose 21.25mg 21.25 mg 21.25 mg 20 mg 21.25 mg 21.25mg 21.25 mg 18.75 mg 21.25 mg   INR 2.0 2.4 2.4 2.2 3.0 2.7 2.5 2.0 3.7   Notes    Doxy start 6/4 Doxy finish 6/10    1 missed dose      Date 10/3 10/17 10/24 10/31 11/14 12/12 1/9     Total Weekly Dose 20mg? 20mg? 20mg 20mg 20mg 20mg 20mg     INR 3.1 3.5 2.4 2.6 2.7 2.9 3.4     Notes   Dec GLV?     Less GLV       Clinic Interview:  Verbal Release Authorization signed on 6/25/18 -- may speak with Glenis Lucero (wife), Marge Cox (daughter)  Tablet Strength: 2.5mg tablets      Patient Findings   Positives:  Change in diet/appetite   Negatives:  Signs/symptoms of thrombosis, Signs/symptoms of bleeding, Laboratory test error suspected, Change in health, Change in alcohol use, Change in activity, Upcoming invasive procedure, Emergency department visit, Upcoming dental procedure, Missed doses, Extra doses, Change in medications, Hospital admission, Bruising, Other  complaints   Comments:  Mr. Lucero reports that he is eating less GLV.     Plan:   1. INR is SUPRAtherapeutic today. Instructed Mr. Lucero to decrease dose of warfarin tonight to 1.25mg and then continue warfarin 2.5mg daily except 3.75mg on SunWed.   2. RTC in 2 weeks. IF SUPRAtherapeutic consider dose decrease to 3.75mg once weekly.  3. He requested new refill of warfarin.  4. Written and verbal information provided in clinic. Mr. Lucero expresses understanding with teach back and has no further questions at this time.    Colleen Nolasco, PharmD  1/9/2019  1:09 PM

## 2019-01-23 ENCOUNTER — ANTICOAGULATION VISIT (OUTPATIENT)
Dept: PHARMACY | Facility: HOSPITAL | Age: 84
End: 2019-01-23

## 2019-01-23 DIAGNOSIS — I48.20 CHRONIC ATRIAL FIBRILLATION (HCC): ICD-10-CM

## 2019-01-23 LAB
INR PPP: 2.8 (ref 0.91–1.09)
PROTHROMBIN TIME: 33.8 SECONDS (ref 10–13.8)

## 2019-01-23 PROCEDURE — 85610 PROTHROMBIN TIME: CPT

## 2019-01-23 PROCEDURE — G0463 HOSPITAL OUTPT CLINIC VISIT: HCPCS

## 2019-01-23 PROCEDURE — 36416 COLLJ CAPILLARY BLOOD SPEC: CPT

## 2019-01-23 NOTE — PROGRESS NOTES
Anticoagulation Clinic Progress Note  Indication: afib  Referring Provider: Em  Goal INR: 2-3  Current Drug Interactions: aspirin, simvastatin, trazodone  Other: no bleeding per patient  [id number]: 4 (age, HTN, CAD)    Diet: Typically doesn't eat many Vit K foods    Anticoagulation Clinic INR History:  Date 9/14 10/12 11/9 12/7 1/11 2/15 3/22 4/26 5/31   Total Weekly Dose 17.5 mg 17.5 mg 17.5 mg 17.5 mg 17.5 mg 17.5 mg 17.5 mg 17.5 mg 17.5mg   INR 3.0 2.4 2.5 2.5 2.3 2.3 2.6 2.5 2.6     Date 7/5 8/9 8/30 9/18 10/16 11/15 11/29 12/13 1/3   Total Weekly Dose 17.5  mg 17.5 mg 17.5 mg 17.5 mg 17.5 mg 17.5 mg 18.75 mg 18.75mg 20mg   INR 2.2 1.9 2.1 2.0 2.2 1.8 1.9 2.1 2.1   Notes       abx   Keflex     Date 1/8 1/10 1/18 1/25 2/9 3/2 3/16 3/23 4/10   Total Weekly Dose 23.75 mg 23.75 mg 18.75 mg 18.75 mg 18.75 mg 18.75 mg 20mg 21.25 mg 20mg   INR 1.61 1.9 2.5 2.3 2.2 1.7 1.6 2.3 1.9   Notes  hosp Doxy     Doxy start 3/10 Doxy finish 3/20      Date 4/23 5/7 6/4 6/11 6/25 7/10 8/1 8/29 9/19   Total Weekly Dose 21.25mg 21.25 mg 21.25 mg 20 mg 21.25 mg 21.25mg 21.25 mg 18.75 mg 21.25 mg   INR 2.0 2.4 2.4 2.2 3.0 2.7 2.5 2.0 3.7   Notes    Doxy start 6/4 Doxy finish 6/10    1 missed dose      Date 10/3 10/17 10/24 10/31 11/14 12/12 1/9 1/23    Total Weekly Dose 20mg? 20mg? 20mg 20mg 20mg 20mg 20mg 20mg    INR 3.1 3.5 2.4 2.6 2.7 2.9 3.4 2.8    Notes   Dec GLV?     Less GLV       Clinic Interview:  Verbal Release Authorization signed on 6/25/18 -- may speak with Glenis Lucero (wife), Marge Cox (daughter)  Tablet Strength: 2.5mg tablets      Patient Findings   Negatives:  Signs/symptoms of thrombosis, Signs/symptoms of bleeding, Laboratory test error suspected, Change in health, Change in alcohol use, Change in activity, Upcoming invasive procedure, Emergency department visit, Upcoming dental procedure, Missed doses, Extra doses, Change in medications, Change in diet/appetite, Hospital admission, Bruising, Other  complaints   Comments:  Patient reports that he took everything as instructed and ate his normal amount of green vegetables the last couple of weeks.      Plan:   1. INR is therapeutic today. Instructed Mr. Lucero to continue warfarin 2.5mg daily except 3.75mg on SunWed.   2. RTC in 3 weeks.  3. Written and verbal information provided in clinic. Mr. Lucero expresses understanding with teach back and has no further questions at this time.    Maribel Otto, Pharmacy Intern  1/23/2019  1:27 PM     ILizzeth, PharmD, have reviewed the note in full and agree with the assessment and plan.  01/23/19  4:21 PM

## 2019-02-13 ENCOUNTER — ANTICOAGULATION VISIT (OUTPATIENT)
Dept: PHARMACY | Facility: HOSPITAL | Age: 84
End: 2019-02-13

## 2019-02-13 DIAGNOSIS — I48.20 CHRONIC ATRIAL FIBRILLATION (HCC): ICD-10-CM

## 2019-02-13 LAB
INR PPP: 2.7 (ref 0.91–1.09)
PROTHROMBIN TIME: 32 SECONDS (ref 10–13.8)

## 2019-02-13 PROCEDURE — 85610 PROTHROMBIN TIME: CPT

## 2019-02-13 PROCEDURE — 36416 COLLJ CAPILLARY BLOOD SPEC: CPT

## 2019-02-13 PROCEDURE — G0463 HOSPITAL OUTPT CLINIC VISIT: HCPCS

## 2019-02-13 NOTE — PROGRESS NOTES
Anticoagulation Clinic Progress Note  Indication: afib  Referring Provider: Em  Goal INR: 2-3  Current Drug Interactions: aspirin, simvastatin, trazodone  Other: no bleeding per patient  [id number]: 4 (age, HTN, CAD)    Diet: Typically doesn't eat many Vit K foods    Anticoagulation Clinic INR History:  Date 9/14 10/12 11/9 12/7 1/11 2/15 3/22 4/26 5/31   Total Weekly Dose 17.5 mg 17.5 mg 17.5 mg 17.5 mg 17.5 mg 17.5 mg 17.5 mg 17.5 mg 17.5mg   INR 3.0 2.4 2.5 2.5 2.3 2.3 2.6 2.5 2.6     Date 7/5 8/9 8/30 9/18 10/16 11/15 11/29 12/13 1/3   Total Weekly Dose 17.5  mg 17.5 mg 17.5 mg 17.5 mg 17.5 mg 17.5 mg 18.75 mg 18.75mg 20mg   INR 2.2 1.9 2.1 2.0 2.2 1.8 1.9 2.1 2.1   Notes       abx   Keflex     Date 1/8 1/10 1/18 1/25 2/9 3/2 3/16 3/23 4/10   Total Weekly Dose 23.75 mg 23.75 mg 18.75 mg 18.75 mg 18.75 mg 18.75 mg 20mg 21.25 mg 20mg   INR 1.61 1.9 2.5 2.3 2.2 1.7 1.6 2.3 1.9   Notes  hosp Doxy     Doxy start 3/10 Doxy finish 3/20      Date 4/23 5/7 6/4 6/11 6/25 7/10 8/1 8/29 9/19   Total Weekly Dose 21.25mg 21.25 mg 21.25 mg 20 mg 21.25 mg 21.25mg 21.25 mg 18.75 mg 21.25 mg   INR 2.0 2.4 2.4 2.2 3.0 2.7 2.5 2.0 3.7   Notes    Doxy start 6/4 Doxy finish 6/10    1 missed dose      Date 10/3 10/17 10/24 10/31 11/14 12/12 1/9 1/23 2/13   Total Weekly Dose 20mg? 20mg? 20mg 20mg 20mg 20mg 20mg 20mg 20mg   INR 3.1 3.5 2.4 2.6 2.7 2.9 3.4 2.8 2.7   Notes   Dec GLV?     Less GLV       Clinic Interview:  Verbal Release Authorization signed on 6/25/18 -- may speak with Glenis Lucero (wife), Marge Cox (daughter)  Tablet Strength: 2.5mg tablets      Patient Findings   Positives:  Change in diet/appetite   Negatives:  Signs/symptoms of thrombosis, Signs/symptoms of bleeding, Laboratory test error suspected, Change in health, Change in alcohol use, Change in activity, Upcoming invasive procedure, Emergency department visit, Upcoming dental procedure, Missed doses, Extra doses, Change in medications, Hospital admission,  Bruising, Other complaints   Comments:  Patient states he may have eaten more greens than usual this past few week, but tries to eat a consistent amount every week. Patient also states he has used a saline nasal spray for congestion caused by cpap machine the past few days.        Plan:   1. INR is therapeutic today. Instructed Mr. Lucero to continue warfarin 2.5mg daily except 3.75mg on SunWed.   2. RTC in 4 weeks 3/13  3. Written and verbal information provided in clinic. Mr. Lucero expresses understanding with teach back and has no further questions at this time.    Aristides Coleman, Pharmacy Intern  2/13/2019  1:22 PM     I, Colleen Nolasco, PharmD, have reviewed the note in full and agree with the assessment and plan.  02/13/19  3:45 PM

## 2019-02-18 RX ORDER — LORATADINE 10 MG/1
TABLET ORAL
Qty: 30 TABLET | Refills: 4 | Status: SHIPPED | OUTPATIENT
Start: 2019-02-18 | End: 2019-07-14 | Stop reason: SDUPTHER

## 2019-02-18 RX ORDER — TRAZODONE HYDROCHLORIDE 50 MG/1
TABLET ORAL
Qty: 90 TABLET | Refills: 0 | Status: SHIPPED | OUTPATIENT
Start: 2019-02-18 | End: 2019-05-19 | Stop reason: SDUPTHER

## 2019-03-06 ENCOUNTER — OFFICE VISIT (OUTPATIENT)
Dept: NEUROLOGY | Facility: CLINIC | Age: 84
End: 2019-03-06

## 2019-03-06 VITALS — DIASTOLIC BLOOD PRESSURE: 58 MMHG | SYSTOLIC BLOOD PRESSURE: 126 MMHG

## 2019-03-06 DIAGNOSIS — G31.84 MILD COGNITIVE IMPAIRMENT: Primary | ICD-10-CM

## 2019-03-06 PROCEDURE — 99214 OFFICE O/P EST MOD 30 MIN: CPT | Performed by: PSYCHIATRY & NEUROLOGY

## 2019-03-06 NOTE — PROGRESS NOTES
Subjective     Chief Complaint: memory loss      History of Present Illness   Mike Lucero Jr. is a 86 y.o. male who returns to clinic today with a history of possible Mild Cognitive Impairment (MCI) . He was previously followed by Dr. Whyte. He has noted symptoms for at least several years marked initially by forgetfulness as well as naming and word-finding difficulties. This has gradually worsened  over time. Additional symptoms have included impairments in orientation and executive function. There have been no associated  symptoms of anxiety and depression. He is currently residing at home with wife.      Prior evaluation has included screening blood work  and an MRI of the brain which were essentially unremarkable. He has been intolerant of memantine. He remains on donepezil.     It should be noted that he has a history of RELL and has recently had his CPAP pressure adjusted after a titration study in 4/17 and is followed by Dr. Vieira.    Since his last visit on 5/21/18, he notes continued impairment in memory. His wife also notes impairments in language and executive function. He was unable to tolerate Namenda. He denies depression or anxiety.      I have reviewed and confirmed the past family, social and medical history as accurate on 3/6/19.     Review of Systems   Constitutional: Negative.        Objective     /58     General appearance today is normal.      Physical Exam   Constitutional: He is oriented to person, place, and time.   Neurological: He is oriented to person, place, and time. He has normal strength.   Psychiatric: His speech is normal.        Neurologic Exam     Mental Status   Oriented to person, place, and time.   Registration: recalls 3 of 3 objects. Recall of objects at 5 minutes: 0/3. Follows 3 step commands.   Attention: normal.   Speech: speech is normal   Level of consciousness: alert  Able to name object. Able to read. Able to repeat. Able to write. Normal  comprehension.     Cranial Nerves   Cranial nerves II through XII intact.     Motor Exam   Muscle bulk: normal  Overall muscle tone: normal    Strength   Strength 5/5 throughout.         Results  MMSE=26      Assessment/Plan   Mike was seen today for memory loss.    Diagnoses and all orders for this visit:    Mild cognitive impairment          Discussion/Summary   Mike Lucero Jr. returns to clinic today  with a history potentially consistent with Mild Cognitive Impairment (MCI). I again reviewed his current status and treatment options. After discussing potential treatment options, it was elected to continue on donepezil unchanged. I will refer him to cognitive rehabilitation. I also discussed potential clinical trial participation. He will then follow up in 6 months, or sooner if needed.     I spent 25 minutes face to face with the patient and family. I spent 15 minutes counseling and discussing current status, driving, treatment options, management and clinical trials.    As part of this visit I obtained additional history from the family which is incorporated in the HPI.      Jeovany Rodriguez MD

## 2019-03-13 ENCOUNTER — ANTICOAGULATION VISIT (OUTPATIENT)
Dept: PHARMACY | Facility: HOSPITAL | Age: 84
End: 2019-03-13

## 2019-03-13 DIAGNOSIS — I48.20 CHRONIC ATRIAL FIBRILLATION (HCC): ICD-10-CM

## 2019-03-13 LAB
INR PPP: 3.2 (ref 0.91–1.09)
PROTHROMBIN TIME: 38 SECONDS (ref 10–13.8)

## 2019-03-13 PROCEDURE — G0463 HOSPITAL OUTPT CLINIC VISIT: HCPCS

## 2019-03-13 PROCEDURE — 36416 COLLJ CAPILLARY BLOOD SPEC: CPT

## 2019-03-13 PROCEDURE — 85610 PROTHROMBIN TIME: CPT

## 2019-03-13 NOTE — PROGRESS NOTES
Anticoagulation Clinic Progress Note  Indication: afib  Referring Provider: Em  Goal INR: 2-3  Current Drug Interactions: aspirin, simvastatin, trazodone  Other: no bleeding per patient  [id number]: 4 (age, HTN, CAD)    Diet: Typically doesn't eat many Vit K foods    Anticoagulation Clinic INR History:  Date 9/14 10/12 11/9 12/7 1/11 2/15 3/22 4/26 5/31   Total Weekly Dose 17.5 mg 17.5 mg 17.5 mg 17.5 mg 17.5 mg 17.5 mg 17.5 mg 17.5 mg 17.5mg   INR 3.0 2.4 2.5 2.5 2.3 2.3 2.6 2.5 2.6     Date 7/5 8/9 8/30 9/18 10/16 11/15 11/29 12/13 1/3   Total Weekly Dose 17.5  mg 17.5 mg 17.5 mg 17.5 mg 17.5 mg 17.5 mg 18.75 mg 18.75mg 20mg   INR 2.2 1.9 2.1 2.0 2.2 1.8 1.9 2.1 2.1   Notes       abx   Keflex     Date 1/8 1/10 1/18 1/25 2/9 3/2 3/16 3/23 4/10   Total Weekly Dose 23.75 mg 23.75 mg 18.75 mg 18.75 mg 18.75 mg 18.75 mg 20mg 21.25 mg 20mg   INR 1.61 1.9 2.5 2.3 2.2 1.7 1.6 2.3 1.9   Notes  hosp Doxy     Doxy start 3/10 Doxy finish 3/20      Date 4/23 5/7 6/4 6/11 6/25 7/10 8/1 8/29 9/19   Total Weekly Dose 21.25mg 21.25 mg 21.25 mg 20 mg 21.25 mg 21.25mg 21.25 mg 18.75 mg 21.25 mg   INR 2.0 2.4 2.4 2.2 3.0 2.7 2.5 2.0 3.7   Notes    Doxy start 6/4 Doxy finish 6/10    1 missed dose      Date 10/3 10/17 10/24 10/31 11/14 12/12 1/9 1/23 2/13   Total Weekly Dose 20mg? 20mg? 20mg 20mg 20mg 20mg 20mg 20mg 20mg   INR 3.1 3.5 2.4 2.6 2.7 2.9 3.4 2.8 2.7   Notes   Dec GLV?     Less GLV       Date 3/13           Total Weekly Dose 20mg           INR 3.2           Notes               Clinic Interview:  Verbal Release Authorization signed on 6/25/18 -- may speak with Glenis Lucero (wife), Marge Cox (daughter)  Tablet Strength: 2.5mg tablets      Patient Findings:  Negatives:  Signs/symptoms of thrombosis, Signs/symptoms of bleeding, Laboratory test error suspected, Change in health, Change in alcohol use, Change in activity, Upcoming invasive procedure, Emergency department visit, Upcoming dental procedure, Missed doses,  Extra doses, Change in medications, Change in diet/appetite, Hospital admission, Bruising, Other complaints   Comments:  No changes. Patient has been consistent with his greens each week but states he thinks he may have had a little extra this past week.     Plan:   1. INR is supratherapeutic today at 3.2. Instructed Mr. Lucero to decrease dose of warfarin today (3/13) from 3.75mg to 2.5mg then continue warfarin 2.5mg daily except 3.75mg on SunWed.   2. RTC in 2 weeks 3/27.  3. Written and verbal information provided in clinic. Mr. Lucero expresses understanding with teach back and has no further questions at this time.    Aristides Coleman, Pharmacy Intern  3/13/2019  1:12 PM     6% dose decrease.   Mr. Lucero has an appt with Dr. Strickland on Monday 3/25 but did not wish to come to the [facility] on the same day.   IStefanie, [facility], have reviewed the note in full and agree with the assessment and plan.  03/13/19  3:44 PM

## 2019-03-21 ENCOUNTER — APPOINTMENT (OUTPATIENT)
Dept: SPEECH THERAPY | Facility: HOSPITAL | Age: 84
End: 2019-03-21

## 2019-03-25 ENCOUNTER — OFFICE VISIT (OUTPATIENT)
Dept: CARDIOLOGY | Facility: CLINIC | Age: 84
End: 2019-03-25

## 2019-03-25 VITALS
HEART RATE: 66 BPM | HEIGHT: 70 IN | DIASTOLIC BLOOD PRESSURE: 74 MMHG | BODY MASS INDEX: 29.98 KG/M2 | SYSTOLIC BLOOD PRESSURE: 124 MMHG | WEIGHT: 209.4 LBS

## 2019-03-25 DIAGNOSIS — E78.00 HYPERCHOLESTEROLEMIA: ICD-10-CM

## 2019-03-25 DIAGNOSIS — I25.10 CORONARY ARTERY DISEASE INVOLVING NATIVE CORONARY ARTERY OF NATIVE HEART WITHOUT ANGINA PECTORIS: ICD-10-CM

## 2019-03-25 DIAGNOSIS — I10 ESSENTIAL HYPERTENSION: ICD-10-CM

## 2019-03-25 DIAGNOSIS — I48.21 PERMANENT ATRIAL FIBRILLATION (HCC): Primary | ICD-10-CM

## 2019-03-25 PROCEDURE — 99214 OFFICE O/P EST MOD 30 MIN: CPT | Performed by: INTERNAL MEDICINE

## 2019-03-25 NOTE — PROGRESS NOTES
[facility] at [facility]  Office visit  Mike Lucero Jr.  7/29/1932  [phone]  VISIT DATE:  09/18/2017    PCP: Fabien Merino, ZULY ELLIOTT [facility] [id number]    CC:  Chief Complaint   Patient presents with   • Coronary Artery Disease   • Atrial Fibrillation       PROBLEM LIST:  1.  Coronary artery disease:  a. Preserved LV systolic function.   b.  CABG for 3-vessel disease, July 2006.  2. Paroxysmal atrial fibrillation,  anticoagulated on Coumadin.   3. Benign hypertension.   4. Hypercholesterolemia.   5. Endocarditis, September 2009, treated with IV antibiotics:  a.  MILTON by Dr. Valladares, 09/11/2009:  Small nodular nonpedunculated density, possibly consistent with small vegetation of the posterior leaflet of the mitral valve.  b. Previously treated with Dr. Vann.  6. Remote tobacco abuse.   7. Osteoarthritis.   8. Cataracts.   9. Seasonal allergies/rhinitis.   10. Overmedication reaction secondary to temazepam with neurologic  effects now resolved      ASSESSMENT:   Diagnosis Plan   1. Permanent atrial fibrillation (CMS/HCC)     2. Coronary artery disease involving native coronary artery of native heart without angina pectoris     3. Essential hypertension     4. Hypercholesterolemia         PLAN:  Coronary artery disease: Continue low-dose aspirin, statin and afterload reduction  Permanent atrial fibrillation: Continue oral anticoagulation with a goal INR of 2-3 for stroke prophylaxis, Continue rate control with diltiazem  Hypertension: Goal less than 130/80 mmHg.  Currently well-controlled.  Continue current antihypertensive regimen  Hyperlipidemia: Continue current statin therapy, goal LDL less than 100.  Currently controlled, continue current dose of simvastatin.    Subjective  Reports stable functional capacity.  Denies dyspnea on exertion or chest pain.  No PND or orthopnea.  Blood pressures up and running less than 140/90 mmHg.  He denies myalgias on statin  "therapy. He is compliant with medical therapy.  Denies easy bruising or bleeding complications on Eliquis.  Uses a pill counter to make sure that he is taking his medications as directed, his wife helps him as well.    PHYSICAL EXAMINATION:  Vitals:    03/25/19 1353   BP: 124/74   BP Location: Left arm   Patient Position: Sitting   Pulse: 66   Weight: 95 kg (209 lb 6.4 oz)   Height: 177.8 cm (70\")     General Appearance:    Alert, cooperative, no distress, appears stated age   Head:    Normocephalic, without obvious abnormality, atraumatic   Eyes:    conjunctiva/corneas clear   Nose:   Nares normal, septum midline, mucosa normal, no drainage   Throat:   Lips, teeth and gums normal   Neck:   Supple, symmetrical, trachea midline, no carotid    bruit or JVD   Lungs:     Clear to auscultation bilaterally, respirations unlabored   Chest Wall:    No tenderness or deformity    Heart:   Irregularly irregular, S1 and S2 normal, no murmur, rub   or gallop, normal carotid impulse bilaterally without bruit.   Abdomen:     Soft, non-tender   Extremities:   Extremities normal, atraumatic, no cyanosis or edema   Pulses:   2+ and symmetric all extremities   Skin:   Skin color, texture, turgor normal, no rashes or lesions       Diagnostic Data:  Procedures  Lab Results   Component Value Date    TRIG 137 09/21/2018    HDL 40 09/21/2018     Lab Results   Component Value Date    GLUCOSE 97 09/21/2018    BUN 12 09/21/2018    CREATININE 0.86 09/21/2018     09/21/2018    K 3.9 09/21/2018    CL 99 09/21/2018    CO2 34.0 (H) 09/21/2018     Lab Results   Component Value Date    HGBA1C 5.50 10/23/2017     Lab Results   Component Value Date    WBC 8.87 09/21/2018    HGB 16.5 09/21/2018    HCT 49.4 09/21/2018     09/21/2018       Allergies  Allergies   Allergen Reactions   • Rocephin [Ceftriaxone] Angioedema     Tongue and lip swelling, sore throat   • Temazepam Other (See Comments)     Caused pt to \"sleep drive\"   • Wellbutrin " [Bupropion] Mental Status Change and Dizziness     Memory loss       Current Medications    Current Outpatient Medications:   •  alfuzosin (UROXATRAL) 10 MG 24 hr tablet, Take 1 tablet by mouth Daily., Disp: 30 tablet, Rfl: 5  •  aspirin 81 MG EC tablet, Take 81 mg by mouth Daily., Disp: , Rfl:   •  CARTIA  MG 24 hr capsule, TAKE ONE CAPSULE BY MOUTH DAILY, Disp: 90 capsule, Rfl: 3  •  donepezil (ARICEPT) 10 MG tablet, TAKE ONE TABLET BY MOUTH DAILY, Disp: 30 tablet, Rfl: 10  •  lactobacillus acidophilus (RISAQUAD) capsule capsule, Take 1 capsule by mouth Daily., Disp: 30 capsule, Rfl: 0  •  lisinopril (PRINIVIL,ZESTRIL) 20 MG tablet, Take 20 mg by mouth Daily., Disp: , Rfl:   •  loratadine (CLARITIN) 10 MG tablet, TAKE ONE TABLET BY MOUTH DAILY, Disp: 30 tablet, Rfl: 4  •  potassium chloride (K-DUR,KLOR-CON) 20 MEQ CR tablet, TAKE ONE TABLET BY MOUTH DAILY, Disp: 90 tablet, Rfl: 1  •  simvastatin (ZOCOR) 10 MG tablet, Take 1 tablet by mouth Every Night., Disp: 90 tablet, Rfl: 1  •  sodium chloride (OCEAN) 0.65 % nasal spray, 1 spray into the nostril(s) as directed by provider As Needed for Congestion., Disp: , Rfl:   •  traZODone (DESYREL) 50 MG tablet, TAKE ONE TABLET BY MOUTH ONCE NIGHTLY, Disp: 90 tablet, Rfl: 0  •  Triamcinolone Acetonide (NASACORT ALLERGY 24HR) 55 MCG/ACT nasal inhaler, 2 sprays into the nostril(s) as directed by provider Daily., Disp: , Rfl:   •  warfarin (COUMADIN) 2.5 MG tablet, TAKE ONE TABLET BY MOUTH DAILY OR AS DIRECTED BY ANTICOAGULATION PHARMACIST, Disp: 30 tablet, Rfl: 2          ROS  Review of Systems   Constitution: Negative for diaphoresis and malaise/fatigue.   Cardiovascular: Negative for chest pain, claudication, cyanosis, dyspnea on exertion, irregular heartbeat, leg swelling and near-syncope.   Respiratory: Negative for cough, hemoptysis and sputum production.        SOCIAL HX  Social History     Socioeconomic History   • Marital status:      Spouse name: Glenis    • Number of children: 2   • Years of education: BRIDGER   • Highest education level: Not on file   Occupational History   • Occupation:      Employer: RETIRED   Tobacco Use   • Smoking status: Former Smoker     Packs/day: 1.00     Years: 18.00     Pack years: 18.00     Types: Cigarettes     Last attempt to quit: 1970     Years since quittin.8   • Smokeless tobacco: Never Used   Substance and Sexual Activity   • Alcohol use: Yes     Comment: rare   • Drug use: No   • Sexual activity: Not Currently     Partners: Female       FAMILY HX  Family History   Problem Relation Age of Onset   • Alzheimer's disease Mother    • Dementia Mother    • Heart disease Father    • Stroke Father    • Heart disease Sister    • Heart disease Brother    • Stroke Brother              Ike Strickland III, MD, FACC

## 2019-03-27 ENCOUNTER — ANTICOAGULATION VISIT (OUTPATIENT)
Dept: PHARMACY | Facility: HOSPITAL | Age: 84
End: 2019-03-27

## 2019-03-27 DIAGNOSIS — I48.20 CHRONIC ATRIAL FIBRILLATION (HCC): ICD-10-CM

## 2019-03-27 LAB
INR PPP: 2.7 (ref 0.91–1.09)
PROTHROMBIN TIME: 32.3 SECONDS (ref 10–13.8)

## 2019-03-27 PROCEDURE — G0463 HOSPITAL OUTPT CLINIC VISIT: HCPCS

## 2019-03-27 PROCEDURE — 36416 COLLJ CAPILLARY BLOOD SPEC: CPT

## 2019-03-27 PROCEDURE — 85610 PROTHROMBIN TIME: CPT

## 2019-03-27 NOTE — PROGRESS NOTES
Anticoagulation Clinic Progress Note  Indication: afib  Referring Provider: Em  Goal INR: 2-3  Current Drug Interactions: aspirin, simvastatin, trazodone  Other: no bleeding per patient  [id number]: 4 (age, HTN, CAD)    Diet: Typically doesn't eat many Vit K foods    Anticoagulation Clinic INR History:  Date 9/14 10/12 11/9 12/7 1/11 2/15 3/22 4/26 5/31   Total Weekly Dose 17.5 mg 17.5 mg 17.5 mg 17.5 mg 17.5 mg 17.5 mg 17.5 mg 17.5 mg 17.5mg   INR 3.0 2.4 2.5 2.5 2.3 2.3 2.6 2.5 2.6     Date 7/5 8/9 8/30 9/18 10/16 11/15 11/29 12/13 1/3   Total Weekly Dose 17.5  mg 17.5 mg 17.5 mg 17.5 mg 17.5 mg 17.5 mg 18.75 mg 18.75mg 20mg   INR 2.2 1.9 2.1 2.0 2.2 1.8 1.9 2.1 2.1   Notes       abx   Keflex     Date 1/8 1/10 1/18 1/25 2/9 3/2 3/16 3/23 4/10   Total Weekly Dose 23.75 mg 23.75 mg 18.75 mg 18.75 mg 18.75 mg 18.75 mg 20mg 21.25 mg 20mg   INR 1.61 1.9 2.5 2.3 2.2 1.7 1.6 2.3 1.9   Notes  hosp Doxy     Doxy start 3/10 Doxy finish 3/20      Date 4/23 5/7 6/4 6/11 6/25 7/10 8/1 8/29 9/19   Total Weekly Dose 21.25mg 21.25 mg 21.25 mg 20 mg 21.25 mg 21.25mg 21.25 mg 18.75 mg 21.25 mg   INR 2.0 2.4 2.4 2.2 3.0 2.7 2.5 2.0 3.7   Notes    Doxy start 6/4 Doxy finish 6/10    1 missed dose      Date 10/3 10/17 10/24 10/31 11/14 12/12 1/9 1/23 2/13   Total Weekly Dose 20mg? 20mg? 20mg 20mg 20mg 20mg 20mg 20mg 20mg   INR 3.1 3.5 2.4 2.6 2.7 2.9 3.4 2.8 2.7   Notes   Dec GLV?     Less GLV       Date 3/13 3/27          Total Weekly Dose 20mg 20mg          INR 3.2 2.7          Notes               Clinic Interview:  Verbal Release Authorization signed on 6/25/18 -- may speak with Glenis Lucero (wife), Marge Cox (daughter)  Tablet Strength: 2.5mg tablets      Patient Findings:  Positives:  Change in diet/appetite   Negatives:  Signs/symptoms of thrombosis, Signs/symptoms of bleeding, Laboratory test error suspected, Change in health, Change in alcohol use, Change in activity, Upcoming invasive procedure, Emergency department  visit, Upcoming dental procedure, Missed doses, Extra doses, Change in medications, Hospital admission, Bruising, Other complaints   Comments:  Patient states he has eaten more vegetables than usual over the last week.     Plan:   1. INR is therapeutic today at 2.7. Instructed Mr. Lucero to continue warfarin 2.5mg daily except 3.75mg on SunWed.   2. RTC in 4 weeks 4/24.  3. Written and verbal information provided in clinic. Mr. Lucero expresses understanding with teach back and has no further questions at this time.    Mark Krutz, PharmD Candidate  3/27/2019  2:24 PM     IJames, [facility], have reviewed the note in full and agree with the assessment and plan.  03/27/19  4:40 PM

## 2019-04-01 RX ORDER — SIMVASTATIN 10 MG
TABLET ORAL
Qty: 90 TABLET | Refills: 0 | Status: SHIPPED | OUTPATIENT
Start: 2019-04-01 | End: 2019-06-24 | Stop reason: SDUPTHER

## 2019-04-01 RX ORDER — WARFARIN SODIUM 2.5 MG/1
TABLET ORAL
Qty: 90 TABLET | Refills: 1 | Status: SHIPPED | OUTPATIENT
Start: 2019-04-01 | End: 2019-09-17 | Stop reason: SDUPTHER

## 2019-04-24 ENCOUNTER — ANTICOAGULATION VISIT (OUTPATIENT)
Dept: PHARMACY | Facility: HOSPITAL | Age: 84
End: 2019-04-24

## 2019-04-24 DIAGNOSIS — I48.20 CHRONIC ATRIAL FIBRILLATION (HCC): ICD-10-CM

## 2019-04-24 LAB
INR PPP: 2.8 (ref 0.91–1.09)
PROTHROMBIN TIME: 33.8 SECONDS (ref 10–13.8)

## 2019-04-24 PROCEDURE — 36416 COLLJ CAPILLARY BLOOD SPEC: CPT

## 2019-04-24 PROCEDURE — G0463 HOSPITAL OUTPT CLINIC VISIT: HCPCS

## 2019-04-24 PROCEDURE — 85610 PROTHROMBIN TIME: CPT

## 2019-04-24 NOTE — PROGRESS NOTES
Anticoagulation Clinic Progress Note  Indication: afib  Referring Provider: Em  Goal INR: 2-3  Current Drug Interactions: aspirin, simvastatin, trazodone  Other: no bleeding per patient  [id number]: 4 (age, HTN, CAD)    Diet: Typically doesn't eat many Vit K foods    Anticoagulation Clinic INR History:  Date 9/14 10/12 11/9 12/7 1/11 2/15 3/22 4/26 5/31   Total Weekly Dose 17.5 mg 17.5 mg 17.5 mg 17.5 mg 17.5 mg 17.5 mg 17.5 mg 17.5 mg 17.5mg   INR 3.0 2.4 2.5 2.5 2.3 2.3 2.6 2.5 2.6     Date 7/5 8/9 8/30 9/18 10/16 11/15 11/29 12/13 1/3   Total Weekly Dose 17.5  mg 17.5 mg 17.5 mg 17.5 mg 17.5 mg 17.5 mg 18.75 mg 18.75mg 20mg   INR 2.2 1.9 2.1 2.0 2.2 1.8 1.9 2.1 2.1   Notes       abx   Keflex     Date 1/8 1/10 1/18 1/25 2/9 3/2 3/16 3/23 4/10   Total Weekly Dose 23.75 mg 23.75 mg 18.75 mg 18.75 mg 18.75 mg 18.75 mg 20mg 21.25 mg 20mg   INR 1.61 1.9 2.5 2.3 2.2 1.7 1.6 2.3 1.9   Notes  hosp Doxy     Doxy start 3/10 Doxy finish 3/20      Date 4/23 5/7 6/4 6/11 6/25 7/10 8/1 8/29 9/19   Total Weekly Dose 21.25mg 21.25 mg 21.25 mg 20 mg 21.25 mg 21.25mg 21.25 mg 18.75 mg 21.25 mg   INR 2.0 2.4 2.4 2.2 3.0 2.7 2.5 2.0 3.7   Notes    Doxy start 6/4 Doxy finish 6/10    1 missed dose      Date 10/3 10/17 10/24 10/31 11/14 12/12 1/9 1/23 2/13   Total Weekly Dose 20mg? 20mg? 20mg 20mg 20mg 20mg 20mg 20mg 20mg   INR 3.1 3.5 2.4 2.6 2.7 2.9 3.4 2.8 2.7   Notes   Dec GLV?     Less GLV       Date 3/13 3/27 4/24         Total Weekly Dose 20mg 20mg 20 mg         INR 3.2 2.7 2.8         Notes               Clinic Interview:  Verbal Release Authorization signed on 6/25/18 -- may speak with Glenis Lucero (wife), Marge Cooneylivan (daughter)  Tablet Strength: 2.5mg tablets      Patient Findings     Positives:  Change in diet/appetite   Negatives:  Signs/symptoms of thrombosis, Signs/symptoms of bleeding, Laboratory test error suspected, Change in health, Change in alcohol use, Change in activity, Upcoming invasive procedure,  Emergency department visit, Upcoming dental procedure, Missed doses, Extra doses, Change in medications, Hospital admission, Bruising, Other complaints   Comments:  He stated that he has tried to eat a little more green stuff with his meals.   He plans to continue this.  He stated that he is very methodical with his medications and prepares his weekly pill boxes on Sundays to ensure that he is taking his medications as prescribed.     Denies any changes.     Plan:   1. INR is therapeutic today at 2.8. Instructed Mr. Lucero to continue warfarin 2.5mg oral daily except 3.75mg on SunWed. He will continue his increased intake of GLV.  2. RTC in 4 weeks 5/22.  3. Written and verbal information provided in clinic. Mr. Lucero expresses understanding with teach back and has no further questions at this time.    Lizzeth Abraham, PharmD  4/24/2019  1:07 PM

## 2019-05-13 ENCOUNTER — OFFICE VISIT (OUTPATIENT)
Dept: FAMILY MEDICINE CLINIC | Facility: CLINIC | Age: 84
End: 2019-05-13

## 2019-05-13 ENCOUNTER — APPOINTMENT (OUTPATIENT)
Dept: LAB | Facility: HOSPITAL | Age: 84
End: 2019-05-13

## 2019-05-13 VITALS
HEIGHT: 70 IN | HEART RATE: 67 BPM | BODY MASS INDEX: 29.92 KG/M2 | OXYGEN SATURATION: 98 % | DIASTOLIC BLOOD PRESSURE: 80 MMHG | WEIGHT: 209 LBS | SYSTOLIC BLOOD PRESSURE: 114 MMHG

## 2019-05-13 DIAGNOSIS — I48.21 PERMANENT ATRIAL FIBRILLATION (HCC): Primary | ICD-10-CM

## 2019-05-13 DIAGNOSIS — Z00.00 MEDICARE ANNUAL WELLNESS VISIT, SUBSEQUENT: ICD-10-CM

## 2019-05-13 DIAGNOSIS — N40.1 BENIGN PROSTATIC HYPERPLASIA WITH NOCTURIA: ICD-10-CM

## 2019-05-13 DIAGNOSIS — E55.9 VITAMIN D DEFICIENCY: ICD-10-CM

## 2019-05-13 DIAGNOSIS — R35.1 BENIGN PROSTATIC HYPERPLASIA WITH NOCTURIA: ICD-10-CM

## 2019-05-13 DIAGNOSIS — I25.10 CORONARY ARTERY DISEASE INVOLVING NATIVE CORONARY ARTERY OF NATIVE HEART WITHOUT ANGINA PECTORIS: ICD-10-CM

## 2019-05-13 DIAGNOSIS — J30.2 SEASONAL ALLERGIES: ICD-10-CM

## 2019-05-13 DIAGNOSIS — I10 ESSENTIAL HYPERTENSION: ICD-10-CM

## 2019-05-13 LAB
25(OH)D3 SERPL-MCNC: 21.1 NG/ML (ref 30–100)
ANION GAP SERPL CALCULATED.3IONS-SCNC: 8.4 MMOL/L
BACTERIA UR QL AUTO: NORMAL /HPF
BILIRUB UR QL STRIP: NEGATIVE
BUN BLD-MCNC: 12 MG/DL (ref 8–23)
BUN/CREAT SERPL: 12.4 (ref 7–25)
CALCIUM SPEC-SCNC: 8.7 MG/DL (ref 8.6–10.5)
CHLORIDE SERPL-SCNC: 97 MMOL/L (ref 98–107)
CLARITY UR: CLEAR
CO2 SERPL-SCNC: 31.6 MMOL/L (ref 22–29)
COD CRY URNS QL: NORMAL /HPF
COLOR UR: ABNORMAL
CREAT BLD-MCNC: 0.97 MG/DL (ref 0.76–1.27)
GFR SERPL CREATININE-BSD FRML MDRD: 73 ML/MIN/1.73
GLUCOSE BLD-MCNC: 99 MG/DL (ref 65–99)
GLUCOSE UR STRIP-MCNC: NEGATIVE MG/DL
HGB UR QL STRIP.AUTO: NEGATIVE
HYALINE CASTS UR QL AUTO: NORMAL /LPF
KETONES UR QL STRIP: ABNORMAL
LEUKOCYTE ESTERASE UR QL STRIP.AUTO: ABNORMAL
NITRITE UR QL STRIP: NEGATIVE
PH UR STRIP.AUTO: 6 [PH] (ref 5–8)
POTASSIUM BLD-SCNC: 4.2 MMOL/L (ref 3.5–5.2)
PROT UR QL STRIP: ABNORMAL
RBC # UR: NORMAL /HPF
REF LAB TEST METHOD: NORMAL
SODIUM BLD-SCNC: 137 MMOL/L (ref 136–145)
SP GR UR STRIP: >=1.03 (ref 1–1.03)
SQUAMOUS #/AREA URNS HPF: NORMAL /HPF
TSH SERPL DL<=0.05 MIU/L-ACNC: 1.67 MIU/ML (ref 0.27–4.2)
UROBILINOGEN UR QL STRIP: ABNORMAL
WBC UR QL AUTO: NORMAL /HPF

## 2019-05-13 PROCEDURE — 81001 URINALYSIS AUTO W/SCOPE: CPT | Performed by: PHYSICIAN ASSISTANT

## 2019-05-13 PROCEDURE — 80048 BASIC METABOLIC PNL TOTAL CA: CPT | Performed by: PHYSICIAN ASSISTANT

## 2019-05-13 PROCEDURE — 99213 OFFICE O/P EST LOW 20 MIN: CPT | Performed by: PHYSICIAN ASSISTANT

## 2019-05-13 PROCEDURE — 84443 ASSAY THYROID STIM HORMONE: CPT | Performed by: PHYSICIAN ASSISTANT

## 2019-05-13 PROCEDURE — 82306 VITAMIN D 25 HYDROXY: CPT | Performed by: PHYSICIAN ASSISTANT

## 2019-05-13 RX ORDER — POTASSIUM CHLORIDE 20 MEQ/1
TABLET, EXTENDED RELEASE ORAL
Qty: 90 TABLET | Refills: 3 | Status: SHIPPED | OUTPATIENT
Start: 2019-05-13 | End: 2020-05-04

## 2019-05-13 NOTE — PROGRESS NOTES
Chief Complaint   Patient presents with   • Follow-up     6 mth follow up       HPI     Mike Lucero Jr. is a pleasant 86 y.o. male who is here for 6 month follow-up of his chronic conditions. Patient reports a stable course since last visit. Wears CPAP and has some nasal stuffiness chronically. Nasacort helps. He has used other nasal sprays but they caused bleeding. He denies any current bleeding difficulty on warfarin. He is mostly sedentary. No dyspnea or chest pain.     Past Medical History:   Diagnosis Date   • Atrial fibrillation (CMS/HCC)    • Cataract    • Chronic anticoagulation    • Coronary artery disease    • Diverticulosis    • Gallstones    • History of endocarditis    • HLD (hyperlipidemia)    • HTN (hypertension)    • Osteoarthritis    • Seasonal allergies     Seasonal allergies/rhinitis.        Past Surgical History:   Procedure Laterality Date   • ADRENAL GLAND SURGERY      Dr. Jj Kee   • APPENDECTOMY     • BLEPHAROPLASTY  2014    Dr. Santamaria   • COLONOSCOPY     • CORONARY ARTERY BYPASS GRAFT  2006    CABG for 3-vessel disease, 2006.   • INGUINAL HERNIA REPAIR Left      and 2012   • TONSILLECTOMY  1958       Family History   Problem Relation Age of Onset   • Alzheimer's disease Mother    • Dementia Mother    • Heart disease Father    • Stroke Father    • Heart disease Sister    • Heart disease Brother    • Stroke Brother        Social History     Socioeconomic History   • Marital status:      Spouse name: Glenis   • Number of children: 2   • Years of education: J.D.   • Highest education level: Not on file   Occupational History   • Occupation:      Employer: RETIRED   Tobacco Use   • Smoking status: Former Smoker     Packs/day: 1.00     Years: 18.00     Pack years: 18.00     Types: Cigarettes     Last attempt to quit: 1970     Years since quittin.9   • Smokeless tobacco: Never Used   Substance and Sexual Activity   • Alcohol use:  "Yes     Comment: rare   • Drug use: No   • Sexual activity: Not Currently     Partners: Female       Allergies   Allergen Reactions   • Rocephin [Ceftriaxone] Angioedema     Tongue and lip swelling, sore throat   • Temazepam Other (See Comments)     Caused pt to \"sleep drive\"   • Wellbutrin [Bupropion] Mental Status Change and Dizziness     Memory loss       ROS    Review of Systems   HENT: Positive for congestion.    Respiratory: Negative for shortness of breath.    Cardiovascular: Negative for chest pain.   Neurological: Negative for dizziness and light-headedness.       Vitals:    05/13/19 1325   BP: 114/80   Pulse: 67   SpO2: 98%         Current Outpatient Medications:   •  alfuzosin (UROXATRAL) 10 MG 24 hr tablet, Take 1 tablet by mouth Daily., Disp: 30 tablet, Rfl: 5  •  aspirin 81 MG EC tablet, Take 81 mg by mouth Daily., Disp: , Rfl:   •  CARTIA  MG 24 hr capsule, TAKE ONE CAPSULE BY MOUTH DAILY, Disp: 90 capsule, Rfl: 3  •  donepezil (ARICEPT) 10 MG tablet, TAKE ONE TABLET BY MOUTH DAILY, Disp: 30 tablet, Rfl: 10  •  lactobacillus acidophilus (RISAQUAD) capsule capsule, Take 1 capsule by mouth Daily., Disp: 30 capsule, Rfl: 0  •  lisinopril (PRINIVIL,ZESTRIL) 20 MG tablet, Take 20 mg by mouth Daily., Disp: , Rfl:   •  loratadine (CLARITIN) 10 MG tablet, TAKE ONE TABLET BY MOUTH DAILY, Disp: 30 tablet, Rfl: 4  •  potassium chloride (K-DUR,KLOR-CON) 20 MEQ CR tablet, TAKE ONE TABLET BY MOUTH DAILY, Disp: 90 tablet, Rfl: 3  •  simvastatin (ZOCOR) 10 MG tablet, TAKE ONE TABLET BY MOUTH EVERY NIGHT, Disp: 90 tablet, Rfl: 0  •  sodium chloride (OCEAN) 0.65 % nasal spray, 1 spray into the nostril(s) as directed by provider As Needed for Congestion., Disp: , Rfl:   •  traZODone (DESYREL) 50 MG tablet, TAKE ONE TABLET BY MOUTH ONCE NIGHTLY, Disp: 90 tablet, Rfl: 0  •  Triamcinolone Acetonide (NASACORT ALLERGY 24HR) 55 MCG/ACT nasal inhaler, 2 sprays into the nostril(s) as directed by provider Daily., Disp: , " Rfl:   •  warfarin (COUMADIN) 2.5 MG tablet, TAKE ONE TABLET BY MOUTH DAILY OR AS DIRECTED BY ANTICOAGULATION PHARMACIST, Disp: 90 tablet, Rfl: 1    PE    Physical Exam   Constitutional: He appears well-developed and well-nourished. No distress.   HENT:   Head: Normocephalic.   Nose: Nose normal.   Cardiovascular: Normal rate, regular rhythm and normal heart sounds.   No murmur heard.  Pulmonary/Chest: Effort normal and breath sounds normal.   Neurological: He is alert.   Psychiatric: He has a normal mood and affect. His behavior is normal.       Results    Results for orders placed or performed in visit on 04/24/19   POC Protime / INR   Result Value Ref Range    Protime 33.8 (H) 10.0 - 13.8 seconds    INR 2.8 (H) 0.91 - 1.09       A/P    Problem List Items Addressed This Visit        Cardiovascular and Mediastinum    Permanent atrial fibrillation (CMS/HCC) - Primary  Recently met with his cardiologist in March.     Overview     1. Permanent atrial fibrillation:  a. Anticoagulated on Coumadin.   b. Rate controlled with diltiazem.         Essential hypertension  -Good control. Pt did not have labs requested in November. We will obtain them today    Relevant Orders    Basic Metabolic Panel    Coronary artery disease    Overview     2. Coronary artery disease:  a. Preserved LV systolic function.   b. CABG for 3-vessel disease, July 2006.  -Currently asymptomatic          Other    Seasonal allergies    Overview     -Seasonal allergies/rhinitis. Unable to tolerate additional nasal sprays. Pt is unsure if he is taking claritin.   -His wife states she is going to make sure it is included in his medications             Plan of care reviewed with patient at the conclusion of today's visit. Education was provided regarding diagnosis, management and any prescribed or recommended OTC medications.  Patient verbalizes understanding of and agreement with management plan.        ZULY Sparrow

## 2019-05-20 RX ORDER — TRAZODONE HYDROCHLORIDE 50 MG/1
TABLET ORAL
Qty: 90 TABLET | Refills: 0 | Status: SHIPPED | OUTPATIENT
Start: 2019-05-20 | End: 2019-08-18 | Stop reason: SDUPTHER

## 2019-05-22 ENCOUNTER — ANTICOAGULATION VISIT (OUTPATIENT)
Dept: PHARMACY | Facility: HOSPITAL | Age: 84
End: 2019-05-22

## 2019-05-22 DIAGNOSIS — I48.20 CHRONIC ATRIAL FIBRILLATION (HCC): ICD-10-CM

## 2019-05-22 LAB
INR PPP: 2.9 (ref 0.91–1.09)
PROTHROMBIN TIME: 35 SECONDS (ref 10–13.8)

## 2019-05-22 PROCEDURE — 85610 PROTHROMBIN TIME: CPT

## 2019-05-22 PROCEDURE — G0463 HOSPITAL OUTPT CLINIC VISIT: HCPCS

## 2019-05-22 PROCEDURE — 36416 COLLJ CAPILLARY BLOOD SPEC: CPT

## 2019-05-22 NOTE — PROGRESS NOTES
Anticoagulation Clinic Progress Note  Indication: afib  Referring Provider: Em  Goal INR: 2-3  Current Drug Interactions: aspirin, simvastatin, trazodone  Other: no bleeding per patient  [id number]: 4 (age, HTN, CAD)    Diet: Typically doesn't eat many Vit K foods    Anticoagulation Clinic INR History:  Date 7/5 8/9 8/30 9/18 10/16 11/15 11/29 12/13 1/3   Total Weekly Dose 17.5  mg 17.5 mg 17.5 mg 17.5 mg 17.5 mg 17.5 mg 18.75 mg 18.75mg 20mg   INR 2.2 1.9 2.1 2.0 2.2 1.8 1.9 2.1 2.1   Notes       abx   Keflex     Date 1/8 1/10 1/18 1/25 2/9 3/2 3/16 3/23 4/10   Total Weekly Dose 23.75 mg 23.75 mg 18.75 mg 18.75 mg 18.75 mg 18.75 mg 20mg 21.25 mg 20mg   INR 1.61 1.9 2.5 2.3 2.2 1.7 1.6 2.3 1.9   Notes  hosp Doxy     Doxy start 3/10 Doxy finish 3/20      Date 4/23 5/7 6/4 6/11 6/25 7/10 8/1 8/29 9/19   Total Weekly Dose 21.25mg 21.25 mg 21.25 mg 20 mg 21.25 mg 21.25mg 21.25 mg 18.75 mg 21.25 mg   INR 2.0 2.4 2.4 2.2 3.0 2.7 2.5 2.0 3.7   Notes    Doxy start 6/4 Doxy finish 6/10    1 missed dose      Date 10/3 10/17 10/24 10/31 11/14 12/12 1/9 1/23 2/13   Total Weekly Dose 20 mg? 20mg? 20mg 20mg 20mg 20mg 20mg 20mg 20mg   INR 3.1 3.5 2.4 2.6 2.7 2.9 3.4 2.8 2.7   Notes   Dec GLV?     Less GLV       Date 3/13 3/27 4/24 5/22        Total Weekly Dose 20mg 20mg 20 mg 20mg        INR 3.2 2.7 2.8 2.9        Notes               Clinic Interview:  Verbal Release Authorization signed on 6/25/18 -- may speak with Glenis Lucero (wife), Marge Cox (daughter)  Tablet Strength: 2.5mg tablets      Patient Findings:  Negatives:  Signs/symptoms of thrombosis, Signs/symptoms of bleeding, Laboratory test error suspected, Change in health, Change in alcohol use, Change in activity, Upcoming invasive procedure, Emergency department visit, Upcoming dental procedure, Missed doses, Extra doses, Change in medications, Change in diet/appetite, Hospital admission, Bruising, Other complaints     Plan:   1. INR is therapeutic again today,  so instructed Mr. Lucero to continue warfarin 2.5mg oral daily except 3.75mg on SunWed.  2. RTC in 4 weeks.  3. Written and verbal information provided in clinic. Mr. Lucero expresses understanding with teach back and has no further questions at this time.    Ann Cowden Mayer, PharmD  5/22/2019  1:14 PM

## 2019-05-24 PROBLEM — E55.9 VITAMIN D INSUFFICIENCY: Status: ACTIVE | Noted: 2019-05-24

## 2019-06-06 ENCOUNTER — TELEPHONE (OUTPATIENT)
Dept: FAMILY MEDICINE CLINIC | Facility: CLINIC | Age: 84
End: 2019-06-06

## 2019-06-06 RX ORDER — ALFUZOSIN HYDROCHLORIDE 10 MG/1
10 TABLET, EXTENDED RELEASE ORAL DAILY
Qty: 30 TABLET | Refills: 1 | Status: SHIPPED | OUTPATIENT
Start: 2019-06-06 | End: 2019-08-20 | Stop reason: SDUPTHER

## 2019-06-07 ENCOUNTER — TELEPHONE (OUTPATIENT)
Dept: FAMILY MEDICINE CLINIC | Facility: CLINIC | Age: 84
End: 2019-06-07

## 2019-06-07 NOTE — TELEPHONE ENCOUNTER
Taking Alfluzosin and Aricept together have a possible interaction to include arhythmia and dizziness.

## 2019-06-07 NOTE — TELEPHONE ENCOUNTER
These are OK. Recommend patient monitor for and report dizziness, lightheadedness, or low blood pressures.

## 2019-06-19 ENCOUNTER — ANTICOAGULATION VISIT (OUTPATIENT)
Dept: PHARMACY | Facility: HOSPITAL | Age: 84
End: 2019-06-19

## 2019-06-19 DIAGNOSIS — I48.20 CHRONIC ATRIAL FIBRILLATION (HCC): ICD-10-CM

## 2019-06-19 LAB
INR PPP: 2.9 (ref 0.91–1.09)
INR PPP: 3.1 (ref 0.91–1.09)
PROTHROMBIN TIME: 34.9 SECONDS (ref 10–13.8)
PROTHROMBIN TIME: 37.6 SECONDS (ref 10–13.8)

## 2019-06-19 PROCEDURE — 36416 COLLJ CAPILLARY BLOOD SPEC: CPT

## 2019-06-19 PROCEDURE — 85610 PROTHROMBIN TIME: CPT

## 2019-06-19 PROCEDURE — G0463 HOSPITAL OUTPT CLINIC VISIT: HCPCS

## 2019-06-19 RX ORDER — PREDNISOLONE ACETATE 10 MG/ML
1 SUSPENSION/ DROPS OPHTHALMIC DAILY PRN
COMMUNITY
Start: 2019-05-31

## 2019-06-19 NOTE — PROGRESS NOTES
Anticoagulation Clinic Progress Note  Indication: afib  Referring Provider: Em  Goal INR: 2-3  Current Drug Interactions: aspirin, simvastatin, trazodone  Other: no bleeding per patient  [id number]: 4 (age, HTN, CAD)    Diet: Typically doesn't eat many Vit K foods    Anticoagulation Clinic INR History:  Date 7/5 8/9 8/30 9/18 10/16 11/15 11/29 12/13 1/3   Total Weekly Dose 17.5  mg 17.5 mg 17.5 mg 17.5 mg 17.5 mg 17.5 mg 18.75 mg 18.75mg 20mg   INR 2.2 1.9 2.1 2.0 2.2 1.8 1.9 2.1 2.1   Notes       abx   Keflex     Date 1/8 1/10 1/18 1/25 2/9 3/2 3/16 3/23 4/10   Total Weekly Dose 23.75 mg 23.75 mg 18.75 mg 18.75 mg 18.75 mg 18.75 mg 20mg 21.25 mg 20mg   INR 1.61 1.9 2.5 2.3 2.2 1.7 1.6 2.3 1.9   Notes  hosp Doxy     Doxy start 3/10 Doxy finish 3/20      Date 4/23 5/7 6/4 6/11 6/25 7/10 8/1 8/29 9/19   Total Weekly Dose 21.25mg 21.25 mg 21.25 mg 20 mg 21.25 mg 21.25mg 21.25 mg 18.75 mg 21.25 mg   INR 2.0 2.4 2.4 2.2 3.0 2.7 2.5 2.0 3.7   Notes    doxy start 6/4 doxy finish 6/10    1 missed dose      Date 10/3 10/17 10/24 10/31 11/14 12/12 1/9 1/23 2/13   Total Weekly Dose 20 mg? 20mg? 20mg 20mg 20mg 20mg 20mg 20mg 20mg   INR 3.1 3.5 2.4 2.6 2.7 2.9 3.4 2.8 2.7   Notes   Dec GLV?     Less GLV       Date 3/13 3/27 4/24 5/22 6/19       Total Weekly Dose 20mg 20mg 20 mg 20mg 20mg       INR 3.2 2.7 2.8 2.9 3.1       Notes               Clinic Interview:  Verbal Release Authorization signed on 6/25/18 -- may speak with Glenis Lucero (wife), Marge Cox (daughter)  Tablet Strength: 2.5mg tablets      Patient Findings:  Positives:  Change in medications   Negatives:  Signs/symptoms of thrombosis, Signs/symptoms of bleeding, Laboratory test error suspected, Change in health, Change in alcohol use, Change in activity, Upcoming invasive procedure, Emergency department visit, Upcoming dental procedure, Missed doses, Extra doses, Change in diet/appetite, Hospital admission, Bruising, Other complaints   Comments:  Mr. Lucero  experienced some eye irritation a few weeks ago, so he was prescribed prednisolone eye drops, which he used TID for a week or two -- now complete.      Plan:   1. INR is slightly supratherapeutic. Instructed Mr. Lucero to take 2.5mg tonight and otherwise continue warfarin 2.5mg oral daily except 3.75mg on SunWed.  2. RTC in 3 weeks.  3. Written and verbal information provided in clinic. Mr. Lucero expresses understanding with teach back and has no further questions at this time.    Ann Cowden Mayer, PharmD  6/19/2019  1:12 PM

## 2019-06-24 RX ORDER — SIMVASTATIN 10 MG
TABLET ORAL
Qty: 90 TABLET | Refills: 0 | Status: SHIPPED | OUTPATIENT
Start: 2019-06-24 | End: 2019-09-22 | Stop reason: SDUPTHER

## 2019-07-10 ENCOUNTER — ANTICOAGULATION VISIT (OUTPATIENT)
Dept: PHARMACY | Facility: HOSPITAL | Age: 84
End: 2019-07-10

## 2019-07-10 DIAGNOSIS — I48.20 CHRONIC ATRIAL FIBRILLATION (HCC): ICD-10-CM

## 2019-07-10 LAB
INR PPP: 3.8 (ref 0.91–1.09)
PROTHROMBIN TIME: 45.9 SECONDS (ref 10–13.8)

## 2019-07-10 PROCEDURE — G0463 HOSPITAL OUTPT CLINIC VISIT: HCPCS

## 2019-07-10 PROCEDURE — 36416 COLLJ CAPILLARY BLOOD SPEC: CPT

## 2019-07-10 PROCEDURE — 85610 PROTHROMBIN TIME: CPT

## 2019-07-10 NOTE — PROGRESS NOTES
Anticoagulation Clinic Progress Note  Indication: afib  Referring Provider: Em  Goal INR: 2-3  Current Drug Interactions: aspirin, simvastatin, trazodone  Other: no bleeding per patient  [id number]: 4 (age, HTN, CAD)    Diet: Typically doesn't eat many Vit K foods    Anticoagulation Clinic INR History:  Date 7/5 8/9 8/30 9/18 10/16 11/15 11/29 12/13 1/3   Total Weekly Dose 17.5  mg 17.5 mg 17.5 mg 17.5 mg 17.5 mg 17.5 mg 18.75 mg 18.75mg 20mg   INR 2.2 1.9 2.1 2.0 2.2 1.8 1.9 2.1 2.1   Notes       abx   Keflex     Date 1/8 1/10 1/18 1/25 2/9 3/2 3/16 3/23 4/10   Total Weekly Dose 23.75 mg 23.75 mg 18.75 mg 18.75 mg 18.75 mg 18.75 mg 20mg 21.25 mg 20mg   INR 1.61 1.9 2.5 2.3 2.2 1.7 1.6 2.3 1.9   Notes  hosp Doxy     Doxy start 3/10 Doxy finish 3/20      Date 4/23 5/7 6/4 6/11 6/25 7/10 8/1 8/29 9/19   Total Weekly Dose 21.25mg 21.25 mg 21.25 mg 20 mg 21.25 mg 21.25mg 21.25 mg 18.75 mg 21.25 mg   INR 2.0 2.4 2.4 2.2 3.0 2.7 2.5 2.0 3.7   Notes    doxy start 6/4 doxy finish 6/10    1 missed dose      Date 10/3 10/17 10/24 10/31 11/14 12/12 1/9 1/23 2/13   Total Weekly Dose 20 mg? 20mg? 20mg 20mg 20mg 20mg 20mg 20mg 20mg   INR 3.1 3.5 2.4 2.6 2.7 2.9 3.4 2.8 2.7   Notes   Dec GLV?     Less GLV       Date 3/13 3/27 4/24 5/22 6/19       Total Weekly Dose 20mg 20mg 20 mg 20mg 20mg       INR 3.2 2.7 2.8 2.9 3.1/ 2.9       Notes               Clinic Interview:  Verbal Release Authorization signed on 6/25/18 -- may speak with Glenis Lucero (wife), Marge Cox (daughter)  Tablet Strength: 2.5mg tablets      Patient Findings   Negatives:  Signs/symptoms of thrombosis, Signs/symptoms of bleeding, Laboratory test error suspected, Change in health, Change in alcohol use, Change in activity, Upcoming invasive procedure, Emergency department visit, Upcoming dental procedure, Missed doses, Extra doses, Change in medications, Change in diet/appetite, Hospital admission, Bruising, Other complaints   Comments:  Mr. Lucero reports  that he may have eaten a little less GLV. He reports his appetite has decreased.      Plan:   1. INR is supratherapeutic today. Per the tracker, he had two INR's drawn at his last encounter resulting in an INR of 2.9 and 3.1. Instructed Mr. Lucero to eat a serving of GLV tonight and decrease his dose tonight to 2.5mg and then continue warfarin 2.5mg oral daily except 3.75mg on SunWed.  2. RTC in 2 weeks.  3. Written and verbal information provided in clinic. Mr. Lucero expresses understanding with teach back and has no further questions at this time.    Colleen Nolasco, PharmD  7/10/2019  1:13 PM

## 2019-07-15 RX ORDER — LORATADINE 10 MG/1
TABLET ORAL
Qty: 30 TABLET | Refills: 5 | Status: SHIPPED | OUTPATIENT
Start: 2019-07-15 | End: 2020-01-13

## 2019-07-15 RX ORDER — DONEPEZIL HYDROCHLORIDE 10 MG/1
TABLET, FILM COATED ORAL
Qty: 30 TABLET | Refills: 9 | Status: SHIPPED | OUTPATIENT
Start: 2019-07-15 | End: 2020-05-11

## 2019-07-24 ENCOUNTER — ANTICOAGULATION VISIT (OUTPATIENT)
Dept: PHARMACY | Facility: HOSPITAL | Age: 84
End: 2019-07-24

## 2019-07-24 DIAGNOSIS — I48.20 CHRONIC ATRIAL FIBRILLATION (HCC): ICD-10-CM

## 2019-07-24 LAB
INR PPP: 4 (ref 0.91–1.09)
PROTHROMBIN TIME: 47.7 SECONDS (ref 10–13.8)

## 2019-07-24 PROCEDURE — G0463 HOSPITAL OUTPT CLINIC VISIT: HCPCS

## 2019-07-24 PROCEDURE — 36416 COLLJ CAPILLARY BLOOD SPEC: CPT

## 2019-07-24 PROCEDURE — 85610 PROTHROMBIN TIME: CPT

## 2019-07-24 NOTE — PROGRESS NOTES
Anticoagulation Clinic Progress Note  Indication: afib  Referring Provider: Em  Goal INR: 2-3  Current Drug Interactions: aspirin, simvastatin, trazodone  Other: no bleeding per patient  [id number]: 4 (age, HTN, CAD)    Diet: Typically doesn't eat many Vit K foods    Anticoagulation Clinic INR History:  Date 7/5 8/9 8/30 9/18 10/16 11/15 11/29 12/13 1/3   Total Weekly Dose 17.5  mg 17.5 mg 17.5 mg 17.5 mg 17.5 mg 17.5 mg 18.75 mg 18.75mg 20mg   INR 2.2 1.9 2.1 2.0 2.2 1.8 1.9 2.1 2.1   Notes       abx   Keflex     Date 1/8 1/10 1/18 1/25 2/9 3/2 3/16 3/23 4/10   Total Weekly Dose 23.75 mg 23.75 mg 18.75 mg 18.75 mg 18.75 mg 18.75 mg 20mg 21.25 mg 20mg   INR 1.61 1.9 2.5 2.3 2.2 1.7 1.6 2.3 1.9   Notes  hosp Doxy     Doxy start 3/10 Doxy finish 3/20      Date 4/23 5/7 6/4 6/11 6/25 7/10 8/1 8/29 9/19   Total Weekly Dose 21.25mg 21.25 mg 21.25 mg 20 mg 21.25 mg 21.25mg 21.25 mg 18.75 mg 21.25 mg   INR 2.0 2.4 2.4 2.2 3.0 2.7 2.5 2.0 3.7   Notes    doxy start 6/4 doxy finish 6/10    1 missed dose      Date 10/3 10/17 10/24 10/31 11/14 12/12 1/9 1/23 2/13   Total Weekly Dose 20 mg? 20mg? 20mg 20mg 20mg 20mg 20mg 20mg 20mg   INR 3.1 3.5 2.4 2.6 2.7 2.9 3.4 2.8 2.7   Notes   Dec GLV?     Less GLV       Date 3/13 3/27 4/24 5/22 6/19 7/10 7/24     Total Weekly Dose 20mg 20mg 20 mg 20mg 20mg 20mg      INR 3.2 2.7 2.8 2.9 3.1/ 2.9 3.8 4.0     Notes               Clinic Interview:  Verbal Release Authorization signed on 6/25/18 -- may speak with Glenis Lucero (wife), Marge Cox (daughter)  Tablet Strength: 2.5mg tablets      Patient Findings   Negatives:  Signs/symptoms of thrombosis, Signs/symptoms of bleeding, Laboratory test error suspected, Change in health, Change in alcohol use, Change in activity, Upcoming invasive procedure, Emergency department visit, Upcoming dental procedure, Missed doses, Extra doses, Change in medications, Change in diet/appetite, Hospital admission, Bruising, Other complaints   Comments:   Mr. Lucero reports that he may have eaten a little less GLV. He reports his appetite has decreased.      Plan:   1. INR is supratherapeutic today. Per the tracker, he had two INR's drawn at his last encounter resulting in an INR of 2.9 and 3.1. Instructed Mr. Lucero to eat a serving of GLV tonight and decrease his dose tonight to 2.5mg and then continue warfarin 2.5mg oral daily except 3.75mg on SunWed.  2. RTC in 2 weeks.  3. Written and verbal information provided in clinic. Mr. Lucero expresses understanding with teach back and has no further questions at this time.

## 2019-07-24 NOTE — PROGRESS NOTES
Anticoagulation Clinic Progress Note  Indication: afib  Referring Provider: Em  Goal INR: 2-3  Current Drug Interactions: aspirin, simvastatin, trazodone  Other: no bleeding per patient  [id number]: 4 (age, HTN, CAD)    Diet: Typically doesn't eat many Vit K foods    Anticoagulation Clinic INR History:  Date 7/5 8/9 8/30 9/18 10/16 11/15 11/29 12/13 1/3   Total Weekly Dose 17.5  mg 17.5 mg 17.5 mg 17.5 mg 17.5 mg 17.5 mg 18.75 mg 18.75mg 20mg   INR 2.2 1.9 2.1 2.0 2.2 1.8 1.9 2.1 2.1   Notes       abx   Keflex     Date 1/8 1/10 1/18 1/25 2/9 3/2 3/16 3/23 4/10   Total Weekly Dose 23.75 mg 23.75 mg 18.75 mg 18.75 mg 18.75 mg 18.75 mg 20mg 21.25 mg 20mg   INR 1.61 1.9 2.5 2.3 2.2 1.7 1.6 2.3 1.9   Notes  hosp Doxy     Doxy start 3/10 Doxy finish 3/20      Date 4/23 5/7 6/4 6/11 6/25 7/10 8/1 8/29 9/19   Total Weekly Dose 21.25mg 21.25 mg 21.25 mg 20 mg 21.25 mg 21.25mg 21.25 mg 18.75 mg 21.25 mg   INR 2.0 2.4 2.4 2.2 3.0 2.7 2.5 2.0 3.7   Notes    doxy start 6/4 doxy finish 6/10    1 missed dose      Date 10/3 10/17 10/24 10/31 11/14 12/12 1/9 1/23 2/13   Total Weekly Dose 20 mg? 20mg? 20mg 20mg 20mg 20mg 20mg 20mg 20mg   INR 3.1 3.5 2.4 2.6 2.7 2.9 3.4 2.8 2.7   Notes   Dec GLV?     Less GLV       Date 3/13 3/27 4/24 5/22 6/19 7/10 7/24     Total Weekly Dose 20mg 20mg 20 mg 20mg 20mg 20mg 20mg     INR 3.2 2.7 2.8 2.9 3.1/ 2.9 3.8 4.0     Notes               Clinic Interview:  Verbal Release Authorization signed on 6/25/18 -- may speak with Glenis Lucero (wife), Marge Cox (daughter)  Tablet Strength: 2.5mg tablets      Patient Findings   Negatives:  Signs/symptoms of thrombosis, Signs/symptoms of bleeding, Laboratory test error suspected, Change in health, Change in alcohol use, Change in activity, Upcoming invasive procedure, Emergency department visit, Upcoming dental procedure, Missed doses, Extra doses, Change in medications, Change in diet/appetite, Hospital admission, Bruising, Other complaints     Plan:    1. INR is supratherapeutic today. Mr. Lucero to eat a serving of GLV tonight and decrease his dose tonight to 1.25mg and then continue warfarin 2.5mg daily.  2. RTC in 1 week.  3. Written and verbal information provided in clinic. Mr. Lucero expresses understanding with teach back and has no further questions at this time.      Yossi BlackD.  07/24/19   1:30 PM

## 2019-08-01 ENCOUNTER — ANTICOAGULATION VISIT (OUTPATIENT)
Dept: PHARMACY | Facility: HOSPITAL | Age: 84
End: 2019-08-01

## 2019-08-01 DIAGNOSIS — I48.20 CHRONIC ATRIAL FIBRILLATION (HCC): ICD-10-CM

## 2019-08-01 LAB
INR PPP: 3 (ref 0.91–1.09)
PROTHROMBIN TIME: 35.9 SECONDS (ref 10–13.8)

## 2019-08-01 PROCEDURE — 85610 PROTHROMBIN TIME: CPT

## 2019-08-01 PROCEDURE — G0463 HOSPITAL OUTPT CLINIC VISIT: HCPCS

## 2019-08-01 PROCEDURE — 36416 COLLJ CAPILLARY BLOOD SPEC: CPT

## 2019-08-01 NOTE — PROGRESS NOTES
Anticoagulation Clinic Progress Note  Indication: afib  Referring Provider: Em  Goal INR: 2-3  Current Drug Interactions: aspirin, simvastatin, trazodone  Other: no bleeding per patient  [id number]: 4 (age, HTN, CAD)    Diet: Typically doesn't eat many Vit K foods    Anticoagulation Clinic INR History:  Date 7/5 8/9 8/30 9/18 10/16 11/15 11/29 12/13 1/3   Total Weekly Dose 17.5  mg 17.5 mg 17.5 mg 17.5 mg 17.5 mg 17.5 mg 18.75 mg 18.75mg 20mg   INR 2.2 1.9 2.1 2.0 2.2 1.8 1.9 2.1 2.1   Notes       abx   Keflex     Date 1/8 1/10 1/18 1/25 2/9 3/2 3/16 3/23 4/10   Total Weekly Dose 23.75 mg 23.75 mg 18.75 mg 18.75 mg 18.75 mg 18.75 mg 20mg 21.25 mg 20mg   INR 1.61 1.9 2.5 2.3 2.2 1.7 1.6 2.3 1.9   Notes  hosp Doxy     Doxy start 3/10 Doxy finish 3/20      Date 4/23 5/7 6/4 6/11 6/25 7/10 8/1 8/29 9/19   Total Weekly Dose 21.25mg 21.25 mg 21.25 mg 20 mg 21.25 mg 21.25mg 21.25 mg 18.75 mg 21.25 mg   INR 2.0 2.4 2.4 2.2 3.0 2.7 2.5 2.0 3.7   Notes    doxy start 6/4 doxy finish 6/10    1 missed dose      Date 10/3 10/17 10/24 10/31 11/14 12/12 1/9 1/23 2/13   Total Weekly Dose 20 mg? 20mg? 20mg 20mg 20mg 20mg 20mg 20mg 20mg   INR 3.1 3.5 2.4 2.6 2.7 2.9 3.4 2.8 2.7   Notes   Dec GLV?     Less GLV       Date 3/13 3/27 4/24 5/22 6/19 7/10 7/24 8/1    Total Weekly Dose 20mg 20mg 20 mg 20mg 20mg 20mg 20mg 17.5mg    INR 3.2 2.7 2.8 2.9 3.1/ 2.9 3.8 4.0 3.0    Notes               Clinic Interview:  Verbal Release Authorization signed on 6/25/18 -- may speak with Glenis Lucero (wife), Marge Cox (daughter)  Tablet Strength: 2.5mg tablets     Patient Findings   Negatives:  Signs/symptoms of thrombosis, Signs/symptoms of bleeding, Laboratory test error suspected, Change in health, Change in alcohol use, Change in activity, Upcoming invasive procedure, Emergency department visit, Upcoming dental procedure, Missed doses, Extra doses, Change in medications, Change in diet/appetite, Hospital admission, Bruising, Other  complaints   Comments:  Mr. Lucero reports that he has been congested more recently. Encouraged him to be sure to discuss with his doctor how to clean his CPAP machine appropriately. He used to wash it once weekly but isn't doing this anymore. Discussed that possibility of getting sick if a machine isn't cleaned appropriately. He did get a new mask a week ago. He was able to confirm dosing since his last appointment. He reports he has had a few extra green vegetables this last week.      Plan:   1. INR is therapeutic. Mr. Lucero to continue warfarin 2.5mg daily. If patient becomes SUBtherapeutic consider dose increase to 3.75mg once weekly (18.75mg).  2. RTC in 1.5 weeks.  3. Written and verbal information provided in clinic. Mr. Lucero expresses understanding with teach back and has no further questions at this time.    Colleen Nolasco, PharmD  08/01/19   1:14 PM

## 2019-08-12 ENCOUNTER — ANTICOAGULATION VISIT (OUTPATIENT)
Dept: PHARMACY | Facility: HOSPITAL | Age: 84
End: 2019-08-12

## 2019-08-12 DIAGNOSIS — I48.20 CHRONIC ATRIAL FIBRILLATION (HCC): ICD-10-CM

## 2019-08-12 LAB
INR PPP: 2.3 (ref 0.91–1.09)
PROTHROMBIN TIME: 28.1 SECONDS (ref 10–13.8)

## 2019-08-12 PROCEDURE — 85610 PROTHROMBIN TIME: CPT

## 2019-08-12 PROCEDURE — 36416 COLLJ CAPILLARY BLOOD SPEC: CPT

## 2019-08-12 PROCEDURE — G0463 HOSPITAL OUTPT CLINIC VISIT: HCPCS

## 2019-08-12 NOTE — PROGRESS NOTES
Anticoagulation Clinic Progress Note  Indication: afib  Referring Provider: Em  Goal INR: 2-3  Current Drug Interactions: aspirin, simvastatin, trazodone  Other: no bleeding per patient  [id number]: 4 (age, HTN, CAD)    Diet: Typically doesn't eat many Vit K foods    Anticoagulation Clinic INR History:  Date 7/5 8/9 8/30 9/18 10/16 11/15 11/29 12/13 1/3   Total Weekly Dose 17.5  mg 17.5 mg 17.5 mg 17.5 mg 17.5 mg 17.5 mg 18.75 mg 18.75mg 20mg   INR 2.2 1.9 2.1 2.0 2.2 1.8 1.9 2.1 2.1   Notes       abx   Keflex     Date 1/8 1/10 1/18 1/25 2/9 3/2 3/16 3/23 4/10   Total Weekly Dose 23.75 mg 23.75 mg 18.75 mg 18.75 mg 18.75 mg 18.75 mg 20mg 21.25 mg 20mg   INR 1.61 1.9 2.5 2.3 2.2 1.7 1.6 2.3 1.9   Notes  hosp Doxy     Doxy start 3/10 Doxy finish 3/20      Date 4/23 5/7 6/4 6/11 6/25 7/10 8/1 8/29 9/19   Total Weekly Dose 21.25mg 21.25 mg 21.25 mg 20 mg 21.25 mg 21.25mg 21.25 mg 18.75 mg 21.25 mg   INR 2.0 2.4 2.4 2.2 3.0 2.7 2.5 2.0 3.7   Notes    doxy start 6/4 doxy finish 6/10    1 missed dose      Date 10/3 10/17 10/24 10/31 11/14 12/12 1/9 1/23 2/13   Total Weekly Dose 20 mg? 20mg? 20mg 20mg 20mg 20mg 20mg 20mg 20mg   INR 3.1 3.5 2.4 2.6 2.7 2.9 3.4 2.8 2.7   Notes   Dec GLV?     Less GLV       Date 3/13 3/27 4/24 5/22 6/19 7/10 7/24 8/1 8/12   Total Weekly Dose 20mg 20mg 20 mg 20mg 20mg 20mg 20mg 17.5mg 17.5 mg   INR 3.2 2.7 2.8 2.9 3.1/ 2.9 3.8 4.0 3.0 2.3   Notes               Clinic Interview:  Verbal Release Authorization signed on 6/25/18 -- may speak with Glenis Lucero (wife), Marge Cox (daughter)  Tablet Strength: 2.5mg tablets     Patient Findings   Negatives:  Signs/symptoms of thrombosis, Signs/symptoms of bleeding, Laboratory test error suspected, Change in health, Change in alcohol use, Change in activity, Upcoming invasive procedure, Emergency department visit, Upcoming dental procedure, Missed doses, Extra doses, Change in medications, Change in diet/appetite, Hospital admission, Bruising,  Other complaints   Comments:  He stated that he has been eating some greens.  He stated that his mind is not what it used to be; therefore, he has a system that he follows to help him take his medications as prescribed.     Plan:   1. INR is therapeutic today at 2.3. Instructed Mr. Lucero to continue warfarin 2.5mg oral daily.    If patient becomes SUBtherapeutic consider dose increase to 3.75mg once weekly (18.75mg).  2. RTC in 3 weeks on 9/3 per patient request.  3. Written and verbal information provided in clinic. Mr. Lucero expresses understanding with teach back and has no further questions at this time.    Lizzeth Abraham, PharmD  08/12/19   1:04 PM

## 2019-08-19 RX ORDER — TRAZODONE HYDROCHLORIDE 50 MG/1
TABLET ORAL
Qty: 90 TABLET | Refills: 1 | Status: SHIPPED | OUTPATIENT
Start: 2019-08-19 | End: 2020-02-12

## 2019-08-20 RX ORDER — ALFUZOSIN HYDROCHLORIDE 10 MG/1
TABLET, EXTENDED RELEASE ORAL
Qty: 30 TABLET | Refills: 0 | Status: SHIPPED | OUTPATIENT
Start: 2019-08-20 | End: 2019-09-22 | Stop reason: SDUPTHER

## 2019-09-03 ENCOUNTER — ANTICOAGULATION VISIT (OUTPATIENT)
Dept: PHARMACY | Facility: HOSPITAL | Age: 84
End: 2019-09-03

## 2019-09-03 DIAGNOSIS — I48.20 CHRONIC ATRIAL FIBRILLATION (HCC): ICD-10-CM

## 2019-09-03 LAB
INR PPP: 2.3 (ref 0.91–1.09)
PROTHROMBIN TIME: 27.7 SECONDS (ref 10–13.8)

## 2019-09-03 PROCEDURE — 36416 COLLJ CAPILLARY BLOOD SPEC: CPT

## 2019-09-03 PROCEDURE — G0463 HOSPITAL OUTPT CLINIC VISIT: HCPCS

## 2019-09-03 PROCEDURE — 85610 PROTHROMBIN TIME: CPT

## 2019-09-03 NOTE — PROGRESS NOTES
Anticoagulation Clinic Progress Note  Indication: afib  Referring Provider: Em  Goal INR: 2-3  Current Drug Interactions: aspirin, simvastatin, trazodone  Other: no bleeding per patient  [id number]: 4 (age, HTN, CAD)    Diet: typically doesn't eat many Vit K foods    Anticoagulation Clinic INR History:  Date 1/8 1/10 1/18 1/25 2/9 3/2 3/16 3/23 4/10   Total Weekly Dose 23.75 mg 23.75 mg 18.75 mg 18.75 mg 18.75 mg 18.75 mg 20mg 21.25 mg 20mg   INR 1.61 1.9 2.5 2.3 2.2 1.7 1.6 2.3 1.9   Notes  hosp Doxy     Doxy start 3/10 Doxy finish 3/20      Date 4/23 5/7 6/4 6/11 6/25 7/10 8/1 8/29 9/19   Total Weekly Dose 21.25mg 21.25 mg 21.25 mg 20 mg 21.25 mg 21.25mg 21.25 mg 18.75 mg 21.25 mg   INR 2.0 2.4 2.4 2.2 3.0 2.7 2.5 2.0 3.7   Notes    doxy start 6/4 doxy finish 6/10    1 missed dose      Date 10/3 10/17 10/24 10/31 11/14 12/12 1/9 1/23 2/13   Total Weekly Dose 20 mg? 20mg? 20mg 20mg 20mg 20mg 20mg 20mg 20mg   INR 3.1 3.5 2.4 2.6 2.7 2.9 3.4 2.8 2.7   Notes   dec GLV     dec GLV       Date 3/13 3/27 4/24 5/22 6/19 7/10 7/24 8/1 8/12   Total Weekly Dose 20mg 20mg 20 mg 20mg 20mg 20mg 20mg 17.5 mg 17.5 mg   INR 3.2 2.7 2.8 2.9 3.1/ 2.9 3.8 4.0 3.0 2.3   Notes               Date 9/3           Total Weekly Dose 17.5 mg  ???           INR 2.3           Notes               Clinic Interview:  Verbal Release Authorization signed on 6/25/18 -- may speak with Glenis Lucero (wife), Marge Cox (daughter)  Tablet Strength: 2.5mg tablets     Patient Findings:  Positives:  Missed doses, Extra doses   Negatives:  Signs/symptoms of thrombosis, Signs/symptoms of bleeding, Laboratory test error suspected, Change in health, Change in alcohol use, Change in activity, Upcoming invasive procedure, Emergency department visit, Upcoming dental procedure, Change in medications, Change in diet/appetite, Hospital admission, Bruising, Other complaints   Comments:  Mr. Lucero recalls taking an extra half tablet on one day, or one day each  week since our last encounter. He also notes he missed a dose of warfarin this past week and took a half tablet the next morning to make up for it. Given the uncertainty re: recent dosing, will have him RTC sooner.      Plan:   1. INR is therapeutic again today, stable at 2.3. Mr. Lucero is somewhat confused about his recent dosing, so for now, will have him continue warfarin 2.5mg oral daily.  2. RTC in two weeks to reassess.   3. Written and verbal information provided in clinic. Mr. Lucero expresses understanding with teach back and has no further questions at this time.    Stefanie Hogue, [facility]  09/03/19   1:13 PM

## 2019-09-04 ENCOUNTER — OFFICE VISIT (OUTPATIENT)
Dept: NEUROLOGY | Facility: CLINIC | Age: 84
End: 2019-09-04

## 2019-09-04 VITALS
WEIGHT: 208 LBS | HEIGHT: 70 IN | SYSTOLIC BLOOD PRESSURE: 118 MMHG | DIASTOLIC BLOOD PRESSURE: 76 MMHG | BODY MASS INDEX: 29.78 KG/M2

## 2019-09-04 DIAGNOSIS — G31.84 MILD COGNITIVE IMPAIRMENT: Primary | ICD-10-CM

## 2019-09-04 PROCEDURE — 99214 OFFICE O/P EST MOD 30 MIN: CPT | Performed by: PHYSICIAN ASSISTANT

## 2019-09-04 NOTE — PROGRESS NOTES
"Subjective     Chief Complaint: memory loss      History of Present Illness   Mike Lucero Jr. is a 87 y.o. male who returns to clinic today with a history of possible Mild Cognitive Impairment (MCI) . He was previously followed by Dr. Whyte. He has noted symptoms for at least several years marked initially by forgetfulness as well as naming and word-finding difficulties. This has gradually worsened  over time. Additional symptoms have included impairments in orientation and executive function. There have been no associated  symptoms of anxiety and depression. He is currently residing at home with wife.      Prior evaluation has included screening blood work  and an MRI of the brain which were essentially unremarkable. He has been intolerant of memantine. He remains on donepezil.     It should be noted that he has a history of RELL and has recently had his CPAP pressure adjusted after a titration study in 4/17 and is followed by Dr. Vieira.    Today: Since his last visit in 3/19, he feels essentially unchanged cognitively. He and his family continue to deny any ADL impairments.       I have reviewed and confirmed the past family, social and medical history as accurate on 9/4/19.     Review of Systems   Constitutional: Negative.    HENT: Negative.    Eyes: Negative.    Respiratory: Negative.    Cardiovascular: Negative.    Gastrointestinal: Negative.    Endocrine: Negative.    Genitourinary: Negative.    Musculoskeletal: Negative.    Skin: Negative.    Allergic/Immunologic: Negative.    Neurological:        Memory loss    Hematological: Negative.    Psychiatric/Behavioral: Negative.        Objective     Ht 177.8 cm (70\")   BMI 29.99 kg/m²     General appearance today is normal.       Physical Exam   Constitutional: He is oriented to person, place, and time.   Neurological: He is oriented to person, place, and time. He has normal strength. He has a normal Finger-Nose-Finger Test.   Psychiatric: His speech " is normal.        Neurologic Exam     Mental Status   Oriented to person, place, and time.   Registration: recalls 3 of 3 objects. Recall at 5 minutes: recalls 1 of 3 objects. Follows 3 step commands.   Attention: normal.   Speech: speech is normal   Level of consciousness: alert  Able to name object. Able to read. Able to repeat. Able to write. Normal comprehension.     Cranial Nerves   Cranial nerves II through XII intact.     Motor Exam   Muscle bulk: normal  Overall muscle tone: normal    Strength   Strength 5/5 throughout.     Sensory Exam   Light touch normal.     Gait, Coordination, and Reflexes     Coordination   Finger to nose coordination: normal    Tremor   Resting tremor: absent        Results  MMSE=26 (unchanged)      Assessment/Plan   Mike was seen today for memory loss.    Diagnoses and all orders for this visit:    Mild cognitive impairment          Discussion/Summary   Mike Lucero Jr. returns to clinic today for evaluation of Mild Cognitive Impairment (MCI) . I again reviewed his current status and treatment options. After discussing potential treatment options, it was elected to continue on  donepezil unchanged. I also discussed cognitive rehabilitation, which he will consider in the future. He will then follow up in 1 year, or sooner if needed.   I spent 25 minutes face to face with the patient and family with 20 minutes spent on discussing diagnosis, evaluation, current status, treatment options and management as discussed above.       As part of this visit I obtained additional history from the family which is incorporated in the HPI.      Snehal Lemus PA-C

## 2019-09-16 RX ORDER — WARFARIN SODIUM 2.5 MG/1
TABLET ORAL
Qty: 90 TABLET | Refills: 0 | OUTPATIENT
Start: 2019-09-16

## 2019-09-17 ENCOUNTER — ANTICOAGULATION VISIT (OUTPATIENT)
Dept: PHARMACY | Facility: HOSPITAL | Age: 84
End: 2019-09-17

## 2019-09-17 DIAGNOSIS — I48.20 CHRONIC ATRIAL FIBRILLATION (HCC): ICD-10-CM

## 2019-09-17 LAB
INR PPP: 2.9 (ref 0.91–1.09)
PROTHROMBIN TIME: 34.9 SECONDS (ref 10–13.8)

## 2019-09-17 PROCEDURE — G0463 HOSPITAL OUTPT CLINIC VISIT: HCPCS

## 2019-09-17 PROCEDURE — 36416 COLLJ CAPILLARY BLOOD SPEC: CPT

## 2019-09-17 PROCEDURE — 85610 PROTHROMBIN TIME: CPT

## 2019-09-17 RX ORDER — WARFARIN SODIUM 2.5 MG/1
TABLET ORAL
Qty: 90 TABLET | Refills: 1 | Status: SHIPPED | OUTPATIENT
Start: 2019-09-17 | End: 2020-03-13

## 2019-09-17 NOTE — PROGRESS NOTES
Anticoagulation Clinic Progress Note  Indication: afib  Referring Provider: Em  Goal INR: 2-3  Current Drug Interactions: aspirin, simvastatin, trazodone  Other: no bleeding per patient  [id number]: 4 (age, HTN, CAD)    Diet: typically doesn't eat many Vit K foods    Anticoagulation Clinic INR History:  Date 1/8 1/10 1/18 1/25 2/9 3/2 3/16 3/23 4/10   Total Weekly Dose 23.75 mg 23.75 mg 18.75 mg 18.75 mg 18.75 mg 18.75 mg 20mg 21.25 mg 20mg   INR 1.61 1.9 2.5 2.3 2.2 1.7 1.6 2.3 1.9   Notes  hosp Doxy     Doxy start 3/10 Doxy finish 3/20      Date 4/23 5/7 6/4 6/11 6/25 7/10 8/1 8/29 9/19   Total Weekly Dose 21.25mg 21.25 mg 21.25 mg 20 mg 21.25 mg 21.25mg 21.25 mg 18.75 mg 21.25 mg   INR 2.0 2.4 2.4 2.2 3.0 2.7 2.5 2.0 3.7   Notes    doxy start 6/4 doxy finish 6/10    1 missed dose      Date 10/3 10/17 10/24 10/31 11/14 12/12 1/9 1/23 2/13   Total Weekly Dose 20 mg? 20mg? 20mg 20mg 20mg 20mg 20mg 20mg 20mg   INR 3.1 3.5 2.4 2.6 2.7 2.9 3.4 2.8 2.7   Notes   dec GLV     dec GLV       Date 3/13 3/27 4/24 5/22 6/19 7/10 7/24 8/1 8/12   Total Weekly Dose 20mg 20mg 20 mg 20mg 20mg 20mg 20mg 17.5 mg 17.5 mg   INR 3.2 2.7 2.8 2.9 3.1/ 2.9 3.8 4.0 3.0 2.3   Notes               Date 9/3 9/17          Total Weekly Dose 17.5 mg  ??? 17.5mg          INR 2.3 2.9          Notes               Clinic Interview:  Verbal Release Authorization signed on 6/25/18 -- may speak with Glenis Lucero (wife), Marge Suvan (daughter)  Tablet Strength: 2.5mg tablets     Patient Findings:  Negatives:  Signs/symptoms of thrombosis, Signs/symptoms of bleeding, Laboratory test error suspected, Change in health, Change in alcohol use, Change in activity, Upcoming invasive procedure, Emergency department visit, Upcoming dental procedure, Missed doses, Extra doses, Change in medications, Change in diet/appetite, Hospital admission, Bruising, Other complaints   Comments:  Patient requesting a refill for warfarin, he has ~3days left.   Patient is  trying to eat less throughout the day. No changes in meds       Plan:   1. INR is therapeutic again today, stable at 2.9. Instructed Mr. Lucero to continue warfarin 2.5mg oral daily and to have a salad tonight.  Refill sent to [facility] in [facility].  2. RTC in three weeks to reassess.   3. Written and verbal information provided in clinic. Mr. Lucero expresses understanding with teach back and has no further questions at this time.      Deepika Bird, PharmD  Pharmacy Resident   9/17/2019  1:35 PM

## 2019-09-23 RX ORDER — SIMVASTATIN 10 MG
TABLET ORAL
Qty: 90 TABLET | Refills: 0 | Status: SHIPPED | OUTPATIENT
Start: 2019-09-23 | End: 2019-12-18 | Stop reason: SDUPTHER

## 2019-09-23 RX ORDER — ALFUZOSIN HYDROCHLORIDE 10 MG/1
TABLET, EXTENDED RELEASE ORAL
Qty: 30 TABLET | Refills: 0 | Status: SHIPPED | OUTPATIENT
Start: 2019-09-23 | End: 2019-10-24 | Stop reason: SDUPTHER

## 2019-10-02 ENCOUNTER — OFFICE VISIT (OUTPATIENT)
Dept: CARDIOLOGY | Facility: CLINIC | Age: 84
End: 2019-10-02

## 2019-10-02 VITALS
HEIGHT: 68 IN | OXYGEN SATURATION: 97 % | DIASTOLIC BLOOD PRESSURE: 68 MMHG | HEART RATE: 60 BPM | SYSTOLIC BLOOD PRESSURE: 132 MMHG | WEIGHT: 208.8 LBS | BODY MASS INDEX: 31.64 KG/M2

## 2019-10-02 DIAGNOSIS — I25.10 CORONARY ARTERY DISEASE INVOLVING NATIVE CORONARY ARTERY OF NATIVE HEART WITHOUT ANGINA PECTORIS: Primary | ICD-10-CM

## 2019-10-02 DIAGNOSIS — I10 ESSENTIAL HYPERTENSION: ICD-10-CM

## 2019-10-02 DIAGNOSIS — E78.2 MIXED HYPERLIPIDEMIA: ICD-10-CM

## 2019-10-02 DIAGNOSIS — I48.21 PERMANENT ATRIAL FIBRILLATION (HCC): ICD-10-CM

## 2019-10-02 PROCEDURE — 99214 OFFICE O/P EST MOD 30 MIN: CPT | Performed by: INTERNAL MEDICINE

## 2019-10-02 NOTE — PROGRESS NOTES
[facility] at [facility]  Office visit  Mike Lucero Jr.  7/29/1932  [phone]  VISIT DATE:  10/2/2019      PCP: Fabien Merino, [address] [facility] [address]    CC:  Chief Complaint   Patient presents with   • PAF       PROBLEM LIST:  1.  Coronary artery disease:  a. Preserved LV systolic function.   b.  CABG for 3-vessel disease, July 2006.  2. Paroxysmal atrial fibrillation,  anticoagulated on Coumadin.   3. Benign hypertension.   4. Hypercholesterolemia.   5. Endocarditis, September 2009, treated with IV antibiotics:  a.  MILTON by Dr. Valladares, 09/11/2009:  Small nodular nonpedunculated density, possibly consistent with small vegetation of the posterior leaflet of the mitral valve.  b. Previously treated with Dr. Vann.  6. Remote tobacco abuse.   7. Osteoarthritis.   8. Cataracts.   9. Seasonal allergies/rhinitis.   10. Overmedication reaction secondary to temazepam with neurologic  effects now resolved      ASSESSMENT:   Diagnosis Plan   1. Coronary artery disease involving native coronary artery of native heart without angina pectoris     2. Permanent atrial fibrillation     3. Mixed hyperlipidemia     4. Essential hypertension         PLAN:  Coronary artery disease: Continue low-dose aspirin, statin and afterload reduction  Permanent atrial fibrillation: Continue oral anticoagulation with a goal INR of 2-3 for stroke prophylaxis, Continue rate control with diltiazem  Hypertension: Goal less than 130/80 mmHg.  Currently well-controlled.  Continue current antihypertensive regimen  Hyperlipidemia: Continue current statin therapy, goal LDL less than 100.  Currently controlled, continue current dose of simvastatin.    Subjective  Reports stable functional capacity.  Denies dyspnea on exertion or chest pain.  No PND or orthopnea.  Blood pressures up and running less than 140/90 mmHg.  He denies myalgias on statin therapy. He is compliant with medical therapy.   "Denies easy bruising or bleeding complications on Eliquis.  Uses a pill counter to make sure that he is taking his medications as directed, his wife helps him as well.  He is not exercising on a regular basis like he used to  But denies any significant limitation.    PHYSICAL EXAMINATION:  Vitals:    10/02/19 1312   BP: 132/68   BP Location: Right arm   Patient Position: Sitting   Pulse: 60   SpO2: 97%   Weight: 94.7 kg (208 lb 12.8 oz)   Height: 171.5 cm (67.5\")     General Appearance:    Alert, cooperative, no distress, appears stated age   Head:    Normocephalic, without obvious abnormality, atraumatic   Eyes:    conjunctiva/corneas clear   Nose:   Nares normal, septum midline, mucosa normal, no drainage   Throat:   Lips, teeth and gums normal   Neck:   Supple, symmetrical, trachea midline, no carotid    bruit or JVD   Lungs:     Clear to auscultation bilaterally, respirations unlabored   Chest Wall:    No tenderness or deformity    Heart:   Irregularly irregular, S1 and S2 normal, no murmur, rub   or gallop, normal carotid impulse bilaterally without bruit.   Abdomen:     Soft, non-tender   Extremities:   Extremities normal, atraumatic, no cyanosis or edema   Pulses:   2+ and symmetric all extremities   Skin:   Skin color, texture, turgor normal, no rashes or lesions       Diagnostic Data:  Procedures  Lab Results   Component Value Date    TRIG 137 09/21/2018    HDL 40 09/21/2018     Lab Results   Component Value Date    GLUCOSE 99 05/13/2019    BUN 12 05/13/2019    CREATININE 0.97 05/13/2019     05/13/2019    K 4.2 05/13/2019    CL 97 (L) 05/13/2019    CO2 31.6 (H) 05/13/2019     Lab Results   Component Value Date    HGBA1C 5.50 10/23/2017     Lab Results   Component Value Date    WBC 8.87 09/21/2018    HGB 16.5 09/21/2018    HCT 49.4 09/21/2018     09/21/2018       Allergies  Allergies   Allergen Reactions   • Rocephin [Ceftriaxone] Angioedema     Tongue and lip swelling, sore throat   • Temazepam " "Other (See Comments)     Caused pt to \"sleep drive\"   • Wellbutrin [Bupropion] Mental Status Change and Dizziness     Memory loss       Current Medications    Current Outpatient Medications:   •  alfuzosin (UROXATRAL) 10 MG 24 hr tablet, TAKE ONE TABLET BY MOUTH DAILY, Disp: 30 tablet, Rfl: 0  •  aspirin 81 MG EC tablet, Take 81 mg by mouth Daily., Disp: , Rfl:   •  CARTIA  MG 24 hr capsule, TAKE ONE CAPSULE BY MOUTH DAILY, Disp: 90 capsule, Rfl: 3  •  donepezil (ARICEPT) 10 MG tablet, TAKE ONE TABLET BY MOUTH DAILY, Disp: 30 tablet, Rfl: 9  •  lactobacillus acidophilus (RISAQUAD) capsule capsule, Take 1 capsule by mouth Daily., Disp: 30 capsule, Rfl: 0  •  lisinopril (PRINIVIL,ZESTRIL) 20 MG tablet, Take 20 mg by mouth Daily., Disp: , Rfl:   •  loratadine (CLARITIN) 10 MG tablet, TAKE ONE TABLET BY MOUTH DAILY, Disp: 30 tablet, Rfl: 5  •  potassium chloride (K-DUR,KLOR-CON) 20 MEQ CR tablet, TAKE ONE TABLET BY MOUTH DAILY, Disp: 90 tablet, Rfl: 3  •  prednisoLONE acetate (PRED FORTE) 1 % ophthalmic suspension, Apply 1 drop to eye(s) as directed by provider Daily As Needed., Disp: , Rfl:   •  simvastatin (ZOCOR) 10 MG tablet, TAKE ONE TABLET BY MOUTH ONCE NIGHTLY, Disp: 90 tablet, Rfl: 0  •  sodium chloride (OCEAN) 0.65 % nasal spray, 1 spray into the nostril(s) as directed by provider As Needed for Congestion., Disp: , Rfl:   •  traZODone (DESYREL) 50 MG tablet, TAKE ONE TABLET BY MOUTH ONCE NIGHTLY, Disp: 90 tablet, Rfl: 1  •  Triamcinolone Acetonide (NASACORT ALLERGY 24HR) 55 MCG/ACT nasal inhaler, 2 sprays into the nostril(s) as directed by provider Daily., Disp: , Rfl:   •  warfarin (COUMADIN) 2.5 MG tablet, Take 1 tablet by mouth daily or as directed by anticoagulation clinic., Disp: 90 tablet, Rfl: 1          ROS  Review of Systems   Constitution: Negative for diaphoresis and malaise/fatigue.   Cardiovascular: Positive for leg swelling. Negative for chest pain, claudication, cyanosis, dyspnea on " exertion, irregular heartbeat and near-syncope.   Respiratory: Negative for cough, hemoptysis and sputum production.        SOCIAL HX  Social History     Socioeconomic History   • Marital status:      Spouse name: Glenis   • Number of children: 2   • Years of education: J.D.   • Highest education level: Not on file   Occupational History   • Occupation:      Employer: RETIRED   Tobacco Use   • Smoking status: Former Smoker     Packs/day: 1.00     Years: 18.00     Pack years: 18.00     Types: Cigarettes     Last attempt to quit: 1970     Years since quittin.3   • Smokeless tobacco: Never Used   Substance and Sexual Activity   • Alcohol use: Yes     Comment: rare   • Drug use: No   • Sexual activity: Not Currently     Partners: Female       FAMILY HX  Family History   Problem Relation Age of Onset   • Alzheimer's disease Mother    • Dementia Mother    • Heart disease Father    • Stroke Father    • Heart disease Sister    • Heart disease Brother    • Stroke Brother              Ike Strickland III, MD, FACC

## 2019-10-09 ENCOUNTER — ANTICOAGULATION VISIT (OUTPATIENT)
Dept: PHARMACY | Facility: HOSPITAL | Age: 84
End: 2019-10-09

## 2019-10-09 DIAGNOSIS — I48.20 CHRONIC ATRIAL FIBRILLATION (HCC): ICD-10-CM

## 2019-10-09 LAB
INR PPP: 2.4 (ref 0.91–1.09)
PROTHROMBIN TIME: 29.1 SECONDS (ref 10–13.8)

## 2019-10-09 PROCEDURE — 85610 PROTHROMBIN TIME: CPT

## 2019-10-09 PROCEDURE — G0463 HOSPITAL OUTPT CLINIC VISIT: HCPCS

## 2019-10-09 PROCEDURE — 36416 COLLJ CAPILLARY BLOOD SPEC: CPT

## 2019-10-09 NOTE — PROGRESS NOTES
"Anticoagulation Clinic Progress Note  Indication: afib  Referring Provider: Em  Goal INR: 2-3  Current Drug Interactions: aspirin, simvastatin, trazodone  Other: no bleeding per patient  [id number]: 4 (age, HTN, CAD)    Diet: typically doesn't eat many Vit K foods    Anticoagulation Clinic INR History:  Date 1/8 1/10 1/18 1/25 2/9 3/2 3/16 3/23 4/10   Total Weekly Dose 23.75 mg 23.75 mg 18.75 mg 18.75 mg 18.75 mg 18.75 mg 20mg 21.25 mg 20mg   INR 1.61 1.9 2.5 2.3 2.2 1.7 1.6 2.3 1.9   Notes  hosp Doxy     Doxy start 3/10 Doxy finish 3/20      Date 4/23 5/7 6/4 6/11 6/25 7/10 8/1 8/29 9/19   Total Weekly Dose 21.25mg 21.25 mg 21.25 mg 20 mg 21.25 mg 21.25mg 21.25 mg 18.75 mg 21.25 mg   INR 2.0 2.4 2.4 2.2 3.0 2.7 2.5 2.0 3.7   Notes    doxy start 6/4 doxy finish 6/10    1 missed dose      Date 10/3 10/17 10/24 10/31 11/14 12/12 1/9 1/23 2/13   Total Weekly Dose 20 mg? 20mg? 20mg 20mg 20mg 20mg 20mg 20mg 20mg   INR 3.1 3.5 2.4 2.6 2.7 2.9 3.4 2.8 2.7   Notes   dec GLV     dec GLV       Date 3/13 3/27 4/24 5/22 6/19 7/10 7/24 8/1 8/12   Total Weekly Dose 20mg 20mg 20 mg 20mg 20mg 20mg 20mg 17.5 mg 17.5 mg   INR 3.2 2.7 2.8 2.9 3.1/ 2.9 3.8 4.0 3.0 2.3   Notes               Date 9/3 9/17 10/9         Total Weekly Dose 17.5 mg  ??? 17.5mg 17.5 mg         INR 2.3 2.9 2.4         Notes               Clinic Interview:  Verbal Release Authorization signed on 6/25/18 -- may speak with Glenis Lucero (wife), Marge Cox (daughter)  Tablet Strength: 2.5mg tablets     Patient Findings    Positives:  Change in diet/appetite   Negatives:  Signs/symptoms of thrombosis, Signs/symptoms of bleeding, Laboratory test error suspected, Change in health, Change in alcohol use, Change in activity, Upcoming invasive procedure, Emergency department visit, Upcoming dental procedure, Missed doses, Extra doses, Change in medications, Hospital admission, Bruising, Other complaints   Comments:  He stated that he ate a little more \"green " "stuff\" than usual.      Plan:     1. INR is therapeutic again today at 2.4 . Instructed Mr. Lucero to continue warfarin 2.5mg oral daily.  2. RTC in three weeks on 10/30 to reassess.   3. Written and verbal information provided in clinic. Mr. Lucero expresses understanding with teach back and has no further questions at this time.      Lizzeth Abraham, PharmD  10/9/2019  1:34 PM    "

## 2019-10-14 RX ORDER — DILTIAZEM HYDROCHLORIDE 240 MG/1
CAPSULE, EXTENDED RELEASE ORAL
Qty: 90 CAPSULE | Refills: 3 | Status: SHIPPED | OUTPATIENT
Start: 2019-10-14 | End: 2020-10-05

## 2019-10-24 RX ORDER — ALFUZOSIN HYDROCHLORIDE 10 MG/1
TABLET, EXTENDED RELEASE ORAL
Qty: 30 TABLET | Refills: 0 | Status: SHIPPED | OUTPATIENT
Start: 2019-10-24 | End: 2019-11-20 | Stop reason: SDUPTHER

## 2019-10-30 ENCOUNTER — ANTICOAGULATION VISIT (OUTPATIENT)
Dept: PHARMACY | Facility: HOSPITAL | Age: 84
End: 2019-10-30

## 2019-10-30 DIAGNOSIS — I48.20 CHRONIC ATRIAL FIBRILLATION (HCC): ICD-10-CM

## 2019-10-30 LAB
INR PPP: 2.1 (ref 0.91–1.09)
PROTHROMBIN TIME: 25.1 SECONDS (ref 10–13.8)

## 2019-10-30 PROCEDURE — 36416 COLLJ CAPILLARY BLOOD SPEC: CPT

## 2019-10-30 PROCEDURE — G0463 HOSPITAL OUTPT CLINIC VISIT: HCPCS

## 2019-10-30 PROCEDURE — 85610 PROTHROMBIN TIME: CPT

## 2019-10-30 NOTE — PROGRESS NOTES
Anticoagulation Clinic Progress Note  Indication: afib  Referring Provider: Em  Goal INR: 2-3  Current Drug Interactions: aspirin, simvastatin, trazodone  Other: no bleeding per patient  [id number]: 4 (age, HTN, CAD)    Diet: typically doesn't eat many Vit K foods    Anticoagulation Clinic INR History:  Date 1/8 1/10 1/18 1/25 2/9 3/2 3/16 3/23 4/10   Total Weekly Dose 23.75 mg 23.75 mg 18.75 mg 18.75 mg 18.75 mg 18.75 mg 20mg 21.25 mg 20mg   INR 1.61 1.9 2.5 2.3 2.2 1.7 1.6 2.3 1.9   Notes  hosp doxy     Doxy start 3/10 Doxy finish 3/20      Date 4/23 5/7 6/4 6/11 6/25 7/10 8/1 8/29 9/19   Total Weekly Dose 21.25mg 21.25 mg 21.25 mg 20 mg 21.25 mg 21.25mg 21.25 mg 18.75 mg 21.25 mg   INR 2.0 2.4 2.4 2.2 3.0 2.7 2.5 2.0 3.7   Notes    doxy start 6/4 doxy finish 6/10    1 missed dose      Date 10/3 10/17 10/24 10/31 11/14 12/12 1/9 1/23 2/13   Total Weekly Dose 20 mg? 20mg? 20mg 20mg 20mg 20mg 20mg 20mg 20mg   INR 3.1 3.5 2.4 2.6 2.7 2.9 3.4 2.8 2.7   Notes   dec GLV     dec GLV       Date 3/13 3/27 4/24 5/22 6/19 7/10 7/24 8/1 8/12   Total Weekly Dose 20mg 20mg 20 mg 20mg 20mg 20mg 20mg 17.5 mg 17.5 mg   INR 3.2 2.7 2.8 2.9 3.1/ 2.9 3.8 4.0 3.0 2.3   Notes               Date 9/3 9/17 10/9 10/30        Total Weekly Dose 17.5 mg  ??? 17.5mg 17.5 mg 17.5 mg        INR 2.3 2.9 2.4 2.1        Notes               Clinic Interview:  Verbal Release Authorization signed on 6/25/18 -- may speak with Glenis Lucero (wife), Marge Cox (daughter)  Tablet Strength: 2.5mg tablets     Patient Findings:  Negatives:  Signs/symptoms of thrombosis, Signs/symptoms of bleeding, Laboratory test error suspected, Change in health, Change in alcohol use, Change in activity, Upcoming invasive procedure, Emergency department visit, Upcoming dental procedure, Missed doses, Extra doses, Change in medications, Change in diet/appetite, Hospital admission, Bruising, Other complaints     Plan:   1. INR is therapeutic again today, so instructed  Mr. Lucero to continue warfarin 2.5mg oral daily.  2. RTC in about 4 weeks.  3. Written and verbal information provided in clinic. Mr. Lucero expresses understanding with teach back and has no further questions at this time.    Stefanie Hogue RPH  10/30/2019  1:32 PM

## 2019-11-13 ENCOUNTER — OFFICE VISIT (OUTPATIENT)
Dept: FAMILY MEDICINE CLINIC | Facility: CLINIC | Age: 84
End: 2019-11-13

## 2019-11-13 VITALS
DIASTOLIC BLOOD PRESSURE: 86 MMHG | SYSTOLIC BLOOD PRESSURE: 130 MMHG | HEART RATE: 62 BPM | HEIGHT: 68 IN | OXYGEN SATURATION: 97 % | WEIGHT: 206 LBS | BODY MASS INDEX: 31.22 KG/M2

## 2019-11-13 DIAGNOSIS — Z13.29 SCREENING FOR HYPOTHYROIDISM: ICD-10-CM

## 2019-11-13 DIAGNOSIS — R35.1 BENIGN PROSTATIC HYPERPLASIA WITH NOCTURIA: ICD-10-CM

## 2019-11-13 DIAGNOSIS — J32.9 CHRONIC SINUSITIS, UNSPECIFIED LOCATION: ICD-10-CM

## 2019-11-13 DIAGNOSIS — I25.10 CORONARY ARTERY DISEASE INVOLVING NATIVE CORONARY ARTERY OF NATIVE HEART WITHOUT ANGINA PECTORIS: ICD-10-CM

## 2019-11-13 DIAGNOSIS — I10 ESSENTIAL HYPERTENSION: ICD-10-CM

## 2019-11-13 DIAGNOSIS — R73.02 IMPAIRED GLUCOSE TOLERANCE: ICD-10-CM

## 2019-11-13 DIAGNOSIS — E78.00 HYPERCHOLESTEROLEMIA: ICD-10-CM

## 2019-11-13 DIAGNOSIS — Z00.00 MEDICARE ANNUAL WELLNESS VISIT, SUBSEQUENT: Primary | ICD-10-CM

## 2019-11-13 DIAGNOSIS — L98.9 SKIN LESION OF NECK: ICD-10-CM

## 2019-11-13 DIAGNOSIS — G47.33 OSA (OBSTRUCTIVE SLEEP APNEA): ICD-10-CM

## 2019-11-13 DIAGNOSIS — I48.20 CHRONIC ATRIAL FIBRILLATION (HCC): ICD-10-CM

## 2019-11-13 DIAGNOSIS — G31.84 MILD COGNITIVE IMPAIRMENT: ICD-10-CM

## 2019-11-13 DIAGNOSIS — E66.09 CLASS 1 OBESITY DUE TO EXCESS CALORIES WITH SERIOUS COMORBIDITY AND BODY MASS INDEX (BMI) OF 31.0 TO 31.9 IN ADULT: ICD-10-CM

## 2019-11-13 DIAGNOSIS — K42.9 UMBILICAL HERNIA WITHOUT OBSTRUCTION AND WITHOUT GANGRENE: ICD-10-CM

## 2019-11-13 DIAGNOSIS — D80.4 IGM DEFICIENCY (HCC): ICD-10-CM

## 2019-11-13 DIAGNOSIS — E66.09 CLASS 1 OBESITY DUE TO EXCESS CALORIES WITH SERIOUS COMORBIDITY AND BODY MASS INDEX (BMI) OF 32.0 TO 32.9 IN ADULT: ICD-10-CM

## 2019-11-13 DIAGNOSIS — N40.1 BENIGN PROSTATIC HYPERPLASIA WITH NOCTURIA: ICD-10-CM

## 2019-11-13 DIAGNOSIS — R51.9 ACUTE NONINTRACTABLE HEADACHE, UNSPECIFIED HEADACHE TYPE: ICD-10-CM

## 2019-11-13 DIAGNOSIS — E78.2 MIXED HYPERLIPIDEMIA: ICD-10-CM

## 2019-11-13 LAB
ALBUMIN SERPL-MCNC: 4.1 G/DL (ref 3.5–5.2)
ALBUMIN/GLOB SERPL: 1.5 G/DL
ALP SERPL-CCNC: 58 U/L (ref 39–117)
ALT SERPL W P-5'-P-CCNC: 9 U/L (ref 1–41)
ANION GAP SERPL CALCULATED.3IONS-SCNC: 11 MMOL/L (ref 5–15)
AST SERPL-CCNC: 17 U/L (ref 1–40)
BASOPHILS # BLD AUTO: 0.05 10*3/MM3 (ref 0–0.2)
BASOPHILS NFR BLD AUTO: 0.7 % (ref 0–1.5)
BILIRUB SERPL-MCNC: 1.1 MG/DL (ref 0.2–1.2)
BILIRUB UR QL STRIP: NEGATIVE
BUN BLD-MCNC: 12 MG/DL (ref 8–23)
BUN/CREAT SERPL: 13.2 (ref 7–25)
CALCIUM SPEC-SCNC: 8.7 MG/DL (ref 8.6–10.5)
CHLORIDE SERPL-SCNC: 98 MMOL/L (ref 98–107)
CHOLEST SERPL-MCNC: 99 MG/DL (ref 0–200)
CLARITY UR: CLEAR
CO2 SERPL-SCNC: 30 MMOL/L (ref 22–29)
COLOR UR: YELLOW
CREAT BLD-MCNC: 0.91 MG/DL (ref 0.76–1.27)
DEPRECATED RDW RBC AUTO: 43 FL (ref 37–54)
EOSINOPHIL # BLD AUTO: 0.24 10*3/MM3 (ref 0–0.4)
EOSINOPHIL NFR BLD AUTO: 3.2 % (ref 0.3–6.2)
ERYTHROCYTE [DISTWIDTH] IN BLOOD BY AUTOMATED COUNT: 12.4 % (ref 12.3–15.4)
GFR SERPL CREATININE-BSD FRML MDRD: 79 ML/MIN/1.73
GLOBULIN UR ELPH-MCNC: 2.8 GM/DL
GLUCOSE BLD-MCNC: 104 MG/DL (ref 65–99)
GLUCOSE UR STRIP-MCNC: NEGATIVE MG/DL
HBA1C MFR BLD: 6.17 % (ref 4.8–5.6)
HCT VFR BLD AUTO: 45.3 % (ref 37.5–51)
HDLC SERPL-MCNC: 30 MG/DL (ref 40–60)
HGB BLD-MCNC: 15.6 G/DL (ref 13–17.7)
HGB UR QL STRIP.AUTO: NEGATIVE
IMM GRANULOCYTES # BLD AUTO: 0.06 10*3/MM3 (ref 0–0.05)
IMM GRANULOCYTES NFR BLD AUTO: 0.8 % (ref 0–0.5)
KETONES UR QL STRIP: NEGATIVE
LDLC SERPL CALC-MCNC: 38 MG/DL (ref 0–100)
LDLC/HDLC SERPL: 1.26 {RATIO}
LEUKOCYTE ESTERASE UR QL STRIP.AUTO: NEGATIVE
LYMPHOCYTES # BLD AUTO: 2.11 10*3/MM3 (ref 0.7–3.1)
LYMPHOCYTES NFR BLD AUTO: 28.4 % (ref 19.6–45.3)
MCH RBC QN AUTO: 32.6 PG (ref 26.6–33)
MCHC RBC AUTO-ENTMCNC: 34.4 G/DL (ref 31.5–35.7)
MCV RBC AUTO: 94.6 FL (ref 79–97)
MONOCYTES # BLD AUTO: 0.6 10*3/MM3 (ref 0.1–0.9)
MONOCYTES NFR BLD AUTO: 8.1 % (ref 5–12)
NEUTROPHILS # BLD AUTO: 4.36 10*3/MM3 (ref 1.7–7)
NEUTROPHILS NFR BLD AUTO: 58.8 % (ref 42.7–76)
NITRITE UR QL STRIP: NEGATIVE
NRBC BLD AUTO-RTO: 0 /100 WBC (ref 0–0.2)
PH UR STRIP.AUTO: 6 [PH] (ref 5–8)
PLATELET # BLD AUTO: 202 10*3/MM3 (ref 140–450)
PMV BLD AUTO: 10 FL (ref 6–12)
POTASSIUM BLD-SCNC: 4.2 MMOL/L (ref 3.5–5.2)
PROT SERPL-MCNC: 6.9 G/DL (ref 6–8.5)
PROT UR QL STRIP: NEGATIVE
RBC # BLD AUTO: 4.79 10*6/MM3 (ref 4.14–5.8)
SODIUM BLD-SCNC: 139 MMOL/L (ref 136–145)
SP GR UR STRIP: 1.02 (ref 1–1.03)
TRIGL SERPL-MCNC: 156 MG/DL (ref 0–150)
TSH SERPL DL<=0.05 MIU/L-ACNC: 1.84 UIU/ML (ref 0.27–4.2)
UROBILINOGEN UR QL STRIP: NORMAL
VLDLC SERPL-MCNC: 31.2 MG/DL (ref 5–40)
WBC NRBC COR # BLD: 7.42 10*3/MM3 (ref 3.4–10.8)

## 2019-11-13 PROCEDURE — 80061 LIPID PANEL: CPT | Performed by: PHYSICIAN ASSISTANT

## 2019-11-13 PROCEDURE — 85025 COMPLETE CBC W/AUTO DIFF WBC: CPT | Performed by: PHYSICIAN ASSISTANT

## 2019-11-13 PROCEDURE — 80053 COMPREHEN METABOLIC PANEL: CPT | Performed by: PHYSICIAN ASSISTANT

## 2019-11-13 PROCEDURE — G0439 PPPS, SUBSEQ VISIT: HCPCS | Performed by: PHYSICIAN ASSISTANT

## 2019-11-13 PROCEDURE — 83036 HEMOGLOBIN GLYCOSYLATED A1C: CPT | Performed by: PHYSICIAN ASSISTANT

## 2019-11-13 PROCEDURE — 84443 ASSAY THYROID STIM HORMONE: CPT | Performed by: PHYSICIAN ASSISTANT

## 2019-11-13 PROCEDURE — 81003 URINALYSIS AUTO W/O SCOPE: CPT | Performed by: PHYSICIAN ASSISTANT

## 2019-11-13 NOTE — PROGRESS NOTES
QUICK REFERENCE INFORMATION:  The ABCs of the Annual Wellness Visit    Medicare Subsequent Wellness Visit    Chief Complaint   Patient presents with   • Annual Exam   • Medicare Wellness-subsequent        HPI     Mike Lucero Jr. is a 87 y.o. male presenting for subsequent annual wellness visit. In the last 2 weeks he has complained of increased frontal sinus headaches per his wife. Tylenol helps some. He has chronic nasal congestion that is usually worse in the mornings. He attributes this to his CPAP. He is using claritin and nasacort daily. He has been evaluated by ENT in the past. He sees infectious disease annually due to a past history of leg cellulitis. He had an appointment earlier this week. He also saw his cardiologist last month without concerns. Current with eye and dental exams. He has a spot on his right neck that has been bleeding intermittently. He is unsure how long it has been there.     Past Medical History:   Diagnosis Date   • Atrial fibrillation (CMS/HCC)    • Cataract    • Chronic anticoagulation    • Coronary artery disease    • Diverticulosis    • Gallstones    • History of endocarditis    • HLD (hyperlipidemia)    • HTN (hypertension)    • Osteoarthritis    • Seasonal allergies     Seasonal allergies/rhinitis.       Past Surgical History:   Procedure Laterality Date   • ADRENAL GLAND SURGERY  1996    Dr. Jj Kee   • APPENDECTOMY  1950   • BLEPHAROPLASTY  08/06/2014    Dr. Santamaria   • COLONOSCOPY  2009   • CORONARY ARTERY BYPASS GRAFT  07/2006    CABG for 3-vessel disease, July 2006.   • INGUINAL HERNIA REPAIR Left     1963 and 09/2012   • TONSILLECTOMY  01/1958     Family History   Problem Relation Age of Onset   • Alzheimer's disease Mother    • Dementia Mother    • Heart disease Father    • Stroke Father    • Heart disease Sister    • Heart disease Brother    • Stroke Brother       Social History     Socioeconomic History   • Marital status:      Spouse name: Glenis   •  "Number of children: 2   • Years of education: BRIDGER   • Highest education level: Not on file   Occupational History   • Occupation:      Employer: RETIRED   Tobacco Use   • Smoking status: Former Smoker     Packs/day: 1.00     Years: 18.00     Pack years: 18.00     Types: Cigarettes     Last attempt to quit: 1970     Years since quittin.4   • Smokeless tobacco: Never Used   Substance and Sexual Activity   • Alcohol use: Yes     Comment: rare   • Drug use: No   • Sexual activity: Not Currently     Partners: Female      Allergies   Allergen Reactions   • Rocephin [Ceftriaxone] Angioedema     Tongue and lip swelling, sore throat   • Temazepam Other (See Comments)     Caused pt to \"sleep drive\"   • Wellbutrin [Bupropion] Mental Status Change and Dizziness     Memory loss      Outpatient Medications Prior to Visit   Medication Sig Dispense Refill   • alfuzosin (UROXATRAL) 10 MG 24 hr tablet TAKE ONE TABLET BY MOUTH DAILY 30 tablet 0   • aspirin 81 MG EC tablet Take 81 mg by mouth Daily.     • CARTIA  MG 24 hr capsule TAKE ONE CAPSULE BY MOUTH DAILY 90 capsule 3   • donepezil (ARICEPT) 10 MG tablet TAKE ONE TABLET BY MOUTH DAILY 30 tablet 9   • lactobacillus acidophilus (RISAQUAD) capsule capsule Take 1 capsule by mouth Daily. 30 capsule 0   • lisinopril (PRINIVIL,ZESTRIL) 20 MG tablet Take 20 mg by mouth Daily.     • loratadine (CLARITIN) 10 MG tablet TAKE ONE TABLET BY MOUTH DAILY 30 tablet 5   • potassium chloride (K-DUR,KLOR-CON) 20 MEQ CR tablet TAKE ONE TABLET BY MOUTH DAILY 90 tablet 3   • prednisoLONE acetate (PRED FORTE) 1 % ophthalmic suspension Apply 1 drop to eye(s) as directed by provider Daily As Needed.     • simvastatin (ZOCOR) 10 MG tablet TAKE ONE TABLET BY MOUTH ONCE NIGHTLY 90 tablet 0   • sodium chloride (OCEAN) 0.65 % nasal spray 1 spray into the nostril(s) as directed by provider As Needed for Congestion.     • traZODone (DESYREL) 50 MG tablet TAKE ONE TABLET BY MOUTH ONCE " "NIGHTLY 90 tablet 1   • Triamcinolone Acetonide (NASACORT ALLERGY 24HR) 55 MCG/ACT nasal inhaler 2 sprays into the nostril(s) as directed by provider Daily.     • warfarin (COUMADIN) 2.5 MG tablet Take 1 tablet by mouth daily or as directed by anticoagulation clinic. 90 tablet 1     No facility-administered medications prior to visit.        Reviewed use of high risk medication in the elderly: yes  Reviewed for potential of harmful drug interactions in the elderly: yes    The following portions of the patient's history were reviewed and updated as appropriate: allergies, current medications, past family history, past medical history, past social history, past surgical history and problem list.    Review of Systems   Constitutional: Positive for fatigue (longstanding, stable).   HENT: Positive for congestion, hearing loss (prior hearing evaluation, hearing aids did not help), rhinorrhea and sinus pressure. Negative for swollen glands and trouble swallowing.    Eyes: Negative for blurred vision and visual disturbance.   Respiratory: Negative for choking and shortness of breath.    Cardiovascular: Negative for chest pain and leg swelling.   Gastrointestinal: Negative for abdominal pain, constipation, diarrhea and GERD.   Endocrine: Negative for polydipsia.   Genitourinary: Positive for nocturia (x3, stable). Negative for dysuria and hematuria.   Musculoskeletal: Negative for arthralgias.   Skin: Positive for skin lesions. Negative for rash.   Neurological: Positive for headache.   Hematological: Negative for adenopathy. Does not bruise/bleed easily.   Psychiatric/Behavioral: Negative for sleep disturbance and depressed mood.        Vitals:    11/13/19 1123   BP: 130/86   BP Location: Right arm   Patient Position: Sitting   Cuff Size: Large Adult   Pulse: 62   SpO2: 97%   Weight: 93.4 kg (206 lb)   Height: 171.5 cm (67.5\")       Objective    Physical Exam   Constitutional: He is oriented to person, place, and time. He " appears well-developed and well-nourished. He does not appear ill. No distress. He is obese.  HENT:   Head: Normocephalic and atraumatic.   Right Ear: Hearing, tympanic membrane and external ear normal.   Left Ear: Hearing, tympanic membrane and external ear normal.   Nose: Mucosal edema and congestion present.   Mouth/Throat: Uvula is midline, oropharynx is clear and moist and mucous membranes are normal. Normal dentition.   Eyes: Conjunctivae, EOM and lids are normal. Pupils are equal, round, and reactive to light.        Neck: Normal range of motion. Neck supple. Carotid bruit is not present. No thyroid mass and no thyromegaly present.       Cardiovascular: Normal rate and normal heart sounds. An irregularly irregular rhythm present.   No murmur heard.  Pulses:       Radial pulses are 2+ on the right side, and 2+ on the left side.   Difficult to palpate pedal pulses due to pedal edema   Pulmonary/Chest: Effort normal and breath sounds normal. He has no wheezes. He has no rhonchi. He has no rales.   Abdominal: Soft. Bowel sounds are normal. He exhibits no distension and no mass. There is no tenderness. A hernia (small, reducible umbilical hernia. Mild discomfort on palpation) is present.   Musculoskeletal: Normal range of motion. He exhibits edema (1+ pitting BLE ankle/pedal edema).   Lymphadenopathy:     He has no cervical adenopathy.        Right: No inguinal and no supraclavicular adenopathy present.        Left: No inguinal and no supraclavicular adenopathy present.   Neurological: He is alert and oriented to person, place, and time. He has normal strength. He displays normal reflexes. Gait normal.   Skin: Skin is warm and dry. Lesion noted. No rash noted. No cyanosis. Nails show no clubbing.   Psychiatric: He has a normal mood and affect. His speech is normal and behavior is normal. Cognition and memory are impaired.   Vitals reviewed.       HEALTH RISK ASSESSMENT    7/29/1932    Recent Hospitalizations:  No  hospitalization(s) within the last year..      Current Medical Providers:  Patient Care Team:  Fabien Merino PA as PCP - General (Physician Assistant)  Stefanie Hogue [facility] as Pharmacist (Pharmacy)  Colleen Nolasco, PharmD as Pharmacist (Pharmacy)      Smoking Status:  Social History     Tobacco Use   Smoking Status Former Smoker   • Packs/day: 1.00   • Years: 18.00   • Pack years: 18.00   • Types: Cigarettes   • Last attempt to quit: 1970   • Years since quittin.4   Smokeless Tobacco Never Used       Alcohol Consumption:  Social History     Substance and Sexual Activity   Alcohol Use Yes    Comment: rare       Depression Screen:   PHQ-2/PHQ-9 Depression Screening 2019   Little interest or pleasure in doing things 3   Feeling down, depressed, or hopeless 0   Trouble falling or staying asleep, or sleeping too much 0   Feeling tired or having little energy 3   Poor appetite or overeating 0   Feeling bad about yourself - or that you are a failure or have let yourself or your family down 0   Trouble concentrating on things, such as reading the newspaper or watching television 0   Moving or speaking so slowly that other people could have noticed. Or the opposite - being so fidgety or restless that you have been moving around a lot more than usual 0   Thoughts that you would be better off dead, or of hurting yourself in some way 0   Total Score 6   If you checked off any problems, how difficult have these problems made it for you to do your work, take care of things at home, or get along with other people? Somewhat difficult       Health Habits and Functional and Cognitive Screening:  Functional & Cognitive Status 2019   Do you have difficulty preparing food and eating? No   Do you have difficulty bathing yourself, getting dressed or grooming yourself? No   Do you have difficulty using the toilet? No   Do you have difficulty moving around from place to place? No   Do you have trouble with steps or  getting out of a bed or a chair? Yes   Current Diet Frequent Junk Food   Dental Exam Up to date   Eye Exam Not up to date   Exercise (times per week) 0 times per week   Current Exercise Activities Include None   Do you need help using the phone?  No   Are you deaf or do you have serious difficulty hearing?  Yes   Do you need help with transportation? No   Do you need help shopping? No   Do you need help preparing meals?  No   Do you need help with housework?  No   Do you need help with laundry? No   Do you need help taking your medications? No   Do you need help managing money? No   Do you ever drive or ride in a car without wearing a seat belt? No   Have you felt unusual stress, anger or loneliness in the last month? No   Who do you live with? Spouse   If you need help, do you have trouble finding someone available to you? No   Have you been bothered in the last four weeks by sexual problems? No   Do you have difficulty concentrating, remembering or making decisions? Yes           Does the patient have evidence of cognitive impairment? Yes    Aspirin use counseling? Taking ASA appropriately as indicated      Recent Lab Results:  CMP:  Lab Results   Component Value Date    BUN 12 05/13/2019    CREATININE 0.97 05/13/2019    EGFRIFNONA 73 05/13/2019    BCR 12.4 05/13/2019     05/13/2019    K 4.2 05/13/2019    CO2 31.6 (H) 05/13/2019    CALCIUM 8.7 05/13/2019    ALBUMIN 4.30 09/21/2018    BILITOT 1.4 (H) 09/21/2018    ALKPHOS 55 09/21/2018    AST 23 09/21/2018    ALT 22 09/21/2018     Lipid Panel:  Lab Results   Component Value Date    CHOL 128 09/21/2018    TRIG 137 09/21/2018    HDL 40 09/21/2018     HbA1c:  Lab Results   Component Value Date    HGBA1C 5.50 10/23/2017       Visual Acuity:  No exam data present    Age-appropriate Screening Schedule:  Refer to the list below for future screening recommendations based on patient's age, sex and/or medical conditions. Orders for these recommended tests are listed in  the plan section. The patient has been provided with a written plan.    Health Maintenance   Topic Date Due   • TDAP/TD VACCINES (1 - Tdap) 07/29/1951   • ZOSTER VACCINE (2 of 3) 09/11/2013   • LIPID PANEL  09/21/2019   • INFLUENZA VACCINE  Completed   • PNEUMOCOCCAL VACCINES (65+ LOW/MEDIUM RISK)  Completed          Advance Care Planning:  Patient has an advance directive - a copy has not been provided. Have asked the patient to send this to us to add to record    Identification of Risk Factors:  Risk factors include: Cardiovascular risk  Dementia/Memory   Hearing Problem  Immunizations Discussed/Encouraged (specific immunizations; Shingrix )  Inactivity/Sedentary  Obesity/Overweight .    Compared to one year ago, the patient feels his physical health is worse. Head congestion  Compared to one year ago, the patient feels his mental health is the same.      Mike was seen today for annual exam and medicare wellness-subsequent.    Diagnoses and all orders for this visit:    Medicare annual wellness visit, subsequent    Chronic sinusitis, unspecified location  -Chronic nasal congestion/allergic rhinitis  -Continue nasal saline, nasacort, and claritin  -CT head to evaluate new frontal sinus headaches/sinus disease  -Will refer to ENT pending CT results    Chronic atrial fibrillation  -Anticoagulated on warfarin, rate controlled  -INR 2.1 recently, [facility]    RELL (obstructive sleep apnea)  -Uses CPAP    Essential hypertension  -     CBC Auto Differential; Future    Mixed hyperlipidemia  -     Comprehensive Metabolic Panel; Future  -     Lipid Panel; Future    Coronary artery disease involving native coronary artery of native heart without angina pectoris  -s/p CABG in 2006  -Cardiology also follows  -Currently asymptomatic    Class 1 obesity due to excess calories with serious comorbidity and body mass index (BMI) of 32.0 to 32.9 in adult  -Patient mostly sedentary  -Light walking and activities, reducing  sweets discussed    Benign prostatic hyperplasia with nocturia  -     Urinalysis With Culture If Indicated -; Future    Mild cognitive impairment  -Continue Namenda and Aricept  -Patient does continue to follow with neurology    Hypercholesterolemia  -On statin. Monitor    Skin lesion of neck  -Suspicious for SCC. Refer to dermatology for biopsy    Umbilical hernia without obstruction and without gangrene  -Patient's wife indicates he does not complain of any umbilical area pain  -Discussed signs/symptoms of incarceration and presenting to the ER in that scenario  -They would like to monitor at this time     Impaired glucose tolerance  -     Hemoglobin A1c; Future    Screening for hypothyroidism  -     TSH; Future    Acute nonintractable headache, unspecified headache type  -     CT Head Without Contrast; Future    IgM deficiency (CMS/HCC)  -patient continues to follow with ID, Dr. White annually  -hx cellulitis/sepsis      Procedure   Procedures       Patient Self-Management and Personalized Health Advice  The patient has been provided with information about: diet, exercise and weight management and preventive services including:   · Annual Wellness Visit (AWV).      Follow Up:  Return in about 6 months (around 5/13/2020), or if symptoms worsen or fail to improve, for Follow-up chronic conditions.     An After Visit Summary and PPPS with all of these plans were given to the patient.

## 2019-11-20 ENCOUNTER — HOSPITAL ENCOUNTER (OUTPATIENT)
Dept: CT IMAGING | Facility: HOSPITAL | Age: 84
Discharge: HOME | End: 2019-11-20
Admitting: PHYSICIAN ASSISTANT

## 2019-11-20 DIAGNOSIS — R51.9 ACUTE NONINTRACTABLE HEADACHE, UNSPECIFIED HEADACHE TYPE: ICD-10-CM

## 2019-11-20 PROCEDURE — 70450 CT HEAD/BRAIN W/O DYE: CPT

## 2019-11-20 RX ORDER — ALFUZOSIN HYDROCHLORIDE 10 MG/1
TABLET, EXTENDED RELEASE ORAL
Qty: 30 TABLET | Refills: 5 | Status: SHIPPED | OUTPATIENT
Start: 2019-11-20 | End: 2020-05-14

## 2019-11-25 ENCOUNTER — APPOINTMENT (OUTPATIENT)
Dept: PHARMACY | Facility: HOSPITAL | Age: 84
End: 2019-11-25

## 2019-11-26 ENCOUNTER — TELEPHONE (OUTPATIENT)
Dept: FAMILY MEDICINE CLINIC | Facility: CLINIC | Age: 84
End: 2019-11-26

## 2019-11-26 DIAGNOSIS — J32.9 CHRONIC SINUSITIS, UNSPECIFIED LOCATION: Primary | ICD-10-CM

## 2019-11-26 NOTE — TELEPHONE ENCOUNTER
----- Message from ZULY Sparrow sent at 11/26/2019  3:41 PM EST -----  Please inform patient and his wife of results: his CT showed chronic changes that are likely age related but no worrisome finding. There were signs of mucosal sinus thickening. I will order an ENT referral.

## 2019-11-26 NOTE — TELEPHONE ENCOUNTER
Informed pt's wife about results, and referral. She states that they have seen a ENT doctor but they dont do anything, so they may or may not go to ENT doctor.

## 2019-12-02 ENCOUNTER — ANTICOAGULATION VISIT (OUTPATIENT)
Dept: PHARMACY | Facility: HOSPITAL | Age: 84
End: 2019-12-02

## 2019-12-02 DIAGNOSIS — I48.20 CHRONIC ATRIAL FIBRILLATION (HCC): ICD-10-CM

## 2019-12-02 LAB
INR PPP: 2.4 (ref 0.91–1.09)
PROTHROMBIN TIME: 28.6 SECONDS (ref 10–13.8)

## 2019-12-02 PROCEDURE — G0463 HOSPITAL OUTPT CLINIC VISIT: HCPCS

## 2019-12-02 PROCEDURE — 36416 COLLJ CAPILLARY BLOOD SPEC: CPT

## 2019-12-02 PROCEDURE — 85610 PROTHROMBIN TIME: CPT

## 2019-12-02 NOTE — PROGRESS NOTES
Anticoagulation Clinic Progress Note  Indication: afib  Referring Provider: Em  Goal INR: 2-3  Current Drug Interactions: aspirin, simvastatin, trazodone  Other: no bleeding per patient  [id number]: 4 (age, HTN, CAD)    Diet: typically doesn't eat many Vit K foods    Anticoagulation Clinic INR History:  Date 1/8 1/10 1/18 1/25 2/9 3/2 3/16 3/23 4/10   Total Weekly Dose 23.75 mg 23.75 mg 18.75 mg 18.75 mg 18.75 mg 18.75 mg 20mg 21.25 mg 20mg   INR 1.61 1.9 2.5 2.3 2.2 1.7 1.6 2.3 1.9   Notes  hosp doxy     Doxy start 3/10 Doxy finish 3/20      Date 4/23 5/7 6/4 6/11 6/25 7/10 8/1 8/29 9/19   Total Weekly Dose 21.25mg 21.25 mg 21.25 mg 20 mg 21.25 mg 21.25mg 21.25 mg 18.75 mg 21.25 mg   INR 2.0 2.4 2.4 2.2 3.0 2.7 2.5 2.0 3.7   Notes    doxy start 6/4 doxy finish 6/10    1 missed dose      Date 10/3 10/17 10/24 10/31 11/14 12/12 1/9 1/23 2/13   Total Weekly Dose 20 mg? 20mg? 20mg 20mg 20mg 20mg 20mg 20mg 20mg   INR 3.1 3.5 2.4 2.6 2.7 2.9 3.4 2.8 2.7   Notes   dec GLV     dec GLV       Date 3/13 3/27 4/24 5/22 6/19 7/10 7/24 8/1 8/12   Total Weekly Dose 20mg 20mg 20 mg 20mg 20mg 20mg 20mg 17.5 mg 17.5 mg   INR 3.2 2.7 2.8 2.9 3.1/ 2.9 3.8 4.0 3.0 2.3   Notes               Date 9/3 9/17 10/9 10/30 12/2       Total Weekly Dose 17.5 mg  ??? 17.5mg 17.5 mg 17.5 mg 17.5 mg       INR 2.3 2.9 2.4 2.1 2.4       Notes               Clinic Interview:  Verbal Release Authorization signed on 6/25/18 -- may speak with Glenis Lucero (wife), Marge Cox (daughter)  Tablet Strength: 2.5mg tablets     Patient Findings   Negatives:  Signs/symptoms of thrombosis, Signs/symptoms of bleeding, Laboratory test error suspected, Change in health, Change in alcohol use, Change in activity, Upcoming invasive procedure, Emergency department visit, Upcoming dental procedure, Missed doses, Extra doses, Change in medications, Change in diet/appetite, Hospital admission, Bruising, Other complaints   Comments:  Mr. Lucero reports no changes and  denies going to an ENT MD. He reports all patient findings to be negative at this time.      Plan:   1. INR is therapeutic again today. Instructed Mr. Lucero to continue warfarin 2.5mg oral daily.  2. RTC in about 4 weeks on 12/30.  3. Written and verbal information provided in clinic. Mr. Lucero expresses understanding with teach back and has no further questions at this time.    Colleen Nolasco, PharmD  12/2/2019  1:22 PM

## 2019-12-18 RX ORDER — SIMVASTATIN 10 MG
TABLET ORAL
Qty: 90 TABLET | Refills: 0 | Status: SHIPPED | OUTPATIENT
Start: 2019-12-18 | End: 2020-03-18

## 2019-12-30 ENCOUNTER — APPOINTMENT (OUTPATIENT)
Dept: PHARMACY | Facility: HOSPITAL | Age: 84
End: 2019-12-30

## 2020-01-06 ENCOUNTER — ANTICOAGULATION VISIT (OUTPATIENT)
Dept: PHARMACY | Facility: HOSPITAL | Age: 85
End: 2020-01-06

## 2020-01-06 DIAGNOSIS — I48.20 CHRONIC ATRIAL FIBRILLATION (HCC): ICD-10-CM

## 2020-01-06 LAB — INR PPP: 1.9 (ref 0.9–1.1)

## 2020-01-06 PROCEDURE — 36416 COLLJ CAPILLARY BLOOD SPEC: CPT

## 2020-01-06 PROCEDURE — G0463 HOSPITAL OUTPT CLINIC VISIT: HCPCS

## 2020-01-06 PROCEDURE — 85610 PROTHROMBIN TIME: CPT

## 2020-01-06 NOTE — PROGRESS NOTES
Anticoagulation Clinic Progress Note  Indication: afib  Referring Provider: Em  Goal INR: 2-3  Current Drug Interactions: aspirin, simvastatin, trazodone  Other: no bleeding per patient  [id number]: 4 (age, HTN, CAD)    Diet: typically doesn't eat many Vit K foods    Anticoagulation Clinic INR History:  Date 1/8 1/10 1/18 1/25 2/9 3/2 3/16 3/23 4/10   Total Weekly Dose 23.75 mg 23.75 mg 18.75 mg 18.75 mg 18.75 mg 18.75 mg 20mg 21.25 mg 20mg   INR 1.61 1.9 2.5 2.3 2.2 1.7 1.6 2.3 1.9   Notes  hosp doxy     Doxy start 3/10 Doxy finish 3/20      Date 4/23 5/7 6/4 6/11 6/25 7/10 8/1 8/29 9/19   Total Weekly Dose 21.25mg 21.25 mg 21.25 mg 20 mg 21.25 mg 21.25mg 21.25 mg 18.75 mg 21.25 mg   INR 2.0 2.4 2.4 2.2 3.0 2.7 2.5 2.0 3.7   Notes    doxy start 6/4 doxy finish 6/10    1 missed dose      Date 10/3 10/17 10/24 10/31 11/14 12/12 1/9 1/23 2/13   Total Weekly Dose 20 mg? 20mg? 20mg 20mg 20mg 20mg 20mg 20mg 20mg   INR 3.1 3.5 2.4 2.6 2.7 2.9 3.4 2.8 2.7   Notes   dec GLV     dec GLV       Date 3/13 3/27 4/24 5/22 6/19 7/10 7/24 8/1 8/12   Total Weekly Dose 20mg 20mg 20 mg 20mg 20mg 20mg 20mg 17.5 mg 17.5 mg   INR 3.2 2.7 2.8 2.9 3.1/ 2.9 3.8 4.0 3.0 2.3   Notes               Date 9/3 9/17 10/9 10/30 12/2 1/6/20      Total Weekly Dose 17.5 mg  ??? 17.5mg 17.5 mg 17.5 mg 17.5 mg 17.5 mg      INR 2.3 2.9 2.4 2.1 2.4 1.9      Notes       Inc. GLV Boost x1       Clinic Interview:  Verbal Release Authorization signed on 6/25/18 -- may speak with Glenis Lucero (wife), Marge Cox (daughter)  Tablet Strength: 2.5mg tablets     Patient Findings   Positives:  Change in diet/appetite   Negatives:  Signs/symptoms of thrombosis, Signs/symptoms of bleeding, Laboratory test error suspected, Change in health, Change in alcohol use, Change in activity, Upcoming invasive procedure, Emergency department visit, Upcoming dental procedure, Missed doses, Extra doses, Change in medications, Hospital admission, Bruising, Other complaints    Comments:  Mike Lucero Jr. denies s/sx of bleeding or bruising out of the ordinary.   He stated that his diet has had more GLV in it than normal.   He reports that he has not started any new medications or stopped any medications.   He has been compliant with his warfarin since the last encounter.   He complains that his CPAP is aggravating his sinuses.       Plan:   1. INR is SUBtherapeutic today at 1.9. Instructed Mr. Lucero to BOOST tonight's dose (1/6) to 3.75 mg then tomorrow resume warfarin 2.5mg oral daily.  2. RTC in 2 weeks on 1/20.  3. Written and verbal information provided in clinic. Mr. Lucero expresses understanding with teach back and has no further questions at this time.    Brandon Rivers, Pharmacy Intern  1/6/2020  1:07 PM     ILizzeth, PharmD, have reviewed the note in full and agree with the assessment and plan.  01/06/20  4:51 PM

## 2020-01-13 RX ORDER — LORATADINE 10 MG/1
TABLET ORAL
Qty: 30 TABLET | Refills: 4 | Status: SHIPPED | OUTPATIENT
Start: 2020-01-13 | End: 2021-01-12

## 2020-01-20 ENCOUNTER — ANTICOAGULATION VISIT (OUTPATIENT)
Dept: PHARMACY | Facility: HOSPITAL | Age: 85
End: 2020-01-20

## 2020-01-20 DIAGNOSIS — I48.20 CHRONIC ATRIAL FIBRILLATION (HCC): ICD-10-CM

## 2020-01-20 LAB
INR PPP: 2 (ref 0.91–1.09)
PROTHROMBIN TIME: 24.2 SECONDS (ref 10–13.8)

## 2020-01-20 PROCEDURE — G0463 HOSPITAL OUTPT CLINIC VISIT: HCPCS

## 2020-01-20 PROCEDURE — 85610 PROTHROMBIN TIME: CPT

## 2020-01-20 PROCEDURE — 36416 COLLJ CAPILLARY BLOOD SPEC: CPT

## 2020-01-20 NOTE — PROGRESS NOTES
Anticoagulation Clinic Progress Note  Indication: afib  Referring Provider: Em  Goal INR: 2-3  Current Drug Interactions: aspirin, simvastatin, trazodone  Other: no bleeding per patient  [id number]: 4 (age, HTN, CAD)    Diet: typically doesn't eat many Vit K foods    Anticoagulation Clinic INR History:  Date 1/8 1/10 1/18 1/25 2/9 3/2 3/16 3/23 4/10   Total Weekly Dose 23.75 mg 23.75 mg 18.75 mg 18.75 mg 18.75 mg 18.75 mg 20mg 21.25 mg 20mg   INR 1.61 1.9 2.5 2.3 2.2 1.7 1.6 2.3 1.9   Notes  hosp doxy     Doxy start 3/10 Doxy finish 3/20      Date 4/23 5/7 6/4 6/11 6/25 7/10 8/1 8/29 9/19   Total Weekly Dose 21.25mg 21.25 mg 21.25 mg 20 mg 21.25 mg 21.25mg 21.25 mg 18.75 mg 21.25 mg   INR 2.0 2.4 2.4 2.2 3.0 2.7 2.5 2.0 3.7   Notes    doxy start 6/4 doxy finish 6/10    1 missed dose      Date 10/3 10/17 10/24 10/31 11/14 12/12 1/9 1/23 2/13   Total Weekly Dose 20 mg? 20mg? 20mg 20mg 20mg 20mg 20mg 20mg 20mg   INR 3.1 3.5 2.4 2.6 2.7 2.9 3.4 2.8 2.7   Notes   dec GLV     dec GLV       Date 3/13 3/27 4/24 5/22 6/19 7/10 7/24 8/1 8/12   Total Weekly Dose 20mg 20mg 20 mg 20mg 20mg 20mg 20mg 17.5 mg 17.5 mg   INR 3.2 2.7 2.8 2.9 3.1/ 2.9 3.8 4.0 3.0 2.3   Notes               Date 9/3 9/17 10/9 10/30 12/2 1/6/20 1/20     Total Weekly Dose 17.5 mg  ??? 17.5mg 17.5 mg 17.5 mg 17.5 mg 17.5 mg 17.5 mg     INR 2.3 2.9 2.4 2.1 2.4 1.9 2.0     Notes       Inc. GLV Boost x1       Clinic Interview:  Verbal Release Authorization signed on 6/25/18 -- may speak with Glenis Lucero (wife), Marge Cox (daughter)  Tablet Strength: 2.5mg tablets     Patient Findings   Positives:  Change in diet/appetite   Negatives:  Signs/symptoms of thrombosis, Signs/symptoms of bleeding, Laboratory test error suspected, Change in health, Change in alcohol use, Change in activity, Upcoming invasive procedure, Emergency department visit, Upcoming dental procedure, Missed doses, Extra doses, Change in medications, Hospital admission, Bruising,  Other complaints   Comments:  Mike Lucero Jr. denies s/sx of bleeding or bruising out of the ordinary.   He stated that his diet has returned to normal.   He reports that he has not started any new medications or stopped any medications.   He has been compliant with his warfarin since the last encounter.   He still complains of sinus problems from his CPAP machine.       Plan:   1. INR is therapeutic today at 2.0. Instructed Mr. Lucero to continue warfarin 2.5mg oral daily.  2. RTC in 4 weeks on 2/17.  3. Written and verbal information provided in clinic. Mr. Lucero expresses understanding with teach back and has no further questions at this time.    Brandon Rivers, Pharmacy Intern  1/20/2020  1:27 PM     [facility], have reviewed the note in full and agree with the assessment and plan.  01/20/20  2:38 PM

## 2020-02-12 RX ORDER — TRAZODONE HYDROCHLORIDE 50 MG/1
TABLET ORAL
Qty: 90 TABLET | Refills: 0 | Status: SHIPPED | OUTPATIENT
Start: 2020-02-12 | End: 2020-05-12

## 2020-02-17 ENCOUNTER — ANTICOAGULATION VISIT (OUTPATIENT)
Dept: PHARMACY | Facility: HOSPITAL | Age: 85
End: 2020-02-17

## 2020-02-17 DIAGNOSIS — I48.20 CHRONIC ATRIAL FIBRILLATION (HCC): ICD-10-CM

## 2020-02-17 LAB
INR PPP: 2.3 (ref 0.91–1.09)
PROTHROMBIN TIME: 27.8 SECONDS (ref 10–13.8)

## 2020-02-17 PROCEDURE — 36416 COLLJ CAPILLARY BLOOD SPEC: CPT

## 2020-02-17 PROCEDURE — G0463 HOSPITAL OUTPT CLINIC VISIT: HCPCS

## 2020-02-17 PROCEDURE — 85610 PROTHROMBIN TIME: CPT

## 2020-02-17 NOTE — PROGRESS NOTES
Anticoagulation Clinic Progress Note  Indication: afib  Referring Provider: Em  Goal INR: 2-3   Current Drug Interactions: aspirin, simvastatin, trazodone  Other: no bleeding per patient  [id number]: 4 (age, HTN, CAD)    Diet: tries to avoid Vit K    Anticoagulation Clinic INR History:  Date 1/8 1/10 1/18 1/25 2/9 3/2 3/16 3/23 4/10   Total Weekly Dose 23.75 mg 23.75 mg 18.75 mg 18.75 mg 18.75 mg 18.75 mg 20mg 21.25 mg 20mg   INR 1.61 1.9 2.5 2.3 2.2 1.7 1.6 2.3 1.9   Notes  hosp doxy     Doxy start 3/10 Doxy finish 3/20      Date 4/23 5/7 6/4 6/11 6/25 7/10 8/1 8/29 9/19   Total Weekly Dose 21.25mg 21.25 mg 21.25 mg 20 mg 21.25 mg 21.25mg 21.25 mg 18.75 mg 21.25 mg   INR 2.0 2.4 2.4 2.2 3.0 2.7 2.5 2.0 3.7   Notes    doxy start 6/4 doxy finish 6/10    1 missed dose      Date 10/3 10/17 10/24 10/31 11/14 12/12 1/9 1/23 2/13   Total Weekly Dose 20 mg? 20mg? 20mg 20mg 20mg 20mg 20mg 20mg 20mg   INR 3.1 3.5 2.4 2.6 2.7 2.9 3.4 2.8 2.7   Notes   dec GLV     dec GLV       Date 3/13 3/27 4/24 5/22 6/19 7/10 7/24 8/1 8/12   Total Weekly Dose 20mg 20mg 20 mg 20mg 20mg 20mg 20mg 17.5 mg 17.5 mg   INR 3.2 2.7 2.8 2.9 3.1/ 2.9 3.8 4.0 3.0 2.3   Notes               Date 9/3 9/17 10/9 10/30 12/2 1/6/20 1/20 2/17    Total Weekly Dose 17.5 mg  ??? 17.5mg 17.5 mg 17.5 mg 17.5 mg 17.5 mg 17.5 mg 17.5mg    INR 2.3 2.9 2.4 2.1 2.4 1.9 2.0 2.3    Notes       Inc. GLV Boost x1       Clinic Interview:  Verbal Release Authorization signed on 6/25/18 -- may speak with Glenis Lucero (wife), Marge Cox (daughter)  Tablet Strength: 2.5mg tablets     Patient Findings   Positives:  Extra doses   Negatives:  Signs/symptoms of thrombosis, Signs/symptoms of bleeding, Laboratory test error suspected, Change in health, Change in alcohol use, Change in activity, Upcoming invasive procedure, Emergency department visit, Upcoming dental procedure, Missed doses, Change in medications, Change in diet/appetite, Hospital admission, Bruising, Other  complaints   Comments:  Patient reports he is continuing to have sinus problems. He believes this is related to his CPAP use. He states he does have a nasal spray at home that was prescribed to him for this and he tries to use it regularly. He also reports he may have taken a couple of extra half tablets he had on-hand at some point but is unsure of when exactly this may have been.      Plan:   1. INR is therapeutic again today at 2.3. Instructed Mr. Lucero to continue warfarin 2.5mg oral daily.  2. RTC in 4 weeks on 3/16.  3. Written and verbal information provided in clinic. Mr. Lucero expresses understanding with teach back and has no further questions at this time.    Kelsey Muñoz, Pharmacy Intern  2/17/2020  1:42 PM     I, Colleen Nolasco, PharmD, have reviewed the note in full and agree with the assessment and plan.  02/17/20  2:30 PM

## 2020-03-11 PROBLEM — C44.41 BASAL CELL CARCINOMA (BCC) OF SKIN OF NECK: Status: ACTIVE | Noted: 2020-03-11

## 2020-03-13 RX ORDER — WARFARIN SODIUM 2.5 MG/1
TABLET ORAL
Qty: 90 TABLET | Refills: 0 | Status: SHIPPED | OUTPATIENT
Start: 2020-03-13 | End: 2020-06-09 | Stop reason: SDUPTHER

## 2020-03-16 ENCOUNTER — ANTICOAGULATION VISIT (OUTPATIENT)
Dept: PHARMACY | Facility: HOSPITAL | Age: 85
End: 2020-03-16

## 2020-03-16 DIAGNOSIS — I48.20 CHRONIC ATRIAL FIBRILLATION (HCC): ICD-10-CM

## 2020-03-16 LAB
INR PPP: 2.1 (ref 0.91–1.09)
PROTHROMBIN TIME: 24.7 SECONDS (ref 10–13.8)

## 2020-03-16 PROCEDURE — 85610 PROTHROMBIN TIME: CPT

## 2020-03-16 PROCEDURE — G0463 HOSPITAL OUTPT CLINIC VISIT: HCPCS

## 2020-03-16 PROCEDURE — 36416 COLLJ CAPILLARY BLOOD SPEC: CPT

## 2020-03-16 NOTE — PROGRESS NOTES
Anticoagulation Clinic Progress Note  Indication: afib  Referring Provider: Em  Goal INR: 2-3   Current Drug Interactions: aspirin, simvastatin, trazodone  Other: no bleeding per patient  [id number]: 4 (age, HTN, CAD)    Diet: tries to avoid Vit K    Anticoagulation Clinic INR History:  Date 1/8 1/10 1/18 1/25 2/9 3/2 3/16 3/23 4/10   Total Weekly Dose 23.75 mg 23.75 mg 18.75 mg 18.75 mg 18.75 mg 18.75 mg 20mg 21.25 mg 20mg   INR 1.61 1.9 2.5 2.3 2.2 1.7 1.6 2.3 1.9   Notes  hosp doxy     Doxy start 3/10 Doxy finish 3/20      Date 4/23 5/7 6/4 6/11 6/25 7/10 8/1 8/29 9/19   Total Weekly Dose 21.25mg 21.25 mg 21.25 mg 20 mg 21.25 mg 21.25mg 21.25 mg 18.75 mg 21.25 mg   INR 2.0 2.4 2.4 2.2 3.0 2.7 2.5 2.0 3.7   Notes    doxy start 6/4 doxy finish 6/10    1 missed dose      Date 10/3 10/17 10/24 10/31 11/14 12/12 1/9 1/23 2/13   Total Weekly Dose 20 mg? 20mg? 20mg 20mg 20mg 20mg 20mg 20mg 20mg   INR 3.1 3.5 2.4 2.6 2.7 2.9 3.4 2.8 2.7   Notes   dec GLV     dec GLV       Date 3/13 3/27 4/24 5/22 6/19 7/10 7/24 8/1 8/12   Total Weekly Dose 20mg 20mg 20 mg 20mg 20mg 20mg 20mg 17.5 mg 17.5 mg   INR 3.2 2.7 2.8 2.9 3.1/ 2.9 3.8 4.0 3.0 2.3   Notes               Date 9/3 9/17 10/9 10/30 12/2 1/6/20 1/20 2/17 3/16   Total Weekly Dose 17.5 mg  ??? 17.5mg 17.5 mg 17.5 mg 17.5 mg 17.5 mg 17.5 mg 17.5mg 17.5mg   INR 2.3 2.9 2.4 2.1 2.4 1.9 2.0 2.3 2.1   Notes       Inc. GLV Boost x1       Clinic Interview:  Verbal Release Authorization signed on 6/25/18 -- may speak with Glenis Lucero (wife), Marge Cox (daughter)  Tablet Strength: 2.5mg tablets     Patient Findings   Negatives:  Signs/symptoms of thrombosis, Signs/symptoms of bleeding, Laboratory test error suspected, Change in health, Change in alcohol use, Change in activity, Upcoming invasive procedure, Emergency department visit, Upcoming dental procedure, Missed doses, Extra doses, Change in medications, Change in diet/appetite, Hospital admission, Bruising, Other  complaints       Plan:   1. INR is therapeutic again today at 2.1. Instructed Mr. Lucero to continue warfarin 2.5mg oral daily.  2. RTC in 3 weeks prior to appointment with Dr Strickland  3. Written and verbal information provided in clinic. Mr. Lucero expresses understanding with teach back and has no further questions at this time.    Tena Gomez, YossiD.  03/16/20   13:52

## 2020-03-18 RX ORDER — SIMVASTATIN 10 MG
TABLET ORAL
Qty: 90 TABLET | Refills: 0 | Status: SHIPPED | OUTPATIENT
Start: 2020-03-18 | End: 2020-03-24

## 2020-03-24 ENCOUNTER — TELEPHONE (OUTPATIENT)
Dept: FAMILY MEDICINE CLINIC | Facility: CLINIC | Age: 85
End: 2020-03-24

## 2020-03-24 RX ORDER — PRAVASTATIN SODIUM 40 MG
40 TABLET ORAL DAILY
Qty: 90 TABLET | Refills: 1 | Status: SHIPPED | OUTPATIENT
Start: 2020-03-24 | End: 2020-12-21 | Stop reason: ALTCHOICE

## 2020-03-24 NOTE — TELEPHONE ENCOUNTER
Please inform pt's Glenis (on verbal release form) that there is an possible medication interaction between simvastatin and warfarin. I would like for Mr. Lucero to stop simvastatin and start pravastatin. If they agree, I will send the new prescription for pravastatin 40 mg qd to his pharmacy.

## 2020-04-06 ENCOUNTER — APPOINTMENT (OUTPATIENT)
Dept: PHARMACY | Facility: HOSPITAL | Age: 85
End: 2020-04-06

## 2020-05-04 RX ORDER — POTASSIUM CHLORIDE 20 MEQ/1
TABLET, EXTENDED RELEASE ORAL
Qty: 90 TABLET | Refills: 0 | Status: SHIPPED | OUTPATIENT
Start: 2020-05-04 | End: 2020-07-31

## 2020-05-11 RX ORDER — DONEPEZIL HYDROCHLORIDE 10 MG/1
TABLET, FILM COATED ORAL
Qty: 30 TABLET | Refills: 8 | Status: SHIPPED | OUTPATIENT
Start: 2020-05-11 | End: 2021-02-03

## 2020-05-12 ENCOUNTER — ANTICOAGULATION VISIT (OUTPATIENT)
Dept: PHARMACY | Facility: HOSPITAL | Age: 85
End: 2020-05-12

## 2020-05-12 DIAGNOSIS — I48.20 CHRONIC ATRIAL FIBRILLATION (HCC): ICD-10-CM

## 2020-05-12 LAB
INR PPP: 2 (ref 0.91–1.09)
PROTHROMBIN TIME: 23.9 SECONDS (ref 10–13.8)

## 2020-05-12 PROCEDURE — 36416 COLLJ CAPILLARY BLOOD SPEC: CPT

## 2020-05-12 PROCEDURE — 85610 PROTHROMBIN TIME: CPT

## 2020-05-12 PROCEDURE — G0463 HOSPITAL OUTPT CLINIC VISIT: HCPCS

## 2020-05-12 RX ORDER — TRAZODONE HYDROCHLORIDE 50 MG/1
TABLET ORAL
Qty: 90 TABLET | Refills: 0 | Status: SHIPPED | OUTPATIENT
Start: 2020-05-12 | End: 2020-08-07

## 2020-05-12 NOTE — PROGRESS NOTES
Anticoagulation Clinic Progress Note  Indication: afib  Referring Provider: Em  Goal INR: 2-3   Current Drug Interactions: aspirin, simvastatin, trazodone  Other: no bleeding per patient  [id number]: 4 (age, HTN, CAD)    Diet: tries to avoid Vit K    Anticoagulation Clinic INR History:  Date 1/8 1/10 1/18 1/25 2/9 3/2 3/16 3/23 4/10   Total Weekly Dose 23.75 mg 23.75 mg 18.75 mg 18.75 mg 18.75 mg 18.75 mg 20mg 21.25 mg 20mg   INR 1.61 1.9 2.5 2.3 2.2 1.7 1.6 2.3 1.9   Notes  hosp doxy     Doxy start 3/10 Doxy finish 3/20      Date 4/23 5/7 6/4 6/11 6/25 7/10 8/1 8/29 9/19   Total Weekly Dose 21.25mg 21.25 mg 21.25 mg 20 mg 21.25 mg 21.25mg 21.25 mg 18.75 mg 21.25 mg   INR 2.0 2.4 2.4 2.2 3.0 2.7 2.5 2.0 3.7   Notes    doxy start 6/4 doxy finish 6/10    1 missed dose      Date 10/3 10/17 10/24 10/31 11/14 12/12 1/9 1/23 2/13   Total Weekly Dose 20 mg? 20mg? 20mg 20mg 20mg 20mg 20mg 20mg 20mg   INR 3.1 3.5 2.4 2.6 2.7 2.9 3.4 2.8 2.7   Notes   dec GLV     dec GLV       Date 3/13 3/27 4/24 5/22 6/19 7/10 7/24 8/1 8/12   Total Weekly Dose 20mg 20mg 20 mg 20mg 20mg 20mg 20mg 17.5 mg 17.5 mg   INR 3.2 2.7 2.8 2.9 3.1/ 2.9 3.8 4.0 3.0 2.3   Notes               Date 9/3 9/17 10/9 10/30 12/2 1/6/20 1/20 2/17 3/16 5/12     Total Weekly Dose 17.5 mg  ??? 17.5mg 17.5 mg 17.5 mg 17.5 mg 17.5 mg 17.5 mg 17.5mg 17.5mg 17.5mg     INR 2.3 2.9 2.4 2.1 2.4 1.9 2.0 2.3 2.1 2.0     Notes       Inc. GLV Boost x1          Clinic Interview:  Verbal Release Authorization signed on 6/25/18 -- may speak with Glenis Lucero (wife), Marge Cox (daughter)  Tablet Strength: 2.5mg tablets     Patient Findings  Negatives:  Signs/symptoms of thrombosis, Signs/symptoms of bleeding, Laboratory test error suspected, Change in health, Change in alcohol use, Change in activity, Upcoming invasive procedure, Emergency department visit, Upcoming dental procedure, Missed doses, Extra doses, Change in medications, Change in diet/appetite, Hospital  admission, Bruising, Other complaints   Comments:  Spoke with Ms. Lucero who reports that he may have an iceburg salad every now and then. Additionally he may have some peas or green beans. His wife reports that he ate peas a few days ago.     Plan:   1. INR is therapeutic again today. Instructed Ms. Lucero to continue warfarin 2.5mg oral daily.  2. RTC in 4 weeks on 6/9.   3. Written and verbal information provided in clinic. Mr. Lucero expresses understanding with teach back and has no further questions at this time.    Colleen Nolasco, PharmD  05/12/20   15:02

## 2020-05-14 RX ORDER — ALFUZOSIN HYDROCHLORIDE 10 MG/1
TABLET, EXTENDED RELEASE ORAL
Qty: 30 TABLET | Refills: 4 | Status: SHIPPED | OUTPATIENT
Start: 2020-05-14 | End: 2020-10-07

## 2020-06-09 ENCOUNTER — ANTICOAGULATION VISIT (OUTPATIENT)
Dept: PHARMACY | Facility: HOSPITAL | Age: 85
End: 2020-06-09

## 2020-06-09 DIAGNOSIS — I48.20 CHRONIC ATRIAL FIBRILLATION (HCC): ICD-10-CM

## 2020-06-09 LAB
INR PPP: 3.1 (ref 0.91–1.09)
PROTHROMBIN TIME: 37.2 SECONDS (ref 10–13.8)

## 2020-06-09 PROCEDURE — 85610 PROTHROMBIN TIME: CPT

## 2020-06-09 PROCEDURE — G0463 HOSPITAL OUTPT CLINIC VISIT: HCPCS

## 2020-06-09 PROCEDURE — 36416 COLLJ CAPILLARY BLOOD SPEC: CPT

## 2020-06-09 RX ORDER — WARFARIN SODIUM 2.5 MG/1
TABLET ORAL
Qty: 90 TABLET | Refills: 1 | Status: SHIPPED | OUTPATIENT
Start: 2020-06-09 | End: 2020-11-23

## 2020-06-09 RX ORDER — WARFARIN SODIUM 2.5 MG/1
TABLET ORAL
Qty: 90 TABLET | Refills: 0 | OUTPATIENT
Start: 2020-06-09

## 2020-06-09 NOTE — PROGRESS NOTES
Anticoagulation Clinic Progress Note  Indication: afib  Referring Provider: Em  Goal INR: 2-3   Current Drug Interactions: aspirin, simvastatin, trazodone  Other: no bleeding per patient  [id number]: 4 (age, HTN, CAD)    Diet: iceburg salads every now and then/ peas    Anticoagulation Clinic INR History:  Date 1/8 1/10 1/18 1/25 2/9 3/2 3/16 3/23 4/10   Total Weekly Dose 23.75 mg 23.75 mg 18.75 mg 18.75 mg 18.75 mg 18.75 mg 20mg 21.25 mg 20mg   INR 1.61 1.9 2.5 2.3 2.2 1.7 1.6 2.3 1.9   Notes  hosp doxy     Doxy start 3/10 Doxy finish 3/20      Date 4/23 5/7 6/4 6/11 6/25 7/10 8/1 8/29 9/19   Total Weekly Dose 21.25mg 21.25 mg 21.25 mg 20 mg 21.25 mg 21.25mg 21.25 mg 18.75 mg 21.25 mg   INR 2.0 2.4 2.4 2.2 3.0 2.7 2.5 2.0 3.7   Notes    doxy start 6/4 doxy finish 6/10    1 missed dose      Date 10/3 10/17 10/24 10/31 11/14 12/12 1/9 1/23 2/13   Total Weekly Dose 20 mg? 20mg? 20mg 20mg 20mg 20mg 20mg 20mg 20mg   INR 3.1 3.5 2.4 2.6 2.7 2.9 3.4 2.8 2.7   Notes   dec GLV     dec GLV       Date 3/13 3/27 4/24 5/22 6/19 7/10 7/24 8/1 8/12   Total Weekly Dose 20mg 20mg 20 mg 20mg 20mg 20mg 20mg 17.5 mg 17.5 mg   INR 3.2 2.7 2.8 2.9 3.1/ 2.9 3.8 4.0 3.0 2.3   Notes               Date 9/3 9/17 10/9 10/30 12/2 1/6/20 1/20 2/17 3/16 5/12 6/9    Total Weekly Dose 17.5 mg  ??? 17.5mg 17.5 mg 17.5 mg 17.5 mg 17.5 mg 17.5 mg 17.5mg 17.5mg 17.5mg 17.5mg    INR 2.3 2.9 2.4 2.1 2.4 1.9 2.0 2.3 2.1 2.0 3.1    Notes       Inc. GLV Boost x1          Clinic Interview:  Verbal Release Authorization signed on 6/25/18 -- may speak with Glenis Lucero (wife), Marge Cox (daughter)  Tablet Strength: 2.5mg tablets   Patient contact: speak with wife [phone]    Patient Findings   Positives:  Upcoming invasive procedure   Negatives:  Signs/symptoms of thrombosis, Signs/symptoms of bleeding, Laboratory test error suspected, Change in health, Change in alcohol use, Change in activity, Emergency department visit, Upcoming dental  procedure, Missed doses, Extra doses, Change in medications, Change in diet/appetite, Hospital admission, Bruising, Other complaints   Comments:  Ms. Lucero reports that he is going to have basal cell removal with dermatologist (JESSICA Grove) on 6/23/2020. I LVM for Callie Paris to discuss procedure. [phone]       Unable to get in contact with Patient or patient's wife until 6/12/2020. Patient's wife reports that their home phone is the best to get in touch with her and there is no other phone number available.   Unable to LVM 6/9, 6/10 - phone is disconnected   6/10 unable to LVM at work or on wife's cell phone.   6/11: Mr Lucero reports that his wife isn't home right now and did not seem to remember coming to the clinic two days ago. He said she would be back in a few hours and would be home tomorrow.    Addendum 6/15/2020: Evelina Robles at Dermatology office called back on 6/15. They would like for him to have his INR checked the week of the basal cell removal and want his INR to be under 3.0. Not scheduled at this time. The dermatology office will call us when this is scheduled.     Plan:   1. INR was slightly SUPRAtherapeutic on 6/9/2020 unable to reach patient until 6/12/2020. Instructed Ms. Lucero to have Mr. Lucero eat an extra serving of GLV today and then continue warfarin 2.5mg oral daily.    Addendum 6/15/2020: Evelina Robles at Dermatology office called back on 6/15. They would like for him to have his INR checked the week of the basal cell removal and want his INR to be under 3.0. Not scheduled at this time. The dermatology office will call us when this is scheduled.    2. Repeat INR pending upcoming procedure- awaiting call back. Spoke with wife, and he will come on 6/22 at 1pm. She will call us after the appointment to ensure we get in touch the same day prior to procedure on 6/23/2020.  3. [facility] opened.  4. Written and verbal information provided in clinic. Mr. Lucero expresses  understanding with teach back and has no further questions at this time.    Colleen Nolasco, PharmD  06/09/20   13:37

## 2020-06-12 RX ORDER — SIMVASTATIN 10 MG
TABLET ORAL
Qty: 90 TABLET | Refills: 0 | Status: SHIPPED | OUTPATIENT
Start: 2020-06-12 | End: 2020-09-14

## 2020-06-22 ENCOUNTER — ANTICOAGULATION VISIT (OUTPATIENT)
Dept: PHARMACY | Facility: HOSPITAL | Age: 85
End: 2020-06-22

## 2020-06-22 DIAGNOSIS — I48.20 CHRONIC ATRIAL FIBRILLATION (HCC): ICD-10-CM

## 2020-06-22 LAB
INR PPP: 2 (ref 0.91–1.09)
PROTHROMBIN TIME: 23.7 SECONDS (ref 10–13.8)

## 2020-06-22 PROCEDURE — 36416 COLLJ CAPILLARY BLOOD SPEC: CPT

## 2020-06-22 PROCEDURE — G0463 HOSPITAL OUTPT CLINIC VISIT: HCPCS

## 2020-06-22 PROCEDURE — 85610 PROTHROMBIN TIME: CPT

## 2020-06-22 NOTE — PROGRESS NOTES
Anticoagulation Clinic Progress Note  Indication: afib  Referring Provider: Em  Goal INR: 2-3   Current Drug Interactions: aspirin, simvastatin, trazodone  Other: no bleeding per patient  [id number]: 4 (age, HTN, CAD)    Diet: iceburg salads every now and then/ peas    Anticoagulation Clinic INR History:  Date 1/8 1/10 1/18 1/25 2/9 3/2 3/16 3/23 4/10   Total Weekly Dose 23.75 mg 23.75 mg 18.75 mg 18.75 mg 18.75 mg 18.75 mg 20mg 21.25 mg 20mg   INR 1.61 1.9 2.5 2.3 2.2 1.7 1.6 2.3 1.9   Notes  hosp doxy     Doxy start 3/10 Doxy finish 3/20      Date 4/23 5/7 6/4 6/11 6/25 7/10 8/1 8/29 9/19   Total Weekly Dose 21.25mg 21.25 mg 21.25 mg 20 mg 21.25 mg 21.25mg 21.25 mg 18.75 mg 21.25 mg   INR 2.0 2.4 2.4 2.2 3.0 2.7 2.5 2.0 3.7   Notes    doxy start 6/4 doxy finish 6/10    1 missed dose      Date 10/3 10/17 10/24 10/31 11/14 12/12 1/9 1/23 2/13   Total Weekly Dose 20 mg? 20mg? 20mg 20mg 20mg 20mg 20mg 20mg 20mg   INR 3.1 3.5 2.4 2.6 2.7 2.9 3.4 2.8 2.7   Notes   dec GLV     dec GLV       Date 3/13 3/27 4/24 5/22 6/19 7/10 7/24 8/1 8/12   Total Weekly Dose 20mg 20mg 20 mg 20mg 20mg 20mg 20mg 17.5 mg 17.5 mg   INR 3.2 2.7 2.8 2.9 3.1/ 2.9 3.8 4.0 3.0 2.3   Notes               Date 9/3 9/17 10/9 10/30 12/2 1/6/20 1/20 2/17 3/16 5/12 6/9 6/22      Total Weekly Dose 17.5 mg  ??? 17.5mg 17.5 mg 17.5 mg 17.5 mg 17.5 mg 17.5 mg 17.5mg 17.5mg 17.5mg 17.5mg       INR 2.3 2.9 2.4 2.1 2.4 1.9 2.0 2.3 2.1 2.0 3.1 2      Notes       Inc. GLV Boost x1             Clinic Interview:  Verbal Release Authorization signed on 6/25/18 -- may speak with Glenis Lucero (wife), Marge Cox (daughter)  Tablet Strength: 2.5mg tablets   Patient contact: speak with wife [phone]    Patient Findings  Positives:  Change in medications   Negatives:  Signs/symptoms of thrombosis, Signs/symptoms of bleeding, Laboratory test error suspected, Change in health, Change in alcohol use, Change in activity, Upcoming invasive procedure, Emergency  department visit, Upcoming dental procedure, Missed doses, Extra doses, Change in diet/appetite, Hospital admission, Bruising, Other complaints   Comments:  He did not eat any peas last night but he did have a spinach salad a few nights ago.  Discussed importance of consistent intake of foods containing vitamin K.  Glenis Lucero denies any missed doses.     His pravastatin was changed from pravastatin to simvastatin on 6/14 (DDI: may result in an increased risk of bleeding).   She has no idea what dose of warfarin he has been taking and he will not remember. Upon discussion, she was willing to help manage his warfarin.  Recommended that they utilize weekly pill box and write his warfarin doses on a calendar as well.   He is due for removal of basal cell carcinoma tomorrow.  Attempted to contact [facility] [place]  ([place]) to report INR.  LVM.     Otherwise, above findings negative.   They will contact the clinic with any medication changes or if any bleeding issues after procedure.      Plan:   1. INR is therapeutic today at 2. Instructed Ms. Lucero to have Mr. Lucero continue warfarin 2.5mg oral daily.  2. Repeat INR on 7/7  3. Written and verbal information provided in clinic. Mr. Lucero expresses understanding with teach back and has no further questions at this time.    Lizzeth Abraham, PharmD  06/22/20   13:29

## 2020-07-07 ENCOUNTER — ANTICOAGULATION VISIT (OUTPATIENT)
Dept: PHARMACY | Facility: HOSPITAL | Age: 85
End: 2020-07-07

## 2020-07-07 DIAGNOSIS — I48.20 CHRONIC ATRIAL FIBRILLATION (HCC): ICD-10-CM

## 2020-07-07 LAB
INR PPP: 1.6 (ref 0.91–1.09)
PROTHROMBIN TIME: 19.5 SECONDS (ref 10–13.8)

## 2020-07-07 PROCEDURE — 36416 COLLJ CAPILLARY BLOOD SPEC: CPT

## 2020-07-07 PROCEDURE — G0463 HOSPITAL OUTPT CLINIC VISIT: HCPCS

## 2020-07-07 PROCEDURE — 85610 PROTHROMBIN TIME: CPT

## 2020-07-07 NOTE — PROGRESS NOTES
Anticoagulation Clinic Progress Note  Indication: afib  Referring Provider: Em  Goal INR: 2-3   Current Drug Interactions: aspirin, simvastatin, trazodone  Other: no bleeding per patient  [id number]: 4 (age, HTN, CAD)    Diet: iceburg salads every now and then/ peas    Anticoagulation Clinic INR History:  Date 1/8 1/10 1/18 1/25 2/9 3/2 3/16 3/23 4/10   Total Weekly Dose 23.75 mg 23.75 mg 18.75 mg 18.75 mg 18.75 mg 18.75 mg 20mg 21.25 mg 20mg   INR 1.61 1.9 2.5 2.3 2.2 1.7 1.6 2.3 1.9   Notes  hosp doxy     Doxy start 3/10 Doxy finish 3/20      Date 4/23 5/7 6/4 6/11 6/25 7/10 8/1 8/29 9/19   Total Weekly Dose 21.25mg 21.25 mg 21.25 mg 20 mg 21.25 mg 21.25mg 21.25 mg 18.75 mg 21.25 mg   INR 2.0 2.4 2.4 2.2 3.0 2.7 2.5 2.0 3.7   Notes    doxy start 6/4 doxy finish 6/10    1 missed dose      Date 10/3 10/17 10/24 10/31 11/14 12/12 1/9 1/23 2/13   Total Weekly Dose 20 mg? 20mg? 20mg 20mg 20mg 20mg 20mg 20mg 20mg   INR 3.1 3.5 2.4 2.6 2.7 2.9 3.4 2.8 2.7   Notes   dec GLV     dec GLV       Date 3/13 3/27 4/24 5/22 6/19 7/10 7/24 8/1 8/12   Total Weekly Dose 20mg 20mg 20 mg 20mg 20mg 20mg 20mg 17.5 mg 17.5 mg   INR 3.2 2.7 2.8 2.9 3.1/ 2.9 3.8 4.0 3.0 2.3   Notes               Date 9/3 9/17 10/9 10/30 12/2 1/6/20 1/20 2/17 3/16 5/12 6/9 6/22 7/7     Total Weekly Dose 17.5 mg  ??? 17.5mg 17.5 mg 17.5 mg 17.5 mg 17.5 mg 17.5 mg 17.5mg 17.5mg 17.5mg 17.5mg 17.5 mg 17.5 mg     INR 2.3 2.9 2.4 2.1 2.4 1.9 2.0 2.3 2.1 2.0 3.1 2 1.6     Notes       Inc. GLV Boost x1      Missed dose       Clinic Interview:  Verbal Release Authorization signed on 6/25/18 -- may speak with Glenis Nic (wife), Marge Cox (daughter)  Tablet Strength: 2.5mg tablets   Patient contact: speak with wife Glenis [phone]    Patient Findings   Positives:  Missed doses, Extra doses, Change in diet/appetite   Negatives:  Signs/symptoms of thrombosis, Signs/symptoms of bleeding, Laboratory test error suspected, Change in health, Change in alcohol  use, Change in activity, Upcoming invasive procedure, Emergency department visit, Upcoming dental procedure, Change in medications, Hospital admission, Bruising, Other complaints   Comments:  He had a salad the night before last.  He typically does not eat any GLV.     We have not had any documentation of Mr. Lucero taking warfarin 2.5 mg oral daily except 3.75 mg on MonThur (20 mg/week), since July 10, 2019. (was SunWed at that time); however, he repeated himself 5 times that he his taking 8 total tablets per week, with an extra 1/2 tablet two days per week.  He seemed very sure of himself.     Updated tracker for this week to reflect reported dose.  Hesitated to boost with 5 mg tablet today in case his report was not accurate.   Will need to evaluated his INR next week and update maintenance dose in tracker       Contacted his wife and she denied any missed doses.  He initially denied missing any doses, but now he believes that he may have missed at least one dose.   He stated that he takes an extra 1/2 tablet of warfarin twice weekly  He thinks he spreads them out and takes the extra dose on Monday and Wednesday?  We have not had him on this dose.   He fills the med box himself on Sunday.     His wife Glenis called the clinic.  She did confirm that he has an extra 1/2 tablet in his med box, but it was on Friday.  We adjusted it to Thursday to spread the doses a little more evenly.   She updated the dosing calendar from the clinic today to reflect changes.       Otherwise, above findings negative.       Plan:   1. INR is sub therapeutic today at 1.6 after missing a dose. Instructed  and Mrs. Lucero to have Mr. Lucero boost warfarin 3.75 mg today, then tomorrow resume his reported dose of warfarin 2.5mg oral daily except 3.75 mg on MonThur.  2. Repeat INR on 7/14 to ensure wnl.  Would like to push out next visit if therapeutic due to COVID19 concerns.  3. Written and verbal information provided in clinic.   Nic expresses understanding with teach back and has no further questions at this time.  Also, discussed with Mrs. Lucero over the phone.  She verbalized understanding.  Calendar updated (see pt findings)    Lizzeth Abraham, PharmD  07/07/20   13:10

## 2020-07-14 ENCOUNTER — ANTICOAGULATION VISIT (OUTPATIENT)
Dept: PHARMACY | Facility: HOSPITAL | Age: 85
End: 2020-07-14

## 2020-07-14 DIAGNOSIS — I48.20 CHRONIC ATRIAL FIBRILLATION (HCC): ICD-10-CM

## 2020-07-14 LAB
INR PPP: 2.6 (ref 0.91–1.09)
INR PPP: 2.6 (ref 0.9–1.1)
PROTHROMBIN TIME: 31 SECONDS (ref 10–13.8)

## 2020-07-14 PROCEDURE — 85610 PROTHROMBIN TIME: CPT

## 2020-07-14 PROCEDURE — 36416 COLLJ CAPILLARY BLOOD SPEC: CPT

## 2020-07-14 PROCEDURE — G0463 HOSPITAL OUTPT CLINIC VISIT: HCPCS | Performed by: PHARMACIST

## 2020-07-14 NOTE — PROGRESS NOTES
Anticoagulation Clinic Progress Note  Indication: afib  Referring Provider: Em  Goal INR: 2-3   Current Drug Interactions: aspirin, simvastatin, trazodone  Other: no bleeding per patient  [id number]: 4 (age, HTN, CAD)    Diet: iceburg salads every now and then/ peas    Anticoagulation Clinic INR History:  Date 1/8 1/10 1/18 1/25 2/9 3/2 3/16 3/23 4/10   Total Weekly Dose 23.75 mg 23.75 mg 18.75 mg 18.75 mg 18.75 mg 18.75 mg 20mg 21.25 mg 20mg   INR 1.61 1.9 2.5 2.3 2.2 1.7 1.6 2.3 1.9   Notes  hosp doxy     Doxy start 3/10 Doxy finish 3/20      Date 4/23 5/7 6/4 6/11 6/25 7/10 8/1 8/29 9/19   Total Weekly Dose 21.25mg 21.25 mg 21.25 mg 20 mg 21.25 mg 21.25mg 21.25 mg 18.75 mg 21.25 mg   INR 2.0 2.4 2.4 2.2 3.0 2.7 2.5 2.0 3.7   Notes    doxy start 6/4 doxy finish 6/10    1 missed dose      Date 10/3 10/17 10/24 10/31 11/14 12/12 1/9 1/23 2/13   Total Weekly Dose 20 mg? 20mg? 20mg 20mg 20mg 20mg 20mg 20mg 20mg   INR 3.1 3.5 2.4 2.6 2.7 2.9 3.4 2.8 2.7   Notes   dec GLV     dec GLV       Date 3/13 3/27 4/24 5/22 6/19 7/10 7/24 8/1 8/12   Total Weekly Dose 20mg 20mg 20 mg 20mg 20mg 20mg 20mg 17.5 mg 17.5 mg   INR 3.2 2.7 2.8 2.9 3.1/ 2.9 3.8 4.0 3.0 2.3   Notes               Date 9/3 9/17 10/9 10/30 12/2 1/6/20 1/20 2/17 3/16 5/12 6/9 6/22 7/7 7/14    Total Weekly Dose 17.5 mg  ??? 17.5mg 17.5 mg 17.5 mg 17.5 mg 17.5 mg 17.5 mg 17.5mg 17.5mg 17.5mg 17.5mg 17.5 mg 17.5 mg 21.25 mg    INR 2.3 2.9 2.4 2.1 2.4 1.9 2.0 2.3 2.1 2.0 3.1 2 1.6 2.6    Notes       Inc. GLV Boost x1      Missed dose       Clinic Interview:  Verbal Release Authorization signed on 6/25/18 -- may speak with Glenis Lucero (wife), Marge Cooneylivan (daughter)  Tablet Strength: 2.5mg tablets   Patient contact: speak with wife Glenis [phone]    Patient Findings     Negatives:  Signs/symptoms of thrombosis, Signs/symptoms of bleeding, Laboratory test error suspected, Change in health, Change in alcohol use, Change in activity, Upcoming invasive  procedure, Emergency department visit, Upcoming dental procedure, Missed doses, Extra doses, Change in medications, Change in diet/appetite, Hospital admission, Bruising, Other complaints   Comments:  He repeats several times about his pill planners and his routine.   He seems unsure of the warfarin specifically.     He stated that he takes an extra 1/2 tablet of warfarin twice a day, but I think he means twice a week.  He thinks he spreads them out and takes the extra dose on Monday and Thursday?    He fills the med box himself on Sunday.               Plan:   1. INR is therapeutic today at 2.6 after being subtherapeutic last week. Instructed Mr and Mrs. Lucero to resume his reported dose of warfarin 2.5mg oral daily except 3.75 mg on MonThur.  2. Repeat INR on 8/11 to ensure maintains goal range, he previously asked to push out next visit if therapeutic due to COVID19 concerns.  3. Written and verbal information provided in clinic. Mr. Lucero expresses understanding with teach back and has no further questions at this time.  Also, discussed with Mrs. Lucero.  She verbalized understanding.        Sarbjit Morales, PharmD  07/14/20   13:50

## 2020-07-31 RX ORDER — POTASSIUM CHLORIDE 20 MEQ/1
20 TABLET, EXTENDED RELEASE ORAL DAILY
Qty: 30 TABLET | Refills: 0 | Status: SHIPPED | OUTPATIENT
Start: 2020-07-31 | End: 2020-08-27 | Stop reason: SDUPTHER

## 2020-08-07 RX ORDER — TRAZODONE HYDROCHLORIDE 50 MG/1
TABLET ORAL
Qty: 90 TABLET | Refills: 0 | Status: SHIPPED | OUTPATIENT
Start: 2020-08-07 | End: 2020-11-02

## 2020-08-11 ENCOUNTER — ANTICOAGULATION VISIT (OUTPATIENT)
Dept: PHARMACY | Facility: HOSPITAL | Age: 85
End: 2020-08-11

## 2020-08-11 DIAGNOSIS — I48.20 CHRONIC ATRIAL FIBRILLATION (HCC): ICD-10-CM

## 2020-08-11 LAB
INR PPP: 2.6 (ref 0.91–1.09)
PROTHROMBIN TIME: 30.8 SECONDS (ref 10–13.8)

## 2020-08-11 PROCEDURE — 85610 PROTHROMBIN TIME: CPT

## 2020-08-11 PROCEDURE — G0463 HOSPITAL OUTPT CLINIC VISIT: HCPCS

## 2020-08-11 PROCEDURE — 36416 COLLJ CAPILLARY BLOOD SPEC: CPT

## 2020-08-11 NOTE — PROGRESS NOTES
Anticoagulation Clinic Progress Note  Indication: afib  Referring Provider: Em  Goal INR: 2-3   Current Drug Interactions: aspirin, simvastatin, trazodone  Other: no bleeding per patient  [id number]: 4 (age, HTN, CAD)    Diet: iceburg salads every now and then/ peas    Anticoagulation Clinic INR History:  Date 1/8 1/10 1/18 1/25 2/9 3/2 3/16 3/23 4/10   Total Weekly Dose 23.75 mg 23.75 mg 18.75 mg 18.75 mg 18.75 mg 18.75 mg 20mg 21.25 mg 20mg   INR 1.61 1.9 2.5 2.3 2.2 1.7 1.6 2.3 1.9   Notes  hosp doxy     Doxy start 3/10 Doxy finish 3/20      Date 4/23 5/7 6/4 6/11 6/25 7/10 8/1 8/29 9/19   Total Weekly Dose 21.25mg 21.25 mg 21.25 mg 20 mg 21.25 mg 21.25mg 21.25 mg 18.75 mg 21.25 mg   INR 2.0 2.4 2.4 2.2 3.0 2.7 2.5 2.0 3.7   Notes    doxy start 6/4 doxy finish 6/10    1 missed dose      Date 10/3 10/17 10/24 10/31 11/14 12/12 1/9 1/23 2/13   Total Weekly Dose 20 mg? 20mg? 20mg 20mg 20mg 20mg 20mg 20mg 20mg   INR 3.1 3.5 2.4 2.6 2.7 2.9 3.4 2.8 2.7   Notes   dec GLV     dec GLV       Date 3/13 3/27 4/24 5/22 6/19 7/10 7/24 8/1 8/12   Total Weekly Dose 20mg 20mg 20 mg 20mg 20mg 20mg 20mg 17.5 mg 17.5 mg   INR 3.2 2.7 2.8 2.9 3.1/ 2.9 3.8 4.0 3.0 2.3   Notes               Date 9/3 9/17 10/9 10/30 12/2 1/6/20 1/20 2/17 3/16 5/12 6/9 6/22 7/7 7/14 8/11     Total Weekly Dose 17.5 mg  ??? 17.5mg 17.5 mg 17.5 mg 17.5 mg 17.5 mg 17.5 mg 17.5mg 17.5mg 17.5mg 17.5mg 17.5 mg 17.5 mg 21.25 mg 20 mg     INR 2.3 2.9 2.4 2.1 2.4 1.9 2.0 2.3 2.1 2.0 3.1 2 1.6 2.6 2.6     Notes       Inc. GLV Boost x1      Missed dose boostx1        Clinic Interview:  Verbal Release Authorization signed on 6/25/18 -- may speak with Glenis Lucero (wife), Marge Cox (daughter)  Tablet Strength: 2.5mg tablets   Patient contact: speak with wife Glenis [phone], mobile [phone]    Patient Findings   Negatives:  Signs/symptoms of thrombosis, Signs/symptoms of bleeding, Laboratory test error suspected, Change in health, Change in alcohol  use, Change in activity, Upcoming invasive procedure, Emergency department visit, Upcoming dental procedure, Missed doses, Extra doses, Change in medications, Change in diet/appetite, Hospital admission, Bruising, Other complaints   Comments:  His wife noted that Mr. Lucero took his warfarin as directed.   He discussed filling his pill planner each week on Sundays to help avoid missing doses, etc..   Otherwise, above findings negative     Plan:   1. INR is therapeutic today at 2.6. Instructed Mr and Mrs. Lucero to continue warfarin 2.5 mg oral daily except 3.75 mg on MonThur.  2. Repeat INR in 4 weeks on on 9/18 due to COVID19 concerns.  3. Written and verbal information provided in clinic. Mr. Lucero expresses understanding with teach back and has no further questions at this time.  Also, discussed with Mrs. Lucero.  She verbalized understanding.        Lizzeth Abraham, PharmD  08/11/20   13:21

## 2020-08-24 ENCOUNTER — OFFICE VISIT (OUTPATIENT)
Dept: CARDIOLOGY | Facility: CLINIC | Age: 85
End: 2020-08-24

## 2020-08-24 VITALS
WEIGHT: 194 LBS | BODY MASS INDEX: 30.45 KG/M2 | HEIGHT: 67 IN | SYSTOLIC BLOOD PRESSURE: 118 MMHG | HEART RATE: 56 BPM | DIASTOLIC BLOOD PRESSURE: 68 MMHG | OXYGEN SATURATION: 97 %

## 2020-08-24 DIAGNOSIS — I25.10 CORONARY ARTERY DISEASE INVOLVING NATIVE CORONARY ARTERY OF NATIVE HEART WITHOUT ANGINA PECTORIS: ICD-10-CM

## 2020-08-24 DIAGNOSIS — I10 ESSENTIAL HYPERTENSION: ICD-10-CM

## 2020-08-24 DIAGNOSIS — E78.2 MIXED HYPERLIPIDEMIA: ICD-10-CM

## 2020-08-24 DIAGNOSIS — I48.21 PERMANENT ATRIAL FIBRILLATION (HCC): Primary | ICD-10-CM

## 2020-08-24 PROCEDURE — 99214 OFFICE O/P EST MOD 30 MIN: CPT | Performed by: INTERNAL MEDICINE

## 2020-08-24 NOTE — PROGRESS NOTES
[facility]  Office visit  Mike Lucero Jr.  7/29/1932  [phone]  VISIT DATE:  8/24/2020      PCP: Fabien Merino, [address] [facility] [address]    CC:  Chief Complaint   Patient presents with   • Coronary Artery Disease   • Atrial Fibrillation       PROBLEM LIST:  1.  Coronary artery disease:  a. Preserved LV systolic function.   b.  CABG for 3-vessel disease, July 2006.  2. Paroxysmal atrial fibrillation,  anticoagulated on Coumadin.   3. Benign hypertension.   4. Hypercholesterolemia.   5. Endocarditis, September 2009, treated with IV antibiotics:  a.  MILTON by Dr. Valladares, 09/11/2009:  Small nodular nonpedunculated density, possibly consistent with small vegetation of the posterior leaflet of the mitral valve.  b. Previously treated with Dr. Vann.  6. Remote tobacco abuse.   7. Osteoarthritis.   8. Cataracts.   9. Seasonal allergies/rhinitis.   10. Overmedication reaction secondary to temazepam with neurologic  effects now resolved      ASSESSMENT:   Diagnosis Plan   1. Permanent atrial fibrillation (CMS/HCC)     2. Coronary artery disease involving native coronary artery of native heart without angina pectoris     3. Mixed hyperlipidemia     4. Essential hypertension         PLAN:  Coronary artery disease: Continue low-dose aspirin, statin and afterload reduction    Permanent atrial fibrillation: Continue oral anticoagulation with a goal INR of 2-3 for stroke prophylaxis, Continue rate control with diltiazem    Hypertension: Goal less than 130/80 mmHg.  Currently well-controlled.  Continue current antihypertensive regimen    Hyperlipidemia: Continue current statin therapy, goal LDL less than 100.  Currently controlled, continue statin.    Subjective  Reports stable functional capacity.  Denies dyspnea on exertion or chest pain.  No PND or orthopnea.  Home blood pressures running less than 130/80 mmHg.  He denies myalgias on statin therapy. He is  "compliant with medical therapy.  Denies easy bruising or bleeding complications on Coumadin, INR is at goal.  Uses a pill counter to make sure that he is taking his medications as directed, his wife helps him as well.  He has been social distancing during COVID pandemic.  He will bring a med list or pill bottles the next time to clarify his medication regimen.    PHYSICAL EXAMINATION:  Vitals:    08/24/20 1325   BP: 118/68   BP Location: Left arm   Patient Position: Sitting   Pulse: 56   SpO2: 97%   Weight: 88 kg (194 lb)   Height: 170.2 cm (67\")     General Appearance:    Alert, cooperative, no distress, appears stated age   Head:    Normocephalic, without obvious abnormality, atraumatic   Eyes:    conjunctiva/corneas clear   Nose:   Nares normal, septum midline, mucosa normal, no drainage   Throat:   Lips, teeth and gums normal   Neck:   Supple, symmetrical, trachea midline, no carotid    bruit or JVD   Lungs:     Clear to auscultation bilaterally, respirations unlabored   Chest Wall:    No tenderness or deformity    Heart:   Irregularly irregular, S1 and S2 normal, no murmur, rub   or gallop, normal carotid impulse bilaterally without bruit.   Abdomen:     Soft, non-tender   Extremities:   Extremities normal, atraumatic, no cyanosis or edema   Pulses:   2+ and symmetric all extremities   Skin:   Skin color, texture, turgor normal, no rashes or lesions       Diagnostic Data:  Procedures  Lab Results   Component Value Date    TRIG 156 (H) 11/13/2019    HDL 30 (L) 11/13/2019     Lab Results   Component Value Date    GLUCOSE 104 (H) 11/13/2019    BUN 12 11/13/2019    CREATININE 0.91 11/13/2019     11/13/2019    K 4.2 11/13/2019    CL 98 11/13/2019    CO2 30.0 (H) 11/13/2019     Lab Results   Component Value Date    HGBA1C 6.17 (H) 11/13/2019     Lab Results   Component Value Date    WBC 7.42 11/13/2019    HGB 15.6 11/13/2019    HCT 45.3 11/13/2019     11/13/2019       Allergies  Allergies   Allergen " "Reactions   • Rocephin [Ceftriaxone] Angioedema     Tongue and lip swelling, sore throat   • Temazepam Other (See Comments)     Caused pt to \"sleep drive\"   • Wellbutrin [Bupropion] Mental Status Change and Dizziness     Memory loss       Current Medications    Current Outpatient Medications:   •  alfuzosin (UROXATRAL) 10 MG 24 hr tablet, TAKE ONE TABLET BY MOUTH DAILY, Disp: 30 tablet, Rfl: 4  •  aspirin 81 MG EC tablet, Take 81 mg by mouth Daily., Disp: , Rfl:   •  CARTIA  MG 24 hr capsule, TAKE ONE CAPSULE BY MOUTH DAILY, Disp: 90 capsule, Rfl: 3  •  donepezil (ARICEPT) 10 MG tablet, TAKE ONE TABLET BY MOUTH DAILY, Disp: 30 tablet, Rfl: 8  •  lactobacillus acidophilus (RISAQUAD) capsule capsule, Take 1 capsule by mouth Daily., Disp: 30 capsule, Rfl: 0  •  lisinopril (PRINIVIL,ZESTRIL) 20 MG tablet, Take 20 mg by mouth Daily., Disp: , Rfl:   •  loratadine (CLARITIN) 10 MG tablet, TAKE ONE TABLET BY MOUTH DAILY, Disp: 30 tablet, Rfl: 4  •  potassium chloride (K-DUR,KLOR-CON) 20 MEQ CR tablet, Take 1 tablet by mouth Daily. Need lab work for further refills., Disp: 30 tablet, Rfl: 0  •  pravastatin (PRAVACHOL) 40 MG tablet, Take 1 tablet by mouth Daily., Disp: 90 tablet, Rfl: 1  •  prednisoLONE acetate (PRED FORTE) 1 % ophthalmic suspension, Apply 1 drop to eye(s) as directed by provider Daily As Needed., Disp: , Rfl:   •  simvastatin (ZOCOR) 10 MG tablet, TAKE ONE TABLET BY MOUTH ONCE NIGHTLY, Disp: 90 tablet, Rfl: 0  •  sodium chloride (OCEAN) 0.65 % nasal spray, 1 spray into the nostril(s) as directed by provider As Needed for Congestion., Disp: , Rfl:   •  traZODone (DESYREL) 50 MG tablet, TAKE ONE TABLET BY MOUTH ONCE NIGHTLY, Disp: 90 tablet, Rfl: 0  •  Triamcinolone Acetonide (NASACORT ALLERGY 24HR) 55 MCG/ACT nasal inhaler, 2 sprays into the nostril(s) as directed by provider Daily., Disp: , Rfl:   •  warfarin (COUMADIN) 2.5 MG tablet, TAKE ONE TABLET BY MOUTH DAILY OR AS DIRECTED BY ANTICOAGULATION " CLINIC, Disp: 90 tablet, Rfl: 1          ROS  Review of Systems   Constitution: Negative for diaphoresis and malaise/fatigue.   Cardiovascular: Positive for leg swelling. Negative for chest pain, claudication, cyanosis, dyspnea on exertion, irregular heartbeat and near-syncope.   Respiratory: Negative for cough, hemoptysis and sputum production.        SOCIAL HX  Social History     Socioeconomic History   • Marital status:      Spouse name: Glenis   • Number of children: 2   • Years of education: J.D.   • Highest education level: Not on file   Occupational History   • Occupation:      Employer: RETIRED   Tobacco Use   • Smoking status: Former Smoker     Packs/day: 1.00     Years: 18.00     Pack years: 18.00     Types: Cigarettes     Last attempt to quit: 1970     Years since quittin.2   • Smokeless tobacco: Never Used   Substance and Sexual Activity   • Alcohol use: Yes     Comment: rare   • Drug use: No   • Sexual activity: Not Currently     Partners: Female       FAMILY HX  Family History   Problem Relation Age of Onset   • Alzheimer's disease Mother    • Dementia Mother    • Heart disease Father    • Stroke Father    • Heart disease Sister    • Heart disease Brother    • Stroke Brother              Ike Strickland III, MD, FACC

## 2020-08-26 RX ORDER — POTASSIUM CHLORIDE 20 MEQ/1
TABLET, EXTENDED RELEASE ORAL
Qty: 30 TABLET | Refills: 0 | OUTPATIENT
Start: 2020-08-26

## 2020-08-27 RX ORDER — POTASSIUM CHLORIDE 20 MEQ/1
20 TABLET, EXTENDED RELEASE ORAL DAILY
Qty: 30 TABLET | Refills: 1 | Status: SHIPPED | OUTPATIENT
Start: 2020-08-27 | End: 2020-10-22

## 2020-09-08 ENCOUNTER — ANTICOAGULATION VISIT (OUTPATIENT)
Dept: PHARMACY | Facility: HOSPITAL | Age: 85
End: 2020-09-08

## 2020-09-08 DIAGNOSIS — I48.20 CHRONIC ATRIAL FIBRILLATION (HCC): ICD-10-CM

## 2020-09-08 LAB
INR PPP: 2.3 (ref 0.91–1.09)
PROTHROMBIN TIME: 27.8 SECONDS (ref 10–13.8)

## 2020-09-08 PROCEDURE — 85610 PROTHROMBIN TIME: CPT

## 2020-09-08 PROCEDURE — 36416 COLLJ CAPILLARY BLOOD SPEC: CPT

## 2020-09-08 NOTE — PROGRESS NOTES
Anticoagulation Clinic Progress Note  Indication: afib  Referring Provider: Em  Goal INR: 2-3   Current Drug Interactions: aspirin, simvastatin, trazodone  Other: no bleeding per patient  [id number]: 4 (age, HTN, CAD)    Diet: iceburg salads every now and then/ peas    Anticoagulation Clinic INR History:  Date 1/8 1/10 1/18 1/25 2/9 3/2 3/16 3/23 4/10   Total Weekly Dose 23.75 mg 23.75 mg 18.75 mg 18.75 mg 18.75 mg 18.75 mg 20mg 21.25 mg 20mg   INR 1.61 1.9 2.5 2.3 2.2 1.7 1.6 2.3 1.9   Notes  hosp doxy     Doxy start 3/10 Doxy finish 3/20      Date 4/23 5/7 6/4 6/11 6/25 7/10 8/1 8/29 9/19   Total Weekly Dose 21.25mg 21.25 mg 21.25 mg 20 mg 21.25 mg 21.25mg 21.25 mg 18.75 mg 21.25 mg   INR 2.0 2.4 2.4 2.2 3.0 2.7 2.5 2.0 3.7   Notes    doxy start 6/4 doxy finish 6/10    1 missed dose      Date 10/3 10/17 10/24 10/31 11/14 12/12 1/9 1/23 2/13   Total Weekly Dose 20 mg? 20mg? 20mg 20mg 20mg 20mg 20mg 20mg 20mg   INR 3.1 3.5 2.4 2.6 2.7 2.9 3.4 2.8 2.7   Notes   dec GLV     dec GLV       Date 3/13 3/27 4/24 5/22 6/19 7/10 7/24 8/1 8/12   Total Weekly Dose 20mg 20mg 20 mg 20mg 20mg 20mg 20mg 17.5 mg 17.5 mg   INR 3.2 2.7 2.8 2.9 3.1/ 2.9 3.8 4.0 3.0 2.3   Notes               Date 9/3 9/17 10/9 10/30 12/2 1/6/20 1/20 2/17 3/16 5/12 6/9 6/22 7/7 7/14 8/11   Total Weekly Dose 17.5 mg  ??? 17.5mg 17.5 mg 17.5 mg 17.5 mg 17.5 mg 17.5 mg 17.5mg 17.5mg 17.5mg 17.5mg 17.5 mg 17.5 mg 21.25 mg 20 mg   INR 2.3 2.9 2.4 2.1 2.4 1.9 2.0 2.3 2.1 2.0 3.1 2 1.6 2.6 2.6   Notes       Inc. GLV Boost x1      Missed dose boostx1      Date 9/8         Total Weekly Dose 20mg         INR 2.3         Notes            Clinic Interview:  Verbal Release Authorization signed on 6/25/18 -- may speak with Glenis Lucero (wife), Margekristin Cox (daughter)  Tablet Strength: 2.5mg tablets   Patient contact: speak with wife Glenis [phone], mobile [phone]    Patient Findings     Negatives:  Signs/symptoms of thrombosis, Signs/symptoms of  bleeding, Laboratory test error suspected, Change in health, Change in alcohol use, Change in activity, Upcoming invasive procedure, Emergency department visit, Upcoming dental procedure, Missed doses, Extra doses, Change in medications, Change in diet/appetite, Hospital admission, Bruising, Other complaints   Comments:  Patient continues to fill up medication planner each Sunday using dosing calendar to help him remember to take his warfarin   Spoke with wife, Glenis, she verified warfarin dose, denies any changes.        Plan:   1. INR is therapeutic today at 2.3. Instructed Mr. Lucero and Mrs. Lucero (via phone) to continue warfarin 2.5 mg oral daily except 3.75 mg on MonThur.   2. Repeat INR in 4 weeks on 10/6 per patient preference due to COVID19 concerns.  3. Written and verbal information provided in clinic. Mr. Lucero expresses understanding with teach back and has no further questions at this time. Also, discussed with Mrs. Lucero via phone. She verbalized understanding.      Moi Martinez, PharmD  Pharmacy Resident  9/8/2020  13:06

## 2020-09-14 RX ORDER — SIMVASTATIN 10 MG
TABLET ORAL
Qty: 90 TABLET | Refills: 0 | Status: SHIPPED | OUTPATIENT
Start: 2020-09-14 | End: 2020-12-22

## 2020-10-05 RX ORDER — DILTIAZEM HYDROCHLORIDE 240 MG/1
CAPSULE, COATED, EXTENDED RELEASE ORAL
Qty: 90 CAPSULE | Refills: 1 | Status: SHIPPED | OUTPATIENT
Start: 2020-10-05 | End: 2021-04-01

## 2020-10-06 ENCOUNTER — ANTICOAGULATION VISIT (OUTPATIENT)
Dept: PHARMACY | Facility: HOSPITAL | Age: 85
End: 2020-10-06

## 2020-10-06 DIAGNOSIS — I48.20 CHRONIC ATRIAL FIBRILLATION (HCC): ICD-10-CM

## 2020-10-06 LAB
INR PPP: 2.3 (ref 0.91–1.09)
PROTHROMBIN TIME: 27.8 SECONDS (ref 10–13.8)

## 2020-10-06 PROCEDURE — 85610 PROTHROMBIN TIME: CPT

## 2020-10-06 PROCEDURE — 36416 COLLJ CAPILLARY BLOOD SPEC: CPT

## 2020-10-06 PROCEDURE — G0463 HOSPITAL OUTPT CLINIC VISIT: HCPCS

## 2020-10-06 NOTE — PROGRESS NOTES
Anticoagulation Clinic Progress Note  Indication: afib  Referring Provider: Em  Goal INR: 2-3   Current Drug Interactions: aspirin, simvastatin, trazodone  Other: no bleeding per patient  [id number]: 4 (age, HTN, CAD)    Diet: iceburg salads every now and then/ peas    Anticoagulation Clinic INR History:  Date 1/8 1/10 1/18 1/25 2/9 3/2 3/16 3/23 4/10   Total Weekly Dose 23.75 mg 23.75 mg 18.75 mg 18.75 mg 18.75 mg 18.75 mg 20mg 21.25 mg 20mg   INR 1.61 1.9 2.5 2.3 2.2 1.7 1.6 2.3 1.9   Notes  hosp doxy     Doxy start 3/10 Doxy finish 3/20      Date 4/23 5/7 6/4 6/11 6/25 7/10 8/1 8/29 9/19   Total Weekly Dose 21.25mg 21.25 mg 21.25 mg 20 mg 21.25 mg 21.25mg 21.25 mg 18.75 mg 21.25 mg   INR 2.0 2.4 2.4 2.2 3.0 2.7 2.5 2.0 3.7   Notes    doxy start 6/4 doxy finish 6/10    1 missed dose      Date 10/3 10/17 10/24 10/31 11/14 12/12 1/9 1/23 2/13   Total Weekly Dose 20 mg? 20mg? 20mg 20mg 20mg 20mg 20mg 20mg 20mg   INR 3.1 3.5 2.4 2.6 2.7 2.9 3.4 2.8 2.7   Notes   dec GLV     dec GLV       Date 3/13 3/27 4/24 5/22 6/19 7/10 7/24 8/1 8/12   Total Weekly Dose 20mg 20mg 20 mg 20mg 20mg 20mg 20mg 17.5 mg 17.5 mg   INR 3.2 2.7 2.8 2.9 3.1/ 2.9 3.8 4.0 3.0 2.3   Notes               Date 9/3 9/17 10/9 10/30 12/2 1/6/20 1/20 2/17 3/16 5/12 6/9 6/22 7/7 7/14 8/11   Total Weekly Dose 17.5 mg  ??? 17.5mg 17.5 mg 17.5 mg 17.5 mg 17.5 mg 17.5 mg 17.5mg 17.5mg 17.5mg 17.5mg 17.5 mg 17.5 mg 21.25 mg 20 mg   INR 2.3 2.9 2.4 2.1 2.4 1.9 2.0 2.3 2.1 2.0 3.1 2 1.6 2.6 2.6   Notes       Inc. GLV Boost x1      Missed dose boostx1      Date 9/8 10/5        Total Weekly Dose 20mg 20mg        INR 2.3 2.3        Notes            Clinic Interview:  Verbal Release Authorization signed on 6/25/18 -- may speak with Glenis Lucero (wife), Marge Cooneylivan (daughter)  Tablet Strength: 2.5mg tablets   Patient contact: speak with wife Glenis [phone], mobile [phone]    Patient Findings   Negatives:  Signs/symptoms of thrombosis, Signs/symptoms  of bleeding, Laboratory test error suspected, Change in health, Change in alcohol use, Change in activity, Upcoming invasive procedure, Emergency department visit, Upcoming dental procedure, Missed doses, Extra doses, Change in medications, Change in diet/appetite, Hospital admission, Bruising, Other complaints   Comments:  Patient continues to fill up medication planner each Sunday using dosing calendar to help him remember to take his warfarin. He has not missed any doses or started any new medication and denies any other changes. Spoke with wife, Glenis to confirm findings and relay plan     Plan:   1. INR is therapeutic today at 2.3. Instructed Mr. Lucero and Mrs. Lucero (via phone) to continue warfarin 2.5 mg oral daily except 3.75 mg on MonThur.   2. Repeat INR in 4 weeks on 11/3 per patient preference due to COVID19 concerns.  3. Written and verbal information provided in clinic. Mr. Lucero expresses understanding with teach back and has no further questions at this time. Also, discussed with Mrs. Lucero via phone. She verbalized understanding.      Ivet Walker, PharmD Candidate 2021  10/6/2020  13:03 EDT    I, Tena Gomez, PharmD, have reviewed the note in full and agree with the assessment and plan.  10/06/20  15:08 EDT

## 2020-10-07 RX ORDER — ALFUZOSIN HYDROCHLORIDE 10 MG/1
TABLET, EXTENDED RELEASE ORAL
Qty: 90 TABLET | Refills: 1 | Status: SHIPPED | OUTPATIENT
Start: 2020-10-07 | End: 2021-04-01

## 2020-10-22 RX ORDER — POTASSIUM CHLORIDE 20 MEQ/1
20 TABLET, EXTENDED RELEASE ORAL DAILY
Qty: 30 TABLET | Refills: 0 | Status: SHIPPED | OUTPATIENT
Start: 2020-10-22 | End: 2020-11-19

## 2020-11-02 RX ORDER — TRAZODONE HYDROCHLORIDE 50 MG/1
TABLET ORAL
Qty: 90 TABLET | Refills: 0 | Status: SHIPPED | OUTPATIENT
Start: 2020-11-02 | End: 2021-01-27

## 2020-11-02 NOTE — TELEPHONE ENCOUNTER
Last fill: 08/07/2020  Last office visit: 11/13/2019  Next office visit: 11/16/2020  CSA up-to-date? nonoe  Date of last UDS: none  UDS consistent: none

## 2020-11-03 ENCOUNTER — ANTICOAGULATION VISIT (OUTPATIENT)
Dept: PHARMACY | Facility: HOSPITAL | Age: 85
End: 2020-11-03

## 2020-11-03 DIAGNOSIS — I48.20 CHRONIC ATRIAL FIBRILLATION (HCC): ICD-10-CM

## 2020-11-03 LAB
INR PPP: 2.4 (ref 0.91–1.09)
PROTHROMBIN TIME: 28.2 SECONDS (ref 10–13.8)

## 2020-11-03 PROCEDURE — 85610 PROTHROMBIN TIME: CPT

## 2020-11-03 PROCEDURE — 36416 COLLJ CAPILLARY BLOOD SPEC: CPT

## 2020-11-03 PROCEDURE — G0463 HOSPITAL OUTPT CLINIC VISIT: HCPCS

## 2020-11-03 NOTE — PROGRESS NOTES
Anticoagulation Clinic Progress Note  Indication: afib  Referring Provider: Em  Goal INR: 2-3   Current Drug Interactions: aspirin, simvastatin, trazodone  Other: no bleeding per patient  [id number]: 4 (age, HTN, CAD)    Diet: iceburg salads every now and then/ peas    Anticoagulation Clinic INR History:  Date 1/8 1/10 1/18 1/25 2/9 3/2 3/16 3/23 4/10   Total Weekly Dose 23.75 mg 23.75 mg 18.75 mg 18.75 mg 18.75 mg 18.75 mg 20mg 21.25 mg 20mg   INR 1.61 1.9 2.5 2.3 2.2 1.7 1.6 2.3 1.9   Notes  hosp doxy     Doxy start 3/10 Doxy finish 3/20      Date 4/23 5/7 6/4 6/11 6/25 7/10 8/1 8/29 9/19   Total Weekly Dose 21.25mg 21.25 mg 21.25 mg 20 mg 21.25 mg 21.25mg 21.25 mg 18.75 mg 21.25 mg   INR 2.0 2.4 2.4 2.2 3.0 2.7 2.5 2.0 3.7   Notes    doxy start 6/4 doxy finish 6/10    1 missed dose      Date 10/3 10/17 10/24 10/31 11/14 12/12 1/9 1/23 2/13   Total Weekly Dose 20 mg? 20mg? 20mg 20mg 20mg 20mg 20mg 20mg 20mg   INR 3.1 3.5 2.4 2.6 2.7 2.9 3.4 2.8 2.7   Notes   dec GLV     dec GLV       Date 3/13 3/27 4/24 5/22 6/19 7/10 7/24 8/1 8/12   Total Weekly Dose 20mg 20mg 20 mg 20mg 20mg 20mg 20mg 17.5 mg 17.5 mg   INR 3.2 2.7 2.8 2.9 3.1/ 2.9 3.8 4.0 3.0 2.3   Notes               Date 9/3 9/17 10/9 10/30 12/2 1/6/20 1/20 2/17 3/16 5/12 6/9 6/22 7/7 7/14 8/11   Total Weekly Dose 17.5 mg  ??? 17.5mg 17.5 mg 17.5 mg 17.5 mg 17.5 mg 17.5 mg 17.5mg 17.5mg 17.5mg 17.5mg 17.5 mg 17.5 mg 21.25 mg 20 mg   INR 2.3 2.9 2.4 2.1 2.4 1.9 2.0 2.3 2.1 2.0 3.1 2 1.6 2.6 2.6   Notes       Inc. GLV Boost x1      Missed dose boostx1      Date 9/8 10/5 11/3       Total Weekly Dose 20mg 20mg 20mg       INR 2.3 2.3 2.4       Notes            Clinic Interview:  Verbal Release Authorization signed on 6/25/18 -- may speak with Glenis Lucero (wife), Marge Kenny (daughter)  Tablet Strength: 2.5mg tablets   Patient contact: speak with wife Glenis home: [phone], mobile: [phone]    Patient Findings   Negatives:  Signs/symptoms of  thrombosis, Signs/symptoms of bleeding, Laboratory test error suspected, Change in health, Change in alcohol use, Change in activity, Upcoming invasive procedure, Emergency department visit, Upcoming dental procedure, Missed doses, Extra doses, Change in medications, Change in diet/appetite, Hospital admission, Bruising, Other complaints   Comments:  Patient continues to fill up medication planner each Sunday using dosing calendar to help him remember to take his warfarin. He has not missed any doses or started any new medication and denies any other changes. Plan to speak with wife, Glenis to confirm findings and relay plan     Plan:   1. INR is therapeutic today at 2.3. Instructed Mr. Lucero and Mrs. Lucero (via phone) to continue warfarin 2.5 mg oral daily except 3.75 mg on MonThur.  2. Repeat INR in 4 weeks on 12/1 per patient preference due to COVID19 concerns.  3. Written and verbal information provided in clinic. Mr. Lucero expresses understanding with teach back and has no further questions at this time. Also, discussed plan with Mrs. Lucero via phone.    Maria Teresa Hannah, PharmD  Pharmacy Resident  11/3/2020  13:41 EST

## 2020-11-17 ENCOUNTER — OFFICE VISIT (OUTPATIENT)
Dept: FAMILY MEDICINE CLINIC | Facility: CLINIC | Age: 85
End: 2020-11-17

## 2020-11-17 VITALS
WEIGHT: 199 LBS | HEART RATE: 52 BPM | BODY MASS INDEX: 31.23 KG/M2 | HEIGHT: 67 IN | SYSTOLIC BLOOD PRESSURE: 114 MMHG | OXYGEN SATURATION: 98 % | DIASTOLIC BLOOD PRESSURE: 60 MMHG

## 2020-11-17 DIAGNOSIS — R41.89 COGNITIVE IMPAIRMENT: ICD-10-CM

## 2020-11-17 DIAGNOSIS — I10 ESSENTIAL HYPERTENSION: ICD-10-CM

## 2020-11-17 DIAGNOSIS — I48.20 CHRONIC ATRIAL FIBRILLATION (HCC): ICD-10-CM

## 2020-11-17 DIAGNOSIS — Z00.00 MEDICARE ANNUAL WELLNESS VISIT, SUBSEQUENT: Primary | ICD-10-CM

## 2020-11-17 DIAGNOSIS — E55.9 VITAMIN D INSUFFICIENCY: ICD-10-CM

## 2020-11-17 DIAGNOSIS — R35.1 BENIGN PROSTATIC HYPERPLASIA WITH NOCTURIA: ICD-10-CM

## 2020-11-17 DIAGNOSIS — R10.9 ABDOMINAL PAIN, UNSPECIFIED ABDOMINAL LOCATION: ICD-10-CM

## 2020-11-17 DIAGNOSIS — E78.00 HYPERCHOLESTEROLEMIA: ICD-10-CM

## 2020-11-17 DIAGNOSIS — R73.02 IMPAIRED GLUCOSE TOLERANCE: ICD-10-CM

## 2020-11-17 DIAGNOSIS — I25.10 CORONARY ARTERY DISEASE INVOLVING NATIVE CORONARY ARTERY OF NATIVE HEART WITHOUT ANGINA PECTORIS: ICD-10-CM

## 2020-11-17 DIAGNOSIS — E78.2 MIXED HYPERLIPIDEMIA: ICD-10-CM

## 2020-11-17 DIAGNOSIS — G47.33 OSA (OBSTRUCTIVE SLEEP APNEA): ICD-10-CM

## 2020-11-17 DIAGNOSIS — N40.1 BENIGN PROSTATIC HYPERPLASIA WITH NOCTURIA: ICD-10-CM

## 2020-11-17 DIAGNOSIS — K42.9 UMBILICAL HERNIA WITHOUT OBSTRUCTION AND WITHOUT GANGRENE: ICD-10-CM

## 2020-11-17 PROCEDURE — G0439 PPPS, SUBSEQ VISIT: HCPCS | Performed by: PHYSICIAN ASSISTANT

## 2020-11-17 NOTE — PATIENT INSTRUCTIONS
Zoster Vaccine, Recombinant injection  What is this medicine?  ZOSTER VACCINE (ZOS ter vak SEEN) is used to prevent shingles in adults 50 years old and over. This vaccine is not used to treat shingles or nerve pain from shingles.  This medicine may be used for other purposes; ask your health care provider or pharmacist if you have questions.  COMMON BRAND NAME(S): SHINGRIX  What should I tell my health care provider before I take this medicine?  They need to know if you have any of these conditions:  · blood disorders or disease  · cancer like leukemia or lymphoma  · immune system problems or therapy  · an unusual or allergic reaction to vaccines, other medications, foods, dyes, or preservatives  · pregnant or trying to get pregnant  · breast-feeding  How should I use this medicine?  This vaccine is for injection in a muscle. It is given by a health care professional.  Talk to your pediatrician regarding the use of this medicine in children. This medicine is not approved for use in children.  Overdosage: If you think you have taken too much of this medicine contact a poison control center or emergency room at once.  NOTE: This medicine is only for you. Do not share this medicine with others.  What if I miss a dose?  Keep appointments for follow-up (booster) doses as directed. It is important not to miss your dose. Call your doctor or health care professional if you are unable to keep an appointment.  What may interact with this medicine?  · medicines that suppress your immune system  · medicines to treat cancer  · steroid medicines like prednisone or cortisone  This list may not describe all possible interactions. Give your health care provider a list of all the medicines, herbs, non-prescription drugs, or dietary supplements you use. Also tell them if you smoke, drink alcohol, or use illegal drugs. Some items may interact with your medicine.  What should I watch for while using this medicine?  Visit your doctor for  regular check ups.  This vaccine, like all vaccines, may not fully protect everyone.  What side effects may I notice from receiving this medicine?  Side effects that you should report to your doctor or health care professional as soon as possible:  · allergic reactions like skin rash, itching or hives, swelling of the face, lips, or tongue  · breathing problems  Side effects that usually do not require medical attention (report these to your doctor or health care professional if they continue or are bothersome):  · chills  · headache  · fever  · nausea, vomiting  · redness, warmth, pain, swelling or itching at site where injected  · tiredness  This list may not describe all possible side effects. Call your doctor for medical advice about side effects. You may report side effects to FDA at 7-559-OAP-4835.  Where should I keep my medicine?  This vaccine is only given in a clinic, pharmacy, doctor's office, or other health care setting and will not be stored at home.  NOTE: This sheet is a summary. It may not cover all possible information. If you have questions about this medicine, talk to your doctor, pharmacist, or health care provider.  © 2020 [employer]/Gold Standard (2018-07-30 13:20:30)

## 2020-11-17 NOTE — PROGRESS NOTES
QUICK REFERENCE INFORMATION:  The ABCs of the Annual Wellness Visit    Medicare Subsequent Wellness Visit    Chief Complaint   Patient presents with   • Annual Exam   • Medicare Wellness-subsequent        HPI     Mike Lucero Jr. is a 88 y.o. male presenting for subsequent annual wellness visit.   He is accompanied by his wife today who provides much of the history due to patient's dementia.  He has enjoyed a fairly stable course since his last visit. Eating well, good appetite. He has gained 5 pounds in the last 3 months. He had a couple of episodes of bowel incontinence with diarrhea recently which has since resolve. Several episodes nocturia nightly but stable and family not desiring additional medication for this. His neurologist is moving to [place], [place]. It was recommended they establish with another provider at Dr. Rodriguez's practice but this has not been needed yet per the patient's wife. His wife has noticed gradual increase in forgetfulness. Taking donepazil but did not tolerate namenda in the past. No sleep, agitation/mood problems at this time. He sees his cardiologist twice annually. Last visit in August.     Past Medical History:   Diagnosis Date   • Atrial fibrillation (CMS/HCC)    • Cataract    • Chronic anticoagulation    • Coronary artery disease    • Diverticulosis    • Gallstones    • History of endocarditis    • HLD (hyperlipidemia)    • HTN (hypertension)    • Osteoarthritis    • Seasonal allergies     Seasonal allergies/rhinitis.       Past Surgical History:   Procedure Laterality Date   • ADRENAL GLAND SURGERY  1996    Dr. Jj Kee   • APPENDECTOMY  1950   • BLEPHAROPLASTY  08/06/2014    Dr. Santamaria   • COLONOSCOPY  2009   • CORONARY ARTERY BYPASS GRAFT  07/2006    CABG for 3-vessel disease, July 2006.   • INGUINAL HERNIA REPAIR Left     1963 and 09/2012   • TONSILLECTOMY  01/1958     Family History   Problem Relation Age of Onset   • Alzheimer's disease Mother    • Dementia Mother   "  • Heart disease Father    • Stroke Father    • Heart disease Sister    • Heart disease Brother    • Stroke Brother       Social History     Socioeconomic History   • Marital status:      Spouse name: Glenis   • Number of children: 2   • Years of education: J.D.   • Highest education level: Not on file   Occupational History   • Occupation:      Employer: RETIRED   Tobacco Use   • Smoking status: Former Smoker     Packs/day: 1.00     Years: 18.00     Pack years: 18.00     Types: Cigarettes     Quit date: 1970     Years since quittin.5   • Smokeless tobacco: Never Used   Substance and Sexual Activity   • Alcohol use: Yes     Comment: rare   • Drug use: No   • Sexual activity: Not Currently     Partners: Female      Allergies   Allergen Reactions   • Rocephin [Ceftriaxone] Angioedema     Tongue and lip swelling, sore throat   • Temazepam Other (See Comments)     Caused pt to \"sleep drive\"   • Wellbutrin [Bupropion] Mental Status Change and Dizziness     Memory loss      Outpatient Medications Prior to Visit   Medication Sig Dispense Refill   • alfuzosin (UROXATRAL) 10 MG 24 hr tablet TAKE ONE TABLET BY MOUTH DAILY 90 tablet 1   • aspirin 81 MG EC tablet Take 81 mg by mouth Daily.     • dilTIAZem CD (CARDIZEM CD) 240 MG 24 hr capsule TAKE ONE CAPSULE BY MOUTH DAILY 90 capsule 1   • donepezil (ARICEPT) 10 MG tablet TAKE ONE TABLET BY MOUTH DAILY 30 tablet 8   • lactobacillus acidophilus (RISAQUAD) capsule capsule Take 1 capsule by mouth Daily. 30 capsule 0   • lisinopril (PRINIVIL,ZESTRIL) 20 MG tablet Take 20 mg by mouth Daily.     • loratadine (CLARITIN) 10 MG tablet TAKE ONE TABLET BY MOUTH DAILY 30 tablet 4   • pravastatin (PRAVACHOL) 40 MG tablet Take 1 tablet by mouth Daily. 90 tablet 1   • prednisoLONE acetate (PRED FORTE) 1 % ophthalmic suspension Apply 1 drop to eye(s) as directed by provider Daily As Needed.     • simvastatin (ZOCOR) 10 MG tablet TAKE ONE TABLET BY MOUTH ONCE NIGHTLY 90 " "tablet 0   • sodium chloride (OCEAN) 0.65 % nasal spray 1 spray into the nostril(s) as directed by provider As Needed for Congestion.     • traZODone (DESYREL) 50 MG tablet TAKE ONE TABLET BY MOUTH ONCE NIGHTLY 90 tablet 0   • Triamcinolone Acetonide (NASACORT ALLERGY 24HR) 55 MCG/ACT nasal inhaler 2 sprays into the nostril(s) as directed by provider Daily.     • warfarin (COUMADIN) 2.5 MG tablet TAKE ONE TABLET BY MOUTH DAILY OR AS DIRECTED BY ANTICOAGULATION CLINIC 90 tablet 1   • potassium chloride (KLOR-CON) 20 MEQ CR tablet Take 1 tablet by mouth Daily. 30 tablet 0     No facility-administered medications prior to visit.        Reviewed use of high risk medication in the elderly: yes  Reviewed for potential of harmful drug interactions in the elderly: yes    The following portions of the patient's history were reviewed and updated as appropriate: allergies, current medications, past family history, past medical history, past social history, past surgical history and problem list.    Review of Systems   Unable to perform ROS: Dementia        Vitals:    11/17/20 1358   BP: 114/60   Pulse: 52   SpO2: 98%   Weight: 90.3 kg (199 lb)   Height: 170.2 cm (67\")     Body mass index is 31.17 kg/m².    Objective    Physical Exam  Vitals signs reviewed.   Constitutional:       General: He is not in acute distress.     Appearance: He is well-developed. He is obese.   HENT:      Head: Normocephalic and atraumatic.      Right Ear: Tympanic membrane normal. Decreased hearing noted.      Left Ear: Tympanic membrane normal. Decreased hearing noted.      Mouth/Throat:      Lips: Pink. No lesions.   Eyes:      Conjunctiva/sclera: Conjunctivae normal.      Pupils: Pupils are equal, round, and reactive to light.      Comments:      Neck:      Musculoskeletal: Normal range of motion and neck supple.      Thyroid: No thyroid mass or thyromegaly.      Vascular: No carotid bruit.   Cardiovascular:      Rate and Rhythm: Normal rate. " Rhythm irregularly irregular.      Heart sounds: No murmur. No friction rub.   Pulmonary:      Effort: Pulmonary effort is normal.      Breath sounds: Normal breath sounds. No decreased breath sounds, wheezing, rhonchi or rales.   Abdominal:      Palpations: Abdomen is soft.      Tenderness: There is abdominal tenderness in the periumbilical area and left upper quadrant.      Hernia: A hernia is present. Hernia is present in the umbilical area.      Comments: Small umbilical hernia, partially reducible, tender upon trying to reduce this    Musculoskeletal: Normal range of motion.      Right lower leg: No edema.      Left lower leg: No edema.   Lymphadenopathy:      Cervical: No cervical adenopathy.      Upper Body:      Right upper body: No supraclavicular adenopathy.      Left upper body: No supraclavicular adenopathy.   Skin:     General: Skin is warm and dry.      Findings: No rash.      Nails: There is no clubbing.        Comments: No suspicious nevi on clothed exam   Neurological:      Mental Status: He is alert.      Gait: Gait normal.      Deep Tendon Reflexes: Reflexes normal.      Comments: TUG test 14s   Psychiatric:         Mood and Affect: Mood normal.         Speech: Speech normal.         Behavior: Behavior normal.         Cognition and Memory: He exhibits impaired recent memory.          HEALTH RISK ASSESSMENT    1932    Recent Hospitalizations:  No hospitalization(s) within the last year..      Current Medical Providers:  Patient Care Team:  Fabien Merino PA as PCP - General (Physician Assistant)  Stefanie Hogue, PharmD as Pharmacist (Pharmacy)  Colleen Nolasco, PharmD as Pharmacist (Pharmacy)      Smoking Status:  Social History     Tobacco Use   Smoking Status Former Smoker   • Packs/day: 1.00   • Years: 18.00   • Pack years: 18.00   • Types: Cigarettes   • Quit date: 1970   • Years since quittin.5   Smokeless Tobacco Never Used       Alcohol Consumption:  Social History     Substance  and Sexual Activity   Alcohol Use Yes    Comment: rare       Depression Screen:   PHQ-2/PHQ-9 Depression Screening 11/17/2020   Little interest or pleasure in doing things 0   Feeling down, depressed, or hopeless 0   Trouble falling or staying asleep, or sleeping too much -   Feeling tired or having little energy -   Poor appetite or overeating -   Feeling bad about yourself - or that you are a failure or have let yourself or your family down -   Trouble concentrating on things, such as reading the newspaper or watching television -   Moving or speaking so slowly that other people could have noticed. Or the opposite - being so fidgety or restless that you have been moving around a lot more than usual -   Thoughts that you would be better off dead, or of hurting yourself in some way -   Total Score 0   If you checked off any problems, how difficult have these problems made it for you to do your work, take care of things at home, or get along with other people? -       Health Habits and Functional and Cognitive Screening:  Functional & Cognitive Status 11/17/2020   Do you have difficulty preparing food and eating? No   Do you have difficulty bathing yourself, getting dressed or grooming yourself? No   Do you have difficulty using the toilet? No   Do you have difficulty moving around from place to place? No   Do you have trouble with steps or getting out of a bed or a chair? No   Current Diet Well Balanced Diet   Dental Exam Up to date   Eye Exam Not up to date   Exercise (times per week) 4 times per week   Current Exercise Activities Include Walking   Do you need help using the phone?  No   Are you deaf or do you have serious difficulty hearing?  Yes   Do you need help with transportation? No   Do you need help shopping? No   Do you need help preparing meals?  No   Do you need help with housework?  No   Do you need help with laundry? No   Do you need help taking your medications? No   Do you need help managing money?  No   Do you ever drive or ride in a car without wearing a seat belt? No   Have you felt unusual stress, anger or loneliness in the last month? No   Who do you live with? Spouse   If you need help, do you have trouble finding someone available to you? No   Have you been bothered in the last four weeks by sexual problems? No   Do you have difficulty concentrating, remembering or making decisions? Yes           Does the patient have evidence of cognitive impairment? Yes    Aspirin use counseling? Taking ASA appropriately as indicated      Recent Lab Results:  CMP:  Lab Results   Component Value Date    BUN 12 11/13/2019    CREATININE 0.91 11/13/2019    EGFRIFNONA 79 11/13/2019    BCR 13.2 11/13/2019     11/13/2019    K 4.2 11/13/2019    CO2 30.0 (H) 11/13/2019    CALCIUM 8.7 11/13/2019    ALBUMIN 4.10 11/13/2019    BILITOT 1.1 11/13/2019    ALKPHOS 58 11/13/2019    AST 17 11/13/2019    ALT 9 11/13/2019     Lipid Panel:  Lab Results   Component Value Date    CHOL 99 11/13/2019    TRIG 156 (H) 11/13/2019    HDL 30 (L) 11/13/2019    VLDL 31.2 11/13/2019    LDLHDL 1.26 11/13/2019     HbA1c:  Lab Results   Component Value Date    HGBA1C 6.17 (H) 11/13/2019       Visual Acuity:  No exam data present    Age-appropriate Screening Schedule:  Refer to the list below for future screening recommendations based on patient's age, sex and/or medical conditions. Orders for these recommended tests are listed in the plan section. The patient has been provided with a written plan.    Health Maintenance   Topic Date Due   • TDAP/TD VACCINES (1 - Tdap) 07/29/1951   • ZOSTER VACCINE (2 of 3) 09/11/2013   • LIPID PANEL  11/13/2020   • INFLUENZA VACCINE  Completed          Advance Care Planning:  ACP discussion was held with the patient during this visit. Patient has an advance directive (not in EMR), copy requested.    Identification of Risk Factors:  Risk factors include: Cardiovascular risk  Dementia/Memory   Hearing  Problem  Immunizations Discussed/Encouraged (specific immunizations; Shingrix )  Inactivity/Sedentary  Obesity/Overweight .    Compared to one year ago, the patient feels his physical health is the same.  Compared to one year ago, the patient feels his mental health is worse.      Diagnoses and all orders for this visit:    1. Medicare annual wellness visit, subsequent (Primary)  -Counseled patient and family regarding cancer screening, immunizations, healthy lifestyle, diet, maintaining a healthy weight, and exercise.  -They are not interested in further colonoscopies. Per records he had one in 2009, no report of this.    -Annual dental and eye exams were encouraged.  -Has hearing aids but rarely uses them    2. Cognitive impairment  -Continue Aricept, did not tolerate Namenda  -Follow-up with neurology prn    3. Chronic atrial fibrillation (CMS/HCC)  -Afib today, rate controlled, anticoagulated on coumadin  -Continue management per cardiology    4. Hypercholesterolemia  -On statin, monitor  -     Comprehensive Metabolic Panel; Future  -     Lipid Panel; Future  -     TSH Rfx On Abnormal To Free T4; Future    5. Essential hypertension  -Controlled  -     CBC & Differential; Future  -     Urinalysis With Culture If Indicated - Urine, Clean Catch; Future    6. Coronary artery disease involving native coronary artery of native heart without angina pectoris  -Currently asymptomatic    7. Mixed hyperlipidemia    8. RELL (obstructive sleep apnea)  -Uses CPAP    9. Vitamin D insufficiency  -     Vitamin D 25 Hydroxy; Future    10. Impaired glucose tolerance  -     Hemoglobin A1c; Future    11. Benign prostatic hyperplasia with nocturia  -Stable symptoms  -Family declines finasteride    12. Umbilical hernia without obstruction and without gangrene  -Pain umbilical region and LUQ on exam  -Will order CT to evaluate hernia and pain  -If diarrhea returns/wosens, recommended family return/call for further evaluation    13.  Abdominal pain, unspecified abdominal location  -     CT Abdomen Pelvis Without Contrast; Future        Procedure   Procedures       Patient Self-Management and Personalized Health Advice  The patient has been provided with information about: diet, exercise and weight management and preventive services including:   · Annual Wellness Visit (AWV).      Follow Up:  Return in about 6 months (around 5/17/2021), or if symptoms worsen or fail to improve, for Follow-up chronic conditions.     An After Visit Summary and PPPS with all of these plans were given to the patient.

## 2020-11-19 PROBLEM — H91.93 BILATERAL HEARING LOSS: Status: ACTIVE | Noted: 2020-11-19

## 2020-11-19 RX ORDER — POTASSIUM CHLORIDE 20 MEQ/1
20 TABLET, EXTENDED RELEASE ORAL DAILY
Qty: 30 TABLET | Refills: 0 | Status: SHIPPED | OUTPATIENT
Start: 2020-11-19 | End: 2020-12-18

## 2020-11-20 ENCOUNTER — LAB (OUTPATIENT)
Dept: LAB | Facility: HOSPITAL | Age: 85
End: 2020-11-20

## 2020-11-20 DIAGNOSIS — I10 ESSENTIAL HYPERTENSION: ICD-10-CM

## 2020-11-20 DIAGNOSIS — E55.9 VITAMIN D INSUFFICIENCY: ICD-10-CM

## 2020-11-20 DIAGNOSIS — E78.00 HYPERCHOLESTEROLEMIA: ICD-10-CM

## 2020-11-20 DIAGNOSIS — R73.02 IMPAIRED GLUCOSE TOLERANCE: ICD-10-CM

## 2020-11-20 LAB
25(OH)D3 SERPL-MCNC: 30.4 NG/ML (ref 30–100)
ALBUMIN SERPL-MCNC: 4.1 G/DL (ref 3.5–5.2)
ALBUMIN/GLOB SERPL: 1.6 G/DL
ALP SERPL-CCNC: 71 U/L (ref 39–117)
ALT SERPL W P-5'-P-CCNC: 14 U/L (ref 1–41)
ANION GAP SERPL CALCULATED.3IONS-SCNC: 7.7 MMOL/L (ref 5–15)
AST SERPL-CCNC: 16 U/L (ref 1–40)
BASOPHILS # BLD AUTO: 0.03 10*3/MM3 (ref 0–0.2)
BASOPHILS NFR BLD AUTO: 0.4 % (ref 0–1.5)
BILIRUB SERPL-MCNC: 1.2 MG/DL (ref 0–1.2)
BILIRUB UR QL STRIP: NEGATIVE
BUN SERPL-MCNC: 13 MG/DL (ref 8–23)
BUN/CREAT SERPL: 16.3 (ref 7–25)
CALCIUM SPEC-SCNC: 8.8 MG/DL (ref 8.6–10.5)
CHLORIDE SERPL-SCNC: 98 MMOL/L (ref 98–107)
CHOLEST SERPL-MCNC: 99 MG/DL (ref 0–200)
CLARITY UR: CLEAR
CO2 SERPL-SCNC: 30.3 MMOL/L (ref 22–29)
COLOR UR: YELLOW
CREAT SERPL-MCNC: 0.8 MG/DL (ref 0.76–1.27)
DEPRECATED RDW RBC AUTO: 44.4 FL (ref 37–54)
EOSINOPHIL # BLD AUTO: 0.25 10*3/MM3 (ref 0–0.4)
EOSINOPHIL NFR BLD AUTO: 3.5 % (ref 0.3–6.2)
ERYTHROCYTE [DISTWIDTH] IN BLOOD BY AUTOMATED COUNT: 12.6 % (ref 12.3–15.4)
GFR SERPL CREATININE-BSD FRML MDRD: 91 ML/MIN/1.73
GLOBULIN UR ELPH-MCNC: 2.6 GM/DL
GLUCOSE SERPL-MCNC: 104 MG/DL (ref 65–99)
GLUCOSE UR STRIP-MCNC: NEGATIVE MG/DL
HBA1C MFR BLD: 5.91 % (ref 4.8–5.6)
HCT VFR BLD AUTO: 44.6 % (ref 37.5–51)
HDLC SERPL-MCNC: 40 MG/DL (ref 40–60)
HGB BLD-MCNC: 14.8 G/DL (ref 13–17.7)
HGB UR QL STRIP.AUTO: NEGATIVE
IMM GRANULOCYTES # BLD AUTO: 0.05 10*3/MM3 (ref 0–0.05)
IMM GRANULOCYTES NFR BLD AUTO: 0.7 % (ref 0–0.5)
KETONES UR QL STRIP: NEGATIVE
LDLC SERPL CALC-MCNC: 40 MG/DL (ref 0–100)
LDLC/HDLC SERPL: 0.96 {RATIO}
LEUKOCYTE ESTERASE UR QL STRIP.AUTO: NEGATIVE
LYMPHOCYTES # BLD AUTO: 2.01 10*3/MM3 (ref 0.7–3.1)
LYMPHOCYTES NFR BLD AUTO: 28 % (ref 19.6–45.3)
MCH RBC QN AUTO: 31.8 PG (ref 26.6–33)
MCHC RBC AUTO-ENTMCNC: 33.2 G/DL (ref 31.5–35.7)
MCV RBC AUTO: 95.7 FL (ref 79–97)
MONOCYTES # BLD AUTO: 0.59 10*3/MM3 (ref 0.1–0.9)
MONOCYTES NFR BLD AUTO: 8.2 % (ref 5–12)
NEUTROPHILS NFR BLD AUTO: 4.24 10*3/MM3 (ref 1.7–7)
NEUTROPHILS NFR BLD AUTO: 59.2 % (ref 42.7–76)
NITRITE UR QL STRIP: NEGATIVE
NRBC BLD AUTO-RTO: 0 /100 WBC (ref 0–0.2)
PH UR STRIP.AUTO: 7 [PH] (ref 5–8)
PLATELET # BLD AUTO: 159 10*3/MM3 (ref 140–450)
PMV BLD AUTO: 10.6 FL (ref 6–12)
POTASSIUM SERPL-SCNC: 4.1 MMOL/L (ref 3.5–5.2)
PROT SERPL-MCNC: 6.7 G/DL (ref 6–8.5)
PROT UR QL STRIP: NEGATIVE
RBC # BLD AUTO: 4.66 10*6/MM3 (ref 4.14–5.8)
SODIUM SERPL-SCNC: 136 MMOL/L (ref 136–145)
SP GR UR STRIP: 1.02 (ref 1–1.03)
TRIGL SERPL-MCNC: 103 MG/DL (ref 0–150)
TSH SERPL DL<=0.05 MIU/L-ACNC: 2.04 UIU/ML (ref 0.27–4.2)
UROBILINOGEN UR QL STRIP: NORMAL
VLDLC SERPL-MCNC: 19 MG/DL (ref 5–40)
WBC # BLD AUTO: 7.17 10*3/MM3 (ref 3.4–10.8)

## 2020-11-20 PROCEDURE — 80061 LIPID PANEL: CPT

## 2020-11-20 PROCEDURE — 83036 HEMOGLOBIN GLYCOSYLATED A1C: CPT

## 2020-11-20 PROCEDURE — 84443 ASSAY THYROID STIM HORMONE: CPT

## 2020-11-20 PROCEDURE — 82306 VITAMIN D 25 HYDROXY: CPT

## 2020-11-20 PROCEDURE — 85025 COMPLETE CBC W/AUTO DIFF WBC: CPT

## 2020-11-20 PROCEDURE — 81003 URINALYSIS AUTO W/O SCOPE: CPT

## 2020-11-20 PROCEDURE — 80053 COMPREHEN METABOLIC PANEL: CPT

## 2020-11-23 RX ORDER — WARFARIN SODIUM 2.5 MG/1
TABLET ORAL
Qty: 100 TABLET | Refills: 1 | Status: SHIPPED | OUTPATIENT
Start: 2020-11-23 | End: 2021-03-31

## 2020-12-01 ENCOUNTER — ANTICOAGULATION VISIT (OUTPATIENT)
Dept: PHARMACY | Facility: HOSPITAL | Age: 85
End: 2020-12-01

## 2020-12-01 DIAGNOSIS — I48.20 CHRONIC ATRIAL FIBRILLATION (HCC): ICD-10-CM

## 2020-12-01 LAB
INR PPP: 1.6 (ref 0.91–1.09)
PROTHROMBIN TIME: 19.6 SECONDS (ref 10–13.8)

## 2020-12-01 PROCEDURE — 36416 COLLJ CAPILLARY BLOOD SPEC: CPT

## 2020-12-01 PROCEDURE — 85610 PROTHROMBIN TIME: CPT

## 2020-12-01 PROCEDURE — G0463 HOSPITAL OUTPT CLINIC VISIT: HCPCS

## 2020-12-01 NOTE — PROGRESS NOTES
Anticoagulation Clinic Progress Note  Indication: afib  Referring Provider: Em  Goal INR: 2-3   Current Drug Interactions: aspirin, simvastatin, trazodone  Other: no bleeding per patient  [id number]: 4 (age, HTN, CAD)    Diet: iceburg salads every now and then/ peas    Anticoagulation Clinic INR History:    Date 10/3 10/17 10/24 10/31 11/14 12/12 1/9 1/23 2/13   Total Weekly Dose 20 mg? 20mg? 20mg 20mg 20mg 20mg 20mg 20mg 20mg   INR 3.1 3.5 2.4 2.6 2.7 2.9 3.4 2.8 2.7   Notes   dec GLV     dec GLV       Date 3/13 3/27 4/24 5/22 6/19 7/10 7/24 8/1 8/12   Total Weekly Dose 20mg 20mg 20 mg 20mg 20mg 20mg 20mg 17.5 mg 17.5 mg   INR 3.2 2.7 2.8 2.9 3.1/ 2.9 3.8 4.0 3.0 2.3   Notes               Date 9/3 9/17 10/9 10/30 12/2 1/6/20 1/20 2/17 3/16 5/12 6/9 6/22 7/7 7/14 8/11   Total Weekly Dose 17.5 mg  ??? 17.5mg 17.5 mg 17.5 mg 17.5 mg 17.5 mg 17.5 mg 17.5mg 17.5mg 17.5mg 17.5mg 17.5 mg 17.5 mg 21.25 mg 20 mg   INR 2.3 2.9 2.4 2.1 2.4 1.9 2.0 2.3 2.1 2.0 3.1 2 1.6 2.6 2.6   Notes       Inc. GLV Boost x1      Missed dose boostx1      Date 9/8 10/5 11/3 12/1      Total Weekly Dose 20mg 20mg 20mg 13.75mg      INR 2.3 2.3 2.4 1.6      Notes    2x miss        Clinic Interview:  Verbal Release Authorization signed on 6/25/18 -- may speak with Glenis Lucero (wife), Marge Cox (daughter)  Tablet Strength: 2.5mg tablets   Patient contact: speak with wife Glenis home: [phone], mobile: [phone]    Patient Findings    Negatives:  Signs/symptoms of thrombosis, Signs/symptoms of bleeding, Laboratory test error suspected, Change in health, Change in alcohol use, Change in activity, Upcoming invasive procedure, Emergency department visit, Upcoming dental procedure, Missed doses, Extra doses, Change in medications, Change in diet/appetite, Hospital admission, Bruising, Other complaints   Comments:  Mr. Lucero reports he has not had much GLV. He tries to avoid greens because he knows it changes his INR. He is still using a  pill planner weekly so he remembers to take his warfarin. Glenis reports he had a serving of green beans on Thanksgiving and a small serving of coleslaw on Sunday. Counseled her on the effects of greens on INR. or had any medication changes.      Plan:   1. INR is SUBtherapeutic today at 1.6. Instructed Mr. Lucero and Mrs. Lucero (via phone) to boost tonight's dose of warfarin to 5mg then continue warfarin 2.5 mg oral daily except 3.75 mg on MonThur.  2. Repeat INR in 1 week on 12/7 to ensure WNL.   3. Written and verbal information provided in clinic. Mr. Lucero expresses understanding with teach back and has no further questions at this time. Also, discussed plan with Mrs. Lucero via phone.    Karen Sosa, PharmD Candidate 2021 12/01/2020  13:33 EST    Wife called back to report  She checked the pill planner and patient had actually missed 2 doses (11/29-11/30) will boost tonight's warfarin dose to 5mg  No changes from appt with JOE Merino I, Tena Gomez, PharmD, have reviewed the note in full and agree with the assessment and plan.  12/01/20  14:10 EST

## 2020-12-04 ENCOUNTER — HOSPITAL ENCOUNTER (OUTPATIENT)
Dept: CT IMAGING | Facility: HOSPITAL | Age: 85
Discharge: HOME | End: 2020-12-04
Admitting: PHYSICIAN ASSISTANT

## 2020-12-04 DIAGNOSIS — R10.9 ABDOMINAL PAIN, UNSPECIFIED ABDOMINAL LOCATION: ICD-10-CM

## 2020-12-04 PROCEDURE — 74176 CT ABD & PELVIS W/O CONTRAST: CPT

## 2020-12-08 ENCOUNTER — ANTICOAGULATION VISIT (OUTPATIENT)
Dept: PHARMACY | Facility: HOSPITAL | Age: 85
End: 2020-12-08

## 2020-12-08 DIAGNOSIS — I48.20 CHRONIC ATRIAL FIBRILLATION (HCC): ICD-10-CM

## 2020-12-08 DIAGNOSIS — J90 PLEURAL EFFUSION, RIGHT: ICD-10-CM

## 2020-12-08 DIAGNOSIS — K42.9 UMBILICAL HERNIA WITHOUT OBSTRUCTION AND WITHOUT GANGRENE: Primary | ICD-10-CM

## 2020-12-08 LAB
INR PPP: 2.7 (ref 0.91–1.09)
PROTHROMBIN TIME: 32.1 SECONDS (ref 10–13.8)

## 2020-12-08 PROCEDURE — 85610 PROTHROMBIN TIME: CPT

## 2020-12-08 PROCEDURE — 36416 COLLJ CAPILLARY BLOOD SPEC: CPT

## 2020-12-08 PROCEDURE — G0463 HOSPITAL OUTPT CLINIC VISIT: HCPCS

## 2020-12-08 RX ORDER — DOXYCYCLINE HYCLATE 100 MG
100 TABLET ORAL 2 TIMES DAILY
Qty: 14 TABLET | Refills: 0 | Status: SHIPPED | OUTPATIENT
Start: 2020-12-08 | End: 2020-12-15

## 2020-12-10 RX ORDER — SIMVASTATIN 10 MG
TABLET ORAL
Qty: 90 TABLET | Refills: 3 | OUTPATIENT
Start: 2020-12-10

## 2020-12-10 NOTE — TELEPHONE ENCOUNTER
Simvastatin was discontinued in March due to a possible drug interaction and replaced with pravastatin. He should take the pravastatin instead of this.

## 2020-12-11 ENCOUNTER — TELEPHONE (OUTPATIENT)
Dept: FAMILY MEDICINE CLINIC | Facility: CLINIC | Age: 85
End: 2020-12-11

## 2020-12-11 NOTE — TELEPHONE ENCOUNTER
PT SPOUSE CALLED AND WOULD LIKE TO KNOW IF RX  doxycycline (VIBRAMYICN) 100 MG tablet WAS TO TREAT PT STOMACH ISSUE OR PNEUMONIA, PT JUST SEEN DR. CONNER TODAY AND WAS TOLD THAT IF RX WAS FOR STOMACH THAN TO STOP TAKING RX.    PLEASE ADVISE.  CALL BACK:[phone]

## 2020-12-15 ENCOUNTER — ANTICOAGULATION VISIT (OUTPATIENT)
Dept: PHARMACY | Facility: HOSPITAL | Age: 85
End: 2020-12-15

## 2020-12-15 DIAGNOSIS — I48.20 CHRONIC ATRIAL FIBRILLATION (HCC): ICD-10-CM

## 2020-12-15 LAB
INR PPP: 2.3 (ref 0.91–1.09)
PROTHROMBIN TIME: 27.9 SECONDS (ref 10–13.8)

## 2020-12-15 PROCEDURE — 36416 COLLJ CAPILLARY BLOOD SPEC: CPT

## 2020-12-15 PROCEDURE — 85610 PROTHROMBIN TIME: CPT

## 2020-12-15 PROCEDURE — G0463 HOSPITAL OUTPT CLINIC VISIT: HCPCS

## 2020-12-15 NOTE — PROGRESS NOTES
Anticoagulation Clinic Progress Note  Indication: afib  Referring Provider: Em  Goal INR: 2-3   Current Drug Interactions: aspirin, simvastatin, trazodone  Other: no bleeding per patient  [id number]: 4 (age, HTN, CAD)    Diet: iceburg salads every now and then/ peas    Anticoagulation Clinic INR History:    Date 10/3 10/17 10/24 10/31 11/14 12/12 1/9 1/23 2/13   Total Weekly Dose 20 mg? 20mg? 20mg 20mg 20mg 20mg 20mg 20mg 20mg   INR 3.1 3.5 2.4 2.6 2.7 2.9 3.4 2.8 2.7   Notes   dec GLV     dec GLV       Date 3/13 3/27 4/24 5/22 6/19 7/10 7/24 8/1 8/12   Total Weekly Dose 20mg 20mg 20 mg 20mg 20mg 20mg 20mg 17.5 mg 17.5 mg   INR 3.2 2.7 2.8 2.9 3.1/ 2.9 3.8 4.0 3.0 2.3   Notes               Date 9/3 9/17 10/9 10/30 12/2 1/6/20 1/20 2/17 3/16 5/12 6/9 6/22 7/7 7/14 8/11   Total Weekly Dose 17.5 mg  ??? 17.5mg 17.5 mg 17.5 mg 17.5 mg 17.5 mg 17.5 mg 17.5mg 17.5mg 17.5mg 17.5mg 17.5 mg 17.5 mg 21.25 mg 20 mg   INR 2.3 2.9 2.4 2.1 2.4 1.9 2.0 2.3 2.1 2.0 3.1 2 1.6 2.6 2.6   Notes       Inc. GLV Boost x1      Missed dose boostx1      Date 9/8 10/5 11/3 12/1 12/8 12/15    Total Weekly Dose 20mg 20mg 20mg 13.75mg 22.5mg 20mg    INR 2.3 2.3 2.4 1.6 2.7 2.3    Notes    2x miss Boost 1x doxy      Clinic Interview:  Verbal Release Authorization signed on 6/25/18 -- may speak with Glenis Nic (wife), Marge Cox (daughter)  Tablet Strength: 2.5mg tablets   Patient contact: speak with wife Glenis home: [phone], mobile: [phone]    Patient Findings      Negatives:  Signs/symptoms of thrombosis, Signs/symptoms of bleeding, Laboratory test error suspected, Change in health, Change in alcohol use, Change in activity, Upcoming invasive procedure, Emergency department visit, Upcoming dental procedure, Missed doses, Extra doses, Change in medications, Change in diet/appetite, Hospital admission, Bruising, Other complaints   Comments:  Finished doxy this AM         Plan:   1. INR back WNL and therapeutic today at 2.3.   Instructed Mr. Lucero and Mrs. Lucero to continue warfarin 2.5 mg oral daily except 3.75 mg on MonThur until re-check.   2. Repeat INR on 3 weeks  3. Written and verbal information provided in clinic. Mr. Lucero expresses understanding with teach back and has no further questions at this time. Also, discussed plan with Mrs. Lucero via phone.    Tena Gomez, YossiD.  12/15/20   13:15 EST

## 2020-12-18 RX ORDER — POTASSIUM CHLORIDE 20 MEQ/1
20 TABLET, EXTENDED RELEASE ORAL DAILY
Qty: 30 TABLET | Refills: 3 | Status: SHIPPED | OUTPATIENT
Start: 2020-12-18 | End: 2021-04-16

## 2020-12-21 ENCOUNTER — HOSPITAL ENCOUNTER (OUTPATIENT)
Dept: GENERAL RADIOLOGY | Facility: HOSPITAL | Age: 85
Discharge: HOME | End: 2020-12-21
Admitting: PHYSICIAN ASSISTANT

## 2020-12-21 DIAGNOSIS — J90 PLEURAL EFFUSION, RIGHT: ICD-10-CM

## 2020-12-21 PROCEDURE — 71046 X-RAY EXAM CHEST 2 VIEWS: CPT

## 2020-12-21 RX ORDER — SIMVASTATIN 10 MG
TABLET ORAL
Qty: 90 TABLET | Refills: 0 | OUTPATIENT
Start: 2020-12-21

## 2020-12-21 RX ORDER — SIMVASTATIN 10 MG
10 TABLET ORAL NIGHTLY
Qty: 90 TABLET | Refills: 0 | Status: CANCELLED | OUTPATIENT
Start: 2020-12-21

## 2020-12-21 NOTE — TELEPHONE ENCOUNTER
Contacted patient and advised him that simvastatin has been discontinued and we will refill his pravastatin. He verbalized understanding and agreed to take the simvastatin only

## 2020-12-21 NOTE — TELEPHONE ENCOUNTER
Patient's wife, Glenis, states that he is out of the medication.  She can be reached at [phone]

## 2020-12-21 NOTE — TELEPHONE ENCOUNTER
Spoke with wife who is very confused. I went to read off the recommendations and it seemed to be backwards.   Glenis explained that the patient was originally on pravastatin, this was what was d/c in March and then the patient was placed on simvastatin. They are due for a refill. So now they are confused because the message from Fabien CARUSO is now stating that the simvastatin was d/c and the patient should be on pravastatin. Glenis did not think this was right and would like some clarification.     Looking at the meds list, pravastatin was what the patient was on back in March, simvastatin is what was prescribed in its place. Please advise. If patient should truly be on simvastatin they are due for a refill.

## 2020-12-21 NOTE — TELEPHONE ENCOUNTER
Please make sure his wife, Glenis, is aware of the recommendation.  His simvastatin interacts with another medication. The patient has dementia.

## 2020-12-22 RX ORDER — PRAVASTATIN SODIUM 40 MG
40 TABLET ORAL DAILY
Qty: 90 TABLET | Refills: 1 | Status: SHIPPED | OUTPATIENT
Start: 2020-12-22 | End: 2021-06-14

## 2020-12-22 NOTE — TELEPHONE ENCOUNTER
See telephone encounter dated 3/24. He should take pravastatin and d/c simvastatin which interacts with warfarin. Pravastatin does not interact with warfarin.

## 2020-12-22 NOTE — TELEPHONE ENCOUNTER
Spoke with Glenis patient's wife and informed of medication that was sent in. No further questions or concerns

## 2020-12-22 NOTE — TELEPHONE ENCOUNTER
Patient needs refill of Pravastatin called to pharmacy. Called and spoke to patient's wife Glenis. Informed her of this. She verbalized understanding.

## 2021-01-05 ENCOUNTER — ANTICOAGULATION VISIT (OUTPATIENT)
Dept: PHARMACY | Facility: HOSPITAL | Age: 86
End: 2021-01-05

## 2021-01-05 DIAGNOSIS — I48.20 CHRONIC ATRIAL FIBRILLATION (HCC): ICD-10-CM

## 2021-01-05 LAB
INR PPP: 2.5 (ref 0.91–1.09)
PROTHROMBIN TIME: 30.4 SECONDS (ref 10–13.8)

## 2021-01-05 PROCEDURE — 36416 COLLJ CAPILLARY BLOOD SPEC: CPT

## 2021-01-05 PROCEDURE — G0463 HOSPITAL OUTPT CLINIC VISIT: HCPCS

## 2021-01-05 PROCEDURE — 85610 PROTHROMBIN TIME: CPT

## 2021-01-05 NOTE — PROGRESS NOTES
Anticoagulation Clinic Progress Note  Indication: afib  Referring Provider: Em  Goal INR: 2-3   Current Drug Interactions: aspirin, simvastatin, trazodone  Other: no bleeding per patient  [id number]: 4 (age, HTN, CAD)    Diet: iceburg salads every now and then/ peas    Anticoagulation Clinic INR History:    Date 10/3 10/17 10/24 10/31 11/14 12/12 1/9 1/23 2/13   Total Weekly Dose 20 mg? 20mg? 20mg 20mg 20mg 20mg 20mg 20mg 20mg   INR 3.1 3.5 2.4 2.6 2.7 2.9 3.4 2.8 2.7   Notes   dec GLV     dec GLV       Date 3/13 3/27 4/24 5/22 6/19 7/10 7/24 8/1 8/12   Total Weekly Dose 20mg 20mg 20 mg 20mg 20mg 20mg 20mg 17.5 mg 17.5 mg   INR 3.2 2.7 2.8 2.9 3.1/ 2.9 3.8 4.0 3.0 2.3   Notes               Date 9/3 9/17 10/9 10/30 12/2 1/6/20 1/20 2/17 3/16 5/12 6/9 6/22 7/7 7/14 8/11   Total Weekly Dose 17.5 mg  ??? 17.5mg 17.5 mg 17.5 mg 17.5 mg 17.5 mg 17.5 mg 17.5mg 17.5mg 17.5mg 17.5mg 17.5 mg 17.5 mg 21.25 mg 20 mg   INR 2.3 2.9 2.4 2.1 2.4 1.9 2.0 2.3 2.1 2.0 3.1 2 1.6 2.6 2.6   Notes       Inc. GLV Boost x1      Missed dose boostx1      Date 9/8 10/5 11/3 12/1 12/8 12/15 01/05   Total Weekly Dose 20mg 20mg 20mg 13.75mg 22.5mg 20mg 20mg    INR 2.3 2.3 2.4 1.6 2.7 2.3 2.5   Notes    2x miss Boost 1x doxy      Clinic Interview:  Verbal Release Authorization signed on 6/25/18 -- may speak with Glenis Lucero (wife), Marge Suvan (daughter)  Tablet Strength: 2.5mg tablets   Patient contact: speak with wife Glenis home: [phone], mobile: [phone]    Patient Findings  Negatives:  Signs/symptoms of thrombosis, Signs/symptoms of bleeding, Laboratory test error suspected, Change in health, Change in alcohol use, Change in activity, Upcoming invasive procedure, Emergency department visit, Upcoming dental procedure, Missed doses, Extra doses, Change in medications, Change in diet/appetite, Hospital admission, Bruising, Other complaints   Comments:  Patient couldn't recall warfarin dosing schedule but confirmed with wife  Glenis. He is still avoiding GLV.      Plan:   1. INR is therapeutic today at 2.5. Instructed Mr. Lucero and Mrs. Lucero to continue warfarin 2.5 mg oral daily except 3.75 mg on MonThur until re-check.   2. Repeat INR in 4 weeks.   3. Written and verbal information provided in clinic. Mr. Lucero expresses understanding with teach back and has no further questions at this time. Also, discussed plan with Mrs. Lucero via phone.    Violetta Scott, Pharmacy Intern  01/05/21  13:24 EST       Phone call on 12/19: looks like patient has switched back to simvastatin to pravastatin   I, Tena Gomez, PharmD, have reviewed the note in full and agree with the assessment and plan.  01/05/21  14:45 EST

## 2021-01-12 ENCOUNTER — OFFICE VISIT (OUTPATIENT)
Dept: FAMILY MEDICINE CLINIC | Facility: CLINIC | Age: 86
End: 2021-01-12

## 2021-01-12 VITALS
HEIGHT: 67 IN | DIASTOLIC BLOOD PRESSURE: 68 MMHG | OXYGEN SATURATION: 96 % | HEART RATE: 65 BPM | SYSTOLIC BLOOD PRESSURE: 138 MMHG | BODY MASS INDEX: 30.7 KG/M2 | WEIGHT: 195.6 LBS

## 2021-01-12 DIAGNOSIS — N50.89 SCROTAL SWELLING: Primary | ICD-10-CM

## 2021-01-12 PROCEDURE — 99213 OFFICE O/P EST LOW 20 MIN: CPT | Performed by: PHYSICIAN ASSISTANT

## 2021-01-12 NOTE — PROGRESS NOTES
"    Chief Complaint   Patient presents with   • Groin Swelling       HPI     Mike Lucero Jr. is a pleasant 88 y.o. male with dementia who presents for evaluation of \"chief complaint.\" Patient began complaining of groin swelling and heaviness for 2 weeks per wife. The patient admits to soreness today. There has been no change in urination.     Past Medical History:   Diagnosis Date   • Atrial fibrillation (CMS/HCC)    • Cataract    • Chronic anticoagulation    • Coronary artery disease    • Diverticulosis    • Gallstones    • History of endocarditis    • HLD (hyperlipidemia)    • HTN (hypertension)    • Osteoarthritis    • Seasonal allergies     Seasonal allergies/rhinitis.        Past Surgical History:   Procedure Laterality Date   • ADRENAL GLAND SURGERY      Dr. Jj Kee   • APPENDECTOMY     • BLEPHAROPLASTY  2014    Dr. Santamaria   • COLONOSCOPY     • CORONARY ARTERY BYPASS GRAFT  2006    CABG for 3-vessel disease, 2006.   • INGUINAL HERNIA REPAIR Left      and 2012   • TONSILLECTOMY  1958       Family History   Problem Relation Age of Onset   • Alzheimer's disease Mother    • Dementia Mother    • Heart disease Father    • Stroke Father    • Heart disease Sister    • Heart disease Brother    • Stroke Brother        Social History     Socioeconomic History   • Marital status:      Spouse name: Glenis   • Number of children: 2   • Years of education: J.D.   • Highest education level: Not on file   Occupational History   • Occupation:      Employer: RETIRED   Tobacco Use   • Smoking status: Former Smoker     Packs/day: 1.00     Years: 18.00     Pack years: 18.00     Types: Cigarettes     Quit date: 1970     Years since quittin.6   • Smokeless tobacco: Never Used   Substance and Sexual Activity   • Alcohol use: Yes     Comment: rare   • Drug use: No   • Sexual activity: Not Currently     Partners: Female       Allergies   Allergen Reactions   • Rocephin " "[Ceftriaxone] Angioedema     Tongue and lip swelling, sore throat   • Temazepam Other (See Comments)     Caused pt to \"sleep drive\"   • Wellbutrin [Bupropion] Mental Status Change and Dizziness     Memory loss       ROS    Review of Systems   Genitourinary: Positive for scrotal swelling and testicular pain. Negative for difficulty urinating and dysuria.       Vitals:    01/12/21 1457   BP: 138/68   Pulse: 65   SpO2: 96%     Body mass index is 30.64 kg/m².      Current Outpatient Medications:   •  alfuzosin (UROXATRAL) 10 MG 24 hr tablet, TAKE ONE TABLET BY MOUTH DAILY, Disp: 90 tablet, Rfl: 1  •  aspirin 81 MG EC tablet, Take 81 mg by mouth Daily., Disp: , Rfl:   •  dilTIAZem CD (CARDIZEM CD) 240 MG 24 hr capsule, TAKE ONE CAPSULE BY MOUTH DAILY, Disp: 90 capsule, Rfl: 1  •  donepezil (ARICEPT) 10 MG tablet, TAKE ONE TABLET BY MOUTH DAILY, Disp: 30 tablet, Rfl: 8  •  lactobacillus acidophilus (RISAQUAD) capsule capsule, Take 1 capsule by mouth Daily., Disp: 30 capsule, Rfl: 0  •  lisinopril (PRINIVIL,ZESTRIL) 20 MG tablet, Take 20 mg by mouth Daily., Disp: , Rfl:   •  potassium chloride (KLOR-CON) 20 MEQ CR tablet, Take 1 tablet by mouth Daily., Disp: 30 tablet, Rfl: 3  •  pravastatin (PRAVACHOL) 40 MG tablet, Take 1 tablet by mouth Daily., Disp: 90 tablet, Rfl: 1  •  prednisoLONE acetate (PRED FORTE) 1 % ophthalmic suspension, Apply 1 drop to eye(s) as directed by provider Daily As Needed., Disp: , Rfl:   •  sodium chloride (OCEAN) 0.65 % nasal spray, 1 spray into the nostril(s) as directed by provider As Needed for Congestion., Disp: , Rfl:   •  traZODone (DESYREL) 50 MG tablet, TAKE ONE TABLET BY MOUTH ONCE NIGHTLY, Disp: 90 tablet, Rfl: 0  •  warfarin (COUMADIN) 2.5 MG tablet, Take 1-1.5 tablets by mouth once daily or as directed by the anticoagulation clinic, Disp: 100 tablet, Rfl: 1    PE    Physical Exam  Vitals signs reviewed.   Pulmonary:      Effort: Pulmonary effort is normal. No respiratory distress. "   Genitourinary:     Scrotum/Testes:         Right: Mass, tenderness or swelling not present.         Left: Tenderness and swelling present.       Neurological:      Mental Status: He is alert.   Psychiatric:         Mood and Affect: Mood normal.          A/P    Problem List Items Addressed This Visit     None      Visit Diagnoses     Scrotal swelling    -  Primary  -Suspect hydrocele, confirm with ultrasound  -Refer to urology for management since this is causing the patient significant discomfort  -Use Tylenol prn discomfort for now    Relevant Orders    US Scrotum & Testicles    Ambulatory Referral to Urology          Plan of care was reviewed with patient at the conclusion of today's visit. Education was provided regarding diagnoses, management, prescribed or recommended OTC products, and the importance of compliance with follow-up appointments. The patient was counseled regarding the risks, benefits, and possible side-effects of treatment. I advised the patient to keep me informed of any acute changes in their status including new, worsening, or persistent symptoms. Patient expresses understanding and agreement with the management plan.        ZULY Sparrow

## 2021-01-13 ENCOUNTER — HOSPITAL ENCOUNTER (OUTPATIENT)
Dept: ULTRASOUND IMAGING | Facility: HOSPITAL | Age: 86
Discharge: HOME | End: 2021-01-13
Admitting: PHYSICIAN ASSISTANT

## 2021-01-13 DIAGNOSIS — N50.89 SCROTAL SWELLING: ICD-10-CM

## 2021-01-13 PROCEDURE — 76870 US EXAM SCROTUM: CPT

## 2021-01-14 ENCOUNTER — TELEPHONE (OUTPATIENT)
Dept: FAMILY MEDICINE CLINIC | Facility: CLINIC | Age: 86
End: 2021-01-14

## 2021-01-18 ENCOUNTER — TELEPHONE (OUTPATIENT)
Dept: FAMILY MEDICINE CLINIC | Facility: CLINIC | Age: 86
End: 2021-01-18

## 2021-01-18 NOTE — TELEPHONE ENCOUNTER
PATIENT'S WIFE CALLED STATING THAT THE REFERRAL FOR THE UROLOGIST WAS NOT ABLE TO GET THE PATIENT IN UNTIL MAY.  FELIPE ASKED IF ANOTHER UROLOGIST COULD BE REFERRED FOR THE PATIENT.    FELIPE'S CONTACT [phone]

## 2021-01-20 ENCOUNTER — TELEPHONE (OUTPATIENT)
Dept: FAMILY MEDICINE CLINIC | Facility: CLINIC | Age: 86
End: 2021-01-20

## 2021-01-20 NOTE — TELEPHONE ENCOUNTER
Spoke with Zenobia. Family and patient wanting to transition to home primary care given with [facility] given advanced dementia and difficulty getting the patient out of appointments, etc. Patient's wife will call to request records transfer.

## 2021-01-20 NOTE — TELEPHONE ENCOUNTER
Zenobia Robertson with [facility] called regarding the patient. She asked to speak with Fabien directly. They are going to be requesting that the pt's records be sent somewhere else and she would like to give Fabien some background regarding that.       Ph: [phone]

## 2021-01-27 RX ORDER — TRAZODONE HYDROCHLORIDE 50 MG/1
TABLET ORAL
Qty: 90 TABLET | Refills: 1 | Status: SHIPPED | OUTPATIENT
Start: 2021-01-27

## 2021-01-29 ENCOUNTER — IMMUNIZATION (OUTPATIENT)
Dept: VACCINE CLINIC | Facility: HOSPITAL | Age: 86
End: 2021-01-29

## 2021-01-29 PROCEDURE — 91300 HC SARSCOV02 VAC 30MCG/0.3ML IM: CPT | Performed by: PHYSICIAN ASSISTANT

## 2021-01-29 PROCEDURE — 0001A: CPT | Performed by: PHYSICIAN ASSISTANT

## 2021-02-02 ENCOUNTER — ANTICOAGULATION VISIT (OUTPATIENT)
Dept: PHARMACY | Facility: HOSPITAL | Age: 86
End: 2021-02-02

## 2021-02-02 DIAGNOSIS — I48.20 CHRONIC ATRIAL FIBRILLATION (HCC): ICD-10-CM

## 2021-02-02 LAB
INR PPP: 2.3 (ref 0.91–1.09)
PROTHROMBIN TIME: 27.3 SECONDS (ref 10–13.8)

## 2021-02-02 PROCEDURE — 85610 PROTHROMBIN TIME: CPT

## 2021-02-02 PROCEDURE — 36416 COLLJ CAPILLARY BLOOD SPEC: CPT

## 2021-02-02 PROCEDURE — G0463 HOSPITAL OUTPT CLINIC VISIT: HCPCS

## 2021-02-02 NOTE — PROGRESS NOTES
Anticoagulation Clinic Progress Note  Indication: afib  Referring Provider: Em  Goal INR: 2-3   Current Drug Interactions: aspirin, simvastatin, trazodone  Other: no bleeding per patient  [id number]: 4 (age, HTN, CAD)    Diet: iceburg salads every now and then/ peas    Anticoagulation Clinic INR History:    Date 10/3 10/17 10/24 10/31 11/14 12/12 1/9 1/23 2/13   Total Weekly Dose 20 mg? 20mg? 20mg 20mg 20mg 20mg 20mg 20mg 20mg   INR 3.1 3.5 2.4 2.6 2.7 2.9 3.4 2.8 2.7   Notes   dec GLV     dec GLV       Date 3/13 3/27 4/24 5/22 6/19 7/10 7/24 8/1 8/12   Total Weekly Dose 20mg 20mg 20 mg 20mg 20mg 20mg 20mg 17.5 mg 17.5 mg   INR 3.2 2.7 2.8 2.9 3.1/ 2.9 3.8 4.0 3.0 2.3   Notes               Date 9/3 9/17 10/9 10/30 12/2 1/6/20 1/20 2/17 3/16 5/12 6/9 6/22 7/7 7/14 8/11   Total Weekly Dose 17.5 mg  ??? 17.5mg 17.5 mg 17.5 mg 17.5 mg 17.5 mg 17.5 mg 17.5mg 17.5mg 17.5mg 17.5mg 17.5 mg 17.5 mg 21.25 mg 20 mg   INR 2.3 2.9 2.4 2.1 2.4 1.9 2.0 2.3 2.1 2.0 3.1 2 1.6 2.6 2.6   Notes       Inc. GLV Boost x1      Missed dose boostx1      Date 9/8 10/5 11/3 12/1 12/8 12/15 1/5 2/2       Total Weekly Dose 20mg 20mg 20mg 13.75mg 22.5mg 20mg 20mg 20mg        INR 2.3 2.3 2.4 1.6 2.7 2.3 2.5 2.3       Notes    2x miss Boost 1x doxy           Clinic Interview:  Verbal Release Authorization signed on 6/25/18 -- may speak with Glenis Lucero (wife), Marge Cox (daughter)  Tablet Strength: 2.5mg tablets   Patient contact: speak with wife Glenis home: [phone], mobile: [phone]    Patient Findings  Positives:  Change in medications   Negatives:  Signs/symptoms of thrombosis, Signs/symptoms of bleeding, Laboratory test error suspected, Change in health, Change in alcohol use, Change in activity, Upcoming invasive procedure, Emergency department visit, Upcoming dental procedure, Missed doses, Extra doses, Change in diet/appetite, Hospital admission, Bruising, Other complaints   Comments:  Started alfuzosin about 2 weeks ago.  Patient and wife deny any changes in diet, and patient reports consistently using a pill planner to keep track of his warfarin dose.     Plan:   1. INR is therapeutic today at 2.3. Instructed Mr. Lucero and Mrs. Lucero to continue warfarin 2.5 mg oral daily except 3.75 mg on MonThur until re-check.   2. Repeat INR in 4 weeks on Tues 3/2.  3. Written and verbal information provided in clinic.  And Mrs. Lucero express understanding with teach back and has no further questions at this time.     Thank you,  Mindy Tarango, PharmD.  Pharmacy Resident  2/2/2021 14:22 EST

## 2021-02-03 DIAGNOSIS — G31.84 MILD COGNITIVE IMPAIRMENT: Primary | ICD-10-CM

## 2021-02-03 RX ORDER — DONEPEZIL HYDROCHLORIDE 10 MG/1
TABLET, FILM COATED ORAL
Qty: 30 TABLET | Refills: 7 | Status: SHIPPED | OUTPATIENT
Start: 2021-02-03

## 2021-02-19 ENCOUNTER — IMMUNIZATION (OUTPATIENT)
Dept: VACCINE CLINIC | Facility: HOSPITAL | Age: 86
End: 2021-02-19

## 2021-02-19 PROCEDURE — 0002A: CPT | Performed by: INTERNAL MEDICINE

## 2021-02-19 PROCEDURE — 91300 HC SARSCOV02 VAC 30MCG/0.3ML IM: CPT | Performed by: INTERNAL MEDICINE

## 2021-03-02 ENCOUNTER — ANTICOAGULATION VISIT (OUTPATIENT)
Dept: PHARMACY | Facility: HOSPITAL | Age: 86
End: 2021-03-02

## 2021-03-02 DIAGNOSIS — I48.20 CHRONIC ATRIAL FIBRILLATION (HCC): ICD-10-CM

## 2021-03-02 LAB
INR PPP: 2.3 (ref 0.91–1.09)
PROTHROMBIN TIME: 27.3 SECONDS (ref 10–13.8)

## 2021-03-02 PROCEDURE — 85610 PROTHROMBIN TIME: CPT

## 2021-03-02 PROCEDURE — G0463 HOSPITAL OUTPT CLINIC VISIT: HCPCS

## 2021-03-02 PROCEDURE — 36416 COLLJ CAPILLARY BLOOD SPEC: CPT

## 2021-03-02 NOTE — PROGRESS NOTES
Anticoagulation Clinic Progress Note  Indication: afib  Referring Provider:Em  Awaiting new referral from Dr. Wesley 3/2/2021  Goal INR: 2-3   Current Drug Interactions: aspirin, simvastatin, trazodone  Other: no bleeding per patient  [id number]: 4 (age, HTN, CAD)    Diet: iceburg salads every now and then/ peas    Anticoagulation Clinic INR History:  Date 10/3 10/17 10/24 10/31 11/14 12/12 1/9 1/23 2/13   Total Weekly Dose 20 mg? 20mg? 20mg 20mg 20mg 20mg 20mg 20mg 20mg   INR 3.1 3.5 2.4 2.6 2.7 2.9 3.4 2.8 2.7   Notes   dec GLV     dec GLV       Date 3/13 3/27 4/24 5/22 6/19 7/10 7/24 8/1 8/12   Total Weekly Dose 20mg 20mg 20 mg 20mg 20mg 20mg 20mg 17.5 mg 17.5 mg   INR 3.2 2.7 2.8 2.9 3.1/ 2.9 3.8 4.0 3.0 2.3   Notes               Date 9/3 9/17 10/9 10/30 12/2 1/6/20 1/20 2/17 3/16 5/12 6/9 6/22 7/7 7/14 8/11   Total Weekly Dose 17.5 mg  ??? 17.5mg 17.5 mg 17.5 mg 17.5 mg 17.5 mg 17.5 mg 17.5mg 17.5mg 17.5mg 17.5mg 17.5 mg 17.5 mg 21.25 mg 20 mg   INR 2.3 2.9 2.4 2.1 2.4 1.9 2.0 2.3 2.1 2.0 3.1 2 1.6 2.6 2.6   Notes       Inc. GLV Boost x1      Missed dose boostx1      Date 9/8 10/5 11/3 12/1 12/8 12/15 1/5/21 2/2 3/2      Total Weekly Dose 20mg 20mg 20mg 13.75mg 22.5mg 20mg 20mg 20mg  20mg      INR 2.3 2.3 2.4 1.6 2.7 2.3 2.5 2.3 2.3      Notes    2x miss Boost 1x doxy           Clinic Interview:  Verbal Release Authorization signed on 6/25/18 -- may speak with Glenis Lucero (wife), Marge Cox (daughter)  Tablet Strength: 2.5mg tablets   Patient contact: speak with wife Glenis home: [phone], mobile: [phone]    Patient Findings  Negatives:  Signs/symptoms of thrombosis, Signs/symptoms of bleeding, Laboratory test error suspected, Change in health, Change in alcohol use, Change in activity, Upcoming invasive procedure, Emergency department visit, Upcoming dental procedure, Missed doses, Extra doses, Change in medications, Change in diet/appetite, Hospital admission, Bruising, Other complaints    Comments:  He received two COVID vaccines. Patient reports consistently using a pill planner to keep track of his warfarin dose.     Plan:   1. INR is therapeutic today at 2.3. Instructed Mr. Lucero and Mrs. Lucero to continue warfarin 2.5 mg oral daily except 3.75 mg on MonThur until re-check.   2. Repeat INR in 4 weeks on Tues 3/30.  3. Patient is changing to Dr. Subha Wesley with [facility] - instead of Dr. Strickland. [phone]. Need new referral from her.   4. Written and verbal information provided in clinic.  And Mrs. Lucero express understanding with teach back and has no further questions at this time.   5. Per patient's wife- Lilia is working on home monitor for them. Started process yesterday 3/1/2021. Dr. Wesley has signed off on the home monitor. We are awaiting new referral. Spoke with Elina at Dr. Wesley. Faxing over referral [phone]  .  Colleen Nolasco, PharmD  03/02/21  13:07 EST

## 2021-03-12 ENCOUNTER — TELEPHONE (OUTPATIENT)
Dept: PHARMACY | Facility: HOSPITAL | Age: 86
End: 2021-03-12

## 2021-03-12 NOTE — TELEPHONE ENCOUNTER
Mrs. Lucero called to inform us that after talking with Hubert, Mr. Lucero will not need a HM for INR checks due to complexity of operating the machine. They would prefer to RTC and have an appt scheduled for 3/30.

## 2021-03-30 ENCOUNTER — ANTICOAGULATION VISIT (OUTPATIENT)
Dept: PHARMACY | Facility: HOSPITAL | Age: 86
End: 2021-03-30

## 2021-03-30 DIAGNOSIS — I48.20 CHRONIC ATRIAL FIBRILLATION (HCC): ICD-10-CM

## 2021-03-30 LAB
INR PPP: 2.4 (ref 0.91–1.09)
PROTHROMBIN TIME: 28.5 SECONDS (ref 10–13.8)

## 2021-03-30 PROCEDURE — 85610 PROTHROMBIN TIME: CPT

## 2021-03-30 PROCEDURE — G0463 HOSPITAL OUTPT CLINIC VISIT: HCPCS

## 2021-03-30 PROCEDURE — 36416 COLLJ CAPILLARY BLOOD SPEC: CPT

## 2021-03-30 NOTE — PROGRESS NOTES
Anticoagulation Clinic Progress Note  Indication: afib  Referring Provider: Dr. Wesley 3/2021 (previously Dr Strickland)  Goal INR: 2-3   Current Drug Interactions: aspirin, simvastatin, trazodone  Other: no bleeding per patient  [id number]: 4 (age, HTN, CAD)    Diet: iceburg salads every now and then/ peas    Anticoagulation Clinic INR History:  Date 10/3 10/17 10/24 10/31 11/14 12/12 1/9 1/23 2/13   Total Weekly Dose 20 mg? 20mg? 20mg 20mg 20mg 20mg 20mg 20mg 20mg   INR 3.1 3.5 2.4 2.6 2.7 2.9 3.4 2.8 2.7   Notes   dec GLV     dec GLV       Date 3/13 3/27 4/24 5/22 6/19 7/10 7/24 8/1 8/12   Total Weekly Dose 20mg 20mg 20 mg 20mg 20mg 20mg 20mg 17.5 mg 17.5 mg   INR 3.2 2.7 2.8 2.9 3.1/ 2.9 3.8 4.0 3.0 2.3   Notes               Date 9/3 9/17 10/9 10/30 12/2 1/6/20 1/20 2/17 3/16 5/12 6/9 6/22 7/7 7/14 8/11   Total Weekly Dose 17.5 mg  ??? 17.5mg 17.5 mg 17.5 mg 17.5 mg 17.5 mg 17.5 mg 17.5mg 17.5mg 17.5mg 17.5mg 17.5 mg 17.5 mg 21.25 mg 20 mg   INR 2.3 2.9 2.4 2.1 2.4 1.9 2.0 2.3 2.1 2.0 3.1 2 1.6 2.6 2.6   Notes       Inc. GLV Boost x1      Missed dose boostx1      Date 9/8 10/5 11/3 12/1 12/8 12/15 1/5/21 2/2 3/2 3/30     Total Weekly Dose 20mg 20mg 20mg 13.75mg 22.5mg 20mg 20mg 20mg  20mg 20mg     INR 2.3 2.3 2.4 1.6 2.7 2.3 2.5 2.3 2.3 2.4     Notes    2x miss Boost 1x doxy           Clinic Interview:  Verbal Release Authorization signed on 6/25/18 -- may speak with Glenis Lucero (wife), Marge Cox (daughter)  Tablet Strength: 2.5mg tablets   Patient contact: speak with wife Glenis home: [phone], mobile: [phone]    Patient Findings      Negatives:  Signs/symptoms of thrombosis, Signs/symptoms of bleeding, Laboratory test error suspected, Change in health, Change in alcohol use, Change in activity, Upcoming invasive procedure, Emergency department visit, Upcoming dental procedure, Missed doses, Extra doses, Change in medications, Change in diet/appetite, Hospital admission, Bruising, Other complaints            Plan:   1. INR is therapeutic today at 2.4. Instructed Mr. Lucero and Mrs. Lucero to continue warfarin 2.5 mg oral daily except 3.75 mg on MonThur until re-check.   2. Repeat INR in 4 weeks  3. Patient is changing to Dr. Subha Wesley with [facility] -- have received new referral   4. Written and verbal information provided in clinic.  And Mrs. Lucero express understanding with teach back and has no further questions at this time.   5. Patient declines HM at this time      Tena Gomez, YossiD.  03/30/21   13:10 EDT

## 2021-03-31 RX ORDER — WARFARIN SODIUM 2.5 MG/1
TABLET ORAL
Qty: 100 TABLET | Refills: 0 | Status: SHIPPED | OUTPATIENT
Start: 2021-03-31 | End: 2021-05-19

## 2021-04-01 RX ORDER — DILTIAZEM HYDROCHLORIDE 240 MG/1
240 CAPSULE, COATED, EXTENDED RELEASE ORAL DAILY
Qty: 90 CAPSULE | Refills: 0 | Status: SHIPPED | OUTPATIENT
Start: 2021-04-01 | End: 2021-07-02

## 2021-04-01 RX ORDER — ALFUZOSIN HYDROCHLORIDE 10 MG/1
TABLET, EXTENDED RELEASE ORAL
Qty: 90 TABLET | Refills: 2 | Status: SHIPPED | OUTPATIENT
Start: 2021-04-01

## 2021-04-16 RX ORDER — POTASSIUM CHLORIDE 1500 MG/1
TABLET, EXTENDED RELEASE ORAL
Qty: 30 TABLET | Refills: 2 | Status: SHIPPED | OUTPATIENT
Start: 2021-04-16 | End: 2021-07-12

## 2021-04-29 ENCOUNTER — ANTICOAGULATION VISIT (OUTPATIENT)
Dept: PHARMACY | Facility: HOSPITAL | Age: 86
End: 2021-04-29

## 2021-04-29 DIAGNOSIS — I48.20 CHRONIC ATRIAL FIBRILLATION (HCC): Primary | ICD-10-CM

## 2021-04-29 LAB
INR PPP: 1.3 (ref 0.91–1.09)
PROTHROMBIN TIME: 15.1 SECONDS (ref 10–13.8)

## 2021-04-29 PROCEDURE — G0463 HOSPITAL OUTPT CLINIC VISIT: HCPCS

## 2021-04-29 PROCEDURE — 36416 COLLJ CAPILLARY BLOOD SPEC: CPT

## 2021-04-29 PROCEDURE — 85610 PROTHROMBIN TIME: CPT

## 2021-04-29 NOTE — PROGRESS NOTES
Anticoagulation Clinic Progress Note  Indication: afib  Referring Provider: Dr. Wesley 3/2021 (previously Dr Strikcland)  Goal INR: 2-3   Current Drug Interactions: aspirin, simvastatin, trazodone  Other: no bleeding per patient  [id number]: 4 (age, HTN, CAD)    Diet: iceburg salads every now and then/ peas    Anticoagulation Clinic INR History:  Date 3/13 3/27 4/24 5/22 6/19 7/10 7/24 8/1 8/12   Total Weekly Dose 20mg 20mg 20 mg 20mg 20mg 20mg 20mg 17.5 mg 17.5 mg   INR 3.2 2.7 2.8 2.9 3.1/ 2.9 3.8 4.0 3.0 2.3   Notes               Date 9/3 9/17 10/9 10/30 12/2 1/6/20 1/20 2/17 3/16 5/12 6/9 6/22 7/7 7/14 8/11   Total Weekly Dose 17.5 mg  ??? 17.5mg 17.5 mg 17.5 mg 17.5 mg 17.5 mg 17.5 mg 17.5mg 17.5mg 17.5mg 17.5mg 17.5 mg 17.5 mg 21.25 mg 20 mg   INR 2.3 2.9 2.4 2.1 2.4 1.9 2.0 2.3 2.1 2.0 3.1 2 1.6 2.6 2.6   Notes       Inc. GLV Boost x1      Missed dose boostx1      Date 9/8 10/5 11/3 12/1 12/8 12/15 1/5/21 2/2 3/2 3/30 4/29    Total Weekly Dose 20mg 20mg 20mg 13.75mg 22.5mg 20mg 20mg 20mg  20mg 20mg 8.75mg    INR 2.3 2.3 2.4 1.6 2.7 2.3 2.5 2.3 2.3 2.4 1.3    Notes    2x miss Boost 1x doxy     4x miss      Clinic Interview:  Verbal Release Authorization signed on 6/25/18 -- may speak with Glenis Lucero (wife), Marge Cox (daughter)  Tablet Strength: 2.5mg tablets   Patient contact: speak with wife Glenis home: [phone], mobile: [phone]    Patient Findings  Positives:  Missed doses   Negatives:  Signs/symptoms of thrombosis, Signs/symptoms of bleeding, Laboratory test error suspected, Change in health, Change in alcohol use, Change in activity, Upcoming invasive procedure, Emergency department visit, Upcoming dental procedure, Extra doses, Change in medications, Change in diet/appetite, Hospital admission, Bruising, Other complaints   Comments:  Per wife, zero GLV -- no findings in clinic     Addendum: Wife called back to report patient had no warfarin in his pill planner this week-- in all remaining  santiago only had cholesterol meds. Med planner started Sunday, has missed 4 doses.        Plan:   1. INR is SUBtherapeutic today at 1.3 following missed doses. Instructed Mr. Lucero and Mrs. Lucero to BOOST tonight's dose to 5mg, tomorrow's dose to 3.75mg then continue warfarin 2.5 mg oral daily except 3.75 mg on MonThur until re-check.   2. Repeat INR in 2 weeks  3. Written and verbal information provided in clinic.  And Mrs. Lucero express understanding with teach back and has no further questions at this time.       Tena Gomez, YossiD.  04/29/21   13:16 EDT

## 2021-05-13 ENCOUNTER — APPOINTMENT (OUTPATIENT)
Dept: PHARMACY | Facility: HOSPITAL | Age: 86
End: 2021-05-13

## 2021-05-14 ENCOUNTER — ANTICOAGULATION VISIT (OUTPATIENT)
Dept: PHARMACY | Facility: HOSPITAL | Age: 86
End: 2021-05-14

## 2021-05-14 DIAGNOSIS — I48.20 CHRONIC ATRIAL FIBRILLATION (HCC): Primary | ICD-10-CM

## 2021-05-14 LAB
INR PPP: 1.8 (ref 0.91–1.09)
PROTHROMBIN TIME: 22 SECONDS (ref 10–13.8)

## 2021-05-14 PROCEDURE — 36416 COLLJ CAPILLARY BLOOD SPEC: CPT

## 2021-05-14 PROCEDURE — G0463 HOSPITAL OUTPT CLINIC VISIT: HCPCS

## 2021-05-14 PROCEDURE — 85610 PROTHROMBIN TIME: CPT

## 2021-05-14 NOTE — PROGRESS NOTES
Anticoagulation Clinic Progress Note  Indication: afib  Referring Provider: Dr. Wesley 3/2021 (previously Dr Strickland)  Goal INR: 2-3   Current Drug Interactions: aspirin, simvastatin, trazodone  Other: no bleeding per patient  [id number]: 4 (age, HTN, CAD)    Diet: iceburg salads every now and then/ peas    Anticoagulation Clinic INR History:  Date 3/13 3/27 4/24 5/22 6/19 7/10 7/24 8/1 8/12   Total Weekly Dose 20mg 20mg 20 mg 20mg 20mg 20mg 20mg 17.5 mg 17.5 mg   INR 3.2 2.7 2.8 2.9 3.1/ 2.9 3.8 4.0 3.0 2.3   Notes               Date 9/3 9/17 10/9 10/30 12/2 1/6/20 1/20 2/17 3/16 5/12 6/9 6/22 7/7 7/14 8/11   Total Weekly Dose 17.5 mg  ??? 17.5mg 17.5 mg 17.5 mg 17.5 mg 17.5 mg 17.5 mg 17.5mg 17.5mg 17.5mg 17.5mg 17.5 mg 17.5 mg 21.25 mg 20 mg   INR 2.3 2.9 2.4 2.1 2.4 1.9 2.0 2.3 2.1 2.0 3.1 2 1.6 2.6 2.6   Notes       Inc. GLV Boost x1      Missed dose boostx1      Date 9/8 10/5 11/3 12/1 12/8 12/15 1/5/21 2/2 3/2 3/30 4/29 5/14   Total Weekly Dose 20mg 20mg 20mg 13.75mg 22.5mg 20mg 20mg 20mg  20mg 20mg 8.75mg 20mg   INR 2.3 2.3 2.4 1.6 2.7 2.3 2.5 2.3 2.3 2.4 1.3 1.8   Notes    2x miss Boost 1x doxy     4x miss      Clinic Interview:  Verbal Release Authorization signed on 6/25/18 -- may speak with Glenis Lucero (wife), Marge Kenny (daughter)  Tablet Strength: 2.5mg tablets   Patient contact: speak with wife Glenis home: [phone], mobile: [phone]      Patient Findings  Negatives:  Signs/symptoms of thrombosis, Signs/symptoms of bleeding, Laboratory test error suspected, Change in health, Change in alcohol use, Change in activity, Upcoming invasive procedure, Emergency department visit, Upcoming dental procedure, Missed doses, Extra doses, Change in medications, Change in diet/appetite, Hospital admission, Bruising, Other complaints   Comments:  No GLV. Reports some bilateral swelling. Encouraged her to call PCP.       Plan:   1. INR is SUBtherapeutic today at  1.8. Instructed Mr. Lucero and Mrs. Lucero  to boost dose tonight to 3.75mg continue warfarin 2.5 mg oral daily except 3.75 mg on MonThur until re-check.   2. Repeat INR in 1.5 weeks 5/24.  3. Written and verbal information provided in clinic. MrBear And Mrs. Lucero express understanding with teach back and has no further questions at this time.     Colleen Nolasco, PharmD  05/14/21   11:33 EDT

## 2021-05-19 RX ORDER — WARFARIN SODIUM 2.5 MG/1
TABLET ORAL
Qty: 100 TABLET | Refills: 0 | Status: SHIPPED | OUTPATIENT
Start: 2021-05-19 | End: 2022-01-03

## 2021-05-19 RX ORDER — WARFARIN SODIUM 2.5 MG/1
TABLET ORAL
Qty: 100 TABLET | Refills: 0 | Status: SHIPPED | OUTPATIENT
Start: 2021-05-19 | End: 2021-05-19 | Stop reason: SDUPTHER

## 2021-05-24 ENCOUNTER — ANTICOAGULATION VISIT (OUTPATIENT)
Dept: PHARMACY | Facility: HOSPITAL | Age: 86
End: 2021-05-24

## 2021-05-24 DIAGNOSIS — I48.20 CHRONIC ATRIAL FIBRILLATION (HCC): Primary | ICD-10-CM

## 2021-05-24 LAB
INR PPP: 1.9 (ref 0.91–1.09)
PROTHROMBIN TIME: 22.7 SECONDS (ref 10–13.8)

## 2021-05-24 PROCEDURE — 36416 COLLJ CAPILLARY BLOOD SPEC: CPT

## 2021-05-24 PROCEDURE — 85610 PROTHROMBIN TIME: CPT

## 2021-05-24 PROCEDURE — G0463 HOSPITAL OUTPT CLINIC VISIT: HCPCS

## 2021-05-24 NOTE — PROGRESS NOTES
Anticoagulation Clinic Progress Note  Indication: afib  Referring Provider: Dr. Wesley 3/2021 (previously Dr Strickland)  Goal INR: 2-3   Current Drug Interactions: aspirin, simvastatin, trazodone  Other: no bleeding per patient  [id number]: 4 (age, HTN, CAD)    Diet: iceburg salads every now and then/ peas    Anticoagulation Clinic INR History:  Date 3/13 3/27 4/24 5/22 6/19 7/10 7/24 8/1 8/12   Total Weekly Dose 20mg 20mg 20 mg 20mg 20mg 20mg 20mg 17.5 mg 17.5 mg   INR 3.2 2.7 2.8 2.9 3.1/ 2.9 3.8 4.0 3.0 2.3   Notes               Date 9/3 9/17 10/9 10/30 12/2 1/6/20 1/20 2/17 3/16 5/12 6/9 6/22 7/7 7/14 8/11   Total Weekly Dose 17.5 mg  ??? 17.5mg 17.5 mg 17.5 mg 17.5 mg 17.5 mg 17.5 mg 17.5mg 17.5mg 17.5mg 17.5mg 17.5 mg 17.5 mg 21.25 mg 20 mg   INR 2.3 2.9 2.4 2.1 2.4 1.9 2.0 2.3 2.1 2.0 3.1 2 1.6 2.6 2.6   Notes       Inc. GLV Boost x1      Missed dose boostx1      Date 9/8 10/5 11/3 12/1 12/8 12/15 1/5/21 2/2 3/2 3/30 4/29 5/14 5/24     Total Weekly Dose 20mg 20mg 20mg 13.75mg 22.5mg 20mg 20mg 20mg  20mg 20mg 8.75mg 20mg 17.5mg?     INR 2.3 2.3 2.4 1.6 2.7 2.3 2.5 2.3 2.3 2.4 1.3 1.8 1.9     Notes    2x miss Boost 1x doxy     4x miss  1x miss       Clinic Interview:  Verbal Release Authorization signed on 6/25/18 -- may speak with Glenis Lucero (wife), Marge Kenny (daughter)  Tablet Strength: 2.5mg tablets   Patient contact: speak with wife Glenis home: [phone], mobile: [phone]      Patient Findings  Positives:  Missed doses   Negatives:  Signs/symptoms of thrombosis, Signs/symptoms of bleeding, Laboratory test error suspected, Change in health, Change in alcohol use, Change in activity, Upcoming invasive procedure, Emergency department visit, Upcoming dental procedure, Extra doses, Change in medications, Change in diet/appetite, Hospital admission, Bruising, Other complaints   Comments:  Spouse believes he missed a dose this week, unsure what day         Plan:   1. INR is SUBtherapeutic today at   1.9, likely due to possible missed dose. Instructed Mr. Lucero and Mrs. Lucero to boost dose tonight to 5mg continue warfarin 2.5 mg oral daily except 3.75 mg on MonThur until re-check.   2. Repeat INR in 2 weeks  3. Written and verbal information provided in clinic.  And Mrs. Lucero express understanding with teach back and has no further questions at this time.     Tena Gomez PharmD.  05/24/21   11:37 EDT

## 2021-06-07 ENCOUNTER — ANTICOAGULATION VISIT (OUTPATIENT)
Dept: PHARMACY | Facility: HOSPITAL | Age: 86
End: 2021-06-07

## 2021-06-07 DIAGNOSIS — I48.20 CHRONIC ATRIAL FIBRILLATION (HCC): Primary | ICD-10-CM

## 2021-06-07 LAB
INR PPP: 2 (ref 0.91–1.09)
PROTHROMBIN TIME: 24.2 SECONDS (ref 10–13.8)

## 2021-06-07 PROCEDURE — G0463 HOSPITAL OUTPT CLINIC VISIT: HCPCS | Performed by: PHARMACIST

## 2021-06-07 PROCEDURE — 36416 COLLJ CAPILLARY BLOOD SPEC: CPT

## 2021-06-07 PROCEDURE — 85610 PROTHROMBIN TIME: CPT

## 2021-06-07 NOTE — PROGRESS NOTES
Anticoagulation Clinic Progress Note  Indication: afib  Referring Provider: Dr. Wesley 3/2021 (previously Dr Strickland)  Goal INR: 2-3   Current Drug Interactions: aspirin, simvastatin, trazodone  Other: no bleeding per patient  [id number]: 4 (age, HTN, CAD)    Diet: iceburg salads every now and then/ peas    Anticoagulation Clinic INR History:  Date 3/13 3/27 4/24 5/22 6/19 7/10 7/24 8/1 8/12   Total Weekly Dose 20mg 20mg 20 mg 20mg 20mg 20mg 20mg 17.5 mg 17.5 mg   INR 3.2 2.7 2.8 2.9 3.1/ 2.9 3.8 4.0 3.0 2.3   Notes               Date 9/3 9/17 10/9 10/30 12/2 1/6/20 1/20 2/17 3/16 5/12 6/9 6/22 7/7 7/14 8/11   Total Weekly Dose 17.5 mg  ??? 17.5mg 17.5 mg 17.5 mg 17.5 mg 17.5 mg 17.5 mg 17.5mg 17.5mg 17.5mg 17.5mg 17.5 mg 17.5 mg 21.25 mg 20 mg   INR 2.3 2.9 2.4 2.1 2.4 1.9 2.0 2.3 2.1 2.0 3.1 2 1.6 2.6 2.6   Notes       Inc. GLV Boost x1      Missed dose boostx1      Date 9/8 10/5 11/3 12/1 12/8 12/15 1/5/21 2/2 3/2 3/30 4/29 5/14 5/24 6/7    Total Weekly Dose 20mg 20mg 20mg 13.75mg 22.5mg 20mg 20mg 20mg  20mg 20mg 8.75mg 20mg 17.5mg? 20 mg    INR 2.3 2.3 2.4 1.6 2.7 2.3 2.5 2.3 2.3 2.4 1.3 1.8 1.9 2.0    Notes    2x miss Boost 1x doxy     4x miss  1x miss       Clinic Interview:  Verbal Release Authorization signed on 6/25/18 -- may speak with Glenis Lucero (wife), Marge Cox (daughter)  Tablet Strength: 2.5mg tablets   Patient contact: speak with wife Glenis home: [phone], mobile: [phone]      Patient Findings  Negatives:  Signs/symptoms of thrombosis, Signs/symptoms of bleeding, Laboratory test error suspected, Change in health, Change in alcohol use, Change in activity, Upcoming invasive procedure, Emergency department visit, Upcoming dental procedure, Missed doses, Extra doses, Change in diet/appetite, Hospital admission, Bruising, Other complaints   Comments:  He did take a five day course of furosemide. No missed doses this time.         Plan:   1. INR is therapeutic today at 2.0.  Instructed   Nic and Mrs. Lucero to boost dose tonight to 5mg continue warfarin 2.5 mg oral daily except 3.75 mg on MonThur until re-check.   2. Repeat INR in 3 weeks. If he is in goal range he may extend to 4 week interval.  3. Written and verbal information provided in clinic.  And Mrs. Lucero express understanding with teach back and has no further questions at this time.     Sarbjit Morales, YossiD.  06/07/21   12:10 EDT       Admission

## 2021-06-14 RX ORDER — PRAVASTATIN SODIUM 40 MG
TABLET ORAL
Qty: 90 TABLET | Refills: 2 | Status: SHIPPED | OUTPATIENT
Start: 2021-06-14 | End: 2022-06-11

## 2021-06-15 NOTE — TELEPHONE ENCOUNTER
Contacted patient to schedule wellness visit. Pt's wife states  is not schedule a wellness visit because he is now seeing a home physician with  [facility] (Dr.Jenny Wesley) and is no longer under the care of Fabien Merino.

## 2021-06-28 ENCOUNTER — ANTICOAGULATION VISIT (OUTPATIENT)
Dept: PHARMACY | Facility: HOSPITAL | Age: 86
End: 2021-06-28

## 2021-06-28 DIAGNOSIS — I48.20 CHRONIC ATRIAL FIBRILLATION (HCC): Primary | ICD-10-CM

## 2021-06-28 LAB
INR PPP: 1.9 (ref 0.91–1.09)
PROTHROMBIN TIME: 22.8 SECONDS (ref 10–13.8)

## 2021-06-28 PROCEDURE — 36416 COLLJ CAPILLARY BLOOD SPEC: CPT

## 2021-06-28 PROCEDURE — G0463 HOSPITAL OUTPT CLINIC VISIT: HCPCS

## 2021-06-28 PROCEDURE — 85610 PROTHROMBIN TIME: CPT

## 2021-06-28 NOTE — PROGRESS NOTES
Anticoagulation Clinic Progress Note  Indication: afib  Referring Provider: Dr. Wesley 3/2021 (previously Dr Strickland)  Goal INR: 2-3   Current Drug Interactions: aspirin, simvastatin, trazodone  Other: no bleeding per patient  [id number]: 4 (age, HTN, CAD)    Diet: iceburg salads every now and then/ peas    Anticoagulation Clinic INR History:  Date 3/13 3/27 4/24 5/22 6/19 7/10 7/24 8/1 8/12   Total Weekly Dose 20mg 20mg 20 mg 20mg 20mg 20mg 20mg 17.5 mg 17.5 mg   INR 3.2 2.7 2.8 2.9 3.1/ 2.9 3.8 4.0 3.0 2.3   Notes               Date 9/3 9/17 10/9 10/30 12/2 1/6/20 1/20 2/17 3/16 5/12 6/9 6/22 7/7 7/14 8/11   Total Weekly Dose 17.5 mg  ??? 17.5mg 17.5 mg 17.5 mg 17.5 mg 17.5 mg 17.5 mg 17.5mg 17.5mg 17.5mg 17.5mg 17.5 mg 17.5 mg 21.25 mg 20 mg   INR 2.3 2.9 2.4 2.1 2.4 1.9 2.0 2.3 2.1 2.0 3.1 2 1.6 2.6 2.6   Notes       Inc. GLV Boost x1      Missed dose boostx1      Date 9/8 10/5 11/3 12/1 12/8 12/15 1/5/21 2/2 3/2 3/30 4/29 5/14 5/24 6/7 6/28   Total Weekly Dose 20mg 20mg 20mg 13.75mg 22.5mg 20mg 20mg 20mg  20mg 20mg 8.75mg 20mg 17.5mg? 20 mg 20 mg   INR 2.3 2.3 2.4 1.6 2.7 2.3 2.5 2.3 2.3 2.4 1.3 1.8 1.9 2.0 1.9   Notes    2x miss Boost 1x doxy     4x miss  1x miss       Clinic Interview:  Verbal Release Authorization signed on 6/25/18 -- may speak with Glenis Lucero (wife), Marge Cox (daughter)  Tablet Strength: 2.5mg tablets   Patient contact: speak with wife Glenis home: [phone], mobile: [phone]    Patient Findings  Negatives:  Signs/symptoms of thrombosis, Signs/symptoms of bleeding, Laboratory test error suspected, Change in health, Change in alcohol use, Change in activity, Upcoming invasive procedure, Emergency department visit, Upcoming dental procedure, Missed doses, Extra doses, Change in medications, Change in diet/appetite, Hospital admission, Bruising, Other complaints   Comments:  Patient and patient's wife deny all findings.     Plan:   1. INR is slightly sub therapeutic today at 1.9.   Instructed Mr. Lucero and Mrs. Lucero to take 3.75mg tonight and tomorrow, then increase to warfarin 2.5 mg oral daily except 3.75 mg on MonWedFri until re-check.   2. Repeat INR in 2 weeks on 7/12.  3. Written and verbal information provided in clinic.  And Mrs. Lucero express understanding with teach back and has no further questions at this time.     Mindy Tarango, PharmD.  Pharmacy Resident  6/28/2021 12:30 EDT

## 2021-07-02 RX ORDER — DILTIAZEM HYDROCHLORIDE 240 MG/1
CAPSULE, COATED, EXTENDED RELEASE ORAL
Qty: 30 CAPSULE | Refills: 0 | Status: SHIPPED | OUTPATIENT
Start: 2021-07-02 | End: 2021-08-02

## 2021-07-12 ENCOUNTER — ANTICOAGULATION VISIT (OUTPATIENT)
Dept: PHARMACY | Facility: HOSPITAL | Age: 86
End: 2021-07-12

## 2021-07-12 DIAGNOSIS — I48.20 CHRONIC ATRIAL FIBRILLATION (HCC): Primary | ICD-10-CM

## 2021-07-12 LAB
INR PPP: 1.9 (ref 0.91–1.09)
PROTHROMBIN TIME: 22.4 SECONDS (ref 10–13.8)

## 2021-07-12 PROCEDURE — G0463 HOSPITAL OUTPT CLINIC VISIT: HCPCS | Performed by: PHARMACIST

## 2021-07-12 PROCEDURE — 36416 COLLJ CAPILLARY BLOOD SPEC: CPT

## 2021-07-12 PROCEDURE — 85610 PROTHROMBIN TIME: CPT

## 2021-07-12 RX ORDER — POTASSIUM CHLORIDE 20 MEQ/1
20 TABLET, EXTENDED RELEASE ORAL DAILY
Qty: 30 TABLET | Refills: 1 | Status: SHIPPED | OUTPATIENT
Start: 2021-07-12 | End: 2021-09-13

## 2021-07-12 NOTE — PROGRESS NOTES
Anticoagulation Clinic Progress Note  Indication: afib  Referring Provider: Dr. Wesley 3/2021 (previously Dr Strickland)  Goal INR: 2-3   Current Drug Interactions: aspirin, simvastatin, trazodone  Other: no bleeding per patient  [id number]: 4 (age, HTN, CAD)    Diet: iceburg salads every now and then/ peas    Anticoagulation Clinic INR History:  Date 3/13 3/27 4/24 5/22 6/19 7/10 7/24 8/1 8/12   Total Weekly Dose 20mg 20mg 20 mg 20mg 20mg 20mg 20mg 17.5 mg 17.5 mg   INR 3.2 2.7 2.8 2.9 3.1/ 2.9 3.8 4.0 3.0 2.3   Notes               Date 9/3 9/17 10/9 10/30 12/2 1/6/20 1/20 2/17 3/16 5/12 6/9 6/22 7/7 7/14 8/11   Total Weekly Dose 17.5 mg  ??? 17.5mg 17.5 mg 17.5 mg 17.5 mg 17.5 mg 17.5 mg 17.5mg 17.5mg 17.5mg 17.5mg 17.5 mg 17.5 mg 21.25 mg 20 mg   INR 2.3 2.9 2.4 2.1 2.4 1.9 2.0 2.3 2.1 2.0 3.1 2 1.6 2.6 2.6   Notes       Inc. GLV Boost x1      Missed dose boostx1      Date 9/8 10/5 11/3 12/1 12/8 12/15 1/5/21 2/2 3/2 3/30 4/29 5/14 5/24 6/7 6/28 7/12   Total Weekly Dose 20mg 20mg 20mg 13.75mg 22.5mg 20mg 20mg 20mg  20mg 20mg 8.75mg 20mg 17.5mg? 20 mg 20 mg 21.25 mg   INR 2.3 2.3 2.4 1.6 2.7 2.3 2.5 2.3 2.3 2.4 1.3 1.8 1.9 2.0 1.9 1.9   Notes    2x miss Boost 1x doxy     4x miss  1x miss        Clinic Interview:  Verbal Release Authorization signed on 6/25/18 -- may speak with Glenis Lucero (wife), Marge Cox (daughter)  Tablet Strength: 2.5mg tablets   Patient contact: speak with wife Glenis home: [phone], mobile: [phone]    Patient Findings  Negatives:  Signs/symptoms of thrombosis, Signs/symptoms of bleeding, Laboratory test error suspected, Change in health, Change in alcohol use, Change in activity, Upcoming invasive procedure, Emergency department visit, Upcoming dental procedure, Missed doses, Extra doses, Change in medications, Change in diet/appetite, Hospital admission, Bruising, Other complaints   Comments:  Patient and patient's wife deny all findings.     Plan:   1. INR is SUBtherapeutic  today at 1.9.  Instructed Mr. Lucero and Mrs. Lucero to take 3.75mg tonight and tomorrow, then increase to warfarin 2.5 mg oral daily except 3.75 mg on MonWedFri until re-check.   2. Repeat INR in 2 weeks on 7/26  3. Written and verbal information provided in clinic.  And Mrs. Lucero express understanding with teach back and has no further questions at this time.       Sarbjit Morales, PharmD   7/12/2021  12:15 EDT

## 2021-07-23 RX ORDER — TRAZODONE HYDROCHLORIDE 50 MG/1
TABLET ORAL
Qty: 90 TABLET | Refills: 0 | OUTPATIENT
Start: 2021-07-23

## 2021-07-26 ENCOUNTER — ANTICOAGULATION VISIT (OUTPATIENT)
Dept: PHARMACY | Facility: HOSPITAL | Age: 86
End: 2021-07-26

## 2021-07-26 DIAGNOSIS — I48.20 CHRONIC ATRIAL FIBRILLATION (HCC): Primary | ICD-10-CM

## 2021-07-26 LAB
INR PPP: 2.1 (ref 0.91–1.09)
PROTHROMBIN TIME: 25.7 SECONDS (ref 10–13.8)

## 2021-07-26 PROCEDURE — 36416 COLLJ CAPILLARY BLOOD SPEC: CPT

## 2021-07-26 PROCEDURE — 85610 PROTHROMBIN TIME: CPT

## 2021-07-26 PROCEDURE — G0463 HOSPITAL OUTPT CLINIC VISIT: HCPCS

## 2021-07-26 NOTE — PROGRESS NOTES
Anticoagulation Clinic Progress Note  Indication: afib  Referring Provider: Dr. Wesley 3/2021 (previously Dr Strickland)  Goal INR: 2-3   Current Drug Interactions: aspirin, simvastatin, trazodone  Other: no bleeding per patient  [id number]: 4 (age, HTN, CAD)    Diet: iceburg salads every now and then/ peas, mostly avoids 7/26/21    Anticoagulation Clinic INR History:  Date 3/13 3/27 4/24 5/22 6/19 7/10 7/24 8/1 8/12   Total Weekly Dose 20mg 20mg 20 mg 20mg 20mg 20mg 20mg 17.5 mg 17.5 mg   INR 3.2 2.7 2.8 2.9 3.1/ 2.9 3.8 4.0 3.0 2.3   Notes               Date 9/3 9/17 10/9 10/30 12/2 1/6/20 1/20 2/17 3/16 5/12 6/9 6/22 7/7 7/14 8/11   Total Weekly Dose 17.5 mg  ??? 17.5mg 17.5 mg 17.5 mg 17.5 mg 17.5 mg 17.5 mg 17.5mg 17.5mg 17.5mg 17.5mg 17.5 mg 17.5 mg 21.25 mg 20 mg   INR 2.3 2.9 2.4 2.1 2.4 1.9 2.0 2.3 2.1 2.0 3.1 2 1.6 2.6 2.6   Notes       Inc. GLV Boost x1      Missed dose boostx1      Date 9/8 10/5 11/3 12/1 12/8 12/15 1/5/21 2/2 3/2 3/30 4/29 5/14 5/24 6/7 6/28 7/12   Total Weekly Dose 20mg 20mg 20mg 13.75mg 22.5mg 20mg 20mg 20mg  20mg 20mg 8.75mg 20mg 17.5mg? 20 mg 20 mg 21.25 mg   INR 2.3 2.3 2.4 1.6 2.7 2.3 2.5 2.3 2.3 2.4 1.3 1.8 1.9 2.0 1.9 1.9   Notes    2x miss Boost 1x doxy     4x miss  1x miss        Date 7/26            Total Weekly Dose 21.25mg            INR 2.1            Notes                 Clinic Interview:  Verbal Release Authorization signed on 6/25/18 -- may speak with Glenis Lucero (wife), Marge Cox (daughter)  Tablet Strength: 2.5mg tablets   Patient contact: speak with wife Glenis home: [phone], mobile: [phone]    Patient Findings    Negatives:  Signs/symptoms of thrombosis, Signs/symptoms of bleeding, Laboratory test error suspected, Change in health, Change in alcohol use, Change in activity, Upcoming invasive procedure, Emergency department visit, Upcoming dental procedure, Missed doses, Extra doses, Change in medications, Change in diet/appetite, Hospital admission,  Bruising, Other complaints         Plan:   1. INR is therapeutic today at 2.1.  Instructed Mr. Lucero and Mrs. Lucero to continue increased warfarin 2.5 mg oral daily except 3.75 mg on MonWedFri until re-check.   2. Repeat INR in 4 weeks on 8/23  3. Written and verbal information provided in clinic.  And Mrs. Lucero express understanding with teach back and has no further questions at this time.       Danny Gomez, PharmD   7/26/2021  12:09 EDT

## 2021-07-27 ENCOUNTER — TELEPHONE (OUTPATIENT)
Dept: SLEEP MEDICINE | Facility: HOSPITAL | Age: 86
End: 2021-07-27

## 2021-07-27 NOTE — TELEPHONE ENCOUNTER
Tried to call pt 2x/times to set up an appointment. Patient's wife lvm stating the patient was a past Dr. Vieira patient. Patient needs to be set up with a New Patient appointment with TIFFANY Jean-Baptiste.

## 2021-08-02 RX ORDER — DILTIAZEM HYDROCHLORIDE 240 MG/1
240 CAPSULE, COATED, EXTENDED RELEASE ORAL DAILY
Qty: 30 CAPSULE | Refills: 0 | Status: SHIPPED | OUTPATIENT
Start: 2021-08-02 | End: 2021-09-02

## 2021-08-23 ENCOUNTER — ANTICOAGULATION VISIT (OUTPATIENT)
Dept: PHARMACY | Facility: HOSPITAL | Age: 86
End: 2021-08-23

## 2021-08-23 DIAGNOSIS — I48.20 CHRONIC ATRIAL FIBRILLATION (HCC): Primary | ICD-10-CM

## 2021-08-23 LAB
INR PPP: 2.2 (ref 0.91–1.09)
PROTHROMBIN TIME: 26.7 SECONDS (ref 10–13.8)

## 2021-08-23 PROCEDURE — 85610 PROTHROMBIN TIME: CPT

## 2021-08-23 PROCEDURE — G0463 HOSPITAL OUTPT CLINIC VISIT: HCPCS

## 2021-08-23 PROCEDURE — 36416 COLLJ CAPILLARY BLOOD SPEC: CPT

## 2021-08-23 NOTE — PROGRESS NOTES
Anticoagulation Clinic Progress Note  Indication: afib  Referring Provider: Dr. Wesley 3/2021 (previously Dr Strickland)  Goal INR: 2-3   Current Drug Interactions: aspirin, simvastatin, trazodone  Other: no bleeding per patient  [id number]: 4 (age, HTN, CAD)    Diet: iceburg salads every now and then/ peas, mostly avoids 7/26/21    Anticoagulation Clinic INR History:  Date 3/13 3/27 4/24 5/22 6/19 7/10 7/24 8/1 8/12   Total Weekly Dose 20mg 20mg 20 mg 20mg 20mg 20mg 20mg 17.5 mg 17.5 mg   INR 3.2 2.7 2.8 2.9 3.1/ 2.9 3.8 4.0 3.0 2.3   Notes               Date 9/3 9/17 10/9 10/30 12/2 1/6/20 1/20 2/17 3/16 5/12 6/9 6/22 7/7 7/14 8/11   Total Weekly Dose 17.5 mg  ??? 17.5mg 17.5 mg 17.5 mg 17.5 mg 17.5 mg 17.5 mg 17.5mg 17.5mg 17.5mg 17.5mg 17.5 mg 17.5 mg 21.25 mg 20 mg   INR 2.3 2.9 2.4 2.1 2.4 1.9 2.0 2.3 2.1 2.0 3.1 2 1.6 2.6 2.6   Notes       Inc. GLV Boost x1      Missed dose boostx1      Date 9/8 10/5 11/3 12/1 12/8 12/15 1/5/21 2/2 3/2 3/30 4/29 5/14 5/24 6/7 6/28 7/12   Total Weekly Dose 20mg 20mg 20mg 13.75mg 22.5mg 20mg 20mg 20mg  20mg 20mg 8.75mg 20mg 17.5mg? 20 mg 20 mg 21.25 mg   INR 2.3 2.3 2.4 1.6 2.7 2.3 2.5 2.3 2.3 2.4 1.3 1.8 1.9 2.0 1.9 1.9   Notes    2x miss Boost 1x doxy     4x miss  1x miss        Date 7/26 8/23           Total Weekly Dose 21.25mg 21.25mg           INR 2.1 2.2           Notes                 Clinic Interview:  Verbal Release Authorization signed on 6/25/18 -- may speak with Glenis Lucero (wife), Marge Cox (daughter)  Tablet Strength: 2.5mg tablets   Patient contact: speak with wife Glenis home: [phone], mobile: [phone]    Patient Findings  Negatives:  Signs/symptoms of thrombosis, Signs/symptoms of bleeding, Laboratory test error suspected, Change in health, Change in alcohol use, Change in activity, Upcoming invasive procedure, Emergency department visit, Upcoming dental procedure, Missed doses, Extra doses, Change in medications, Change in diet/appetite, Hospital  admission, Bruising, Other complaints     Plan:   1. INR is therapeutic today at 2.2. Instructed Mr. Lucero and Mrs. Lucero to continue increased warfarin 2.5 mg oral daily except 3.75 mg on MonWedFri.  2. Repeat INR in 4 weeks on 9/20/2021.  3. Written and verbal information provided in clinic.  And Mrs. Lucero express understanding with teach back and has no further questions at this time.     Colleen Nolasco, PharmD   8/23/2021  12:15 EDT

## 2021-09-02 RX ORDER — DILTIAZEM HYDROCHLORIDE 240 MG/1
240 CAPSULE, COATED, EXTENDED RELEASE ORAL DAILY
Qty: 30 CAPSULE | Refills: 0 | Status: SHIPPED | OUTPATIENT
Start: 2021-09-02 | End: 2021-09-27

## 2021-09-13 RX ORDER — POTASSIUM CHLORIDE 20 MEQ/1
20 TABLET, EXTENDED RELEASE ORAL DAILY
Qty: 30 TABLET | Refills: 0 | Status: SHIPPED | OUTPATIENT
Start: 2021-09-13 | End: 2021-12-15 | Stop reason: HOSPADM

## 2021-09-20 ENCOUNTER — ANTICOAGULATION VISIT (OUTPATIENT)
Dept: PHARMACY | Facility: HOSPITAL | Age: 86
End: 2021-09-20

## 2021-09-20 DIAGNOSIS — I48.20 CHRONIC ATRIAL FIBRILLATION (HCC): Primary | ICD-10-CM

## 2021-09-20 LAB
INR PPP: 2.3 (ref 0.91–1.09)
PROTHROMBIN TIME: 28.1 SECONDS (ref 10–13.8)

## 2021-09-20 PROCEDURE — 85610 PROTHROMBIN TIME: CPT

## 2021-09-20 PROCEDURE — 36416 COLLJ CAPILLARY BLOOD SPEC: CPT

## 2021-09-20 PROCEDURE — G0463 HOSPITAL OUTPT CLINIC VISIT: HCPCS

## 2021-09-20 NOTE — PROGRESS NOTES
Anticoagulation Clinic Progress Note  Indication: afib  Referring Provider: Dr. Wesley 3/2021 (previously Dr Strickland)  Goal INR: 2-3   Current Drug Interactions: aspirin, simvastatin, trazodone  Other: no bleeding per patient  [id number]: 4 (age, HTN, CAD)    Diet: iceburg salads every now and then/ peas, mostly avoids 7/26/21    Anticoagulation Clinic INR History:    Date 9/3 9/17 10/9 10/30 12/2 1/6/20 1/20 2/17 3/16 5/12 6/9 6/22 7/7 7/14 8/11   Total Weekly Dose 17.5 mg  ??? 17.5mg 17.5 mg 17.5 mg 17.5 mg 17.5 mg 17.5 mg 17.5mg 17.5mg 17.5mg 17.5mg 17.5 mg 17.5 mg 21.25 mg 20 mg   INR 2.3 2.9 2.4 2.1 2.4 1.9 2.0 2.3 2.1 2.0 3.1 2 1.6 2.6 2.6   Notes       Inc. GLV Boost x1      Missed dose boostx1      Date 9/8 10/5 11/3 12/1 12/8 12/15 1/5/21 2/2 3/2 3/30 4/29 5/14 5/24 6/7 6/28 7/12   Total Weekly Dose 20mg 20mg 20mg 13.75mg 22.5mg 20mg 20mg 20mg  20mg 20mg 8.75mg 20mg 17.5mg? 20 mg 20 mg 21.25 mg   INR 2.3 2.3 2.4 1.6 2.7 2.3 2.5 2.3 2.3 2.4 1.3 1.8 1.9 2.0 1.9 1.9   Notes    2x miss Boost 1x doxy     4x miss  1x miss        Date 7/26 8/23 9/20          Total Weekly Dose 21.25mg 21.25mg 21.25mg          INR 2.1 2.2 2.3          Notes                 Clinic Interview:  Verbal Release Authorization signed on 6/25/18 -- may speak with Glenis Lucero (wife), Marge Cox (daughter)  Tablet Strength: 2.5mg tablets   Patient contact: speak with wife Glenis home: [phone], mobile: [phone]    Patient Findings      Negatives:  Signs/symptoms of thrombosis, Signs/symptoms of bleeding, Laboratory test error suspected, Change in health, Change in alcohol use, Change in activity, Upcoming invasive procedure, Emergency department visit, Upcoming dental procedure, Missed doses, Extra doses, Change in medications, Change in diet/appetite, Hospital admission, Bruising, Other complaints         Plan:   1. INR is therapeutic today at 2.3. Instructed Mr. Lucero and Mrs. Lucero to continue increased warfarin 2.5 mg oral  daily except 3.75 mg on MonWedFri.  2. Repeat INR in 4 weeks  3. Written and verbal information provided in clinic. Mr. And Mrs. Lucero express understanding with teach back and has no further questions at this time.     Tena Gomez, PharmD.  09/20/21   12:10 EDT

## 2021-09-27 RX ORDER — DILTIAZEM HYDROCHLORIDE 240 MG/1
CAPSULE, COATED, EXTENDED RELEASE ORAL
Qty: 30 CAPSULE | Refills: 0 | Status: SHIPPED | OUTPATIENT
Start: 2021-09-27

## 2021-09-29 DIAGNOSIS — G31.84 MILD COGNITIVE IMPAIRMENT: ICD-10-CM

## 2021-09-29 RX ORDER — DONEPEZIL HYDROCHLORIDE 10 MG/1
TABLET, FILM COATED ORAL
Qty: 30 TABLET | Refills: 7 | OUTPATIENT
Start: 2021-09-29

## 2021-10-08 ENCOUNTER — TELEPHONE (OUTPATIENT)
Dept: PHARMACY | Facility: HOSPITAL | Age: 86
End: 2021-10-08

## 2021-10-08 RX ORDER — LEVOFLOXACIN 500 MG/1
500 TABLET, FILM COATED ORAL DAILY
COMMUNITY
Start: 2021-10-05 | End: 2021-10-11

## 2021-10-08 NOTE — TELEPHONE ENCOUNTER
Patient's spouse, Glenis, called to report that on Tues, 10/5, patient started taking Levaquin 500mg 1 tab QD x7 days. Should be due to take last dose on or about Mon, 10/11. Have discussed warfarin-levaquin interaction and s/sx to be aware of.    After consulting with Tena Gomez, PharmD, instructed Glenis to have patient take warfarin 1.25mg tonight and then continue with maintenance dose until in-clinic appt on Mon, 10/11. Would prefer patient have INR drawn sooner but they are unable.

## 2021-10-11 ENCOUNTER — ANTICOAGULATION VISIT (OUTPATIENT)
Dept: PHARMACY | Facility: HOSPITAL | Age: 86
End: 2021-10-11

## 2021-10-11 DIAGNOSIS — I48.20 CHRONIC ATRIAL FIBRILLATION (HCC): Primary | ICD-10-CM

## 2021-10-11 LAB
INR PPP: 1.6 (ref 0.91–1.09)
PROTHROMBIN TIME: 19.2 SECONDS (ref 10–13.8)

## 2021-10-11 PROCEDURE — 85610 PROTHROMBIN TIME: CPT

## 2021-10-11 PROCEDURE — G0463 HOSPITAL OUTPT CLINIC VISIT: HCPCS

## 2021-10-11 PROCEDURE — 36416 COLLJ CAPILLARY BLOOD SPEC: CPT

## 2021-10-11 RX ORDER — POTASSIUM CHLORIDE 1500 MG/1
TABLET, EXTENDED RELEASE ORAL
Qty: 30 TABLET | Refills: 0 | OUTPATIENT
Start: 2021-10-11

## 2021-10-11 NOTE — PROGRESS NOTES
Anticoagulation Clinic Progress Note  Indication: afib  Referring Provider: Dr. Wesley 3/2021 (previously Dr Strickland)  Goal INR: 2-3   Current Drug Interactions: aspirin, simvastatin, trazodone  Other: no bleeding per patient  [id number]: 4 (age, HTN, CAD)    Diet: iceburg salads every now and then/ peas, mostly avoids 7/26/21    Anticoagulation Clinic INR History:    Date 9/3 9/17 10/9 10/30 12/2 1/6/20 1/20 2/17 3/16 5/12 6/9 6/22 7/7 7/14 8/11   Total Weekly Dose 17.5 mg  ??? 17.5mg 17.5 mg 17.5 mg 17.5 mg 17.5 mg 17.5 mg 17.5mg 17.5mg 17.5mg 17.5mg 17.5 mg 17.5 mg 21.25 mg 20 mg   INR 2.3 2.9 2.4 2.1 2.4 1.9 2.0 2.3 2.1 2.0 3.1 2 1.6 2.6 2.6   Notes       Inc. GLV Boost x1      Missed dose boostx1      Date 9/8 10/5 11/3 12/1 12/8 12/15 1/5/21 2/2 3/2 3/30 4/29 5/14 5/24 6/7 6/28 7/12   Total Weekly Dose 20mg 20mg 20mg 13.75mg 22.5mg 20mg 20mg 20mg  20mg 20mg 8.75mg 20mg 17.5mg? 20 mg 20 mg 21.25 mg   INR 2.3 2.3 2.4 1.6 2.7 2.3 2.5 2.3 2.3 2.4 1.3 1.8 1.9 2.0 1.9 1.9   Notes    2x miss Boost 1x doxy     4x miss  1x miss        Date 7/26 8/23 9/20 10/11         Total Weekly Dose 21.25mg 21.25mg 21.25mg 18.75mg         INR 2.1 2.2 2.3 1.6         Notes               Clinic Interview:  Verbal Release Authorization signed on 6/25/18 -- may speak with Glenis Lucero (wife), Marge Cox (daughter)  Tablet Strength: 2.5mg tablets   Patient contact: speak with wife Glenis home: [phone], mobile: [phone]    Patient Findings  Positives:  Change in medications   Negatives:  Signs/symptoms of thrombosis, Signs/symptoms of bleeding, Laboratory test error suspected, Change in health, Change in alcohol use, Change in activity, Upcoming invasive procedure, Emergency department visit, Upcoming dental procedure, Missed doses, Extra doses, Change in diet/appetite, Hospital admission, Bruising, Other complaints   Comments:  Mrs Lucero reports that Mr. Lucero has one more dose of Levaquin today and tomorrow. He is on a  water pill but couldn't remember the name of it. They will follow up to ensure he has donepezil refilled by his current MD.      Plan:   1. INR is SUBtherapeutic today at 1.6. Instructed Mr. Lucero and Mrs. Lucero to boost dose tonight to 5mg and then continue warfarin 2.5 mg oral daily except 3.75 mg on MonWedFri.  2. Repeat INR in 1.5 weeks.  3. Written and verbal information provided in clinic.  And Mrs. Lucero express understanding with teach back and has no further questions at this time.     Colleen Nolasco, PharmD  10/11/21   13:28 EDT

## 2021-10-12 RX ORDER — POTASSIUM CHLORIDE 1500 MG/1
TABLET, EXTENDED RELEASE ORAL
Qty: 30 TABLET | Refills: 0 | OUTPATIENT
Start: 2021-10-12

## 2021-10-18 ENCOUNTER — APPOINTMENT (OUTPATIENT)
Dept: PHARMACY | Facility: HOSPITAL | Age: 86
End: 2021-10-18

## 2021-10-21 ENCOUNTER — APPOINTMENT (OUTPATIENT)
Dept: PHARMACY | Facility: HOSPITAL | Age: 86
End: 2021-10-21

## 2021-10-22 ENCOUNTER — ANTICOAGULATION VISIT (OUTPATIENT)
Dept: PHARMACY | Facility: HOSPITAL | Age: 86
End: 2021-10-22

## 2021-10-22 DIAGNOSIS — I48.20 CHRONIC ATRIAL FIBRILLATION (HCC): Primary | ICD-10-CM

## 2021-10-22 LAB
INR PPP: 2 (ref 0.91–1.09)
PROTHROMBIN TIME: 23.7 SECONDS (ref 10–13.8)

## 2021-10-22 PROCEDURE — G0463 HOSPITAL OUTPT CLINIC VISIT: HCPCS

## 2021-10-22 PROCEDURE — 36416 COLLJ CAPILLARY BLOOD SPEC: CPT

## 2021-10-22 PROCEDURE — 85610 PROTHROMBIN TIME: CPT

## 2021-10-22 NOTE — PROGRESS NOTES
Anticoagulation Clinic Progress Note  Indication: afib  Referring Provider: Dr. Wesley 3/2021 (previously Dr Strickland)  Goal INR: 2-3   Current Drug Interactions: aspirin, simvastatin, trazodone  Other: no bleeding per patient  [id number]: 4 (age, HTN, CAD)    Diet: iceburg salads every now and then/ peas, mostly avoids 7/26/21    Anticoagulation Clinic INR History:    Date 9/3 9/17 10/9 10/30 12/2 1/6/20 1/20 2/17 3/16 5/12 6/9 6/22 7/7 7/14 8/11   Total Weekly Dose 17.5 mg  ??? 17.5mg 17.5 mg 17.5 mg 17.5 mg 17.5 mg 17.5 mg 17.5mg 17.5mg 17.5mg 17.5mg 17.5 mg 17.5 mg 21.25 mg 20 mg   INR 2.3 2.9 2.4 2.1 2.4 1.9 2.0 2.3 2.1 2.0 3.1 2 1.6 2.6 2.6   Notes       Inc. GLV Boost x1      Missed dose boostx1      Date 9/8 10/5 11/3 12/1 12/8 12/15 1/5/21 2/2 3/2 3/30 4/29 5/14 5/24 6/7 6/28 7/12   Total Weekly Dose 20mg 20mg 20mg 13.75mg 22.5mg 20mg 20mg 20mg  20mg 20mg 8.75mg 20mg 17.5mg? 20 mg 20 mg 21.25 mg   INR 2.3 2.3 2.4 1.6 2.7 2.3 2.5 2.3 2.3 2.4 1.3 1.8 1.9 2.0 1.9 1.9   Notes    2x miss Boost 1x doxy     4x miss  1x miss        Date 7/26 8/23 9/20 10/11 10/22        Total Weekly Dose 21.25mg 21.25mg 21.25mg 18.75mg 22.5        INR 2.1 2.2 2.3 1.6 2.0        Notes     1x boost          Clinic Interview:  Verbal Release Authorization signed on 6/25/18 -- may speak with Glenis Lucero (wife), Marge Cox (daughter)  Tablet Strength: 2.5mg tablets   Patient contact: speak with wife Glenis home: [phone], mobile: [phone]    Patient Findings  Positives:  Change in medications   Negatives:  Signs/symptoms of thrombosis, Signs/symptoms of bleeding, Laboratory test error suspected, Change in health, Change in alcohol use, Change in activity, Upcoming invasive procedure, Emergency department visit, Upcoming dental procedure, Missed doses, Extra doses, Change in diet/appetite, Hospital admission, Bruising, Other complaints   Comments:  Mrs Lucero reports that Mr. Lucero has one more dose of Levaquin today and  tomorrow. He is on a water pill but couldn't remember the name of it. They will follow up to ensure he has donepezil refilled by his current MD.      Plan:   1. INR is therapeutic today at 2.0. Instructed Mr. Lucero and Mrs. Lucero to continue warfarin 2.5 mg oral daily except 3.75 mg on MonWedFri.  2. Repeat INR in ~4 weeks per patient's wife request  3. Written and verbal information provided in clinic.  And Mrs. Lucero express understanding with teach back and has no further questions at this time.     Evelina Andrade, PharmD  10/22/21   12:00 EDT

## 2021-11-01 RX ORDER — ALFUZOSIN HYDROCHLORIDE 10 MG/1
TABLET, EXTENDED RELEASE ORAL
Qty: 90 TABLET | Refills: 2 | OUTPATIENT
Start: 2021-11-01

## 2021-11-15 ENCOUNTER — APPOINTMENT (OUTPATIENT)
Dept: PHARMACY | Facility: HOSPITAL | Age: 86
End: 2021-11-15

## 2021-11-22 ENCOUNTER — ANTICOAGULATION VISIT (OUTPATIENT)
Dept: PHARMACY | Facility: HOSPITAL | Age: 86
End: 2021-11-22

## 2021-11-22 DIAGNOSIS — I48.20 CHRONIC ATRIAL FIBRILLATION (HCC): Primary | ICD-10-CM

## 2021-11-22 LAB
INR PPP: 2.8 (ref 0.91–1.09)
PROTHROMBIN TIME: 33.7 SECONDS (ref 10–13.8)

## 2021-11-22 PROCEDURE — G0463 HOSPITAL OUTPT CLINIC VISIT: HCPCS | Performed by: PHARMACIST

## 2021-11-22 PROCEDURE — 85610 PROTHROMBIN TIME: CPT

## 2021-11-22 PROCEDURE — 36416 COLLJ CAPILLARY BLOOD SPEC: CPT

## 2021-11-22 NOTE — PROGRESS NOTES
Anticoagulation Clinic Progress Note  Indication: afib  Referring Provider: Dr. Wesley 3/2021 (previously Dr Strickland)  Goal INR: 2-3   Current Drug Interactions: aspirin, simvastatin, trazodone  Other: no bleeding per patient  [id number]: 4 (age, HTN, CAD)    Diet: iceburg salads every now and then/ peas, mostly avoids 7/26/21    Anticoagulation Clinic INR History:    Date 9/3 9/17 10/9 10/30 12/2 1/6/20 1/20 2/17 3/16 5/12 6/9 6/22 7/7 7/14 8/11   Total Weekly Dose 17.5 mg  ??? 17.5mg 17.5 mg 17.5 mg 17.5 mg 17.5 mg 17.5 mg 17.5mg 17.5mg 17.5mg 17.5mg 17.5 mg 17.5 mg 21.25 mg 20 mg   INR 2.3 2.9 2.4 2.1 2.4 1.9 2.0 2.3 2.1 2.0 3.1 2 1.6 2.6 2.6   Notes       Inc. GLV Boost x1      Missed dose boostx1      Date 9/8 10/5 11/3 12/1 12/8 12/15 1/5/21 2/2 3/2 3/30 4/29 5/14 5/24 6/7 6/28 7/12   Total Weekly Dose 20mg 20mg 20mg 13.75mg 22.5mg 20mg 20mg 20mg  20mg 20mg 8.75mg 20mg 17.5mg? 20 mg 20 mg 21.25 mg   INR 2.3 2.3 2.4 1.6 2.7 2.3 2.5 2.3 2.3 2.4 1.3 1.8 1.9 2.0 1.9 1.9   Notes    2x miss Boost 1x doxy     4x miss  1x miss        Date 7/26 8/23 9/20 10/11 10/22 11/22       Total Weekly Dose 21.25mg 21.25mg 21.25mg 18.75mg 22.5 21.25 mg       INR 2.1 2.2 2.3 1.6 2.0 2.8       Notes     1x boost          Clinic Interview:  Verbal Release Authorization signed on 6/25/18 -- may speak with Glenis Lucero (wife), Marge Cox (daughter)  Tablet Strength: 2.5mg tablets   Patient contact: speak with wife Glenis home: [phone], mobile: [phone]    Patient Findings  Negatives:  Signs/symptoms of thrombosis, Signs/symptoms of bleeding, Laboratory test error suspected, Change in health, Change in alcohol use, Change in activity, Upcoming invasive procedure, Emergency department visit, Upcoming dental procedure, Missed doses, Extra doses, Change in medications, Change in diet/appetite, Hospital admission, Bruising, Other complaints     Plan:   1. INR is therapeutic today at 2.8. Instructed Mr. Lucero and Mrs. Lucero to  continue warfarin 2.5 mg oral daily except 3.75 mg on MonWedFri.  2. Repeat INR in ~4 weeks.  3. Written and verbal information provided in clinic. Mr. And Mrs. Lucero express understanding with teach back and has no further questions at this time.     Sarbjit Morales, PharmD  11/22/21   12:08 EST

## 2021-11-30 ENCOUNTER — APPOINTMENT (OUTPATIENT)
Dept: CT IMAGING | Facility: HOSPITAL | Age: 86
End: 2021-11-30

## 2021-11-30 ENCOUNTER — APPOINTMENT (OUTPATIENT)
Dept: GENERAL RADIOLOGY | Facility: HOSPITAL | Age: 86
End: 2021-11-30

## 2021-11-30 ENCOUNTER — HOSPITAL ENCOUNTER (INPATIENT)
Facility: HOSPITAL | Age: 86
LOS: 13 days | Discharge: NURSING, REHAB OR LONG-TERM CARE | End: 2021-12-15
Attending: EMERGENCY MEDICINE | Admitting: FAMILY MEDICINE

## 2021-11-30 DIAGNOSIS — J90 CHRONIC BILATERAL PLEURAL EFFUSIONS: ICD-10-CM

## 2021-11-30 DIAGNOSIS — S30.3XXA: Primary | ICD-10-CM

## 2021-11-30 DIAGNOSIS — Z74.09 IMPAIRED FUNCTIONAL MOBILITY, BALANCE, GAIT, AND ENDURANCE: ICD-10-CM

## 2021-11-30 DIAGNOSIS — R09.02 HYPOXIA: ICD-10-CM

## 2021-11-30 PROBLEM — F03.90 DEMENTIA (HCC): Status: ACTIVE | Noted: 2021-11-30

## 2021-11-30 LAB
ALBUMIN SERPL-MCNC: 3.3 G/DL (ref 3.5–5.2)
ALBUMIN/GLOB SERPL: 1.4 G/DL
ALP SERPL-CCNC: 82 U/L (ref 39–117)
ALT SERPL W P-5'-P-CCNC: 8 U/L (ref 1–41)
ANION GAP SERPL CALCULATED.3IONS-SCNC: 7 MMOL/L (ref 5–15)
AST SERPL-CCNC: 14 U/L (ref 1–40)
BACTERIA UR QL AUTO: NORMAL /HPF
BASOPHILS # BLD AUTO: 0.03 10*3/MM3 (ref 0–0.2)
BASOPHILS NFR BLD AUTO: 0.4 % (ref 0–1.5)
BILIRUB SERPL-MCNC: 1 MG/DL (ref 0–1.2)
BILIRUB UR QL STRIP: NEGATIVE
BUN SERPL-MCNC: 12 MG/DL (ref 8–23)
BUN/CREAT SERPL: 16.4 (ref 7–25)
CALCIUM SPEC-SCNC: 8.4 MG/DL (ref 8.6–10.5)
CHLORIDE SERPL-SCNC: 99 MMOL/L (ref 98–107)
CLARITY UR: CLEAR
CO2 SERPL-SCNC: 28 MMOL/L (ref 22–29)
COLOR UR: YELLOW
CREAT SERPL-MCNC: 0.73 MG/DL (ref 0.76–1.27)
DEPRECATED RDW RBC AUTO: 50.8 FL (ref 37–54)
EOSINOPHIL # BLD AUTO: 0.11 10*3/MM3 (ref 0–0.4)
EOSINOPHIL NFR BLD AUTO: 1.3 % (ref 0.3–6.2)
ERYTHROCYTE [DISTWIDTH] IN BLOOD BY AUTOMATED COUNT: 13.9 % (ref 12.3–15.4)
GFR SERPL CREATININE-BSD FRML MDRD: 101 ML/MIN/1.73
GLOBULIN UR ELPH-MCNC: 2.3 GM/DL
GLUCOSE SERPL-MCNC: 126 MG/DL (ref 65–99)
GLUCOSE UR STRIP-MCNC: NEGATIVE MG/DL
HCT VFR BLD AUTO: 35 % (ref 37.5–51)
HGB BLD-MCNC: 11.2 G/DL (ref 13–17.7)
HGB UR QL STRIP.AUTO: NEGATIVE
HYALINE CASTS UR QL AUTO: NORMAL /LPF
IMM GRANULOCYTES # BLD AUTO: 0.04 10*3/MM3 (ref 0–0.05)
IMM GRANULOCYTES NFR BLD AUTO: 0.5 % (ref 0–0.5)
INR PPP: 3.06 (ref 0.85–1.16)
KETONES UR QL STRIP: NEGATIVE
LEUKOCYTE ESTERASE UR QL STRIP.AUTO: ABNORMAL
LYMPHOCYTES # BLD AUTO: 1.07 10*3/MM3 (ref 0.7–3.1)
LYMPHOCYTES NFR BLD AUTO: 13 % (ref 19.6–45.3)
MCH RBC QN AUTO: 32 PG (ref 26.6–33)
MCHC RBC AUTO-ENTMCNC: 32 G/DL (ref 31.5–35.7)
MCV RBC AUTO: 100 FL (ref 79–97)
MONOCYTES # BLD AUTO: 0.6 10*3/MM3 (ref 0.1–0.9)
MONOCYTES NFR BLD AUTO: 7.3 % (ref 5–12)
NEUTROPHILS NFR BLD AUTO: 6.41 10*3/MM3 (ref 1.7–7)
NEUTROPHILS NFR BLD AUTO: 77.5 % (ref 42.7–76)
NITRITE UR QL STRIP: NEGATIVE
NRBC BLD AUTO-RTO: 0 /100 WBC (ref 0–0.2)
NT-PROBNP SERPL-MCNC: 4268 PG/ML (ref 0–1800)
PH UR STRIP.AUTO: 6.5 [PH] (ref 5–8)
PLATELET # BLD AUTO: 165 10*3/MM3 (ref 140–450)
PMV BLD AUTO: 9.6 FL (ref 6–12)
POTASSIUM SERPL-SCNC: 3.9 MMOL/L (ref 3.5–5.2)
PROT SERPL-MCNC: 5.6 G/DL (ref 6–8.5)
PROT UR QL STRIP: NEGATIVE
PROTHROMBIN TIME: 30.3 SECONDS (ref 11.4–14.4)
QT INTERVAL: 436 MS
QTC INTERVAL: 449 MS
RBC # BLD AUTO: 3.5 10*6/MM3 (ref 4.14–5.8)
RBC # UR STRIP: NORMAL /HPF
REF LAB TEST METHOD: NORMAL
SARS-COV-2 RDRP RESP QL NAA+PROBE: NORMAL
SODIUM SERPL-SCNC: 134 MMOL/L (ref 136–145)
SP GR UR STRIP: <=1.005 (ref 1–1.03)
SQUAMOUS #/AREA URNS HPF: NORMAL /HPF
UROBILINOGEN UR QL STRIP: ABNORMAL
WBC # UR STRIP: NORMAL /HPF
WBC NRBC COR # BLD: 8.26 10*3/MM3 (ref 3.4–10.8)

## 2021-11-30 PROCEDURE — 71045 X-RAY EXAM CHEST 1 VIEW: CPT

## 2021-11-30 PROCEDURE — 74176 CT ABD & PELVIS W/O CONTRAST: CPT

## 2021-11-30 PROCEDURE — 99285 EMERGENCY DEPT VISIT HI MDM: CPT

## 2021-11-30 PROCEDURE — 83880 ASSAY OF NATRIURETIC PEPTIDE: CPT | Performed by: NURSE PRACTITIONER

## 2021-11-30 PROCEDURE — 25010000002 MORPHINE PER 10 MG: Performed by: NURSE PRACTITIONER

## 2021-11-30 PROCEDURE — 25010000002 FUROSEMIDE PER 20 MG: Performed by: NURSE PRACTITIONER

## 2021-11-30 PROCEDURE — G0378 HOSPITAL OBSERVATION PER HR: HCPCS

## 2021-11-30 PROCEDURE — 87635 SARS-COV-2 COVID-19 AMP PRB: CPT | Performed by: EMERGENCY MEDICINE

## 2021-11-30 PROCEDURE — 25010000002 HALOPERIDOL LACTATE PER 5 MG: Performed by: INTERNAL MEDICINE

## 2021-11-30 PROCEDURE — 93005 ELECTROCARDIOGRAM TRACING: CPT | Performed by: EMERGENCY MEDICINE

## 2021-11-30 PROCEDURE — 25010000002 HALOPERIDOL LACTATE PER 5 MG: Performed by: NURSE PRACTITIONER

## 2021-11-30 PROCEDURE — 25010000002 MORPHINE PER 10 MG: Performed by: EMERGENCY MEDICINE

## 2021-11-30 PROCEDURE — 80053 COMPREHEN METABOLIC PANEL: CPT | Performed by: EMERGENCY MEDICINE

## 2021-11-30 PROCEDURE — 81001 URINALYSIS AUTO W/SCOPE: CPT | Performed by: INTERNAL MEDICINE

## 2021-11-30 PROCEDURE — 72131 CT LUMBAR SPINE W/O DYE: CPT

## 2021-11-30 PROCEDURE — 85025 COMPLETE CBC W/AUTO DIFF WBC: CPT | Performed by: EMERGENCY MEDICINE

## 2021-11-30 PROCEDURE — 36415 COLL VENOUS BLD VENIPUNCTURE: CPT

## 2021-11-30 PROCEDURE — 85610 PROTHROMBIN TIME: CPT | Performed by: EMERGENCY MEDICINE

## 2021-11-30 PROCEDURE — 99223 1ST HOSP IP/OBS HIGH 75: CPT | Performed by: INTERNAL MEDICINE

## 2021-11-30 RX ORDER — MORPHINE SULFATE 4 MG/ML
4 INJECTION, SOLUTION INTRAMUSCULAR; INTRAVENOUS ONCE
Status: COMPLETED | OUTPATIENT
Start: 2021-11-30 | End: 2021-11-30

## 2021-11-30 RX ORDER — ONDANSETRON 2 MG/ML
4 INJECTION INTRAMUSCULAR; INTRAVENOUS EVERY 6 HOURS PRN
Status: DISCONTINUED | OUTPATIENT
Start: 2021-11-30 | End: 2021-12-15 | Stop reason: HOSPADM

## 2021-11-30 RX ORDER — BISACODYL 5 MG/1
5 TABLET, DELAYED RELEASE ORAL DAILY PRN
Status: DISCONTINUED | OUTPATIENT
Start: 2021-11-30 | End: 2021-12-15 | Stop reason: HOSPADM

## 2021-11-30 RX ORDER — MORPHINE SULFATE 2 MG/ML
2 INJECTION, SOLUTION INTRAMUSCULAR; INTRAVENOUS EVERY 4 HOURS PRN
Status: DISCONTINUED | OUTPATIENT
Start: 2021-11-30 | End: 2021-12-06

## 2021-11-30 RX ORDER — HALOPERIDOL 5 MG/ML
1 INJECTION INTRAMUSCULAR EVERY 6 HOURS PRN
Status: DISCONTINUED | OUTPATIENT
Start: 2021-11-30 | End: 2021-12-15 | Stop reason: HOSPADM

## 2021-11-30 RX ORDER — PRAVASTATIN SODIUM 40 MG
40 TABLET ORAL DAILY
Status: DISCONTINUED | OUTPATIENT
Start: 2021-12-01 | End: 2021-12-15 | Stop reason: HOSPADM

## 2021-11-30 RX ORDER — ACETAMINOPHEN 160 MG/5ML
650 SOLUTION ORAL EVERY 4 HOURS PRN
Status: DISCONTINUED | OUTPATIENT
Start: 2021-11-30 | End: 2021-12-15 | Stop reason: HOSPADM

## 2021-11-30 RX ORDER — IPRATROPIUM BROMIDE AND ALBUTEROL SULFATE 2.5; .5 MG/3ML; MG/3ML
3 SOLUTION RESPIRATORY (INHALATION) EVERY 6 HOURS PRN
Status: DISCONTINUED | OUTPATIENT
Start: 2021-11-30 | End: 2021-12-15 | Stop reason: HOSPADM

## 2021-11-30 RX ORDER — BISACODYL 10 MG
10 SUPPOSITORY, RECTAL RECTAL DAILY PRN
Status: DISCONTINUED | OUTPATIENT
Start: 2021-11-30 | End: 2021-12-15 | Stop reason: HOSPADM

## 2021-11-30 RX ORDER — ONDANSETRON 4 MG/1
4 TABLET, FILM COATED ORAL EVERY 6 HOURS PRN
Status: DISCONTINUED | OUTPATIENT
Start: 2021-11-30 | End: 2021-12-15 | Stop reason: HOSPADM

## 2021-11-30 RX ORDER — AMOXICILLIN 250 MG
1 CAPSULE ORAL 2 TIMES DAILY
Status: DISCONTINUED | OUTPATIENT
Start: 2021-11-30 | End: 2021-12-15 | Stop reason: HOSPADM

## 2021-11-30 RX ORDER — ACETAMINOPHEN 650 MG/1
650 SUPPOSITORY RECTAL EVERY 4 HOURS PRN
Status: DISCONTINUED | OUTPATIENT
Start: 2021-11-30 | End: 2021-12-15 | Stop reason: HOSPADM

## 2021-11-30 RX ORDER — DONEPEZIL HYDROCHLORIDE 10 MG/1
10 TABLET, FILM COATED ORAL DAILY
Status: DISCONTINUED | OUTPATIENT
Start: 2021-12-01 | End: 2021-12-15 | Stop reason: HOSPADM

## 2021-11-30 RX ORDER — LISINOPRIL 20 MG/1
20 TABLET ORAL DAILY
Status: DISCONTINUED | OUTPATIENT
Start: 2021-12-01 | End: 2021-12-15 | Stop reason: HOSPADM

## 2021-11-30 RX ORDER — ACETAMINOPHEN 325 MG/1
650 TABLET ORAL EVERY 4 HOURS PRN
Status: DISCONTINUED | OUTPATIENT
Start: 2021-11-30 | End: 2021-12-15 | Stop reason: HOSPADM

## 2021-11-30 RX ORDER — NALOXONE HCL 0.4 MG/ML
0.4 VIAL (ML) INJECTION
Status: DISCONTINUED | OUTPATIENT
Start: 2021-11-30 | End: 2021-12-15 | Stop reason: HOSPADM

## 2021-11-30 RX ORDER — FUROSEMIDE 10 MG/ML
60 INJECTION INTRAMUSCULAR; INTRAVENOUS EVERY 12 HOURS SCHEDULED
Status: COMPLETED | OUTPATIENT
Start: 2021-11-30 | End: 2021-12-01

## 2021-11-30 RX ORDER — DILTIAZEM HYDROCHLORIDE 240 MG/1
240 CAPSULE, COATED, EXTENDED RELEASE ORAL
Status: DISCONTINUED | OUTPATIENT
Start: 2021-12-01 | End: 2021-12-15 | Stop reason: HOSPADM

## 2021-11-30 RX ORDER — QUETIAPINE FUMARATE 25 MG/1
25 TABLET, FILM COATED ORAL EVERY 12 HOURS SCHEDULED
Status: DISCONTINUED | OUTPATIENT
Start: 2021-11-30 | End: 2021-12-08

## 2021-11-30 RX ORDER — SODIUM CHLORIDE 0.9 % (FLUSH) 0.9 %
10 SYRINGE (ML) INJECTION EVERY 12 HOURS SCHEDULED
Status: DISCONTINUED | OUTPATIENT
Start: 2021-11-30 | End: 2021-12-15 | Stop reason: HOSPADM

## 2021-11-30 RX ORDER — L.ACID,PARA/B.BIFIDUM/S.THERM 8B CELL
1 CAPSULE ORAL DAILY
Status: DISCONTINUED | OUTPATIENT
Start: 2021-12-01 | End: 2021-12-15 | Stop reason: HOSPADM

## 2021-11-30 RX ORDER — SODIUM CHLORIDE 0.9 % (FLUSH) 0.9 %
10 SYRINGE (ML) INJECTION AS NEEDED
Status: DISCONTINUED | OUTPATIENT
Start: 2021-11-30 | End: 2021-12-15 | Stop reason: HOSPADM

## 2021-11-30 RX ORDER — POLYETHYLENE GLYCOL 3350 17 G/17G
17 POWDER, FOR SOLUTION ORAL DAILY PRN
Status: DISCONTINUED | OUTPATIENT
Start: 2021-11-30 | End: 2021-12-15 | Stop reason: HOSPADM

## 2021-11-30 RX ORDER — HALOPERIDOL 5 MG/ML
1 INJECTION INTRAMUSCULAR ONCE
Status: COMPLETED | OUTPATIENT
Start: 2021-11-30 | End: 2021-11-30

## 2021-11-30 RX ORDER — TRAZODONE HYDROCHLORIDE 50 MG/1
50 TABLET ORAL NIGHTLY
Status: DISCONTINUED | OUTPATIENT
Start: 2021-11-30 | End: 2021-12-15 | Stop reason: HOSPADM

## 2021-11-30 RX ORDER — HYDROCODONE BITARTRATE AND ACETAMINOPHEN 5; 325 MG/1; MG/1
1 TABLET ORAL EVERY 4 HOURS PRN
Status: DISPENSED | OUTPATIENT
Start: 2021-11-30 | End: 2021-12-14

## 2021-11-30 RX ADMIN — MORPHINE SULFATE 4 MG: 4 INJECTION, SOLUTION INTRAMUSCULAR; INTRAVENOUS at 11:38

## 2021-11-30 RX ADMIN — HALOPERIDOL LACTATE 1 MG: 5 INJECTION, SOLUTION INTRAMUSCULAR at 19:20

## 2021-11-30 RX ADMIN — SODIUM CHLORIDE, PRESERVATIVE FREE 10 ML: 5 INJECTION INTRAVENOUS at 19:24

## 2021-11-30 RX ADMIN — MORPHINE SULFATE 4 MG: 4 INJECTION, SOLUTION INTRAMUSCULAR; INTRAVENOUS at 18:30

## 2021-11-30 RX ADMIN — TRAZODONE HYDROCHLORIDE 50 MG: 50 TABLET ORAL at 19:23

## 2021-11-30 RX ADMIN — QUETIAPINE FUMARATE 25 MG: 25 TABLET ORAL at 20:28

## 2021-11-30 RX ADMIN — MORPHINE SULFATE 2 MG: 2 INJECTION, SOLUTION INTRAMUSCULAR; INTRAVENOUS at 21:27

## 2021-11-30 RX ADMIN — HALOPERIDOL LACTATE 1 MG: 5 INJECTION, SOLUTION INTRAMUSCULAR at 22:22

## 2021-11-30 RX ADMIN — SENNOSIDES AND DOCUSATE SODIUM 1 TABLET: 50; 8.6 TABLET ORAL at 19:23

## 2021-11-30 RX ADMIN — FUROSEMIDE 60 MG: 10 INJECTION INTRAMUSCULAR; INTRAVENOUS at 18:31

## 2021-11-30 RX ADMIN — MORPHINE SULFATE 4 MG: 4 INJECTION, SOLUTION INTRAMUSCULAR; INTRAVENOUS at 15:10

## 2021-11-30 RX ADMIN — HYDROCODONE BITARTRATE AND ACETAMINOPHEN 1 TABLET: 5; 325 TABLET ORAL at 19:23

## 2021-12-01 ENCOUNTER — APPOINTMENT (OUTPATIENT)
Dept: ULTRASOUND IMAGING | Facility: HOSPITAL | Age: 86
End: 2021-12-01

## 2021-12-01 ENCOUNTER — APPOINTMENT (OUTPATIENT)
Dept: GENERAL RADIOLOGY | Facility: HOSPITAL | Age: 86
End: 2021-12-01

## 2021-12-01 LAB
ALBUMIN SERPL-MCNC: 3.5 G/DL (ref 3.5–5.2)
ANION GAP SERPL CALCULATED.3IONS-SCNC: 7 MMOL/L (ref 5–15)
BASOPHILS # BLD AUTO: 0.03 10*3/MM3 (ref 0–0.2)
BASOPHILS NFR BLD AUTO: 0.3 % (ref 0–1.5)
BUN SERPL-MCNC: 9 MG/DL (ref 8–23)
BUN/CREAT SERPL: 11.4 (ref 7–25)
CALCIUM SPEC-SCNC: 8.1 MG/DL (ref 8.6–10.5)
CHLORIDE SERPL-SCNC: 93 MMOL/L (ref 98–107)
CO2 SERPL-SCNC: 29 MMOL/L (ref 22–29)
CREAT SERPL-MCNC: 0.79 MG/DL (ref 0.76–1.27)
DEPRECATED RDW RBC AUTO: 48 FL (ref 37–54)
EOSINOPHIL # BLD AUTO: 0.15 10*3/MM3 (ref 0–0.4)
EOSINOPHIL NFR BLD AUTO: 1.6 % (ref 0.3–6.2)
ERYTHROCYTE [DISTWIDTH] IN BLOOD BY AUTOMATED COUNT: 13.9 % (ref 12.3–15.4)
GFR SERPL CREATININE-BSD FRML MDRD: 92 ML/MIN/1.73
GLUCOSE SERPL-MCNC: 114 MG/DL (ref 65–99)
HAV IGM SERPL QL IA: NORMAL
HBV CORE IGM SERPL QL IA: NORMAL
HBV SURFACE AG SERPL QL IA: NORMAL
HCT VFR BLD AUTO: 33.4 % (ref 37.5–51)
HCV AB SER DONR QL: NORMAL
HGB BLD-MCNC: 11.3 G/DL (ref 13–17.7)
IMM GRANULOCYTES # BLD AUTO: 0.04 10*3/MM3 (ref 0–0.05)
IMM GRANULOCYTES NFR BLD AUTO: 0.4 % (ref 0–0.5)
LYMPHOCYTES # BLD AUTO: 1.12 10*3/MM3 (ref 0.7–3.1)
LYMPHOCYTES NFR BLD AUTO: 12.3 % (ref 19.6–45.3)
MCH RBC QN AUTO: 32.2 PG (ref 26.6–33)
MCHC RBC AUTO-ENTMCNC: 33.8 G/DL (ref 31.5–35.7)
MCV RBC AUTO: 95.2 FL (ref 79–97)
MONOCYTES # BLD AUTO: 0.92 10*3/MM3 (ref 0.1–0.9)
MONOCYTES NFR BLD AUTO: 10.1 % (ref 5–12)
NEUTROPHILS NFR BLD AUTO: 6.88 10*3/MM3 (ref 1.7–7)
NEUTROPHILS NFR BLD AUTO: 75.3 % (ref 42.7–76)
NRBC BLD AUTO-RTO: 0 /100 WBC (ref 0–0.2)
PHOSPHATE SERPL-MCNC: 4 MG/DL (ref 2.5–4.5)
PLATELET # BLD AUTO: 173 10*3/MM3 (ref 140–450)
PMV BLD AUTO: 9 FL (ref 6–12)
POTASSIUM SERPL-SCNC: 3.8 MMOL/L (ref 3.5–5.2)
RBC # BLD AUTO: 3.51 10*6/MM3 (ref 4.14–5.8)
SODIUM SERPL-SCNC: 129 MMOL/L (ref 136–145)
WBC NRBC COR # BLD: 9.14 10*3/MM3 (ref 3.4–10.8)

## 2021-12-01 PROCEDURE — 97165 OT EVAL LOW COMPLEX 30 MIN: CPT

## 2021-12-01 PROCEDURE — 80074 ACUTE HEPATITIS PANEL: CPT | Performed by: NURSE PRACTITIONER

## 2021-12-01 PROCEDURE — 76705 ECHO EXAM OF ABDOMEN: CPT

## 2021-12-01 PROCEDURE — 25010000002 MORPHINE PER 10 MG: Performed by: NURSE PRACTITIONER

## 2021-12-01 PROCEDURE — 80069 RENAL FUNCTION PANEL: CPT | Performed by: INTERNAL MEDICINE

## 2021-12-01 PROCEDURE — 99233 SBSQ HOSP IP/OBS HIGH 50: CPT | Performed by: INTERNAL MEDICINE

## 2021-12-01 PROCEDURE — 25010000002 HALOPERIDOL LACTATE PER 5 MG: Performed by: INTERNAL MEDICINE

## 2021-12-01 PROCEDURE — G0378 HOSPITAL OBSERVATION PER HR: HCPCS

## 2021-12-01 PROCEDURE — 71045 X-RAY EXAM CHEST 1 VIEW: CPT

## 2021-12-01 PROCEDURE — 85025 COMPLETE CBC W/AUTO DIFF WBC: CPT | Performed by: INTERNAL MEDICINE

## 2021-12-01 PROCEDURE — 25010000002 FUROSEMIDE PER 20 MG: Performed by: NURSE PRACTITIONER

## 2021-12-01 RX ORDER — NYSTATIN 100000 [USP'U]/G
POWDER TOPICAL EVERY 12 HOURS SCHEDULED
Status: DISCONTINUED | OUTPATIENT
Start: 2021-12-01 | End: 2021-12-15 | Stop reason: HOSPADM

## 2021-12-01 RX ORDER — TAMSULOSIN HYDROCHLORIDE 0.4 MG/1
0.4 CAPSULE ORAL DAILY
Status: DISCONTINUED | OUTPATIENT
Start: 2021-12-01 | End: 2021-12-15 | Stop reason: HOSPADM

## 2021-12-01 RX ADMIN — HALOPERIDOL LACTATE 1 MG: 5 INJECTION, SOLUTION INTRAMUSCULAR at 01:06

## 2021-12-01 RX ADMIN — TAMSULOSIN HYDROCHLORIDE 0.4 MG: 0.4 CAPSULE ORAL at 10:39

## 2021-12-01 RX ADMIN — DILTIAZEM HYDROCHLORIDE 240 MG: 240 CAPSULE, COATED, EXTENDED RELEASE ORAL at 09:58

## 2021-12-01 RX ADMIN — PRAVASTATIN SODIUM 40 MG: 40 TABLET ORAL at 09:07

## 2021-12-01 RX ADMIN — MORPHINE SULFATE 2 MG: 2 INJECTION, SOLUTION INTRAMUSCULAR; INTRAVENOUS at 17:06

## 2021-12-01 RX ADMIN — FUROSEMIDE 60 MG: 10 INJECTION INTRAMUSCULAR; INTRAVENOUS at 05:03

## 2021-12-01 RX ADMIN — Medication 1 CAPSULE: at 09:07

## 2021-12-01 RX ADMIN — SENNOSIDES AND DOCUSATE SODIUM 1 TABLET: 50; 8.6 TABLET ORAL at 21:58

## 2021-12-01 RX ADMIN — HALOPERIDOL LACTATE 1 MG: 5 INJECTION, SOLUTION INTRAMUSCULAR at 17:06

## 2021-12-01 RX ADMIN — NYSTATIN: 100000 POWDER TOPICAL at 10:39

## 2021-12-01 RX ADMIN — MORPHINE SULFATE 2 MG: 2 INJECTION, SOLUTION INTRAMUSCULAR; INTRAVENOUS at 01:06

## 2021-12-01 RX ADMIN — SENNOSIDES AND DOCUSATE SODIUM 1 TABLET: 50; 8.6 TABLET ORAL at 09:07

## 2021-12-01 RX ADMIN — QUETIAPINE FUMARATE 25 MG: 25 TABLET ORAL at 09:07

## 2021-12-01 RX ADMIN — DONEPEZIL HYDROCHLORIDE 10 MG: 10 TABLET, FILM COATED ORAL at 09:07

## 2021-12-01 RX ADMIN — SODIUM CHLORIDE, PRESERVATIVE FREE 10 ML: 5 INJECTION INTRAVENOUS at 09:59

## 2021-12-01 RX ADMIN — NYSTATIN: 100000 POWDER TOPICAL at 21:37

## 2021-12-01 RX ADMIN — LISINOPRIL 20 MG: 20 TABLET ORAL at 09:58

## 2021-12-01 RX ADMIN — SODIUM CHLORIDE, PRESERVATIVE FREE 10 ML: 5 INJECTION INTRAVENOUS at 21:37

## 2021-12-01 NOTE — THERAPY EVALUATION
Patient Name: Mike Lucero Jr.  : 1932    MRN: [id number]                              Today's Date: 2021       Admit Date: 2021    Visit Dx:     ICD-10-CM ICD-9-CM   1. Contusion of anus, initial encounter  S30.3XXA 922.9   2. Chronic bilateral pleural effusions  J90 511.9   3. Hypoxia  R09.02 799.02     Patient Active Problem List   Diagnosis   • Mixed hyperlipidemia   • Coronary artery disease   • Permanent atrial fibrillation (HCC)   • Essential hypertension   • Hypercholesterolemia   • h/o Endocarditis   • Osteoarthritis   • Cataract   • Seasonal allergies   • Insomnia   • RELL (obstructive sleep apnea)   • Left leg cellulitis   • Mild cognitive impairment   • Chronic anticoagulation   • Benign prostatic hyperplasia with nocturia   • Impaired glucose tolerance   • Microscopic hematuria   • Umbilical hernia   • IgM deficiency (HCC)   • Class 1 obesity due to excess calories with serious comorbidity and body mass index (BMI) of 32.0 to 32.9 in adult   • Onychomycosis of toenail   • Chronic rhinitis   • Chronic atrial fibrillation (HCC)   • Vitamin D insufficiency   • Basal cell carcinoma (BCC) of skin of neck   • Bilateral hearing loss   • Pleural effusion, bilateral   • Contusion of anus   • Dementia (HCC)   • Hypoxia     Past Medical History:   Diagnosis Date   • Atrial fibrillation (HCC)    • Cataract    • Chronic anticoagulation    • Coronary artery disease    • Dementia (HCC)    • Diverticulosis    • Gallstones    • History of endocarditis    • HLD (hyperlipidemia)    • HTN (hypertension)    • Osteoarthritis    • Seasonal allergies     Seasonal allergies/rhinitis.      Past Surgical History:   Procedure Laterality Date   • ADRENAL GLAND SURGERY      Dr. Jj Kee   • APPENDECTOMY     • BLEPHAROPLASTY  2014    Dr. Santamaria   • COLONOSCOPY     • CORONARY ARTERY BYPASS GRAFT  2006    CABG for 3-vessel disease, 2006.   • INGUINAL HERNIA REPAIR Left      and  09/2012   • TONSILLECTOMY  01/1958      General Information     Row Name 12/01/21 1412          OT Time and Intention    Document Type evaluation  -KF     Mode of Treatment occupational therapy  -     Row Name 12/01/21 1412          General Information    Patient Profile Reviewed yes  -KF     Prior Level of Function independent:; all household mobility; transfer; bed mobility; min assist:; ADL's; max assist:; home management  -KF     Existing Precautions/Restrictions fall; oxygen therapy device and L/min  h/o dementia  -KF     Barriers to Rehab previous functional deficit; cognitive status  -KF     Row Name 12/01/21 1412          Occupational Profile    Environmental Supports and Barriers (Occupational Profile) single step to enter home, regular bed/toilet/tub shower used at baseline and no DME-history provided by spouse today  -KF     Row Name 12/01/21 1412          Living Environment    Lives With spouse  -     Row Name 12/01/21 1412          Home Main Entrance    Number of Stairs, Main Entrance one  -KF     Stair Railings, Main Entrance none  -     Row Name 12/01/21 1412          Stairs Within Home, Primary    Number of Stairs, Within Home, Primary none  -     Row Name 12/01/21 1412          Cognition    Orientation Status (Cognition) oriented to; person; disoriented to; place; situation; time; verbal cues/prompts needed for orientation  -     Row Name 12/01/21 1412          Safety Issues, Functional Mobility    Safety Issues Affecting Function (Mobility) insight into deficits/self-awareness; awareness of need for assistance; safety precaution awareness; judgment; ability to follow commands  -KF     Impairments Affecting Function (Mobility) balance; endurance/activity tolerance; pain; cognition  -KF     Cognitive Impairments, Mobility Safety/Performance safety precaution awareness; judgment; problem-solving/reasoning; insight into deficits/self-awareness; awareness, need for assistance; attention  -KF      Comment, Safety Issues/Impairments (Mobility) very lethargic and poor command following  -           User Key  (r) = Recorded By, (t) = Taken By, (c) = Cosigned By    Initials Name Provider Type    Naty Hernandez, IVA Occupational Therapist                 Mobility/ADL's     Row Name 12/01/21 1422          Bed Mobility    Comment (Bed Mobility) unable to complete this date, as pt unable to follow commands and very lethargic, based on screening depA at this time  -     Row Name 12/01/21 1422          Transfers    Comment (Transfers) unable to assess 2/2 poor COG, command following, and very lethargic will continue to assess  -     Row Name 12/01/21 1422          Functional Mobility    Functional Mobility- Ind. Level unable to perform; not tested  -     Row Name 12/01/21 [facility]          Activities of Daily Living    BADL Assessment/Intervention lower body dressing  -     Row Name 12/01/21 [facility]          Lower Body Dressing Assessment/Training    [place] Level (Lower Body Dressing) don; socks; dependent (less than 25% patient effort)  -     Position (Lower Body Dressing) supine  -           User Key  (r) = Recorded By, (t) = Taken By, (c) = Cosigned By    Initials Name Provider Type    Naty Hernandez OT Occupational Therapist               Obj/Interventions     Row Name 12/01/21 1423          Sensory Assessment (Somatosensory)    Sensory Assessment (Somatosensory) UE sensation intact  -[facility] Name 12/01/21 1423          Vision Assessment/Intervention    Visual Impairment/Limitations WFL; corrective lenses for reading  -     Vision Assessment Comment wears reading glasses for [email or web address] games on tablet at baseline  -     Row Name 12/01/21 1423          Range of Motion Comprehensive    General Range of Motion bilateral upper extremity ROM WNL  -     Comment, General Range of Motion demonstrates stiffness in BLE able to complete limited flexion/ext B knees and minimal BLE straight  leg raise  -KF     Row Name 12/01/21 1423          Strength Comprehensive (MMT)    General Manual Muscle Testing (MMT) Assessment upper extremity strength deficits identified  -KF     Comment, General Manual Muscle Testing (MMT) Assessment BUE grossly 4+/5 and 5/5 B , difficulty with full formal assessment 2/2 COG  -KF     Row Name 12/01/21 1423          Balance    Comment, Balance at this time too lethargic to complete sitting or standing, able to follow single commands intermittently in bed however continually falls back asleep and limited alertness throughout  -KF           User Key  (r) = Recorded By, (t) = Taken By, (c) = Cosigned By    Initials Name Provider Type    KF Naty Amaral OT Occupational Therapist               Goals/Plan     Row Name 12/01/21 1434          Bed Mobility Goal 1 (OT)    Activity/Assistive Device (Bed Mobility Goal 1, OT) sit to supine/supine to sit  -KF     [place] Level/Cues Needed (Bed Mobility Goal 1, OT) contact guard assist; verbal cues required  -KF     Time Frame (Bed Mobility Goal 1, OT) long term goal (LTG); 10 days  -KF     Progress/Outcomes (Bed Mobility Goal 1, OT) goal ongoing  -KF     Row Name 12/01/21 1434          Transfer Goal 1 (OT)    Activity/Assistive Device (Transfer Goal 1, OT) sit-to-stand/stand-to-sit; bed-to-chair/chair-to-bed  -KF     [place] Level/Cues Needed (Transfer Goal 1, OT) minimum assist (75% or more patient effort)  -KF     Time Frame (Transfer Goal 1, OT) long term goal (LTG); 10 days  -KF     Progress/Outcome (Transfer Goal 1, OT) goal ongoing  -KF     Row Name 12/01/21 1434          Dressing Goal 1 (OT)    Activity/Device (Dressing Goal 1, OT) lower body dressing  socks/pants  -KF     [place]/Cues Needed (Dressing Goal 1, OT) minimum assist (75% or more patient effort); verbal cues required  -KF     Time Frame (Dressing Goal 1, OT) long term goal (LTG); 10 days  -KF     Progress/Outcome (Dressing Goal 1, OT) goal ongoing   -     Row Name 12/01/21 1434          Therapy Assessment/Plan (OT)    Planned Therapy Interventions (OT) activity tolerance training; BADL retraining; ROM/therapeutic exercise; strengthening exercise; occupation/activity based interventions; patient/caregiver education/training; transfer/mobility retraining; cognitive/visual perception retraining; functional balance retraining  -KF           User Key  (r) = Recorded By, (t) = Taken By, (c) = Cosigned By    Initials Name Provider Type    KF Naty Amaral, OT Occupational Therapist               Clinical Impression     Row Name 12/01/21 1425          Pain Assessment    Additional Documentation Pain Scale: FACES Pre/Post-Treatment (Group)  -     Row Name 12/01/21 1425          Pain Scale: FACES Pre/Post-Treatment    Pain: FACES Scale, Pretreatment 2-->hurts little bit  -KF     Posttreatment Pain Rating 2-->hurts little bit  -KF     Pain Location - Side Right  -KF     Pain Location - Orientation lower  -KF     Pain Location extremity  -KF     Pre/Posttreatment Pain Comment with AROM/AAROM RLE  -     Row Name 12/01/21 1425          Plan of Care Review    Plan of Care Reviewed With patient  -KF     Progress no change  -KF     Outcome Summary OT eval completed Pt presents with deficits in command following, alertness, and attention to task for ADL/functional transfers compared to baseline, recom IPOT d/c SNF rehab pending progress  -     Row Name 12/01/21 1425          Therapy Assessment/Plan (OT)    Rehab Potential (OT) fair, will monitor progress closely  -KF     Criteria for Skilled Therapeutic Interventions Met (OT) yes; meets criteria; skilled treatment is necessary  -KF     Therapy Frequency (OT) daily  -KF     Row Name 12/01/21 1425          Therapy Plan Review/Discharge Plan (OT)    Equipment Needs Upon Discharge (OT) --  TBD  -KF     Anticipated Discharge Disposition (OT) skilled nursing facility  -     Row Name 12/01/21 1429          Vital Signs     Pre Systolic BP Rehab 97  RN cleared VSS  -KF     Pre Treatment Diastolic BP 56  -KF     Pre SpO2 (%) 97  -KF     O2 Delivery Pre Treatment supplemental O2  -KF     Pre Patient Position Supine  -KF     Intra Patient Position Supine  -KF     Post Patient Position Supine  -KF     Row Name 12/01/21 1425          Positioning and Restraints    Pre-Treatment Position in bed  -KF     Post Treatment Position bed  -KF     In Bed with family/caregiver; supine; notified nsg; fowlers; call light within reach; encouraged to call for assist; side rails up x3; exit alarm on; legs elevated; waffle boots/both; LUE elevated; RUE elevated  -KF     Restraints released:; reapplied:; notified nsg:; soft limb  -KF           User Key  (r) = Recorded By, (t) = Taken By, (c) = Cosigned By    Initials Name Provider Type    KF Naty Amaral, OT Occupational Therapist               Outcome Measures     Row Name 12/01/21 1437          How much help from another is currently needed...    Putting on and taking off regular lower body clothing? 1  -KF     Bathing (including washing, rinsing, and drying) 2  -KF     Toileting (which includes using toilet bed pan or urinal) 1  -KF     Putting on and taking off regular upper body clothing 2  -KF     Taking care of personal grooming (such as brushing teeth) 2  -KF     Eating meals 2  -KF     AM-PAC 6 Clicks Score (OT) 10  -KF     Row Name 12/01/21 0907          How much help from another person do you currently need...    Turning from your back to your side while in flat bed without using bedrails? 3  -KR     Moving from lying on back to sitting on the side of a flat bed without bedrails? 3  -KR     Moving to and from a bed to a chair (including a wheelchair)? 2  -KR     Standing up from a chair using your arms (e.g., wheelchair, bedside chair)? 2  -KR     Climbing 3-5 steps with a railing? 2  -KR     To walk in hospital room? 2  -KR     AM-PAC 6 Clicks Score (PT) 14  -KR     Row Name 12/01/21  1437          Functional Assessment    Outcome Measure Options AM-PAC 6 Clicks Daily Activity (OT)  -           User Key  (r) = Recorded By, (t) = Taken By, (c) = Cosigned By    Initials Name Provider Type     Naty Amaral OT Occupational Therapist    Renae Asencio, RN Registered Nurse                Occupational Therapy Education                 Title: PT OT SLP Therapies (Done)     Topic: Occupational Therapy (Done)     Point: ADL training (Done)     Description:   Instruct learner(s) on proper safety adaptation and remediation techniques during self care or transfers.   Instruct in proper use of assistive devices.              Learning Progress Summary           Patient Acceptance, E,TB,D, VU,DU,NR by  at 12/1/2021 1438                   Point: Precautions (Done)     Description:   Instruct learner(s) on prescribed precautions during self-care and functional transfers.              Learning Progress Summary           Patient Acceptance, E,TB,D, VU,DU,NR by  at 12/1/2021 1438                   Point: Body mechanics (Done)     Description:   Instruct learner(s) on proper positioning and spine alignment during self-care, functional mobility activities and/or exercises.              Learning Progress Summary           Patient Acceptance, E,TB,D, VU,DU,NR by  at 12/1/2021 1438                               User Key     Initials Effective Dates Name Provider Type [facility] 06/16/21 -  Naty Amaral OT Occupational Therapist OT              OT Recommendation and Plan  Planned Therapy Interventions (OT): activity tolerance training, BADL retraining, ROM/therapeutic exercise, strengthening exercise, occupation/activity based interventions, patient/caregiver education/training, transfer/mobility retraining, cognitive/visual perception retraining, functional balance retraining  Therapy Frequency (OT): daily  Plan of Care Review  Plan of Care Reviewed With: patient  Progress: no  change  Outcome Summary: OT eval completed Pt presents with deficits in command following, alertness, and attention to task for ADL/functional transfers compared to baseline, recom IPOT d/c SNF rehab pending progress     Time Calculation:    Time Calculation- OT     Row Name 12/01/21 1340             Time Calculation- OT    OT Start Time 1340  -KF      OT Received On 12/01/21  -KF      OT Goal Re-Cert Due Date 12/11/21  -KF              Untimed Charges    OT Eval/Re-eval Minutes 33  -KF              Total Minutes    Untimed Charges Total Minutes 33  -KF       Total Minutes 33  -KF            User Key  (r) = Recorded By, (t) = Taken By, (c) = Cosigned By    Initials Name Provider Type    KF Naty Amaral OT Occupational Therapist              Therapy Charges for Today     Code Description Service Date Service Provider Modifiers Qty    [id number] HC OT EVAL LOW COMPLEXITY 3 12/1/2021 Naty Amaral OT GO 1               Naty Amaral OT  12/1/2021

## 2021-12-01 NOTE — SIGNIFICANT NOTE
Patient is increasingly agitated and restless tonight, has been since admit to floor, just beginning to get worse. Patient very confused and disoriented. Unable to reorient patient, attempted multiple times from multiple different nurses including Charge Nurse. Patient gets fixed on one idea and unable to understand anything else. Patient became very agitate and restless at one point starting to pull at means catheter, lines, and swinging at staff. Called and reported to TIFFANY Urbina. Soft wrist restraints ordered along with one time extra dose of IV haldol, see orders. Patient has been receiving PRN pain medication and PRN agitation medication throughout the night. Only effective for a short while. Called and spoke with Glenis Lucero, patient's wife and updated her on patient's status and need for restraints. VSS. Will continue to monitor.

## 2021-12-01 NOTE — CASE MANAGEMENT/SOCIAL WORK
Continued Stay Note  [facility]     Patient Name: Mike Lucero Jr.  MRN: [id number]  Today's Date: 12/1/2021    Admit Date: 11/30/2021     Discharge Plan     Row Name 12/01/21 1325       Plan    Plan Comments Cm spoke with wife who reports he had been indepedent with mobility prior to admit. She has concerns that due to his current confusion and recent fall she is not going to be able to take him home. CM will follow for PT/OT eval and GOC and will then discuss discharge options.               Discharge Codes    No documentation.                     Sally Garg RN

## 2021-12-01 NOTE — PLAN OF CARE
Goal Outcome Evaluation:  Plan of Care Reviewed With: patient        Progress: no change  Outcome Summary: OT eval completed Pt presents with deficits in command following, alertness, and attention to task for ADL/functional transfers compared to baseline, limited evaluation today 2/2 lethargy, recom IPOT d/c SNF rehab pending progress

## 2021-12-01 NOTE — PROGRESS NOTES
[facility] Medicine Services  PROGRESS NOTE    Patient Name: Mike Lucero Jr.  : 1932  MRN: [id number]    Date of Admission: 2021  Primary Care Physician: Subha Wesley MD    Subjective   Subjective     CC: Fall at home     HPI - currently dozing and unable to answer questions - mumbled responses.  Wife at bedside gives history.     ROS:  Typically has freq voids; was on flomax before, wife unsure why it was stopped  Diagnosed w dementia 2 yrs ago.  Still dresses himself and makes bkfast and plays solitaire. Former    No past falls     Objective   Objective     Vital Signs:   Temp:  [96.8 °F (36 °C)-98 °F (36.7 °C)] 97.7 °F (36.5 °C)  Heart Rate:  [43-89] 84  Resp:  [14-26] 17  BP: (105-140)/(59-90) 114/68  Flow (L/min):  [2-3] 3     Physical Exam:  Constitutional: Asleep in bed, does mumble a few responses but words are indistinct and he does not open his eyes  HENT: NCAT, mucous membranes moist  Neck: Supple,  trachea midline  Respiratory: Clear to auscultation bilaterally, nonlabored respirations   Cardiovascular: RRR, no murmurs  Gastrointestinal: Positive bowel sounds, soft, nontender, mod distended  Musculoskeletal: tr bilateral ankle edema, no clubbing or cyanosis to extremities  Neurologic: mumbled speech, dozing, squeezes my hands bilat   Skin: No rashes      Results Reviewed:  LAB RESULTS:      Lab 21  0801 21  1137   WBC 9.14 8.26   HEMOGLOBIN 11.3* 11.2*   HEMATOCRIT 33.4* 35.0*   PLATELETS 173 165   NEUTROS ABS 6.88 6.41   IMMATURE GRANS (ABS) 0.04 0.04   LYMPHS ABS 1.12 1.07   MONOS ABS 0.92* 0.60   EOS ABS 0.15 0.11   MCV 95.2 100.0*   PROTIME  --  30.3*         Lab 21  0431 21  1137   SODIUM 129* 134*   POTASSIUM 3.8 3.9   CHLORIDE 93* 99   CO2 29.0 28.0   ANION GAP 7.0 7.0   BUN 9 12   CREATININE 0.79 0.73*   GLUCOSE 114* 126*   CALCIUM 8.1* 8.4*   PHOSPHORUS 4.0  --          Lab 21  0431 21  1137   TOTAL  PROTEIN  --  5.6*   ALBUMIN 3.50 3.30*   GLOBULIN  --  2.3   ALT (SGPT)  --  8   AST (SGOT)  --  14   BILIRUBIN  --  1.0   ALK PHOS  --  82         Lab 11/30/21  1137   PROBNP 4,268.0*   PROTIME 30.3*   INR 3.06*                 Brief Urine Lab Results  (Last result in the past 365 days)      Color   Clarity   Blood   Leuk Est   Nitrite   Protein   CREAT   Urine HCG        11/30/21 2109 Yellow   Clear   Negative   Trace   Negative   Negative                 Microbiology Results Abnormal     Procedure Component Value - Date/Time    COVID PRE-OP / PRE-PROCEDURE SCREENING ORDER (NO ISOLATION) - Swab, Nasopharynx [[id number]]  (Normal) Collected: 11/30/21 1212    Lab Status: Final result Specimen: Swab from Nasopharynx Updated: 11/30/21 1257    Narrative:      The following orders were created for panel order COVID PRE-OP / PRE-PROCEDURE SCREENING ORDER (NO ISOLATION) - Swab, Nasopharynx.  Procedure                               Abnormality         Status                     ---------                               -----------         ------                     COVID-19, ABBOTT IN-HOUS...[[id number]]  Normal              Final result                 Please view results for these tests on the individual orders.    COVID-19, ABBOTT IN-HOUSE,NASAL Swab (NO TRANSPORT MEDIA) 2 HR TAT - Swab, Nasopharynx [[id number]]  (Normal) Collected: 11/30/21 1212    Lab Status: Final result Specimen: Swab from Nasopharynx Updated: 11/30/21 1257     COVID19 Presumptive Negative    Narrative:      Fact sheet for providers: https://www.fda.gov/media/574660/download     Fact sheet for patients: https://www.fda.gov/media/494424/download    Test performed by PCR.  If inconsistent with clinical signs and symptoms patient should be tested with different authorized molecular test.          CT Abdomen Pelvis Without Contrast    Result Date: 11/30/2021  EXAMINATION: CT ABDOMEN PELVIS WO CONTRAST- 11/30/2021  INDICATION: fall/right hip pain  TECHNIQUE:  5 mm unenhanced images through the abdomen and pelvis  The radiation dose reduction device was turned on for each scan per the ALARA (As Low as Reasonably Achievable) protocol.  COMPARISON: 12/04/2020 abdomen and pelvis CT scan  FINDINGS: Patient history indicates fall with right lower back pain and right hip pain.  There is a small to moderate right pleural effusion, and minimal left effusion. There is moderate right lower lobe atelectasis. Similar findings are present on prior study, but are little greater today.  There is now mild upper abdominal ascites adjacent the liver and spleen where only trace ascites was present previously. Liver morphology is typical of cirrhosis. Spleen does not appear to be significantly enlarged. Numerous calcified gallstones are seen in the otherwise normal-appearing gallbladder. Pancreas, adrenal glands, and kidneys appear unremarkable for a non-IV contrast enhanced exam. No upper abdominal free air or adenopathy is seen. Bowel loops are normal in caliber. There is pancolonic diverticulosis. No upper abdominal inflammatory change is seen.  Regarding the lower abdomen and pelvis, there is extensive descending colon and sigmoid diverticulosis, but no CT scan findings of acute diverticulitis. Terminal ileum and cecum appear grossly normal. The appendix by history is surgically absent.  With respect to the patient's right hip pain/trauma, bony structures including the pelvic bones and right femur appear to be intact. There is diffuse enlargement of the right gluteus alysha and medius, consistent with some diffuse edema or hematoma and a more discrete, almost masslike appearing 5 cm area superficial to the gluteus alysha presumably a discrete deep subcutaneous hematoma. There is also evidence of more extensive diffuse and superficial right subcutaneous hematoma. No intrapelvic hematoma or left flank hematoma is seen. There is a pure fat density 3 cm left gluteus minimus lipoma.       Impression: 1. Extensive hematoma of the right gluteal region, including discrete deep subcutaneous 5 cm hematoma, diffuse overlying subcutaneous hematoma, and probably diffuse hematoma infiltrating and enlarging the right gluteus alysha and gluteus medius. No evidence of intra-abdominal hematoma.  2. No evidence of acute bony trauma.  3. Mild to moderate ascites, increased from the trace ascites noted on 12/04/2020 exam.  4. Liver morphology typical of cirrhosis.  5. Gradually enlarging right pleural effusion, associated right lower lung atelectasis, and minimal left effusion.  D: 11/30/2021 E: 11/30/2021      CT Lumbar Spine Without Contrast    Result Date: 11/30/2021  EXAMINATION: CT LUMBAR SPINE WO CONTRAST-11/30/2021:  INDICATION: Fall/right low back/hip pain.  TECHNIQUE: Spiral acquisition 2 mm axial images through the lumbar spine with sagittal and coronal 2-D reconstructions.  The radiation dose reduction device was turned on for each scan per the ALARA (As Low as Reasonably Achievable) protocol.  COMPARISON: No previous lumbar studies. Comparison is made to the sagittal reconstruction images of 12/04/2020 abdomen and pelvis CT scan.  FINDINGS: As on that study, there is multilevel lumbar degenerative disc disease, with no evidence of significant focal subluxation or evidence of compression deformity. Degenerative disc changes are again greater at L5-S1 than elsewhere. No pars defects are identified. No destructive or blastic bony disease is appreciated. The coronal images show a minimal dextroconvex lumbar scoliosis unchanged. SI joints show moderate bilateral DJD.  Axial bone window images show no evidence of acute bony trauma. Soft tissue axial images show the usual limited detail of the canal for a non-myelographic CT scan. There is mild canal stenosis at L2-3 due to posterior element hypertrophy and broad-based disc protrusion, mild-to-moderate L3-4 canal stenosis due again to disc protrusion and  posterior element hypertrophy, probably moderate canal stenosis at L4-5 due to right paracentral disc protrusion. No pathologic paraspinous soft tissue mass or acute inflammatory focus is identified. Incidental note is made of bilateral pleural effusions, right greater than left.      Impression: 1. Multilevel lower lumbar degenerative disc disease, and SI joint DJD, but no evidence of acute lumbar spine trauma.  2. Multilevel mild-to-moderate lumbar spinal stenosis due to disc protrusions and posterior element hypertrophy.  3. Incidentally noted bilateral pleural effusions.  D:  11/30/2021 E:  11/30/2021       XR Chest 1 View    Result Date: 12/1/2021  EXAMINATION: XR CHEST 1 VW-  INDICATION: eval effusion; S30.3XXA-Contusion of anus, initial encounter; F17-Lixjoud effusion, not elsewhere classified; R09.02-Hypoxemia  TECHNIQUE: Frontal view of the chest  COMPARISON: 11/30/2021  FINDINGS:  Redemonstration of a small right layering pleural effusion which appears similar or minimally increased in size although increased conspicuity of right pleural fluid on today's exam may reflect semiupright technique versus upright technique from yesterday's exam. Similar associated right lung base parenchymal opacities. Similar mild streaky retrocardiac parenchymal opacities persist. No pneumothorax. Stable cardiomediastinal silhouette demonstrating cardiomegaly.      Impression:  Redemonstration of a layering right pleural effusion which appears similar to minimally increased in size from prior although increased conspicuity may reflect semiupright technique versus upright technique on yesterday's exam. Right lung base parenchymal opacities persist. Otherwise no significant interval change.  This report was finalized on 12/1/2021 6:39 AM by Jose Samayoa MD.      XR Chest 1 View    Result Date: 11/30/2021  EXAMINATION: XR CHEST 1 VW- 11/30/2021  INDICATION: fall/right hip pain  COMPARISON: 12/21/2020  FINDINGS: Heart shadow is  markedly enlarged, and appears a little larger than on the prior study even allowing for different film technique. Pulmonary vasculature is cephalized. There is minimal if any edema, but there is new opacification of the right lung base, probably a combination of right pleural effusion and atelectasis of the right lower lobe. There is minimal left effusion. Lungs elsewhere appear clear. No pneumothorax is seen. Old right upper chest trauma is again noted.      Impression: 1. Marked cardiomegaly and mild pulmonary vascular congestion. 2. New and fairly extensive right basilar atelectasis and effusion.   D: 11/30/2021 E: 11/30/2021            I have reviewed the medications:  Scheduled Meds:dilTIAZem CD, 240 mg, Oral, Q24H  donepezil, 10 mg, Oral, Daily  lactobacillus acidophilus, 1 capsule, Oral, Daily  lisinopril, 20 mg, Oral, Daily  pravastatin, 40 mg, Oral, Daily  QUEtiapine, 25 mg, Oral, Q12H  senna-docusate sodium, 1 tablet, Oral, BID  sodium chloride, 10 mL, Intravenous, Q12H  traZODone, 50 mg, Oral, Nightly      Continuous Infusions:   PRN Meds:.•  acetaminophen **OR** acetaminophen **OR** acetaminophen  •  senna-docusate sodium **AND** polyethylene glycol **AND** bisacodyl **AND** bisacodyl  •  haloperidol lactate  •  HYDROcodone-acetaminophen  •  ipratropium-albuterol  •  Morphine **AND** naloxone  •  ondansetron **OR** ondansetron  •  [COMPLETED] Insert peripheral IV **AND** sodium chloride  •  sodium chloride    Assessment/Plan   Assessment & Plan     Active Hospital Problems    Diagnosis  POA   • Contusion of anus [S30.3XXA]  Yes   • Dementia (HCC) [F03.90]  Yes   • Hypoxia [R09.02]  Yes   • Pleural effusion, bilateral [J90]  Unknown   • Permanent atrial fibrillation (HCC) [I48.21]  Yes   • Essential hypertension [I10]  Yes   • Mixed hyperlipidemia [E78.2]  Yes      Resolved Hospital Problems   No resolved problems to display.        Brief Hospital Course to date:  Mike Lucero  is a 89 y.o. male  with Afib on coumadin, brought after a fall with gluteal hematoma, found to have mult progressing medical problems atop dementia.    Gluteal hematoma  -- pain control  - CBC stable  - hold Coumadin and ASA.  This is first fall; usually walks without cane or walker; discussed w wife the question of stopped coumadin  -- PT.OT to see; was unable to walk due to pain at home.   -- fall precautions      Hypoxia  jamey pleural effusions, new right basilar atelectasis and effusion   - diuresed 3400ml w IV lasix 60 mg  -- signif R effusion - plan IR when INR corrects       Cirrhosis on CT and US  Ascites increased from a month ago  -- IV lasix times 2 doses  -- check hep panel  - plan conservative tx     Dementia  - check TSH B12 and ammonia   -- cont home meds   --As needed Haldol, scheduled Seroquel     HTN  HLD  -- cont home meds      Chronic Afib with coumadin   -- cont cardizem  -- hold coumadin for now      Urinary retention  --Placed Z[facility] but will dc ; flomax started        DVT prophylaxis:  Mechanical DVT prophylaxis orders are present.       AM-PAC 6 Clicks Score (PT): 14 (11/30/21 2040)    Disposition: I expect the patient to be discharged tbd - oif he cannot walk he would need SNF     CODE STATUS:   Code Status and Medical Interventions:   Ordered at: 11/30/21 0184     Medical Intervention Limits:    NO intubation (DNI)     Level Of Support Discussed With:    Health Care Surrogate     Code Status (Patient has no pulse and is not breathing):    No CPR (Do Not Attempt to Resuscitate)     Medical Interventions (Patient has pulse or is breathing):    Limited Support     Comments:    wife       Pat Kelly MD  12/01/21

## 2021-12-01 NOTE — SIGNIFICANT NOTE
Some bloody urine noted in means catheter shortly after patient pulled at means earlier when very agitated and pulling at lines/tubes. Irrigated catheter with 120ml of sterile water, no clots noted. Urine still slightly blood tinged. Notified TIFFANY Urbina of blood-tinged urine. Monitor for now. No new orders. Will continue to monitor.

## 2021-12-02 ENCOUNTER — APPOINTMENT (OUTPATIENT)
Dept: GENERAL RADIOLOGY | Facility: HOSPITAL | Age: 86
End: 2021-12-02

## 2021-12-02 PROBLEM — S70.01XA HEMATOMA OF HIP, RIGHT, INITIAL ENCOUNTER: Status: ACTIVE | Noted: 2021-11-30

## 2021-12-02 PROBLEM — R41.0 CONFUSION: Status: ACTIVE | Noted: 2021-12-02

## 2021-12-02 LAB
ABO GROUP BLD: NORMAL
ALBUMIN SERPL-MCNC: 3.4 G/DL (ref 3.5–5.2)
ALBUMIN/GLOB SERPL: 1.8 G/DL
ALP SERPL-CCNC: 74 U/L (ref 39–117)
ALT SERPL W P-5'-P-CCNC: 7 U/L (ref 1–41)
AMMONIA BLD-SCNC: 23 UMOL/L (ref 16–60)
ANION GAP SERPL CALCULATED.3IONS-SCNC: 8 MMOL/L (ref 5–15)
AST SERPL-CCNC: 13 U/L (ref 1–40)
BACTERIA UR QL AUTO: ABNORMAL /HPF
BILIRUB SERPL-MCNC: 2 MG/DL (ref 0–1.2)
BILIRUB UR QL STRIP: ABNORMAL
BLD GP AB SCN SERPL QL: NEGATIVE
BUN SERPL-MCNC: 15 MG/DL (ref 8–23)
BUN/CREAT SERPL: 16 (ref 7–25)
CALCIUM SPEC-SCNC: 8 MG/DL (ref 8.6–10.5)
CHLORIDE SERPL-SCNC: 91 MMOL/L (ref 98–107)
CLARITY UR: ABNORMAL
CO2 SERPL-SCNC: 34 MMOL/L (ref 22–29)
COLOR UR: ABNORMAL
CREAT SERPL-MCNC: 0.94 MG/DL (ref 0.76–1.27)
D-LACTATE SERPL-SCNC: 1 MMOL/L (ref 0.5–2)
DEPRECATED RDW RBC AUTO: 49.1 FL (ref 37–54)
ERYTHROCYTE [DISTWIDTH] IN BLOOD BY AUTOMATED COUNT: 13.8 % (ref 12.3–15.4)
GFR SERPL CREATININE-BSD FRML MDRD: 76 ML/MIN/1.73
GLOBULIN UR ELPH-MCNC: 1.9 GM/DL
GLUCOSE SERPL-MCNC: 112 MG/DL (ref 65–99)
GLUCOSE UR STRIP-MCNC: ABNORMAL MG/DL
HCT VFR BLD AUTO: 27.5 % (ref 37.5–51)
HGB BLD-MCNC: 9.2 G/DL (ref 13–17.7)
HGB UR QL STRIP.AUTO: ABNORMAL
INR PPP: 2.63 (ref 0.85–1.16)
KETONES UR QL STRIP: NEGATIVE
LEUKOCYTE ESTERASE UR QL STRIP.AUTO: ABNORMAL
MCH RBC QN AUTO: 32.2 PG (ref 26.6–33)
MCHC RBC AUTO-ENTMCNC: 33.5 G/DL (ref 31.5–35.7)
MCV RBC AUTO: 96.2 FL (ref 79–97)
NITRITE UR QL STRIP: POSITIVE
PH UR STRIP.AUTO: 6 [PH] (ref 5–8)
PLATELET # BLD AUTO: 158 10*3/MM3 (ref 140–450)
PMV BLD AUTO: 9.1 FL (ref 6–12)
POTASSIUM SERPL-SCNC: 3.8 MMOL/L (ref 3.5–5.2)
PROCALCITONIN SERPL-MCNC: 0.09 NG/ML (ref 0–0.25)
PROT SERPL-MCNC: 5.3 G/DL (ref 6–8.5)
PROT UR QL STRIP: ABNORMAL
PROTHROMBIN TIME: 27 SECONDS (ref 11.4–14.4)
RBC # BLD AUTO: 2.86 10*6/MM3 (ref 4.14–5.8)
RBC # UR STRIP: ABNORMAL /HPF
REF LAB TEST METHOD: ABNORMAL
RH BLD: POSITIVE
SODIUM SERPL-SCNC: 133 MMOL/L (ref 136–145)
SP GR UR STRIP: 1.02 (ref 1–1.03)
SQUAMOUS #/AREA URNS HPF: ABNORMAL /[HPF]
T&S EXPIRATION DATE: NORMAL
TSH SERPL DL<=0.05 MIU/L-ACNC: 1.27 UIU/ML (ref 0.27–4.2)
UROBILINOGEN UR QL STRIP: ABNORMAL
VIT B12 BLD-MCNC: 245 PG/ML (ref 211–946)
WBC # UR STRIP: ABNORMAL /HPF
WBC NRBC COR # BLD: 10.96 10*3/MM3 (ref 3.4–10.8)

## 2021-12-02 PROCEDURE — 83605 ASSAY OF LACTIC ACID: CPT | Performed by: NURSE PRACTITIONER

## 2021-12-02 PROCEDURE — 85027 COMPLETE CBC AUTOMATED: CPT | Performed by: INTERNAL MEDICINE

## 2021-12-02 PROCEDURE — 86850 RBC ANTIBODY SCREEN: CPT | Performed by: INTERNAL MEDICINE

## 2021-12-02 PROCEDURE — 82140 ASSAY OF AMMONIA: CPT | Performed by: INTERNAL MEDICINE

## 2021-12-02 PROCEDURE — 84145 PROCALCITONIN (PCT): CPT | Performed by: NURSE PRACTITIONER

## 2021-12-02 PROCEDURE — 80053 COMPREHEN METABOLIC PANEL: CPT | Performed by: INTERNAL MEDICINE

## 2021-12-02 PROCEDURE — 99233 SBSQ HOSP IP/OBS HIGH 50: CPT | Performed by: INTERNAL MEDICINE

## 2021-12-02 PROCEDURE — 82607 VITAMIN B-12: CPT | Performed by: INTERNAL MEDICINE

## 2021-12-02 PROCEDURE — 86927 PLASMA FRESH FROZEN: CPT

## 2021-12-02 PROCEDURE — 86901 BLOOD TYPING SEROLOGIC RH(D): CPT | Performed by: INTERNAL MEDICINE

## 2021-12-02 PROCEDURE — 86900 BLOOD TYPING SEROLOGIC ABO: CPT | Performed by: INTERNAL MEDICINE

## 2021-12-02 PROCEDURE — 71045 X-RAY EXAM CHEST 1 VIEW: CPT

## 2021-12-02 PROCEDURE — 81001 URINALYSIS AUTO W/SCOPE: CPT | Performed by: NURSE PRACTITIONER

## 2021-12-02 PROCEDURE — P9017 PLASMA 1 DONOR FRZ W/IN 8 HR: HCPCS

## 2021-12-02 PROCEDURE — 85610 PROTHROMBIN TIME: CPT | Performed by: INTERNAL MEDICINE

## 2021-12-02 PROCEDURE — 84443 ASSAY THYROID STIM HORMONE: CPT | Performed by: INTERNAL MEDICINE

## 2021-12-02 PROCEDURE — 36430 TRANSFUSION BLD/BLD COMPNT: CPT

## 2021-12-02 RX ORDER — CYANOCOBALAMIN 1000 UG/ML
1000 INJECTION, SOLUTION INTRAMUSCULAR; SUBCUTANEOUS DAILY
Status: DISCONTINUED | OUTPATIENT
Start: 2021-12-02 | End: 2021-12-14

## 2021-12-02 RX ADMIN — HYDROCODONE BITARTRATE AND ACETAMINOPHEN 1 TABLET: 5; 325 TABLET ORAL at 22:58

## 2021-12-02 RX ADMIN — SENNOSIDES AND DOCUSATE SODIUM 1 TABLET: 50; 8.6 TABLET ORAL at 10:18

## 2021-12-02 RX ADMIN — TRAZODONE HYDROCHLORIDE 50 MG: 50 TABLET ORAL at 20:48

## 2021-12-02 RX ADMIN — QUETIAPINE FUMARATE 25 MG: 25 TABLET ORAL at 20:48

## 2021-12-02 RX ADMIN — LISINOPRIL 20 MG: 20 TABLET ORAL at 10:18

## 2021-12-02 RX ADMIN — DILTIAZEM HYDROCHLORIDE 240 MG: 240 CAPSULE, COATED, EXTENDED RELEASE ORAL at 10:18

## 2021-12-02 RX ADMIN — DONEPEZIL HYDROCHLORIDE 10 MG: 10 TABLET, FILM COATED ORAL at 10:18

## 2021-12-02 RX ADMIN — Medication 1 CAPSULE: at 10:18

## 2021-12-02 RX ADMIN — NYSTATIN: 100000 POWDER TOPICAL at 20:52

## 2021-12-02 RX ADMIN — TAMSULOSIN HYDROCHLORIDE 0.4 MG: 0.4 CAPSULE ORAL at 10:18

## 2021-12-02 RX ADMIN — SENNOSIDES AND DOCUSATE SODIUM 1 TABLET: 50; 8.6 TABLET ORAL at 20:48

## 2021-12-02 RX ADMIN — SODIUM CHLORIDE, PRESERVATIVE FREE 10 ML: 5 INJECTION INTRAVENOUS at 10:19

## 2021-12-02 RX ADMIN — QUETIAPINE FUMARATE 25 MG: 25 TABLET ORAL at 10:21

## 2021-12-02 RX ADMIN — PRAVASTATIN SODIUM 40 MG: 40 TABLET ORAL at 10:24

## 2021-12-02 RX ADMIN — NYSTATIN: 100000 POWDER TOPICAL at 10:23

## 2021-12-02 NOTE — PLAN OF CARE
Goal Outcome Evaluation:  Plan of Care Reviewed With: patient        Progress: no change    Pt remains in restraints, pulling at means and IV lines. Provider notified of vital signs overnight. Pt doing well on venti mask due to being a mouth breather, 35%. Safety provided throughout shift, attempts to remove restraints.

## 2021-12-02 NOTE — PROGRESS NOTES
[facility] Medicine Services  PROGRESS NOTE    Patient Name: Mike Lucero Jr.  : 1932  MRN: [id number]    Date of Admission: 2021  Primary Care Physician: Subha Wesley MD    Subjective   Subjective     CC: Fall at home     HPI -  Wife not present.  Pt sleeping.  Was trying to climb out of bed last night and tugging on lines/means.    ROS: UTO  Hematuria continues but minimal clots noted     Objective   Objective     Vital Signs:   Temp:  [98.5 °F (36.9 °C)-100 °F (37.8 °C)] 98.5 °F (36.9 °C)  Heart Rate:  [56-85] 56  Resp:  [18] 18  BP: ()/(45-58) 106/58  Flow (L/min):  [3-5] 5     Physical Exam:  Constitutional: Asleep in bed, does not rouse to voice or touch  HENT: NCAT, mucous membranes moist  Neck: Supple,  trachea midline  Respiratory: Clear to auscultation bilaterally, nonlabored respirations   Cardiovascular: RRR, no murmurs  Gastrointestinal: Positive bowel sounds, soft, nontender, mod distended  Musculoskeletal: tr bilateral ankle edema, no clubbing or cyanosis to extremities  Neurologic: sleeping   Skin: No rashes but hematoma R hip may be larger - cannot see full extent    means in, few minimal clots, hematuria noted       Results Reviewed:  LAB RESULTS:      Lab 21  0801 21  1137   WBC 10.96* 9.14 8.26   HEMOGLOBIN 9.2* 11.3* 11.2*   HEMATOCRIT 27.5* 33.4* 35.0*   PLATELETS 158 173 165   NEUTROS ABS  --  6.88 6.41   IMMATURE GRANS (ABS)  --  0.04 0.04   LYMPHS ABS  --  1.12 1.07   MONOS ABS  --  0.92* 0.60   EOS ABS  --  0.15 0.11   MCV 96.2 95.2 100.0*   PROCALCITONIN 0.09  --   --    LACTATE 1.0  --   --    PROTIME 27.0*  --  30.3*         Lab 21  0431 21  1137   SODIUM 133* 129* 134*   POTASSIUM 3.8 3.8 3.9   CHLORIDE 91* 93* 99   CO2 34.0* 29.0 28.0   ANION GAP 8.0 7.0 7.0   BUN 15 9 12   CREATININE 0.94 0.79 0.73*   GLUCOSE 112* 114* 126*   CALCIUM 8.0* 8.1* 8.4*   PHOSPHORUS  --  4.0   --    TSH 1.270  --   --          Lab 12/02/21  0342 12/01/21  0431 11/30/21  1137   TOTAL PROTEIN 5.3*  --  5.6*   ALBUMIN 3.40* 3.50 3.30*   GLOBULIN 1.9  --  2.3   ALT (SGPT) 7  --  8   AST (SGOT) 13  --  14   BILIRUBIN 2.0*  --  1.0   ALK PHOS 74  --  82         Lab 12/02/21  0342 11/30/21  1137   PROBNP  --  4,268.0*   PROTIME 27.0* 30.3*   INR 2.63* 3.06*                 Brief Urine Lab Results  (Last result in the past 365 days)      Color   Clarity   Blood   Leuk Est   Nitrite   Protein   CREAT   Urine HCG        12/02/21 0309 Orange   Turbid   Large (3+)   Moderate (2+)   Positive   >=300 mg/dL (3+)                 Microbiology Results Abnormal     Procedure Component Value - Date/Time    COVID PRE-OP / PRE-PROCEDURE SCREENING ORDER (NO ISOLATION) - Swab, Nasopharynx [[id number]]  (Normal) Collected: 11/30/21 1212    Lab Status: Final result Specimen: Swab from Nasopharynx Updated: 11/30/21 1257    Narrative:      The following orders were created for panel order COVID PRE-OP / PRE-PROCEDURE SCREENING ORDER (NO ISOLATION) - Swab, Nasopharynx.  Procedure                               Abnormality         Status                     ---------                               -----------         ------                     COVID-19, ABBOTT IN-HOUS...[[id number]]  Normal              Final result                 Please view results for these tests on the individual orders.    COVID-19, ABBOTT IN-HOUSE,NASAL Swab (NO TRANSPORT MEDIA) 2 HR TAT - Swab, Nasopharynx [[id number]]  (Normal) Collected: 11/30/21 1212    Lab Status: Final result Specimen: Swab from Nasopharynx Updated: 11/30/21 1257     COVID19 Presumptive Negative    Narrative:      Fact sheet for providers: https://www.fda.gov/media/290165/download     Fact sheet for patients: https://www.fda.gov/media/305010/download    Test performed by PCR.  If inconsistent with clinical signs and symptoms patient should be tested with different authorized molecular test.           CT Abdomen Pelvis Without Contrast    Result Date: 12/1/2021  EXAMINATION: CT ABDOMEN PELVIS WO CONTRAST- 11/30/2021  INDICATION: fall/right hip pain  TECHNIQUE: 5 mm unenhanced images through the abdomen and pelvis  The radiation dose reduction device was turned on for each scan per the ALARA (As Low as Reasonably Achievable) protocol.  COMPARISON: 12/04/2020 abdomen and pelvis CT scan  FINDINGS: Patient history indicates fall with right lower back pain and right hip pain.  There is a small to moderate right pleural effusion, and minimal left effusion. There is moderate right lower lobe atelectasis. Similar findings are present on prior study, but are little greater today.  There is now mild upper abdominal ascites adjacent the liver and spleen where only trace ascites was present previously. Liver morphology is typical of cirrhosis. Spleen does not appear to be significantly enlarged. Numerous calcified gallstones are seen in the otherwise normal-appearing gallbladder. Pancreas, adrenal glands, and kidneys appear unremarkable for a non-IV contrast enhanced exam. No upper abdominal free air or adenopathy is seen. Bowel loops are normal in caliber. There is pancolonic diverticulosis. No upper abdominal inflammatory change is seen.  Regarding the lower abdomen and pelvis, there is extensive descending colon and sigmoid diverticulosis, but no CT scan findings of acute diverticulitis. Terminal ileum and cecum appear grossly normal. The appendix by history is surgically absent.  With respect to the patient's right hip pain/trauma, bony structures including the pelvic bones and right femur appear to be intact. There is diffuse enlargement of the right gluteus alysha and medius, consistent with some diffuse edema or hematoma and a more discrete, almost masslike appearing 5 cm area superficial to the gluteus alysha presumably a discrete deep subcutaneous hematoma. There is also evidence of more extensive diffuse  and superficial right subcutaneous hematoma. No intrapelvic hematoma or left flank hematoma is seen. There is a pure fat density 3 cm left gluteus minimus lipoma.      Impression: 1. Extensive hematoma of the right gluteal region, including discrete deep subcutaneous 5 cm hematoma, diffuse overlying subcutaneous hematoma, and probably diffuse hematoma infiltrating and enlarging the right gluteus alysha and gluteus medius. No evidence of intra-abdominal hematoma.  2. No evidence of acute bony trauma.  3. Mild to moderate ascites, increased from the trace ascites noted on 12/04/2020 exam.  4. Liver morphology typical of cirrhosis.  5. Gradually enlarging right pleural effusion, associated right lower lung atelectasis, and minimal left effusion.  D: 11/30/2021 E: 11/30/2021  This report was finalized on 12/1/2021 2:46 PM by Dr. wJ Madrigal MD.      CT Lumbar Spine Without Contrast    Result Date: 12/1/2021  EXAMINATION: CT LUMBAR SPINE WO CONTRAST-11/30/2021:  INDICATION: Fall/right low back/hip pain.  TECHNIQUE: Spiral acquisition 2 mm axial images through the lumbar spine with sagittal and coronal 2-D reconstructions.  The radiation dose reduction device was turned on for each scan per the ALARA (As Low as Reasonably Achievable) protocol.  COMPARISON: No previous lumbar studies. Comparison is made to the sagittal reconstruction images of 12/04/2020 abdomen and pelvis CT scan.  FINDINGS: As on that study, there is multilevel lumbar degenerative disc disease, with no evidence of significant focal subluxation or evidence of compression deformity. Degenerative disc changes are again greater at L5-S1 than elsewhere. No pars defects are identified. No destructive or blastic bony disease is appreciated. The coronal images show a minimal dextroconvex lumbar scoliosis unchanged. SI joints show moderate bilateral DJD.  Axial bone window images show no evidence of acute bony trauma. Soft tissue axial images show the usual  limited detail of the canal for a non-myelographic CT scan. There is mild canal stenosis at L2-3 due to posterior element hypertrophy and broad-based disc protrusion, mild-to-moderate L3-4 canal stenosis due again to disc protrusion and posterior element hypertrophy, probably moderate canal stenosis at L4-5 due to right paracentral disc protrusion. No pathologic paraspinous soft tissue mass or acute inflammatory focus is identified. Incidental note is made of bilateral pleural effusions, right greater than left.      Impression: 1. Multilevel lower lumbar degenerative disc disease, and SI joint DJD, but no evidence of acute lumbar spine trauma.  2. Multilevel mild-to-moderate lumbar spinal stenosis due to disc protrusions and posterior element hypertrophy.  3. Incidentally noted bilateral pleural effusions.  D:  11/30/2021 E:  11/30/2021  This report was finalized on 12/1/2021 1:14 PM by Dr. Jw Madrigal MD.      US Liver    Result Date: 12/1/2021  EXAMINATION: US LIVER- 12/01/2021  INDICATION: cirrhosis; S30.3XXA-Contusion of anus, initial encounter; H32-Xhuzpyy effusion, not elsewhere classified; R09.02-Hypoxemia  TECHNIQUE: Ultrasound right upper quadrant abdomen and liver  COMPARISON: CT abdomen and pelvis dated 11/30/2021  FINDINGS:  Visualized portions of the pancreas within normal limits.  Liver demonstrates coarsened echotexture throughout the heterogeneous liver with nodular contours of a cirrhotic hepatic morphology hypertrophy/atrophy complex without focal liver lesion evident in a largely atrophic hepatic parenchyma of limited evaluation with adjacent ascites.  Gallbladder contains echogenic foci with shadowing of cholelithiasis along with intermixed sludge however no wall thickening or pericholecystic fluid.  Common bile duct 6 to 7 mm in diameter.  Right kidney measures 11.3 cm in length without evidence of hydronephrosis, contour deforming mass or obvious calculi.      Impression: Cirrhotic hepatic  morphology without focal liver lesion in a limited evaluation due to largely atrophic hepatic parenchyma. Perihepatic ascites is noted.  D:  12/01/2021 E:  12/01/2021  This report was finalized on 12/1/2021 4:32 PM by Dr. Veto Hein.      XR Chest 1 View    Result Date: 12/2/2021  CR Chest 1 Vw INDICATION: Hypoxia. Pleural effusion. COMPARISON:  12/1/2021 FINDINGS: Single portable AP view(s) of the chest. Moderate to large right pleural effusion is unchanged. Consolidation in the right lung is stable. There is a small left pleural effusion with left basilar consolidation as well. Cardiomegaly is noted. No pneumothorax.     Impression: No significant interval change. Signer Name: Ghassan Howard MD  Signed: 12/2/2021 3:48 AM  Workstation Name: [facility]  [facility]    XR Chest 1 View    Result Date: 12/1/2021  EXAMINATION: XR CHEST 1 VW- 11/30/2021  INDICATION: fall/right hip pain  COMPARISON: 12/21/2020  FINDINGS: Heart shadow is markedly enlarged, and appears a little larger than on the prior study even allowing for different film technique. Pulmonary vasculature is cephalized. There is minimal if any edema, but there is new opacification of the right lung base, probably a combination of right pleural effusion and atelectasis of the right lower lobe. There is minimal left effusion. Lungs elsewhere appear clear. No pneumothorax is seen. Old right upper chest trauma is again noted.      Impression: 1. Marked cardiomegaly and mild pulmonary vascular congestion. 2. New and fairly extensive right basilar atelectasis and effusion.   D: 11/30/2021 E: 11/30/2021  This report was finalized on 12/1/2021 10:46 AM by Dr. Jw Madrigal MD.      XR Chest 1 View    Result Date: 12/1/2021  EXAMINATION: XR CHEST 1 VW-  INDICATION: eval effusion; S30.3XXA-Contusion of anus, initial encounter; N18-Undjkaf effusion, not elsewhere classified; R09.02-Hypoxemia  TECHNIQUE: Frontal view of the chest  COMPARISON:  11/30/2021  FINDINGS:  Redemonstration of a small right layering pleural effusion which appears similar or minimally increased in size although increased conspicuity of right pleural fluid on today's exam may reflect semiupright technique versus upright technique from yesterday's exam. Similar associated right lung base parenchymal opacities. Similar mild streaky retrocardiac parenchymal opacities persist. No pneumothorax. Stable cardiomediastinal silhouette demonstrating cardiomegaly.      Impression:  Redemonstration of a layering right pleural effusion which appears similar to minimally increased in size from prior although increased conspicuity may reflect semiupright technique versus upright technique on yesterday's exam. Right lung base parenchymal opacities persist. Otherwise no significant interval change.  This report was finalized on 12/1/2021 6:39 AM by Jose Samayoa MD.            I have reviewed the medications:  Scheduled Meds:dilTIAZem CD, 240 mg, Oral, Q24H  donepezil, 10 mg, Oral, Daily  lactobacillus acidophilus, 1 capsule, Oral, Daily  lisinopril, 20 mg, Oral, Daily  nystatin, , Topical, Q12H  pravastatin, 40 mg, Oral, Daily  QUEtiapine, 25 mg, Oral, Q12H  senna-docusate sodium, 1 tablet, Oral, BID  sodium chloride, 10 mL, Intravenous, Q12H  tamsulosin, 0.4 mg, Oral, Daily  traZODone, 50 mg, Oral, Nightly      Continuous Infusions:   PRN Meds:.•  acetaminophen **OR** acetaminophen **OR** acetaminophen  •  senna-docusate sodium **AND** polyethylene glycol **AND** bisacodyl **AND** bisacodyl  •  haloperidol lactate  •  HYDROcodone-acetaminophen  •  ipratropium-albuterol  •  Morphine **AND** naloxone  •  ondansetron **OR** ondansetron  •  [COMPLETED] Insert peripheral IV **AND** sodium chloride  •  sodium chloride    Assessment/Plan   Assessment & Plan     Active Hospital Problems    Diagnosis  POA   • Contusion of anus [S30.3XXA]  Yes   • Dementia (HCC) [F03.90]  Yes   • Hypoxia [R09.02]  Yes   •  Pleural effusion, bilateral [J90]  Unknown   • Permanent atrial fibrillation (HCC) [I48.21]  Yes   • Essential hypertension [I10]  Yes   • Mixed hyperlipidemia [E78.2]  Yes      Resolved Hospital Problems   No resolved problems to display.        Brief Hospital Course to date:  Mike Lucero Jr. is a 89 y.o. male with Afib on coumadin, brought after a fall with gluteal hematoma, found to have mult progressing medical problems atop dementia.    Gluteal hematoma post fall on coumadin   -- pain control  -  hgb down today 2g , INR 2.6, will give FFP   - hold Coumadin and ASA.    - This is first fall; usually walks without cane or walker; discussed w wife the question of stopped coumadin  -- PT.OT to see; was unable to walk due to pain at home.   -- fall precautions     Confusion, agitation   Dementia x 2 yrs but at home he makes own breakfast and dresses himself; walks without cane or walker   - will DC means if no clots seen on irrigation   - add seroquel at night      Hypoxia  jamey pleural effusions, new right basilar atelectasis and effusion   - diuresed 3400ml w IV lasix 60 mg  -- signif R effusion - plan IR when INR corrects       Cirrhosis on CT and US  Ascites increased from a month ago  -- IV lasix times 2 doses  -- nl hep panel  - plan conservative tx     Dementia  - lownl B12 - replace here   - nl TSHand ammonia   -- cont home meds   --As needed Haldol, scheduled Seroquel     HTN  HLD  -- cont home meds      Chronic Afib with coumadin   -- cont cardizem  -- hold coumadin for now      Urinary retention - small volume voids are chronic  - -Placed Means; did not DC 12/1 due to trauma from pulling with clots seen.  DC today if no more clots  -  flomax started        DVT prophylaxis:  Mechanical DVT prophylaxis orders are present.       AM-PAC 6 Clicks Score (PT): 14 (12/01/21 0907)    Disposition: I expect the patient to be discharged tbd - if he cannot walk he would need SNF     CODE STATUS:   Code Status and  Medical Interventions:   Ordered at: 11/30/21 1724     Medical Intervention Limits:    NO intubation (DNI)     Level Of Support Discussed With:    Health Care Surrogate     Code Status (Patient has no pulse and is not breathing):    No CPR (Do Not Attempt to Resuscitate)     Medical Interventions (Patient has pulse or is breathing):    Limited Support     Comments:    wife       Pat Kelly MD  12/02/21

## 2021-12-03 ENCOUNTER — APPOINTMENT (OUTPATIENT)
Dept: CT IMAGING | Facility: HOSPITAL | Age: 86
End: 2021-12-03

## 2021-12-03 LAB
ANION GAP SERPL CALCULATED.3IONS-SCNC: 8 MMOL/L (ref 5–15)
BH BB BLOOD EXPIRATION DATE: NORMAL
BH BB BLOOD EXPIRATION DATE: NORMAL
BH BB BLOOD TYPE BARCODE: 5100
BH BB BLOOD TYPE BARCODE: 9500
BH BB DISPENSE STATUS: NORMAL
BH BB DISPENSE STATUS: NORMAL
BH BB PRODUCT CODE: NORMAL
BH BB PRODUCT CODE: NORMAL
BH BB UNIT NUMBER: NORMAL
BH BB UNIT NUMBER: NORMAL
BUN SERPL-MCNC: 21 MG/DL (ref 8–23)
BUN/CREAT SERPL: 25.9 (ref 7–25)
CALCIUM SPEC-SCNC: 8 MG/DL (ref 8.6–10.5)
CHLORIDE SERPL-SCNC: 92 MMOL/L (ref 98–107)
CO2 SERPL-SCNC: 32 MMOL/L (ref 22–29)
CREAT SERPL-MCNC: 0.81 MG/DL (ref 0.76–1.27)
DEPRECATED RDW RBC AUTO: 49.2 FL (ref 37–54)
ERYTHROCYTE [DISTWIDTH] IN BLOOD BY AUTOMATED COUNT: 13.6 % (ref 12.3–15.4)
GFR SERPL CREATININE-BSD FRML MDRD: 90 ML/MIN/1.73
GLUCOSE BLDC GLUCOMTR-MCNC: 145 MG/DL (ref 70–130)
GLUCOSE SERPL-MCNC: 96 MG/DL (ref 65–99)
HCT VFR BLD AUTO: 23.6 % (ref 37.5–51)
HGB BLD-MCNC: 8 G/DL (ref 13–17.7)
INR PPP: 1.8 (ref 0.85–1.16)
MAGNESIUM SERPL-MCNC: 1.8 MG/DL (ref 1.6–2.4)
MCH RBC QN AUTO: 33.1 PG (ref 26.6–33)
MCHC RBC AUTO-ENTMCNC: 33.9 G/DL (ref 31.5–35.7)
MCV RBC AUTO: 97.5 FL (ref 79–97)
PLATELET # BLD AUTO: 142 10*3/MM3 (ref 140–450)
PMV BLD AUTO: 9.4 FL (ref 6–12)
POTASSIUM SERPL-SCNC: 3.5 MMOL/L (ref 3.5–5.2)
PROTHROMBIN TIME: 20.2 SECONDS (ref 11.4–14.4)
RBC # BLD AUTO: 2.42 10*6/MM3 (ref 4.14–5.8)
SODIUM SERPL-SCNC: 132 MMOL/L (ref 136–145)
UNIT  ABO: NORMAL
UNIT  ABO: NORMAL
UNIT  RH: NORMAL
UNIT  RH: NORMAL
WBC NRBC COR # BLD: 8.12 10*3/MM3 (ref 3.4–10.8)

## 2021-12-03 PROCEDURE — 80048 BASIC METABOLIC PNL TOTAL CA: CPT | Performed by: INTERNAL MEDICINE

## 2021-12-03 PROCEDURE — 36430 TRANSFUSION BLD/BLD COMPNT: CPT

## 2021-12-03 PROCEDURE — 25010000002 FUROSEMIDE PER 20 MG: Performed by: INTERNAL MEDICINE

## 2021-12-03 PROCEDURE — 97162 PT EVAL MOD COMPLEX 30 MIN: CPT

## 2021-12-03 PROCEDURE — 85610 PROTHROMBIN TIME: CPT | Performed by: INTERNAL MEDICINE

## 2021-12-03 PROCEDURE — 70450 CT HEAD/BRAIN W/O DYE: CPT

## 2021-12-03 PROCEDURE — 86927 PLASMA FRESH FROZEN: CPT

## 2021-12-03 PROCEDURE — 99233 SBSQ HOSP IP/OBS HIGH 50: CPT | Performed by: INTERNAL MEDICINE

## 2021-12-03 PROCEDURE — 85027 COMPLETE CBC AUTOMATED: CPT | Performed by: INTERNAL MEDICINE

## 2021-12-03 PROCEDURE — P9017 PLASMA 1 DONOR FRZ W/IN 8 HR: HCPCS

## 2021-12-03 PROCEDURE — 82962 GLUCOSE BLOOD TEST: CPT

## 2021-12-03 PROCEDURE — 83735 ASSAY OF MAGNESIUM: CPT | Performed by: PHYSICIAN ASSISTANT

## 2021-12-03 PROCEDURE — 25010000002 CYANOCOBALAMIN PER 1000 MCG: Performed by: INTERNAL MEDICINE

## 2021-12-03 RX ORDER — FUROSEMIDE 10 MG/ML
40 INJECTION INTRAMUSCULAR; INTRAVENOUS DAILY
Status: COMPLETED | OUTPATIENT
Start: 2021-12-03 | End: 2021-12-07

## 2021-12-03 RX ADMIN — DILTIAZEM HYDROCHLORIDE 240 MG: 240 CAPSULE, COATED, EXTENDED RELEASE ORAL at 08:24

## 2021-12-03 RX ADMIN — QUETIAPINE FUMARATE 25 MG: 25 TABLET ORAL at 08:24

## 2021-12-03 RX ADMIN — SENNOSIDES AND DOCUSATE SODIUM 1 TABLET: 50; 8.6 TABLET ORAL at 08:24

## 2021-12-03 RX ADMIN — DONEPEZIL HYDROCHLORIDE 10 MG: 10 TABLET, FILM COATED ORAL at 08:24

## 2021-12-03 RX ADMIN — FUROSEMIDE 40 MG: 10 INJECTION, SOLUTION INTRAMUSCULAR; INTRAVENOUS at 10:28

## 2021-12-03 RX ADMIN — PRAVASTATIN SODIUM 40 MG: 40 TABLET ORAL at 08:24

## 2021-12-03 RX ADMIN — NYSTATIN: 100000 POWDER TOPICAL at 08:25

## 2021-12-03 RX ADMIN — CYANOCOBALAMIN 1000 MCG: 1000 INJECTION, SOLUTION INTRAMUSCULAR; SUBCUTANEOUS at 08:24

## 2021-12-03 RX ADMIN — NYSTATIN: 100000 POWDER TOPICAL at 20:58

## 2021-12-03 RX ADMIN — SODIUM CHLORIDE, PRESERVATIVE FREE 10 ML: 5 INJECTION INTRAVENOUS at 20:55

## 2021-12-03 RX ADMIN — Medication 1 CAPSULE: at 08:24

## 2021-12-03 RX ADMIN — TAMSULOSIN HYDROCHLORIDE 0.4 MG: 0.4 CAPSULE ORAL at 08:24

## 2021-12-03 RX ADMIN — LISINOPRIL 20 MG: 20 TABLET ORAL at 08:24

## 2021-12-03 NOTE — PLAN OF CARE
Goal Outcome Evaluation:  Plan of Care Reviewed With: patient           Outcome Summary: PT eval completed. Limited assessment due to cognitive status, lethargy, and inability to follow commands. Pt nichelle PROM to BLE's and UE's, muscle guarding noted, but no agitation noted. VSS. PT to see 3x/wk until able to participate at higher level. PT rec d/c SNF.

## 2021-12-03 NOTE — CASE MANAGEMENT/SOCIAL WORK
Continued Stay Note  [facility]     Patient Name: Mike Lucero Jr.  MRN: [id number]  Today's Date: 12/3/2021    Admit Date: 11/30/2021     Discharge Plan     Row Name 12/03/21 1532       Plan    Plan Ongoing    Patient/Family in Agreement with Plan yes    Plan Comments I spoke with pt's wife at the bedside. I discussed with her what  job is. We discussed that pt was receiving 2 units of PRBC today. We discussed putting D/C plan on hold for the moment due to pt's condition. Wife states she wants to see how pt does over weekend and how he is on Monday. I told her a CM would f/u on Monday, wife is agreeable with that. Dr. Kelly stated in rounds that pt is declining and she had discussed with wife and daughter possibilities of Palliative/Hospice being consulted. No needs voiced or identified at this time. CM will continue to follow.    Final Discharge Disposition Code 30 - still a patient               Discharge Codes    No documentation.               Expected Discharge Date and Time     Expected Discharge Date Expected Discharge Time    Dec 8, 2021             Lyla Thompsno RN

## 2021-12-03 NOTE — PROGRESS NOTES
[facility] Medicine Services  PROGRESS NOTE    Patient Name: Mike Lucero Jr.  : 1932  MRN: [id number]    Date of Admission: 2021  Primary Care Physician: Subha Wesley MD    Subjective   Subjective     CC: Fall at home     HPI -  Wife not present.  He is sleeping though he says an incoherent word or two in response to me, keeping eyes closed, and squeezes my fingers with each hand.   Staff notes that he groans when turned and is generally either agitated or sleepy.      Means was removed yesterday; hematuria continues (recent means trauma)    ROS: UTO   hematuria   Still agitated at times     Objective   Objective     Vital Signs:   Temp:  [97.9 °F (36.6 °C)-99.1 °F (37.3 °C)] 97.9 °F (36.6 °C)  Heart Rate:  [49-93] 93  Resp:  [16-22] 18  BP: ()/(47-77) 128/63  Flow (L/min):  [3-5] 3     Physical Exam:  Constitutional: Asleep in bed, groans a bit in response to my voice, does not open his eyes, does squeeze my hand bilaterally.    HENT: NCAT, mucous membranes moist  Neck: Supple,  trachea midline  Respiratory: Clear to auscultation bilaterally, nonlabored respirations   Cardiovascular: RRR, no murmurs  Gastrointestinal: Positive bowel sounds, soft, nontender, mod distended  Musculoskeletal: tr bilateral ankle edema, no clubbing or cyanosis to extremities  Neurologic: sleeping   Skin: No rashes.  Hematoma R hip does look to have extended toward the groin.         Results Reviewed:  LAB RESULTS:      Lab 21  0529 21  0342 21  0801 21  1137   WBC 8.12 10.96* 9.14 8.26   HEMOGLOBIN 8.0* 9.2* 11.3* 11.2*   HEMATOCRIT 23.6* 27.5* 33.4* 35.0*   PLATELETS 142 158 173 165   NEUTROS ABS  --   --  6.88 6.41   IMMATURE GRANS (ABS)  --   --  0.04 0.04   LYMPHS ABS  --   --  1.12 1.07   MONOS ABS  --   --  0.92* 0.60   EOS ABS  --   --  0.15 0.11   MCV 97.5* 96.2 95.2 100.0*   PROCALCITONIN  --  0.09  --   --    LACTATE  --  1.0  --   --    PROTIME  20.2* 27.0*  --  30.3*         Lab 12/03/21  0529 12/02/21  0342 12/01/21  0431 11/30/21  1137   SODIUM 132* 133* 129* 134*   POTASSIUM 3.5 3.8 3.8 3.9   CHLORIDE 92* 91* 93* 99   CO2 32.0* 34.0* 29.0 28.0   ANION GAP 8.0 8.0 7.0 7.0   BUN 21 15 9 12   CREATININE 0.81 0.94 0.79 0.73*   GLUCOSE 96 112* 114* 126*   CALCIUM 8.0* 8.0* 8.1* 8.4*   MAGNESIUM 1.8  --   --   --    PHOSPHORUS  --   --  4.0  --    TSH  --  1.270  --   --          Lab 12/02/21  0342 12/01/21 0431 11/30/21  1137   TOTAL PROTEIN 5.3*  --  5.6*   ALBUMIN 3.40* 3.50 3.30*   GLOBULIN 1.9  --  2.3   ALT (SGPT) 7  --  8   AST (SGOT) 13  --  14   BILIRUBIN 2.0*  --  1.0   ALK PHOS 74  --  82         Lab 12/03/21  0529 12/02/21  0342 11/30/21  1137   PROBNP  --   --  4,268.0*   PROTIME 20.2* 27.0* 30.3*   INR 1.80* 2.63* 3.06*             Lab 12/02/21  1159 12/02/21  0342   VITAMIN B 12  --  245   ABO TYPING O  --    RH TYPING Positive  --    ANTIBODY SCREEN Negative  --          Brief Urine Lab Results  (Last result in the past 365 days)      Color   Clarity   Blood   Leuk Est   Nitrite   Protein   CREAT   Urine HCG        12/02/21 0309 Orange   Turbid   Large (3+)   Moderate (2+)   Positive   >=300 mg/dL (3+)                 Microbiology Results Abnormal     Procedure Component Value - Date/Time    COVID PRE-OP / PRE-PROCEDURE SCREENING ORDER (NO ISOLATION) - Swab, Nasopharynx [[id number]]  (Normal) Collected: 11/30/21 1212    Lab Status: Final result Specimen: Swab from Nasopharynx Updated: 11/30/21 1257    Narrative:      The following orders were created for panel order COVID PRE-OP / PRE-PROCEDURE SCREENING ORDER (NO ISOLATION) - Swab, Nasopharynx.  Procedure                               Abnormality         Status                     ---------                               -----------         ------                     COVID-19, ABBOTT IN-HOUS...[[id number]]  Normal              Final result                 Please view results for these tests on  the individual orders.    COVID-19, ABBOTT IN-HOUSE,NASAL Swab (NO TRANSPORT MEDIA) 2 HR TAT - Swab, Nasopharynx [[id number]]  (Normal) Collected: 11/30/21 1212    Lab Status: Final result Specimen: Swab from Nasopharynx Updated: 11/30/21 1257     COVID19 Presumptive Negative    Narrative:      Fact sheet for providers: https://www.fda.gov/media/250685/download     Fact sheet for patients: https://www.fda.gov/media/797176/download    Test performed by PCR.  If inconsistent with clinical signs and symptoms patient should be tested with different authorized molecular test.          US Liver    Result Date: 12/1/2021  EXAMINATION: US LIVER- 12/01/2021  INDICATION: cirrhosis; S30.3XXA-Contusion of anus, initial encounter; I69-Qzeqwig effusion, not elsewhere classified; R09.02-Hypoxemia  TECHNIQUE: Ultrasound right upper quadrant abdomen and liver  COMPARISON: CT abdomen and pelvis dated 11/30/2021  FINDINGS:  Visualized portions of the pancreas within normal limits.  Liver demonstrates coarsened echotexture throughout the heterogeneous liver with nodular contours of a cirrhotic hepatic morphology hypertrophy/atrophy complex without focal liver lesion evident in a largely atrophic hepatic parenchyma of limited evaluation with adjacent ascites.  Gallbladder contains echogenic foci with shadowing of cholelithiasis along with intermixed sludge however no wall thickening or pericholecystic fluid.  Common bile duct 6 to 7 mm in diameter.  Right kidney measures 11.3 cm in length without evidence of hydronephrosis, contour deforming mass or obvious calculi.      Impression: Cirrhotic hepatic morphology without focal liver lesion in a limited evaluation due to largely atrophic hepatic parenchyma. Perihepatic ascites is noted.  D:  12/01/2021 E:  12/01/2021  This report was finalized on 12/1/2021 4:32 PM by Dr. Veto Hein.      XR Chest 1 View    Result Date: 12/2/2021  CR Chest 1 Vw INDICATION: Hypoxia. Pleural effusion.  COMPARISON:  12/1/2021 FINDINGS: Single portable AP view(s) of the chest. Moderate to large right pleural effusion is unchanged. Consolidation in the right lung is stable. There is a small left pleural effusion with left basilar consolidation as well. Cardiomegaly is noted. No pneumothorax.     Impression: No significant interval change. Signer Name: Ghassan Howard MD  Signed: 12/2/2021 3:48 AM  Workstation Name: [facility]  [facility]          I have reviewed the medications:  Scheduled Meds:cyanocobalamin, 1,000 mcg, Intramuscular, Daily  dilTIAZem CD, 240 mg, Oral, Q24H  donepezil, 10 mg, Oral, Daily  lactobacillus acidophilus, 1 capsule, Oral, Daily  lisinopril, 20 mg, Oral, Daily  nystatin, , Topical, Q12H  pravastatin, 40 mg, Oral, Daily  QUEtiapine, 25 mg, Oral, Q12H  senna-docusate sodium, 1 tablet, Oral, BID  sodium chloride, 10 mL, Intravenous, Q12H  tamsulosin, 0.4 mg, Oral, Daily  traZODone, 50 mg, Oral, Nightly      Continuous Infusions:   PRN Meds:.•  acetaminophen **OR** acetaminophen **OR** acetaminophen  •  senna-docusate sodium **AND** polyethylene glycol **AND** bisacodyl **AND** bisacodyl  •  haloperidol lactate  •  HYDROcodone-acetaminophen  •  ipratropium-albuterol  •  Morphine **AND** naloxone  •  ondansetron **OR** ondansetron  •  [COMPLETED] Insert peripheral IV **AND** sodium chloride  •  sodium chloride    Assessment/Plan   Assessment & Plan     Active Hospital Problems    Diagnosis  POA   • Confusion [R41.0]  Yes   • Hematoma of hip, right, initial encounter [S70.01XA]  Yes   • Dementia (HCC) [F03.90]  Yes   • Hypoxia [R09.02]  Yes   • Pleural effusion, bilateral [J90]  Unknown   • Permanent atrial fibrillation (HCC) [I48.21]  Yes   • Essential hypertension [I10]  Yes   • Mixed hyperlipidemia [E78.2]  Yes      Resolved Hospital Problems   No resolved problems to display.        Brief Hospital Course to date:  Mike SCHULZ Nic Ballesteros is a 89 y.o. male with Afib on  "coumadin, brought after a fall with gluteal hematoma, found to have mult progressing medical problems atop dementia.    Gluteal hematoma post fall at home on coumadin   -- pain control  -  hgb continues to trend down despite FFP given yesterday; will repeat FFP today since INR still 1.8.  -  holding Coumadin and ASA.    - This is first fall; usually walks without cane or walker; discussed w wife the question of stopped coumadin  -- PT.OT to see; was unable to walk due to pain at home.   -- fall precautions     Confusion, agitation   Dementia x 2 yrs but at home he makes own breakfast and dresses himself; walks without cane or walker   -  seroquel at night   - check head CT      Hypoxia  jamey pleural effusions, new right basilar atelectasis and effusion   - diuresed 3400ml w IV lasix 60 mg  -- signif R effusion - cpnsider IR drainage when INR corrects  - lasix x 3 days        Cirrhosis on CT and US  Ascites increased from a month ago  - plan conservative tx w diuresis   - ammonia nl     Dementia  - lownl B12 - replace here   - nl TSHand ammonia   -- cont home meds   --As needed Haldol, scheduled Seroquel     HTN  HLD  -- cont home meds      Chronic Afib with coumadin   -- cont cardizem  -- hold coumadin for now      Urinary retention - small volume voids are chronic  BPH   - -Placed Zacarias on admission.  Pt tugged on it and developed hematuria.  Zacarias DCd on 12/2.   -  flomax started     GOC - wife and stepdtr are involved and request 'be honest with us.\".  His stepdtr works for [employer], [employer].  His failure ot rally mentally is a concern.  He did not have a head CT on admission - will do that now       DVT prophylaxis:  Mechanical DVT prophylaxis orders are present.       AM-PAC 6 Clicks Score (PT): 14 (12/02/21 0800)    Disposition: I expect the patient to be discharged tbd - if he cannot walk he would need SNF     CODE STATUS:   Code Status and Medical Interventions:   Ordered at: 11/30/21 4148     Medical " Intervention Limits:    NO intubation (DNI)     Level Of Support Discussed With:    Health Care Surrogate     Code Status (Patient has no pulse and is not breathing):    No CPR (Do Not Attempt to Resuscitate)     Medical Interventions (Patient has pulse or is breathing):    Limited Support     Comments:    wife       Pat Kelly MD  12/03/21

## 2021-12-03 NOTE — THERAPY EVALUATION
Patient Name: Mike Lucero Jr.  : 1932    MRN: [id number]                              Today's Date: 12/3/2021       Admit Date: 2021    Visit Dx:     ICD-10-CM ICD-9-CM   1. Contusion of anus, initial encounter  S30.3XXA 922.9   2. Chronic bilateral pleural effusions  J90 511.9   3. Hypoxia  R09.02 799.02   4. Impaired functional mobility, balance, gait, and endurance  Z74.09 V49.89     Patient Active Problem List   Diagnosis   • Mixed hyperlipidemia   • Coronary artery disease   • Permanent atrial fibrillation (HCC)   • Essential hypertension   • Hypercholesterolemia   • h/o Endocarditis   • Osteoarthritis   • Cataract   • Seasonal allergies   • Insomnia   • RELL (obstructive sleep apnea)   • Left leg cellulitis   • Mild cognitive impairment   • Chronic anticoagulation   • Benign prostatic hyperplasia with nocturia   • Impaired glucose tolerance   • Microscopic hematuria   • Umbilical hernia   • IgM deficiency (HCC)   • Class 1 obesity due to excess calories with serious comorbidity and body mass index (BMI) of 32.0 to 32.9 in adult   • Onychomycosis of toenail   • Chronic rhinitis   • Chronic atrial fibrillation (HCC)   • Vitamin D insufficiency   • Basal cell carcinoma (BCC) of skin of neck   • Bilateral hearing loss   • Pleural effusion, bilateral   • Hematoma of hip, right, initial encounter   • Dementia (HCC)   • Hypoxia   • Confusion     Past Medical History:   Diagnosis Date   • Atrial fibrillation (HCC)    • Cataract    • Chronic anticoagulation    • Coronary artery disease    • Dementia (HCC)    • Diverticulosis    • Gallstones    • History of endocarditis    • HLD (hyperlipidemia)    • HTN (hypertension)    • Osteoarthritis    • Seasonal allergies     Seasonal allergies/rhinitis.      Past Surgical History:   Procedure Laterality Date   • ADRENAL GLAND SURGERY      Dr. Jj Kee   • APPENDECTOMY  1950   • BLEPHAROPLASTY  2014    Dr. Santamaria   • COLONOSCOPY     • CORONARY  ARTERY BYPASS GRAFT  07/2006    CABG for 3-vessel disease, July 2006.   • INGUINAL HERNIA REPAIR Left     1963 and 09/2012   • TONSILLECTOMY  01/1958      General Information     [facility] Name 12/03/21 0933          Physical Therapy Time and Intention    Document Type evaluation  -     Mode of Treatment physical therapy  -[facility] Name 12/03/21 09          General Information    Patient Profile Reviewed yes  -     Prior Level of Function independent:; all household mobility; gait; transfer; bed mobility; min assist:; ADL's  -     Existing Precautions/Restrictions fall; oxygen therapy device and L/min  -     Barriers to Rehab previous functional deficit; cognitive status  -[facility] Name 12/03/21 0933          Living Environment    Lives With spouse  -[facility] Name 12/03/21 0933          Home Main Entrance    Number of Stairs, Main Entrance one  -     Stair Railings, Main Entrance none  -[facility] Name 12/03/21 0933          Stairs Within Home, Primary    Number of Stairs, Within Home, Primary none  -[facility] Name 12/03/21 0933          Cognition    Orientation Status (Cognition) unable/difficult to assess  -[facility] Name 12/03/21 0933          Safety Issues, Functional Mobility    Safety Issues Affecting Function (Mobility) ability to follow commands; awareness of need for assistance; insight into deficits/self-awareness; judgment; problem-solving  -     Impairments Affecting Function (Mobility) cognition  -     Cognitive Impairments, Mobility Safety/Performance attention; insight into deficits/self-awareness; judgment; problem-solving/reasoning; safety precaution awareness; sequencing abilities  -     Comment, Safety Issues/Impairments (Mobility) pt lethargic, unable to follow commands  -           User Key  (r) = Recorded By, (t) = Taken By, (c) = Cosigned By    Initials Name Provider Type    Ghislaine Simpson, PT Physical Therapist               Mobility     Row Name 12/03/21 1032           Bed Mobility    Comment (Bed Mobility) unable to assess due to cog status/lethargic  -[facility] Name 12/03/21 1033          Transfers    Comment (Transfers) not safe to attempt to do cognitive status/lethargy  -           User Key  (r) = Recorded By, (t) = Taken By, (c) = Cosigned By    Initials Name Provider Type    Ghislaine Simpson PT Physical Therapist               Obj/Interventions     [facility] Name 12/03/21 1035          Range of Motion Comprehensive    General Range of Motion lower extremity range of motion deficits identified  -     Comment, General Range of Motion limited due to muscle guarding  -[facility] Name 12/03/21 1035          Strength Comprehensive (MMT)    General Manual Muscle Testing (MMT) Assessment lower extremity strength deficits identified  -     Comment, General Manual Muscle Testing (MMT) Assessment unable to formally assess  -[facility] Name 12/03/21 1035          Motor Skills    Therapeutic Exercise hip; shoulder; knee; ankle  -[facility] 12/03/21 1035          Shoulder (Therapeutic Exercise)    Shoulder PROM (Therapeutic Exercise) bilateral; flexion; horizontal aBduction/aDduction; supine; 10 repetitions  -SJ     Row Name 12/03/21 1035          Hip (Therapeutic Exercise)    Hip (Therapeutic Exercise) PROM (passive range of motion)  -     Hip PROM (Therapeutic Exercise) bilateral; flexion; aBduction; aDduction; external rotation; internal rotation; supine; 10 repetitions  -SJ     Row Name 12/03/21 1035          Knee (Therapeutic Exercise)    Knee (Therapeutic Exercise) PROM (passive range of motion)  -     Knee PROM (Therapeutic Exercise) bilateral; flexion; extension; supine; 10 repetitions  -SJ     Row Name 12/03/21 1035          Ankle (Therapeutic Exercise)    Ankle (Therapeutic Exercise) PROM (passive range of motion)  -     Ankle PROM (Therapeutic Exercise) bilateral; dorsiflexion; plantarflexion; supine; 10 repetitions  -           User Key  (r) = Recorded  By, (t) = Taken By, (c) = Cosigned By    Initials Name Provider Type    Ghislaine Simpson, PT Physical Therapist               Goals/Plan     Row Name 12/03/21 1041          Bed Mobility Goal 1 (PT)    Activity/Assistive Device (Bed Mobility Goal 1, PT) sit to supine; supine to sit  -SJ     [place] Level/Cues Needed (Bed Mobility Goal 1, PT) minimum assist (75% or more patient effort); 1 person assist  -SJ     Time Frame (Bed Mobility Goal 1, PT) long term goal (LTG); 2 weeks  -SJ     Row Name 12/03/21 1041          Transfer Goal 1 (PT)    Activity/Assistive Device (Transfer Goal 1, PT) sit-to-stand/stand-to-sit; bed-to-chair/chair-to-bed  -SJ     [place] Level/Cues Needed (Transfer Goal 1, PT) minimum assist (75% or more patient effort); 1 person assist  -SJ     Row Name 12/03/21 1041          Gait Training Goal 1 (PT)    Activity/Assistive Device (Gait Training Goal 1, PT) gait (walking locomotion); walker, rolling  -SJ     [place] Level (Gait Training Goal 1, PT) minimum assist (75% or more patient effort); 2 person assist  -SJ     Distance (Gait Training Goal 1, PT) 20  -SJ     Time Frame (Gait Training Goal 1, PT) long term goal (LTG); 2 weeks  -SJ           User Key  (r) = Recorded By, (t) = Taken By, (c) = Cosigned By    Initials Name Provider Type    Ghislaine Simpson, PT Physical Therapist               Clinical Impression     Row Name 12/03/21 1037          Pain    Additional Documentation Pain Scale: FACES Pre/Post-Treatment (Group)  -[facility] Name 12/03/21 1037          Pain Scale: FACES Pre/Post-Treatment    Pain: FACES Scale, Pretreatment 2-->hurts little bit  -SJ     Posttreatment Pain Rating 2-->hurts little bit  -SJ     Pain Location - Orientation generalized  -[facility] Name 12/03/21 1037          Plan of Care Review    Plan of Care Reviewed With patient  -SJ     Outcome Summary PT eval completed. Limited assessment due to cognitive status, lethargy, and inability to follow  commands. Pt nichelle PROM to BLE's and UE's, muscle guarding noted, but no agitation noted. VSS. PT to see 3x/wk until able to participate at higher level. PT rec d/c SNF.  -     Row Name 12/03/21 1037          Therapy Assessment/Plan (PT)    Patient/Family Therapy Goals Statement (PT) did not state  -     Rehab Potential (PT) fair, will monitor progress closely  -     Criteria for Skilled Interventions Met (PT) yes; skilled treatment is necessary  -     Predicted Duration of Therapy Intervention (PT) 2wks  -     Row Name 12/03/21 1037          Vital Signs    Pre Systolic BP Rehab 123  -SJ     Pre Treatment Diastolic BP 54  -SJ     Intra Systolic BP Rehab --  -SJ     Intra Treatment Diastolic BP --  -SJ     Post Systolic BP Rehab --  -SJ     Post Treatment Diastolic BP --  -SJ     Pretreatment Heart Rate (beats/min) 58  -SJ     Posttreatment Heart Rate (beats/min) --  -SJ     Pre SpO2 (%) 98  -SJ     O2 Delivery Pre Treatment nasal cannula  -SJ     Intra SpO2 (%) --  -SJ     O2 Delivery Intra Treatment --  -SJ     Post SpO2 (%) --  -SJ     O2 Delivery Post Treatment --  -SJ     Pre Patient Position Supine  -SJ     Intra Patient Position Supine  -SJ     Post Patient Position Supine  -SJ     Row Name 12/03/21 1037          Positioning and Restraints    Pre-Treatment Position in bed  -SJ     Post Treatment Position bed  -SJ     In Bed fowlers; call light within reach; encouraged to call for assist; waffle boots/both; heels elevated; exit alarm on  -           User Key  (r) = Recorded By, (t) = Taken By, (c) = Cosigned By    Initials Name Provider Type     Ghislaine Briones, PT Physical Therapist               Outcome Measures     Row Name 12/03/21 1044          How much help from another person do you currently need...    Turning from your back to your side while in flat bed without using bedrails? 1  -SJ     Moving from lying on back to sitting on the side of a flat bed without bedrails? 1  -SJ     Moving to  and from a bed to a chair (including a wheelchair)? 1  -SJ     Standing up from a chair using your arms (e.g., wheelchair, bedside chair)? 1  -SJ     Climbing 3-5 steps with a railing? 1  -SJ     To walk in hospital room? 1  -SJ     AM-PAC 6 Clicks Score (PT) 6  -     Row Name 12/03/21 1044          Functional Assessment    Outcome Measure Options AM-PAC 6 Clicks Basic Mobility (PT)  -           User Key  (r) = Recorded By, (t) = Taken By, (c) = Cosigned By    Initials Name Provider Type    SJ Ghislaine Briones, MORENA Physical Therapist                             Physical Therapy Education                 Title: PT OT SLP Therapies (In Progress)     Topic: Physical Therapy (In Progress)     Point: Mobility training (Not Started)     Learner Progress:  Not documented in this visit.          Point: Home exercise program (In Progress)     Learning Progress Summary           Patient Nonacceptance, E,D, NR,NL by  at 12/3/2021 1045                   Point: Body mechanics (Not Started)     Learner Progress:  Not documented in this visit.          Point: Precautions (Not Started)     Learner Progress:  Not documented in this visit.                      User Key     Initials Effective Dates Name Provider Type Discipline     06/16/21 -  Ghislaine Briones PT Physical Therapist PT              PT Recommendation and Plan  Planned Therapy Interventions (PT): balance training, bed mobility training, gait training, home exercise program, strengthening, patient/family education, transfer training  Plan of Care Reviewed With: patient  Outcome Summary: PT eval completed. Limited assessment due to cognitive status, lethargy, and inability to follow commands. Pt nichelle PROM to BLE's and UE's, muscle guarding noted, but no agitation noted. VSS. PT to see 3x/wk until able to participate at higher level. PT rec d/c SNF.     Time Calculation:    PT Charges     Row Name 12/03/21 1045             Time Calculation    Start Time 0933  -       PT Non-Billable Time (min) 46 min  -      PT Received On 12/03/21  -      PT Goal Re-Cert Due Date 12/13/21  -            User Key  (r) = Recorded By, (t) = Taken By, (c) = Cosigned By    Initials Name Provider Type    Ghislaine Simpson PT Physical Therapist              Therapy Charges for Today     Code Description Service Date Service Provider Modifiers Qty    [id number] HC PT EVAL MOD COMPLEXITY 4 12/3/2021 Ghislaine Briones PT GP 1          PT G-Codes  Outcome Measure Options: AM-PAC 6 Clicks Basic Mobility (PT)  AM-PAC 6 Clicks Score (PT): 6  AM-PAC 6 Clicks Score (OT): 10    Ghislaine Briones PT  12/3/2021

## 2021-12-04 LAB
ANION GAP SERPL CALCULATED.3IONS-SCNC: 6 MMOL/L (ref 5–15)
BH BB BLOOD EXPIRATION DATE: NORMAL
BH BB BLOOD EXPIRATION DATE: NORMAL
BH BB BLOOD TYPE BARCODE: 5100
BH BB BLOOD TYPE BARCODE: NORMAL
BH BB DISPENSE STATUS: NORMAL
BH BB DISPENSE STATUS: NORMAL
BH BB PRODUCT CODE: NORMAL
BH BB PRODUCT CODE: NORMAL
BH BB UNIT NUMBER: NORMAL
BH BB UNIT NUMBER: NORMAL
BUN SERPL-MCNC: 20 MG/DL (ref 8–23)
BUN/CREAT SERPL: 27 (ref 7–25)
CALCIUM SPEC-SCNC: 8.3 MG/DL (ref 8.6–10.5)
CHLORIDE SERPL-SCNC: 93 MMOL/L (ref 98–107)
CO2 SERPL-SCNC: 36 MMOL/L (ref 22–29)
CREAT SERPL-MCNC: 0.74 MG/DL (ref 0.76–1.27)
DEPRECATED RDW RBC AUTO: 50.6 FL (ref 37–54)
ERYTHROCYTE [DISTWIDTH] IN BLOOD BY AUTOMATED COUNT: 13.7 % (ref 12.3–15.4)
GFR SERPL CREATININE-BSD FRML MDRD: 100 ML/MIN/1.73
GLUCOSE SERPL-MCNC: 99 MG/DL (ref 65–99)
HCT VFR BLD AUTO: 23.6 % (ref 37.5–51)
HGB BLD-MCNC: 7.9 G/DL (ref 13–17.7)
INR PPP: 1.42 (ref 0.85–1.16)
MAGNESIUM SERPL-MCNC: 2 MG/DL (ref 1.6–2.4)
MCH RBC QN AUTO: 33.6 PG (ref 26.6–33)
MCHC RBC AUTO-ENTMCNC: 33.5 G/DL (ref 31.5–35.7)
MCV RBC AUTO: 100.4 FL (ref 79–97)
PHOSPHATE SERPL-MCNC: 3.6 MG/DL (ref 2.5–4.5)
PLATELET # BLD AUTO: 153 10*3/MM3 (ref 140–450)
PMV BLD AUTO: 9.5 FL (ref 6–12)
POTASSIUM SERPL-SCNC: 3.4 MMOL/L (ref 3.5–5.2)
POTASSIUM SERPL-SCNC: 3.7 MMOL/L (ref 3.5–5.2)
PROTHROMBIN TIME: 16.9 SECONDS (ref 11.4–14.4)
RBC # BLD AUTO: 2.35 10*6/MM3 (ref 4.14–5.8)
SODIUM SERPL-SCNC: 135 MMOL/L (ref 136–145)
UNIT  ABO: NORMAL
UNIT  ABO: NORMAL
UNIT  RH: NORMAL
UNIT  RH: NORMAL
WBC NRBC COR # BLD: 7.95 10*3/MM3 (ref 3.4–10.8)

## 2021-12-04 PROCEDURE — 80048 BASIC METABOLIC PNL TOTAL CA: CPT | Performed by: INTERNAL MEDICINE

## 2021-12-04 PROCEDURE — 25010000002 FUROSEMIDE PER 20 MG: Performed by: INTERNAL MEDICINE

## 2021-12-04 PROCEDURE — 84132 ASSAY OF SERUM POTASSIUM: CPT | Performed by: INTERNAL MEDICINE

## 2021-12-04 PROCEDURE — 84100 ASSAY OF PHOSPHORUS: CPT | Performed by: INTERNAL MEDICINE

## 2021-12-04 PROCEDURE — 25010000002 CYANOCOBALAMIN PER 1000 MCG: Performed by: INTERNAL MEDICINE

## 2021-12-04 PROCEDURE — 85610 PROTHROMBIN TIME: CPT | Performed by: INTERNAL MEDICINE

## 2021-12-04 PROCEDURE — 0 POTASSIUM CHLORIDE 10 MEQ/100ML SOLUTION: Performed by: INTERNAL MEDICINE

## 2021-12-04 PROCEDURE — 85027 COMPLETE CBC AUTOMATED: CPT | Performed by: INTERNAL MEDICINE

## 2021-12-04 PROCEDURE — 99232 SBSQ HOSP IP/OBS MODERATE 35: CPT | Performed by: INTERNAL MEDICINE

## 2021-12-04 PROCEDURE — 83735 ASSAY OF MAGNESIUM: CPT | Performed by: INTERNAL MEDICINE

## 2021-12-04 PROCEDURE — 25010000002 ERTAPENEM PER 500 MG: Performed by: INTERNAL MEDICINE

## 2021-12-04 RX ORDER — MAGNESIUM SULFATE HEPTAHYDRATE 40 MG/ML
2 INJECTION, SOLUTION INTRAVENOUS AS NEEDED
Status: DISCONTINUED | OUTPATIENT
Start: 2021-12-04 | End: 2021-12-15 | Stop reason: HOSPADM

## 2021-12-04 RX ORDER — POTASSIUM CHLORIDE 7.45 MG/ML
10 INJECTION INTRAVENOUS
Status: DISCONTINUED | OUTPATIENT
Start: 2021-12-04 | End: 2021-12-15 | Stop reason: HOSPADM

## 2021-12-04 RX ORDER — POTASSIUM CHLORIDE 1.5 G/1.77G
40 POWDER, FOR SOLUTION ORAL AS NEEDED
Status: DISCONTINUED | OUTPATIENT
Start: 2021-12-04 | End: 2021-12-15 | Stop reason: HOSPADM

## 2021-12-04 RX ORDER — POTASSIUM CHLORIDE 750 MG/1
40 CAPSULE, EXTENDED RELEASE ORAL AS NEEDED
Status: DISCONTINUED | OUTPATIENT
Start: 2021-12-04 | End: 2021-12-15 | Stop reason: HOSPADM

## 2021-12-04 RX ORDER — MAGNESIUM SULFATE HEPTAHYDRATE 40 MG/ML
4 INJECTION, SOLUTION INTRAVENOUS AS NEEDED
Status: DISCONTINUED | OUTPATIENT
Start: 2021-12-04 | End: 2021-12-15 | Stop reason: HOSPADM

## 2021-12-04 RX ADMIN — POTASSIUM CHLORIDE 10 MEQ: 7.46 INJECTION, SOLUTION INTRAVENOUS at 15:32

## 2021-12-04 RX ADMIN — LISINOPRIL 20 MG: 20 TABLET ORAL at 08:28

## 2021-12-04 RX ADMIN — SENNOSIDES AND DOCUSATE SODIUM 1 TABLET: 50; 8.6 TABLET ORAL at 08:28

## 2021-12-04 RX ADMIN — ERTAPENEM 1 G: 1 INJECTION, POWDER, LYOPHILIZED, FOR SOLUTION INTRAMUSCULAR; INTRAVENOUS at 10:13

## 2021-12-04 RX ADMIN — Medication 1 CAPSULE: at 08:28

## 2021-12-04 RX ADMIN — POTASSIUM CHLORIDE 10 MEQ: 7.46 INJECTION, SOLUTION INTRAVENOUS at 12:45

## 2021-12-04 RX ADMIN — SENNOSIDES AND DOCUSATE SODIUM 1 TABLET: 50; 8.6 TABLET ORAL at 20:42

## 2021-12-04 RX ADMIN — ACETAMINOPHEN 650 MG: 325 TABLET, FILM COATED ORAL at 08:28

## 2021-12-04 RX ADMIN — FUROSEMIDE 40 MG: 10 INJECTION, SOLUTION INTRAMUSCULAR; INTRAVENOUS at 08:28

## 2021-12-04 RX ADMIN — SODIUM CHLORIDE, PRESERVATIVE FREE 10 ML: 5 INJECTION INTRAVENOUS at 08:29

## 2021-12-04 RX ADMIN — DONEPEZIL HYDROCHLORIDE 10 MG: 10 TABLET, FILM COATED ORAL at 08:28

## 2021-12-04 RX ADMIN — NYSTATIN: 100000 POWDER TOPICAL at 08:28

## 2021-12-04 RX ADMIN — TAMSULOSIN HYDROCHLORIDE 0.4 MG: 0.4 CAPSULE ORAL at 08:28

## 2021-12-04 RX ADMIN — PRAVASTATIN SODIUM 40 MG: 40 TABLET ORAL at 08:28

## 2021-12-04 RX ADMIN — TRAZODONE HYDROCHLORIDE 50 MG: 50 TABLET ORAL at 20:42

## 2021-12-04 RX ADMIN — QUETIAPINE FUMARATE 25 MG: 25 TABLET ORAL at 20:42

## 2021-12-04 RX ADMIN — SODIUM CHLORIDE, PRESERVATIVE FREE 10 ML: 5 INJECTION INTRAVENOUS at 20:42

## 2021-12-04 RX ADMIN — POTASSIUM CHLORIDE 10 MEQ: 7.46 INJECTION, SOLUTION INTRAVENOUS at 14:07

## 2021-12-04 RX ADMIN — NYSTATIN: 100000 POWDER TOPICAL at 20:42

## 2021-12-04 RX ADMIN — CYANOCOBALAMIN 1000 MCG: 1000 INJECTION, SOLUTION INTRAMUSCULAR; SUBCUTANEOUS at 10:13

## 2021-12-04 RX ADMIN — QUETIAPINE FUMARATE 25 MG: 25 TABLET ORAL at 08:28

## 2021-12-04 RX ADMIN — POTASSIUM CHLORIDE 10 MEQ: 7.46 INJECTION, SOLUTION INTRAVENOUS at 11:25

## 2021-12-04 NOTE — PROGRESS NOTES
[facility] Medicine Services  PROGRESS NOTE    Patient Name: Mike Lucero Jr.  : 1932  MRN: [id number]    Date of Admission: 2021  Primary Care Physician: Subha Wesley MD    Subjective   Subjective     CC: Fall at home     HPI -    Resting in bed in no acute distress but tells me that he is not comfortable.  He denies pain or shortness of breath however.  No fever or chills.  No chest pain or palpitation or shortness of breath.  No nausea, vomiting, diarrhea, abdominal pain.        ROS  As above    Objective   Objective     Vital Signs:   Temp:  [97.3 °F (36.3 °C)-98.6 °F (37 °C)] 98.6 °F (37 °C)  Heart Rate:  [48-84] 56  Resp:  [18-20] 18  BP: ()/(47-73) 107/47  Flow (L/min):  [3] 3     Physical Exam:  Constitutional: No acute distress  HENT: NCAT, mucous membranes moist  Respiratory: Clear to auscultation bilaterally, respiratory effort normal   Cardiovascular: RRR, no murmurs, rubs, or gallops  Gastrointestinal: Positive bowel sounds, soft, nontender, nondistended  Musculoskeletal: No bilateral ankle edema, right flank hematoma.  Psychiatric: Appropriate affect, cooperative  Neurologic: Awake, alert, follows commands, no focality appreciated.  Speech clear  Skin: No rashes         Results Reviewed:  LAB RESULTS:      Lab 21  0448 21  0529 21  0342 21  0801 21  1137   WBC 7.95 8.12 10.96* 9.14 8.26   HEMOGLOBIN 7.9* 8.0* 9.2* 11.3* 11.2*   HEMATOCRIT 23.6* 23.6* 27.5* 33.4* 35.0*   PLATELETS 153 142 158 173 165   NEUTROS ABS  --   --   --  6.88 6.41   IMMATURE GRANS (ABS)  --   --   --  0.04 0.04   LYMPHS ABS  --   --   --  1.12 1.07   MONOS ABS  --   --   --  0.92* 0.60   EOS ABS  --   --   --  0.15 0.11   .4* 97.5* 96.2 95.2 100.0*   PROCALCITONIN  --   --  0.09  --   --    LACTATE  --   --  1.0  --   --    PROTIME 16.9* 20.2* 27.0*  --  30.3*         Lab 21  0448 21  0529 21  0342 21  0431  11/30/21  1137   SODIUM 135* 132* 133* 129* 134*   POTASSIUM 3.4* 3.5 3.8 3.8 3.9   CHLORIDE 93* 92* 91* 93* 99   CO2 36.0* 32.0* 34.0* 29.0 28.0   ANION GAP 6.0 8.0 8.0 7.0 7.0   BUN 20 21 15 9 12   CREATININE 0.74* 0.81 0.94 0.79 0.73*   GLUCOSE 99 96 112* 114* 126*   CALCIUM 8.3* 8.0* 8.0* 8.1* 8.4*   MAGNESIUM 2.0 1.8  --   --   --    PHOSPHORUS 3.6  --   --  4.0  --    TSH  --   --  1.270  --   --          Lab 12/02/21  0342 12/01/21  0431 11/30/21  1137   TOTAL PROTEIN 5.3*  --  5.6*   ALBUMIN 3.40* 3.50 3.30*   GLOBULIN 1.9  --  2.3   ALT (SGPT) 7  --  8   AST (SGOT) 13  --  14   BILIRUBIN 2.0*  --  1.0   ALK PHOS 74  --  82         Lab 12/04/21  0448 12/03/21  0529 12/02/21  0342 11/30/21  1137   PROBNP  --   --   --  4,268.0*   PROTIME 16.9* 20.2* 27.0* 30.3*   INR 1.42* 1.80* 2.63* 3.06*             Lab 12/02/21  1159 12/02/21  0342   VITAMIN B 12  --  245   ABO TYPING O  --    RH TYPING Positive  --    ANTIBODY SCREEN Negative  --          Brief Urine Lab Results  (Last result in the past 365 days)      Color   Clarity   Blood   Leuk Est   Nitrite   Protein   CREAT   Urine HCG        12/02/21 0309 Orange   Turbid   Large (3+)   Moderate (2+)   Positive   >=300 mg/dL (3+)                 Microbiology Results Abnormal     Procedure Component Value - Date/Time    COVID PRE-OP / PRE-PROCEDURE SCREENING ORDER (NO ISOLATION) - Swab, Nasopharynx [[id number]]  (Normal) Collected: 11/30/21 1212    Lab Status: Final result Specimen: Swab from Nasopharynx Updated: 11/30/21 1257    Narrative:      The following orders were created for panel order COVID PRE-OP / PRE-PROCEDURE SCREENING ORDER (NO ISOLATION) - Swab, Nasopharynx.  Procedure                               Abnormality         Status                     ---------                               -----------         ------                     COVID-19, ABBOTT IN-HOUS...[[id number]]  Normal              Final result                 Please view results for these  tests on the individual orders.    COVID-19, ABBOTT IN-HOUSE,NASAL Swab (NO TRANSPORT MEDIA) 2 HR TAT - Swab, Nasopharynx [[id number]]  (Normal) Collected: 11/30/21 1212    Lab Status: Final result Specimen: Swab from Nasopharynx Updated: 11/30/21 1257     COVID19 Presumptive Negative    Narrative:      Fact sheet for providers: https://www.fda.gov/media/916012/download     Fact sheet for patients: https://www.fda.gov/media/486154/download    Test performed by PCR.  If inconsistent with clinical signs and symptoms patient should be tested with different authorized molecular test.          CT Head Without Contrast    Result Date: 12/3/2021  PROCEDURE: CT Head WO COMPARISON: November 2019 INDICATIONS: confusion, rule out bleed;Contusion of anus, initial encounter;Pleural effusion, not elsewhere classified;Hypoxemia;Other reduced mobility Additional Relevant clinical info: Confusion, fall, r/o bleed.;Best images possible, scan range of pt motion repeated.  TECHNIQUE:  CT examination of the brain is performed without IV contrast. Radiation dose reduction techniques included automated exposure control or exposure modulation based on body size.  Count of known CT and cardiac nuc med studies performed in previous 12 months: 2.  FINDINGS:  Significant motion artifact seen throughout the examination sequences had to be repeated in order to try to obtain a diagnostic examination. Study is limited as a result. Moderate cortical atrophy seen somewhat asymmetric worse on the left than the right. It is possible that the left extra-axial space which is stable from 2019 could be an old chronic hygroma. No gross acute subdural collection seen. No definite acute intracranial hemorrhage mass effect or midline shift. Moderate cortical atrophy present. There is mild cortical atrophy. Periventricular low attenuation is likely secondary to small vessel ischemic disease. The ventricular system is not dilated.  The calvarium is grossly  intact. However examination is limited with reconstructions performed of small areas of field-of-view.     Impression: No gross acute intracranial process. Probable small vessel ischemic disease.  Signer Name: Cinda Trinidad MD  Signed: 12/3/2021 9:47 PM  Workstation Name: [facility]  [facility]          I have reviewed the medications:  Scheduled Meds:cyanocobalamin, 1,000 mcg, Intramuscular, Daily  dilTIAZem CD, 240 mg, Oral, Q24H  donepezil, 10 mg, Oral, Daily  ertapenem, 1 g, Intravenous, Q24H  furosemide, 40 mg, Intravenous, Daily  lactobacillus acidophilus, 1 capsule, Oral, Daily  lisinopril, 20 mg, Oral, Daily  nystatin, , Topical, Q12H  pravastatin, 40 mg, Oral, Daily  QUEtiapine, 25 mg, Oral, Q12H  senna-docusate sodium, 1 tablet, Oral, BID  sodium chloride, 10 mL, Intravenous, Q12H  tamsulosin, 0.4 mg, Oral, Daily  traZODone, 50 mg, Oral, Nightly      Continuous Infusions:   PRN Meds:.•  acetaminophen **OR** acetaminophen **OR** acetaminophen  •  senna-docusate sodium **AND** polyethylene glycol **AND** bisacodyl **AND** bisacodyl  •  haloperidol lactate  •  HYDROcodone-acetaminophen  •  ipratropium-albuterol  •  magnesium sulfate **OR** magnesium sulfate **OR** magnesium sulfate  •  Morphine **AND** naloxone  •  ondansetron **OR** ondansetron  •  potassium chloride **OR** potassium chloride **OR** potassium chloride  •  potassium phosphate infusion greater than 15 mMoles **OR** potassium phosphate infusion greater than 15 mMoles **OR** potassium phosphate **OR** sodium phosphate IVPB **OR** sodium phosphate IVPB **OR** sodium phosphate IVPB  •  [COMPLETED] Insert peripheral IV **AND** sodium chloride  •  sodium chloride    Assessment/Plan   Assessment & Plan     Active Hospital Problems    Diagnosis  POA   • Confusion [R41.0]  Yes   • Hematoma of hip, right, initial encounter [S70.01XA]  Yes   • Dementia (HCC) [F03.90]  Yes   • Hypoxia [R09.02]  Yes   • Pleural effusion,  bilateral [J90]  Unknown   • Permanent atrial fibrillation (HCC) [I48.21]  Yes   • Essential hypertension [I10]  Yes   • Mixed hyperlipidemia [E78.2]  Yes      Resolved Hospital Problems   No resolved problems to display.        Brief Hospital Course to date:  Mike Lucero Jr. is a 89 y.o. male with Afib on coumadin, brought after a fall with gluteal hematoma, found to have mult progressing medical problems atop dementia.    Gluteal hematoma post fall at home on coumadin   -- pain control  -Monitor hemoglobin    Confusion, agitation   Dementia x 2 yrs but at home he makes own breakfast and dresses himself; walks without cane or walker   -  seroquel at night   -Currently seems to be at baseline.     Hypoxia  jamey pleural effusions, new right basilar atelectasis and effusion   - diuresed 3400ml w IV lasix 60 mg  -- signif right pleural effusion   - cpnsider IR drainage when INR corrects      Cirrhosis on CT and US  Ascites increased from a month ago  - plan conservative tx w diuresis   - ammonia nl     Dementia  - lownl B12 - replace here   - nl TSHand ammonia   -- cont home meds   --As needed Haldol, scheduled Seroquel     HTN  HLD  -- cont home meds      Chronic Afib with coumadin   -- cont cardizem  -- hold coumadin for now      Urinary retention - small volume voids are chronic  BPH   - -Placed Zacarias on admission.  Pt tugged on it and developed hematuria.  Zacarias DCd on 12/2.   -Currently on flomax          DVT prophylaxis:  Mechanical DVT prophylaxis orders are present.       AM-PAC 6 Clicks Score (PT): 6 (12/04/21 9133)    Disposition: I expect the patient to be discharged tbd - if he cannot walk he would need SNF     CODE STATUS:   Code Status and Medical Interventions:   Ordered at: 11/30/21 6866     Medical Intervention Limits:    NO intubation (DNI)     Level Of Support Discussed With:    Health Care Surrogate     Code Status (Patient has no pulse and is not breathing):    No CPR (Do Not Attempt to  Resuscitate)     Medical Interventions (Patient has pulse or is breathing):    Limited Support     Comments:    wife       Librado Ricketts MD  12/04/21

## 2021-12-04 NOTE — PLAN OF CARE
Problem: Fall Injury Risk  Goal: Absence of Fall and Fall-Related Injury  Outcome: Ongoing, Progressing  Intervention: Identify and Manage Contributors to Fall Injury Risk  Recent Flowsheet Documentation  Taken 12/4/2021 1800 by Snehal Escobedo RN  Medication Review/Management: medications reviewed  Taken 12/4/2021 1610 by Snehal Escobedo RN  Medication Review/Management: medications reviewed  Taken 12/4/2021 1417 by Snehal Escobedo RN  Medication Review/Management: medications reviewed  Taken 12/4/2021 1213 by Snehal Escobedo RN  Medication Review/Management: medications reviewed  Taken 12/4/2021 1120 by Snehal Escobedo RN  Medication Review/Management: medications reviewed  Taken 12/4/2021 1000 by Snehal Escobedo RN  Medication Review/Management: medications reviewed  Taken 12/4/2021 0940 by Snehal Escobedo RN  Medication Review/Management: medications reviewed  Taken 12/4/2021 0815 by Snehal Escobedo RN  Medication Review/Management: medications reviewed  Intervention: Promote Injury-Free Environment  Recent Flowsheet Documentation  Taken 12/4/2021 1800 by Snehal Escobedo RN  Safety Promotion/Fall Prevention: (sitter at bedside) safety round/check completed  Taken 12/4/2021 1640 by Snehal Escobedo RN  Safety Promotion/Fall Prevention: (sitter at bedside) safety round/check completed  Taken 12/4/2021 1610 by Snehal Escobedo RN  Safety Promotion/Fall Prevention: safety round/check completed  Taken 12/4/2021 1417 by Snehal Escobedo RN  Safety Promotion/Fall Prevention: safety round/check completed  Taken 12/4/2021 1213 by Snehal Escobedo RN  Safety Promotion/Fall Prevention: safety round/check completed  Taken 12/4/2021 1120 by Snehal Escobedo RN  Safety Promotion/Fall Prevention: safety round/check completed  Taken 12/4/2021 1000 by Snehal Escobedo RN  Safety Promotion/Fall Prevention: safety round/check completed  Taken 12/4/2021 0940 by Senhal Escobedo RN  Safety Promotion/Fall  Prevention: safety round/check completed  Taken 12/4/2021 0815 by Snehal Escobedo RN  Safety Promotion/Fall Prevention: (sitter at bedside)   safety round/check completed   lighting adjusted   clutter free environment maintained     Problem: Skin Injury Risk Increased  Goal: Skin Health and Integrity  Outcome: Ongoing, Progressing  Intervention: Optimize Skin Protection  Recent Flowsheet Documentation  Taken 12/4/2021 1800 by Snehal Escobedo RN  Head of Bed (HOB): HOB at 30-45 degrees  Taken 12/4/2021 1640 by Snehal Escobedo RN  Head of Bed (HOB): HOB at 30-45 degrees  Taken 12/4/2021 1610 by Snehal Escobedo RN  Pressure Reduction Techniques:   heels elevated off bed   positioned off wounds   pressure points protected   weight shift assistance provided  Head of Bed (HOB): HOB at 30-45 degrees  Pressure Reduction Devices:   heel offloading device utilized   positioning supports utilized  Skin Protection:   adhesive use limited   incontinence pads utilized   tubing/devices free from skin contact  Taken 12/4/2021 1417 by Snehal Escobedo RN  Pressure Reduction Techniques:   heels elevated off bed   positioned off wounds   pressure points protected   weight shift assistance provided  Head of Bed (HOB): HOB at 30-45 degrees  Pressure Reduction Devices:   heel offloading device utilized   positioning supports utilized  Skin Protection:   adhesive use limited   incontinence pads utilized   tubing/devices free from skin contact  Taken 12/4/2021 1213 by Snehal Escobedo RN  Pressure Reduction Techniques:   heels elevated off bed   positioned off wounds   pressure points protected   weight shift assistance provided  Head of Bed (HOB): HOB at 30-45 degrees  Pressure Reduction Devices:   heel offloading device utilized   positioning supports utilized  Skin Protection:   adhesive use limited   incontinence pads utilized   tubing/devices free from skin contact  Taken 12/4/2021 1120 by Snehal Escobedo RN  Head of Bed  (HOB): HOB at 30-45 degrees  Taken 12/4/2021 1000 by Snehal Escobedo RN  Pressure Reduction Techniques:   heels elevated off bed   positioned off wounds   pressure points protected   weight shift assistance provided  Head of Bed (HOB): HOB at 30-45 degrees  Pressure Reduction Devices:   heel offloading device utilized   positioning supports utilized  Skin Protection:   adhesive use limited   incontinence pads utilized   tubing/devices free from skin contact  Taken 12/4/2021 0940 by Snehal Escobedo RN  Head of Bed (HOB): HOB at 30-45 degrees  Taken 12/4/2021 0815 by Snehal Escobdeo RN  Pressure Reduction Techniques:   heels elevated off bed   positioned off wounds   pressure points protected   weight shift assistance provided  Head of Bed (HOB): HOB at 45 degrees  Pressure Reduction Devices:   heel offloading device utilized   positioning supports utilized  Skin Protection:   adhesive use limited   incontinence pads utilized   tubing/devices free from skin contact  Intervention: Promote and Optimize Oral Intake  Recent Flowsheet Documentation  Taken 12/4/2021 0815 by Snehal Escobedo RN  Oral Nutrition Promotion: rest periods promoted     Problem: Adult Inpatient Plan of Care  Goal: Plan of Care Review  Outcome: Ongoing, Progressing  Flowsheets (Taken 12/4/2021 1820)  Progress: no change  Plan of Care Reviewed With:   patient   spouse  Outcome Summary: Patient remains drowsy throughout shift. Patient oriented to self only, but pleasantly confused. Restless at times, but only with need to void. No restraints or prn sedatives needed. Arouses to voice or repeated commands. Patient is hard of hearing. CELIS. Generalized weakness. Only complained of pain with activity (turning). Tolerating 3L NC to maintain 02 sat >90%. Lung sounds diminished. Lasix 40mg administered this am per order. HR 40s-60s afib. Cardizem held. Hospitalist made aware this am. SBP 90s-110s throughout shift. Edema improving. Appetite minimal. Too  drowsy for dinner this evening. Stated he was not hungry. Frequent voiding with urgency. One small bowel movement this afternoon. Remains afebrile. Potassium replaced and recheck ordered per protocol. Plan of care discussed with patient's wife Glenis via telephone. R hip/gluteal hematoma present/stable.    Goal Outcome Evaluation:

## 2021-12-04 NOTE — NURSING NOTE
Spoke with Dr. Ricketts at bedside. Informed him of the following: that the patient is very drowsy, but arousable with an uneventful night, administered Lasix this am as well as Invanz (will monitor for reaction), held cardizem this morning due to bradycardia, kidney function normal, ok to replace electrolytes per protocol, adding magnesium and phosphorous draw to am labs. Wife updated.

## 2021-12-05 ENCOUNTER — APPOINTMENT (OUTPATIENT)
Dept: GENERAL RADIOLOGY | Facility: HOSPITAL | Age: 86
End: 2021-12-05

## 2021-12-05 LAB
ANION GAP SERPL CALCULATED.3IONS-SCNC: 7 MMOL/L (ref 5–15)
BUN SERPL-MCNC: 17 MG/DL (ref 8–23)
BUN/CREAT SERPL: 27 (ref 7–25)
CALCIUM SPEC-SCNC: 8 MG/DL (ref 8.6–10.5)
CHLORIDE SERPL-SCNC: 92 MMOL/L (ref 98–107)
CO2 SERPL-SCNC: 33 MMOL/L (ref 22–29)
CREAT SERPL-MCNC: 0.63 MG/DL (ref 0.76–1.27)
DEPRECATED RDW RBC AUTO: 47.9 FL (ref 37–54)
ERYTHROCYTE [DISTWIDTH] IN BLOOD BY AUTOMATED COUNT: 13.5 % (ref 12.3–15.4)
GFR SERPL CREATININE-BSD FRML MDRD: 120 ML/MIN/1.73
GLUCOSE SERPL-MCNC: 90 MG/DL (ref 65–99)
HCT VFR BLD AUTO: 23.8 % (ref 37.5–51)
HGB BLD-MCNC: 8.1 G/DL (ref 13–17.7)
INR PPP: 1.42 (ref 0.85–1.16)
MCH RBC QN AUTO: 32.8 PG (ref 26.6–33)
MCHC RBC AUTO-ENTMCNC: 34 G/DL (ref 31.5–35.7)
MCV RBC AUTO: 96.4 FL (ref 79–97)
PLATELET # BLD AUTO: 181 10*3/MM3 (ref 140–450)
PMV BLD AUTO: 9.1 FL (ref 6–12)
POTASSIUM SERPL-SCNC: 3.5 MMOL/L (ref 3.5–5.2)
PROTHROMBIN TIME: 16.9 SECONDS (ref 11.4–14.4)
RBC # BLD AUTO: 2.47 10*6/MM3 (ref 4.14–5.8)
SODIUM SERPL-SCNC: 132 MMOL/L (ref 136–145)
WBC NRBC COR # BLD: 6.56 10*3/MM3 (ref 3.4–10.8)

## 2021-12-05 PROCEDURE — 25010000002 CYANOCOBALAMIN PER 1000 MCG: Performed by: INTERNAL MEDICINE

## 2021-12-05 PROCEDURE — 25010000002 FUROSEMIDE PER 20 MG: Performed by: INTERNAL MEDICINE

## 2021-12-05 PROCEDURE — 85610 PROTHROMBIN TIME: CPT | Performed by: INTERNAL MEDICINE

## 2021-12-05 PROCEDURE — 80048 BASIC METABOLIC PNL TOTAL CA: CPT | Performed by: INTERNAL MEDICINE

## 2021-12-05 PROCEDURE — 99233 SBSQ HOSP IP/OBS HIGH 50: CPT | Performed by: INTERNAL MEDICINE

## 2021-12-05 PROCEDURE — 85027 COMPLETE CBC AUTOMATED: CPT | Performed by: INTERNAL MEDICINE

## 2021-12-05 PROCEDURE — 71045 X-RAY EXAM CHEST 1 VIEW: CPT

## 2021-12-05 PROCEDURE — 25010000002 ERTAPENEM PER 500 MG: Performed by: INTERNAL MEDICINE

## 2021-12-05 RX ADMIN — ERTAPENEM 1 G: 1 INJECTION, POWDER, LYOPHILIZED, FOR SOLUTION INTRAMUSCULAR; INTRAVENOUS at 10:24

## 2021-12-05 RX ADMIN — CYANOCOBALAMIN 1000 MCG: 1000 INJECTION, SOLUTION INTRAMUSCULAR; SUBCUTANEOUS at 08:23

## 2021-12-05 RX ADMIN — TRAZODONE HYDROCHLORIDE 50 MG: 50 TABLET ORAL at 20:18

## 2021-12-05 RX ADMIN — POTASSIUM CHLORIDE 40 MEQ: 750 CAPSULE, EXTENDED RELEASE ORAL at 08:24

## 2021-12-05 RX ADMIN — NYSTATIN: 100000 POWDER TOPICAL at 08:23

## 2021-12-05 RX ADMIN — Medication 1 CAPSULE: at 08:24

## 2021-12-05 RX ADMIN — DONEPEZIL HYDROCHLORIDE 10 MG: 10 TABLET, FILM COATED ORAL at 08:23

## 2021-12-05 RX ADMIN — SENNOSIDES AND DOCUSATE SODIUM 1 TABLET: 50; 8.6 TABLET ORAL at 08:23

## 2021-12-05 RX ADMIN — QUETIAPINE FUMARATE 25 MG: 25 TABLET ORAL at 08:27

## 2021-12-05 RX ADMIN — TAMSULOSIN HYDROCHLORIDE 0.4 MG: 0.4 CAPSULE ORAL at 08:24

## 2021-12-05 RX ADMIN — LISINOPRIL 20 MG: 20 TABLET ORAL at 08:23

## 2021-12-05 RX ADMIN — SODIUM CHLORIDE, PRESERVATIVE FREE 10 ML: 5 INJECTION INTRAVENOUS at 08:27

## 2021-12-05 RX ADMIN — SODIUM CHLORIDE, PRESERVATIVE FREE 10 ML: 5 INJECTION INTRAVENOUS at 20:18

## 2021-12-05 RX ADMIN — NYSTATIN: 100000 POWDER TOPICAL at 20:19

## 2021-12-05 RX ADMIN — SENNOSIDES AND DOCUSATE SODIUM 1 TABLET: 50; 8.6 TABLET ORAL at 20:18

## 2021-12-05 RX ADMIN — PRAVASTATIN SODIUM 40 MG: 40 TABLET ORAL at 08:23

## 2021-12-05 RX ADMIN — FUROSEMIDE 40 MG: 10 INJECTION, SOLUTION INTRAMUSCULAR; INTRAVENOUS at 08:24

## 2021-12-05 RX ADMIN — DILTIAZEM HYDROCHLORIDE 240 MG: 240 CAPSULE, COATED, EXTENDED RELEASE ORAL at 08:23

## 2021-12-05 RX ADMIN — QUETIAPINE FUMARATE 25 MG: 25 TABLET ORAL at 20:18

## 2021-12-05 NOTE — PROGRESS NOTES
[facility] Medicine Services  PROGRESS NOTE    Patient Name: Mike Lucero Jr.  : 1932  MRN: [id number]    Date of Admission: 2021  Primary Care Physician: Subha Wesley MD    Subjective   Subjective     CC: Fall at home     HPI -    Resting in bed in no acute distress but he is very drowsy although arousable.  His wife is present at the bedside.  He is also very confused.        ROS  Unable to obtain    Objective   Objective     Vital Signs:   Temp:  [98 °F (36.7 °C)-98.9 °F (37.2 °C)] 98.9 °F (37.2 °C)  Heart Rate:  [45-88] 60  Resp:  [18-20] 18  BP: (107-138)/(47-74) 127/74  Flow (L/min):  [3] 3     Physical Exam:  Constitutional: No acute distress  HENT: NCAT, mucous membranes moist  Respiratory: Clear to auscultation bilaterally, respiratory effort normal   Cardiovascular: RRR, no murmurs, rubs, or gallops  Gastrointestinal: Positive bowel sounds, soft, nontender, nondistended  Musculoskeletal: No bilateral ankle edema, right flank and gluteal hematoma.  Unable to assess  Neurologic: A sleepy, arousable, very confused, did not recognize his wife at the bedside.  Skin: No rashes, extensive ecchymosis over the right gluteal and flank area         Results Reviewed:  LAB RESULTS:      Lab 21  0425 21  0448 21  0529 21  0342 21  0801 21  1137 21  1137   WBC 6.56 7.95 8.12 10.96* 9.14   < > 8.26   HEMOGLOBIN 8.1* 7.9* 8.0* 9.2* 11.3*   < > 11.2*   HEMATOCRIT 23.8* 23.6* 23.6* 27.5* 33.4*   < > 35.0*   PLATELETS 181 153 142 158 173   < > 165   NEUTROS ABS  --   --   --   --  6.88  --  6.41   IMMATURE GRANS (ABS)  --   --   --   --  0.04  --  0.04   LYMPHS ABS  --   --   --   --  1.12  --  1.07   MONOS ABS  --   --   --   --  0.92*  --  0.60   EOS ABS  --   --   --   --  0.15  --  0.11   MCV 96.4 100.4* 97.5* 96.2 95.2   < > 100.0*   PROCALCITONIN  --   --   --  0.09  --   --   --    LACTATE  --   --   --  1.0  --   --   --     PROTIME 16.9* 16.9* 20.2* 27.0*  --   --  30.3*    < > = values in this interval not displayed.         Lab 12/05/21 0425 12/04/21 2033 12/04/21 0448 12/03/21 0529 12/02/21 0342 12/01/21 0431 12/01/21 0431   SODIUM 132*  --  135* 132* 133*  --  129*   POTASSIUM 3.5 3.7 3.4* 3.5 3.8   < > 3.8   CHLORIDE 92*  --  93* 92* 91*  --  93*   CO2 33.0*  --  36.0* 32.0* 34.0*  --  29.0   ANION GAP 7.0  --  6.0 8.0 8.0  --  7.0   BUN 17  --  20 21 15  --  9   CREATININE 0.63*  --  0.74* 0.81 0.94  --  0.79   GLUCOSE 90  --  99 96 112*  --  114*   CALCIUM 8.0*  --  8.3* 8.0* 8.0*  --  8.1*   MAGNESIUM  --   --  2.0 1.8  --   --   --    PHOSPHORUS  --   --  3.6  --   --   --  4.0   TSH  --   --   --   --  1.270  --   --     < > = values in this interval not displayed.         Lab 12/02/21 0342 12/01/21 0431 11/30/21  1137   TOTAL PROTEIN 5.3*  --  5.6*   ALBUMIN 3.40* 3.50 3.30*   GLOBULIN 1.9  --  2.3   ALT (SGPT) 7  --  8   AST (SGOT) 13  --  14   BILIRUBIN 2.0*  --  1.0   ALK PHOS 74  --  82         Lab 12/05/21 0425 12/04/21 0448 12/03/21 0529 12/02/21 0342 11/30/21  1137   PROBNP  --   --   --   --  4,268.0*   PROTIME 16.9* 16.9* 20.2* 27.0* 30.3*   INR 1.42* 1.42* 1.80* 2.63* 3.06*             Lab 12/02/21  1159 12/02/21 0342   VITAMIN B 12  --  245   ABO TYPING O  --    RH TYPING Positive  --    ANTIBODY SCREEN Negative  --          Brief Urine Lab Results  (Last result in the past 365 days)      Color   Clarity   Blood   Leuk Est   Nitrite   Protein   CREAT   Urine HCG        12/02/21 0309 Orange   Turbid   Large (3+)   Moderate (2+)   Positive   >=300 mg/dL (3+)                 Microbiology Results Abnormal     Procedure Component Value - Date/Time    COVID PRE-OP / PRE-PROCEDURE SCREENING ORDER (NO ISOLATION) - Swab, Nasopharynx [[id number]]  (Normal) Collected: 11/30/21 1212    Lab Status: Final result Specimen: Swab from Nasopharynx Updated: 11/30/21 1257    Narrative:      The following orders were  created for panel order COVID PRE-OP / PRE-PROCEDURE SCREENING ORDER (NO ISOLATION) - Swab, Nasopharynx.  Procedure                               Abnormality         Status                     ---------                               -----------         ------                     COVID-19, ABBOTT IN-HOUS...[[id number]]  Normal              Final result                 Please view results for these tests on the individual orders.    COVID-19, ABBOTT IN-HOUSE,NASAL Swab (NO TRANSPORT MEDIA) 2 HR TAT - Swab, Nasopharynx [[id number]]  (Normal) Collected: 11/30/21 1212    Lab Status: Final result Specimen: Swab from Nasopharynx Updated: 11/30/21 1257     COVID19 Presumptive Negative    Narrative:      Fact sheet for providers: https://www.fda.gov/media/893099/download     Fact sheet for patients: https://www.fda.gov/media/593944/download    Test performed by PCR.  If inconsistent with clinical signs and symptoms patient should be tested with different authorized molecular test.          CT Head Without Contrast    Result Date: 12/3/2021  PROCEDURE: CT Head WO COMPARISON: November 2019 INDICATIONS: confusion, rule out bleed;Contusion of anus, initial encounter;Pleural effusion, not elsewhere classified;Hypoxemia;Other reduced mobility Additional Relevant clinical info: Confusion, fall, r/o bleed.;Best images possible, scan range of pt motion repeated.  TECHNIQUE:  CT examination of the brain is performed without IV contrast. Radiation dose reduction techniques included automated exposure control or exposure modulation based on body size.  Count of known CT and cardiac nuc med studies performed in previous 12 months: 2.  FINDINGS:  Significant motion artifact seen throughout the examination sequences had to be repeated in order to try to obtain a diagnostic examination. Study is limited as a result. Moderate cortical atrophy seen somewhat asymmetric worse on the left than the right. It is possible that the left extra-axial  space which is stable from 2019 could be an old chronic hygroma. No gross acute subdural collection seen. No definite acute intracranial hemorrhage mass effect or midline shift. Moderate cortical atrophy present. There is mild cortical atrophy. Periventricular low attenuation is likely secondary to small vessel ischemic disease. The ventricular system is not dilated.  The calvarium is grossly intact. However examination is limited with reconstructions performed of small areas of field-of-view.     Impression: No gross acute intracranial process. Probable small vessel ischemic disease.  Signer Name: Cinda Trinidad MD  Signed: 12/3/2021 9:47 PM  Workstation Name: [facility]  [facility]          I have reviewed the medications:  Scheduled Meds:cyanocobalamin, 1,000 mcg, Intramuscular, Daily  dilTIAZem CD, 240 mg, Oral, Q24H  donepezil, 10 mg, Oral, Daily  ertapenem, 1 g, Intravenous, Q24H  furosemide, 40 mg, Intravenous, Daily  lactobacillus acidophilus, 1 capsule, Oral, Daily  lisinopril, 20 mg, Oral, Daily  nystatin, , Topical, Q12H  pravastatin, 40 mg, Oral, Daily  QUEtiapine, 25 mg, Oral, Q12H  senna-docusate sodium, 1 tablet, Oral, BID  sodium chloride, 10 mL, Intravenous, Q12H  tamsulosin, 0.4 mg, Oral, Daily  traZODone, 50 mg, Oral, Nightly      Continuous Infusions:   PRN Meds:.•  acetaminophen **OR** acetaminophen **OR** acetaminophen  •  senna-docusate sodium **AND** polyethylene glycol **AND** bisacodyl **AND** bisacodyl  •  haloperidol lactate  •  HYDROcodone-acetaminophen  •  ipratropium-albuterol  •  magnesium sulfate **OR** magnesium sulfate **OR** magnesium sulfate  •  Morphine **AND** naloxone  •  ondansetron **OR** ondansetron  •  potassium chloride **OR** potassium chloride **OR** potassium chloride  •  potassium phosphate infusion greater than 15 mMoles **OR** potassium phosphate infusion greater than 15 mMoles **OR** potassium phosphate **OR** sodium phosphate IVPB **OR**  sodium phosphate IVPB **OR** sodium phosphate IVPB  •  [COMPLETED] Insert peripheral IV **AND** sodium chloride  •  sodium chloride    Assessment/Plan   Assessment & Plan     Active Hospital Problems    Diagnosis  POA   • Confusion [R41.0]  Yes   • Hematoma of hip, right, initial encounter [S70.01XA]  Yes   • Dementia (HCC) [F03.90]  Yes   • Hypoxia [R09.02]  Yes   • Pleural effusion, bilateral [J90]  Unknown   • Permanent atrial fibrillation (HCC) [I48.21]  Yes   • Essential hypertension [I10]  Yes   • Mixed hyperlipidemia [E78.2]  Yes      Resolved Hospital Problems   No resolved problems to display.        Brief Hospital Course to date:  Mike Lucero Jr. is a 89 y.o. male with Afib on coumadin, brought after a fall with gluteal hematoma, found to have mult progressing medical problems atop dementia.    Gluteal hematoma post fall at home on coumadin   -- pain control  -Monitor hemoglobin    Confusion, agitation   Dementia x 2 yrs but at home he makes own breakfast and dresses himself; walks without cane or walker   -Currently on Seroquel 25 mg every 12 hours scheduled  -Currently mental status is worse than baseline.  Patient is very confused.     Hypoxia  jamey pleural effusions, new right basilar atelectasis and effusion   -- signif right pleural effusion  --Get a chest x-ray and cpnsider IR drainage when INR corrects      Cirrhosis on CT and US  Ascites increased from a month ago  - plan conservative tx w diuresis   - ammonia was nl     Dementia  - lownl B12 - replace here   - nl TSHand ammonia   -- cont home meds   --As needed Haldol, scheduled Seroquel     HTN  HLD  -- cont home meds      Chronic Afib with coumadin   -- cont cardizem  -- hold coumadin for now      Urinary retention - small volume voids are chronic  BPH   - -Placed Zacarias on admission.  Pt tugged on it and developed hematuria.  Zacarias DCd on 12/2.   -Currently on flomax     PLAN:  - check labs including ammonia in am  - CXR to check p-  effusion     NOTE: Had a long conversation with patient's wife at the bedside.  Answered all of her many questions to her satisfaction and explained to her the plan of care.  Total time spent 38 minutes.    DVT prophylaxis:  Mechanical DVT prophylaxis orders are present.       AM-PAC 6 Clicks Score (PT): 10 (12/05/21 0820)    Disposition: I expect the patient to be discharged tbd - if he cannot walk he would need SNF     CODE STATUS:   Code Status and Medical Interventions:   Ordered at: 11/30/21 1724     Medical Intervention Limits:    NO intubation (DNI)     Level Of Support Discussed With:    Health Care Surrogate     Code Status (Patient has no pulse and is not breathing):    No CPR (Do Not Attempt to Resuscitate)     Medical Interventions (Patient has pulse or is breathing):    Limited Support     Comments:    wife       Librado Ricketts MD  12/05/21

## 2021-12-06 LAB
ALBUMIN SERPL-MCNC: 3 G/DL (ref 3.5–5.2)
ALBUMIN/GLOB SERPL: 1.2 G/DL
ALP SERPL-CCNC: 92 U/L (ref 39–117)
ALT SERPL W P-5'-P-CCNC: 6 U/L (ref 1–41)
AMMONIA BLD-SCNC: 20 UMOL/L (ref 16–60)
ANION GAP SERPL CALCULATED.3IONS-SCNC: 7 MMOL/L (ref 5–15)
AST SERPL-CCNC: 13 U/L (ref 1–40)
BASOPHILS # BLD AUTO: 0.02 10*3/MM3 (ref 0–0.2)
BASOPHILS NFR BLD AUTO: 0.3 % (ref 0–1.5)
BILIRUB SERPL-MCNC: 2.2 MG/DL (ref 0–1.2)
BUN SERPL-MCNC: 16 MG/DL (ref 8–23)
BUN/CREAT SERPL: 22.5 (ref 7–25)
CALCIUM SPEC-SCNC: 8.1 MG/DL (ref 8.6–10.5)
CHLORIDE SERPL-SCNC: 93 MMOL/L (ref 98–107)
CO2 SERPL-SCNC: 34 MMOL/L (ref 22–29)
CREAT SERPL-MCNC: 0.71 MG/DL (ref 0.76–1.27)
DEPRECATED RDW RBC AUTO: 49.6 FL (ref 37–54)
EOSINOPHIL # BLD AUTO: 0.19 10*3/MM3 (ref 0–0.4)
EOSINOPHIL NFR BLD AUTO: 2.9 % (ref 0.3–6.2)
ERYTHROCYTE [DISTWIDTH] IN BLOOD BY AUTOMATED COUNT: 13.4 % (ref 12.3–15.4)
GFR SERPL CREATININE-BSD FRML MDRD: 104 ML/MIN/1.73
GLOBULIN UR ELPH-MCNC: 2.5 GM/DL
GLUCOSE SERPL-MCNC: 92 MG/DL (ref 65–99)
HCT VFR BLD AUTO: 25.4 % (ref 37.5–51)
HGB BLD-MCNC: 8.3 G/DL (ref 13–17.7)
IMM GRANULOCYTES # BLD AUTO: 0.04 10*3/MM3 (ref 0–0.05)
IMM GRANULOCYTES NFR BLD AUTO: 0.6 % (ref 0–0.5)
INR PPP: 1.37 (ref 0.85–1.16)
LYMPHOCYTES # BLD AUTO: 1.28 10*3/MM3 (ref 0.7–3.1)
LYMPHOCYTES NFR BLD AUTO: 19.6 % (ref 19.6–45.3)
MAGNESIUM SERPL-MCNC: 1.9 MG/DL (ref 1.6–2.4)
MCH RBC QN AUTO: 33.1 PG (ref 26.6–33)
MCHC RBC AUTO-ENTMCNC: 32.7 G/DL (ref 31.5–35.7)
MCV RBC AUTO: 101.2 FL (ref 79–97)
MONOCYTES # BLD AUTO: 0.66 10*3/MM3 (ref 0.1–0.9)
MONOCYTES NFR BLD AUTO: 10.1 % (ref 5–12)
NEUTROPHILS NFR BLD AUTO: 4.35 10*3/MM3 (ref 1.7–7)
NEUTROPHILS NFR BLD AUTO: 66.5 % (ref 42.7–76)
NRBC BLD AUTO-RTO: 0 /100 WBC (ref 0–0.2)
PHOSPHATE SERPL-MCNC: 2.5 MG/DL (ref 2.5–4.5)
PLATELET # BLD AUTO: 206 10*3/MM3 (ref 140–450)
PMV BLD AUTO: 9.1 FL (ref 6–12)
POTASSIUM SERPL-SCNC: 3.6 MMOL/L (ref 3.5–5.2)
PROT SERPL-MCNC: 5.5 G/DL (ref 6–8.5)
PROTHROMBIN TIME: 16.4 SECONDS (ref 11.4–14.4)
RBC # BLD AUTO: 2.51 10*6/MM3 (ref 4.14–5.8)
SODIUM SERPL-SCNC: 134 MMOL/L (ref 136–145)
WBC NRBC COR # BLD: 6.54 10*3/MM3 (ref 3.4–10.8)

## 2021-12-06 PROCEDURE — 85610 PROTHROMBIN TIME: CPT | Performed by: INTERNAL MEDICINE

## 2021-12-06 PROCEDURE — 25010000002 FUROSEMIDE PER 20 MG: Performed by: INTERNAL MEDICINE

## 2021-12-06 PROCEDURE — 85025 COMPLETE CBC W/AUTO DIFF WBC: CPT | Performed by: INTERNAL MEDICINE

## 2021-12-06 PROCEDURE — 97116 GAIT TRAINING THERAPY: CPT

## 2021-12-06 PROCEDURE — 25010000002 CYANOCOBALAMIN PER 1000 MCG: Performed by: INTERNAL MEDICINE

## 2021-12-06 PROCEDURE — 84100 ASSAY OF PHOSPHORUS: CPT | Performed by: INTERNAL MEDICINE

## 2021-12-06 PROCEDURE — 82140 ASSAY OF AMMONIA: CPT | Performed by: INTERNAL MEDICINE

## 2021-12-06 PROCEDURE — 97110 THERAPEUTIC EXERCISES: CPT

## 2021-12-06 PROCEDURE — 83735 ASSAY OF MAGNESIUM: CPT | Performed by: INTERNAL MEDICINE

## 2021-12-06 PROCEDURE — 25010000002 ERTAPENEM PER 500 MG: Performed by: INTERNAL MEDICINE

## 2021-12-06 PROCEDURE — 25010000002 HALOPERIDOL LACTATE PER 5 MG: Performed by: INTERNAL MEDICINE

## 2021-12-06 PROCEDURE — 97535 SELF CARE MNGMENT TRAINING: CPT

## 2021-12-06 PROCEDURE — 99233 SBSQ HOSP IP/OBS HIGH 50: CPT | Performed by: INTERNAL MEDICINE

## 2021-12-06 PROCEDURE — 80053 COMPREHEN METABOLIC PANEL: CPT | Performed by: INTERNAL MEDICINE

## 2021-12-06 PROCEDURE — 0 MAGNESIUM SULFATE 4 GM/100ML SOLUTION: Performed by: INTERNAL MEDICINE

## 2021-12-06 RX ADMIN — DONEPEZIL HYDROCHLORIDE 10 MG: 10 TABLET, FILM COATED ORAL at 09:52

## 2021-12-06 RX ADMIN — TRAZODONE HYDROCHLORIDE 50 MG: 50 TABLET ORAL at 20:35

## 2021-12-06 RX ADMIN — PRAVASTATIN SODIUM 40 MG: 40 TABLET ORAL at 09:51

## 2021-12-06 RX ADMIN — QUETIAPINE FUMARATE 25 MG: 25 TABLET ORAL at 10:04

## 2021-12-06 RX ADMIN — CYANOCOBALAMIN 1000 MCG: 1000 INJECTION, SOLUTION INTRAMUSCULAR; SUBCUTANEOUS at 09:53

## 2021-12-06 RX ADMIN — SODIUM CHLORIDE, PRESERVATIVE FREE 10 ML: 5 INJECTION INTRAVENOUS at 09:55

## 2021-12-06 RX ADMIN — NYSTATIN: 100000 POWDER TOPICAL at 23:21

## 2021-12-06 RX ADMIN — LISINOPRIL 20 MG: 20 TABLET ORAL at 09:50

## 2021-12-06 RX ADMIN — Medication 1 CAPSULE: at 09:52

## 2021-12-06 RX ADMIN — ERTAPENEM 1 G: 1 INJECTION, POWDER, LYOPHILIZED, FOR SOLUTION INTRAMUSCULAR; INTRAVENOUS at 09:54

## 2021-12-06 RX ADMIN — HALOPERIDOL LACTATE 1 MG: 5 INJECTION, SOLUTION INTRAMUSCULAR at 14:52

## 2021-12-06 RX ADMIN — SENNOSIDES AND DOCUSATE SODIUM 1 TABLET: 50; 8.6 TABLET ORAL at 20:35

## 2021-12-06 RX ADMIN — QUETIAPINE FUMARATE 25 MG: 25 TABLET ORAL at 20:36

## 2021-12-06 RX ADMIN — DILTIAZEM HYDROCHLORIDE 240 MG: 240 CAPSULE, COATED, EXTENDED RELEASE ORAL at 09:52

## 2021-12-06 RX ADMIN — TAMSULOSIN HYDROCHLORIDE 0.4 MG: 0.4 CAPSULE ORAL at 09:52

## 2021-12-06 RX ADMIN — MAGNESIUM SULFATE HEPTAHYDRATE 4 G: 40 INJECTION, SOLUTION INTRAVENOUS at 09:54

## 2021-12-06 RX ADMIN — NYSTATIN: 100000 POWDER TOPICAL at 09:54

## 2021-12-06 RX ADMIN — SENNOSIDES AND DOCUSATE SODIUM 1 TABLET: 50; 8.6 TABLET ORAL at 09:53

## 2021-12-06 RX ADMIN — FUROSEMIDE 40 MG: 10 INJECTION, SOLUTION INTRAMUSCULAR; INTRAVENOUS at 09:53

## 2021-12-06 RX ADMIN — POTASSIUM CHLORIDE 40 MEQ: 750 CAPSULE, EXTENDED RELEASE ORAL at 09:50

## 2021-12-06 RX ADMIN — SODIUM CHLORIDE, PRESERVATIVE FREE 10 ML: 5 INJECTION INTRAVENOUS at 20:35

## 2021-12-06 NOTE — PLAN OF CARE
Goal Outcome Evaluation:  Plan of Care Reviewed With: patient        Progress: improving  Outcome Summary: patient ambulated 30 feet with Mod assist x2 and rolling walker for support, patient with decreased weight bearing on right due to hip pain, bruising noticeable posterior and lateral right hip. Tech followed with recliner for safety. Distance limited by weakness, fatigue and pain. Completed B LE exercises with cues for technique and attention to task.

## 2021-12-06 NOTE — THERAPY TREATMENT NOTE
Patient Name: Mike Lucero Jr.  : 1932    MRN: [id number]                              Today's Date: 2021       Admit Date: 2021    Visit Dx:     ICD-10-CM ICD-9-CM   1. Contusion of anus, initial encounter  S30.3XXA 922.9   2. Chronic bilateral pleural effusions  J90 511.9   3. Hypoxia  R09.02 799.02   4. Impaired functional mobility, balance, gait, and endurance  Z74.09 V49.89     Patient Active Problem List   Diagnosis   • Mixed hyperlipidemia   • Coronary artery disease   • Permanent atrial fibrillation (HCC)   • Essential hypertension   • Hypercholesterolemia   • h/o Endocarditis   • Osteoarthritis   • Cataract   • Seasonal allergies   • Insomnia   • RELL (obstructive sleep apnea)   • Left leg cellulitis   • Mild cognitive impairment   • Chronic anticoagulation   • Benign prostatic hyperplasia with nocturia   • Impaired glucose tolerance   • Microscopic hematuria   • Umbilical hernia   • IgM deficiency (HCC)   • Class 1 obesity due to excess calories with serious comorbidity and body mass index (BMI) of 32.0 to 32.9 in adult   • Onychomycosis of toenail   • Chronic rhinitis   • Chronic atrial fibrillation (HCC)   • Vitamin D insufficiency   • Basal cell carcinoma (BCC) of skin of neck   • Bilateral hearing loss   • Pleural effusion, bilateral   • Hematoma of hip, right, initial encounter   • Dementia (HCC)   • Hypoxia   • Confusion     Past Medical History:   Diagnosis Date   • Atrial fibrillation (HCC)    • Cataract    • Chronic anticoagulation    • Coronary artery disease    • Dementia (HCC)    • Diverticulosis    • Gallstones    • History of endocarditis    • HLD (hyperlipidemia)    • HTN (hypertension)    • Osteoarthritis    • Seasonal allergies     Seasonal allergies/rhinitis.      Past Surgical History:   Procedure Laterality Date   • ADRENAL GLAND SURGERY      Dr. Jj Kee   • APPENDECTOMY  1950   • BLEPHAROPLASTY  2014    Dr. Santamaria   • COLONOSCOPY     • CORONARY  ARTERY BYPASS GRAFT  07/2006    CABG for 3-vessel disease, July 2006.   • INGUINAL HERNIA REPAIR Left     1963 and 09/2012   • TONSILLECTOMY  01/1958      General Information     Row Name 12/06/21 1055          Physical Therapy Time and Intention    Document Type therapy note (daily note)  -AS     Mode of Treatment physical therapy  -AS     Row Name 12/06/21 1055          General Information    Patient Profile Reviewed yes  -AS     Existing Precautions/Restrictions fall; oxygen therapy device and L/min  3L O2 per NC  -AS     Barriers to Rehab previous functional deficit; cognitive status  -AS     Row Name 12/06/21 1055          Cognition    Orientation Status (Cognition) oriented to; person  -AS     Row Name 12/06/21 1055          Safety Issues, Functional Mobility    Safety Issues Affecting Function (Mobility) ability to follow commands; awareness of need for assistance; insight into deficits/self-awareness; safety precaution awareness; safety precautions follow-through/compliance; sequencing abilities; positioning of assistive device  -AS     Impairments Affecting Function (Mobility) cognition; balance; strength; endurance/activity tolerance  -AS     Cognitive Impairments, Mobility Safety/Performance attention; awareness, need for assistance; insight into deficits/self-awareness; judgment; safety precaution awareness; safety precaution follow-through; sequencing abilities  -AS     Comment, Safety Issues/Impairments (Mobility) Patient lethargic at beginning of session but became more responsive and alert throughout session.  -AS           User Key  (r) = Recorded By, (t) = Taken By, (c) = Cosigned By    Initials Name Provider Type    AS Evelina Reed PTA Physical Therapy Assistant               Mobility     Row Name 12/06/21 1056          Bed Mobility    Supine-Sit [place] (Bed Mobility) verbal cues; moderate assist (50% patient effort); 1 person assist  -AS     Assistive Device (Bed Mobility) draw  sheet; head of bed elevated  -AS     Comment (Bed Mobility) cues for sequencing  -AS     Row Name 12/06/21 1056          Transfers    Comment (Transfers) verbal cues for sequencing and hand placement  -AS     Row Name 12/06/21 1056          Bed-Chair Transfer    Bed-Chair [place] (Transfers) verbal cues; minimum assist (75% patient effort); 2 person assist  -AS     Assistive Device (Bed-Chair Transfers) walker, front-wheeled  -AS     Row Name 12/06/21 1056          Sit-Stand Transfer    Sit-Stand [place] (Transfers) verbal cues; moderate assist (50% patient effort); 2 person assist  -AS     Assistive Device (Sit-Stand Transfers) walker, front-wheeled  -AS     Row Name 12/06/21 1056          Gait/Stairs (Locomotion)    [place] Level (Gait) verbal cues; moderate assist (50% patient effort); 2 person assist  -AS     Assistive Device (Gait) walker, front-wheeled  -AS     Distance in Feet (Gait) 30  -AS     Right Sided Gait Deviations forward flexed posture; heel strike decreased; weight shift ability decreased  -AS     Comment (Gait/Stairs) patient ambulated 30 feet with Mod assist x2 and rolling walker for support, patient with decreased weight bearing on right due to hip pain, bruising noticeable posterior and lateral right hip. Tech followed with recliner for safety. Distance limited by weakness, fatigue and pain.  -AS           User Key  (r) = Recorded By, (t) = Taken By, (c) = Cosigned By    Initials Name Provider Type    AS Evelina Reed PTA Physical Therapy Assistant               Obj/Interventions     Row Name 12/06/21 1101          Motor Skills    Therapeutic Exercise knee; ankle  -AS     Row Name 12/06/21 1101          Knee (Therapeutic Exercise)    Knee (Therapeutic Exercise) strengthening exercise  -AS     Knee Strengthening (Therapeutic Exercise) bilateral; marching while seated; LAQ (long arc quad); sitting; 10 repetitions  -AS     Row Name 12/06/21 1101          Ankle (Therapeutic  Exercise)    Ankle (Therapeutic Exercise) AROM (active range of motion)  -AS     Ankle PROM (Therapeutic Exercise) bilateral; dorsiflexion; plantarflexion; sitting; 10 repetitions  -AS           User Key  (r) = Recorded By, (t) = Taken By, (c) = Cosigned By    Initials Name Provider Type    AS Evelina Reed PTA Physical Therapy Assistant               Goals/Plan    No documentation.                Clinical Impression     Row Name 12/06/21 1102          Pain    Additional Documentation Pain Scale: FACES Pre/Post-Treatment (Group)  -AS     Row Name 12/06/21 1102          Pain Scale: FACES Pre/Post-Treatment    Pain: FACES Scale, Pretreatment 2-->hurts little bit  -AS     Posttreatment Pain Rating 4-->hurts little more  -AS     Pain Location - Side Right  -AS     Pain Location hip  -AS     [facility] 12/06/21 1102          Plan of Care Review    Plan of Care Reviewed With patient  -AS     Progress improving  -AS     Outcome Summary patient ambulated 30 feet with Mod assist x2 and rolling walker for support, patient with decreased weight bearing on right due to hip pain, bruising noticeable posterior and lateral right hip. Tech followed with recliner for safety. Distance limited by weakness, fatigue and pain. Completed B LE exercises with cues for technique and attention to task.  -AS     Row Name 12/06/21 1102          Positioning and Restraints    Pre-Treatment Position in bed  -AS     Post Treatment Position chair  -AS     In Chair reclined; call light within reach; encouraged to call for assist; exit alarm on; with family/caregiver; waffle cushion; on mechanical lift sling; legs elevated  -AS           User Key  (r) = Recorded By, (t) = Taken By, (c) = Cosigned By    Initials Name Provider Type    AS Evelina Reed PTA Physical Therapy Assistant               Outcome Measures     Row Name 12/06/21 1103          How much help from another person do you currently need...    Turning from your back to your  side while in flat bed without using bedrails? 2  -AS     Moving from lying on back to sitting on the side of a flat bed without bedrails? 2  -AS     Moving to and from a bed to a chair (including a wheelchair)? 2  -AS     Standing up from a chair using your arms (e.g., wheelchair, bedside chair)? 2  -AS     Climbing 3-5 steps with a railing? 1  -AS     To walk in hospital room? 2  -AS     AM-PAC 6 Clicks Score (PT) 11  -AS     Row Name 12/06/21 1103          Functional Assessment    Outcome Measure Options AM-PAC 6 Clicks Basic Mobility (PT)  -AS           User Key  (r) = Recorded By, (t) = Taken By, (c) = Cosigned By    Initials Name Provider Type    AS Evelina Reed PTA Physical Therapy Assistant                             Physical Therapy Education                 Title: PT OT SLP Therapies (In Progress)     Topic: Physical Therapy (In Progress)     Point: Mobility training (In Progress)     Learning Progress Summary           Patient Acceptance, E, NR by AS at 12/6/2021 1103                   Point: Home exercise program (In Progress)     Learning Progress Summary           Patient Acceptance, E, NR by AS at 12/6/2021 1103    Nonacceptance, E,D, NR,NL by  at 12/3/2021 1045                   Point: Body mechanics (In Progress)     Learning Progress Summary           Patient Acceptance, E, NR by AS at 12/6/2021 1103                   Point: Precautions (In Progress)     Learning Progress Summary           Patient Acceptance, E, NR by AS at 12/6/2021 1103                               User Key     Initials Effective Dates Name Provider Type Discipline     06/16/21 -  Ghislaine Briones, PT Physical Therapist PT    AS 06/16/21 -  Evelina Reed PTA Physical Therapy Assistant PT              PT Recommendation and Plan     Plan of Care Reviewed With: patient  Progress: improving  Outcome Summary: patient ambulated 30 feet with Mod assist x2 and rolling walker for support, patient with decreased weight  bearing on right due to hip pain, bruising noticeable posterior and lateral right hip. Tech followed with recliner for safety. Distance limited by weakness, fatigue and pain. Completed B LE exercises with cues for technique and attention to task.     Time Calculation:    PT Charges     Row Name 12/06/21 1104             Time Calculation    Start Time 0959  -AS      PT Received On 12/06/21  -AS      PT Goal Re-Cert Due Date 12/13/21  -AS              Timed Charges    14112 - PT Therapeutic Exercise Minutes 8  -AS      67340 - Gait Training Minutes  15  -AS              Total Minutes    Timed Charges Total Minutes 23  -AS       Total Minutes 23  -AS            User Key  (r) = Recorded By, (t) = Taken By, (c) = Cosigned By    Initials Name Provider Type    AS Evelina Reed PTA Physical Therapy Assistant              Therapy Charges for Today     Code Description Service Date Service Provider Modifiers Qty    94884923222 HC PT THER PROC EA 15 MIN 12/6/2021 Evelina Reed PTA GP 1    36807011444 HC GAIT TRAINING EA 15 MIN 12/6/2021 Evelina Reed, SELENA GP 1    38212091415 HC PT THER SUPP EA 15 MIN 12/6/2021 Evelina Reed PTA GP 1          PT G-Codes  Outcome Measure Options: AM-PAC 6 Clicks Basic Mobility (PT)  AM-PAC 6 Clicks Score (PT): 11  AM-PAC 6 Clicks Score (OT): 10    Evelina Reed PTA  12/6/2021

## 2021-12-06 NOTE — THERAPY TREATMENT NOTE
Patient Name: Mike Lucero Jr.  : 1932    MRN: [id number]                              Today's Date: 2021       Admit Date: 2021    Visit Dx:     ICD-10-CM ICD-9-CM   1. Contusion of anus, initial encounter  S30.3XXA 922.9   2. Chronic bilateral pleural effusions  J90 511.9   3. Hypoxia  R09.02 799.02   4. Impaired functional mobility, balance, gait, and endurance  Z74.09 V49.89     Patient Active Problem List   Diagnosis   • Mixed hyperlipidemia   • Coronary artery disease   • Permanent atrial fibrillation (HCC)   • Essential hypertension   • Hypercholesterolemia   • h/o Endocarditis   • Osteoarthritis   • Cataract   • Seasonal allergies   • Insomnia   • RELL (obstructive sleep apnea)   • Left leg cellulitis   • Mild cognitive impairment   • Chronic anticoagulation   • Benign prostatic hyperplasia with nocturia   • Impaired glucose tolerance   • Microscopic hematuria   • Umbilical hernia   • IgM deficiency (HCC)   • Class 1 obesity due to excess calories with serious comorbidity and body mass index (BMI) of 32.0 to 32.9 in adult   • Onychomycosis of toenail   • Chronic rhinitis   • Chronic atrial fibrillation (HCC)   • Vitamin D insufficiency   • Basal cell carcinoma (BCC) of skin of neck   • Bilateral hearing loss   • Pleural effusion, bilateral   • Hematoma of hip, right, initial encounter   • Dementia (HCC)   • Hypoxia   • Confusion     Past Medical History:   Diagnosis Date   • Atrial fibrillation (HCC)    • Cataract    • Chronic anticoagulation    • Coronary artery disease    • Dementia (HCC)    • Diverticulosis    • Gallstones    • History of endocarditis    • HLD (hyperlipidemia)    • HTN (hypertension)    • Osteoarthritis    • Seasonal allergies     Seasonal allergies/rhinitis.      Past Surgical History:   Procedure Laterality Date   • ADRENAL GLAND SURGERY      Dr. Jj Kee   • APPENDECTOMY  1950   • BLEPHAROPLASTY  2014    Dr. Santamaria   • COLONOSCOPY     • CORONARY  ARTERY BYPASS GRAFT  07/2006    CABG for 3-vessel disease, July 2006.   • INGUINAL HERNIA REPAIR Left     1963 and 09/2012   • TONSILLECTOMY  01/1958      General Information     Row Name 12/06/21 1125          OT Time and Intention    Document Type therapy note (daily note)  -KF     Mode of Treatment occupational therapy  -KF     Row Name 12/06/21 1125          General Information    Patient Profile Reviewed yes  -KF     Existing Precautions/Restrictions fall; oxygen therapy device and L/min  -KF     Barriers to Rehab cognitive status; medically complex; previous functional deficit  -KF     Row Name 12/06/21 1125          Cognition    Orientation Status (Cognition) oriented to; person; verbal cues/prompts needed for orientation; place; time; disoriented to; situation  -     Row Name 12/06/21 1125          Safety Issues, Functional Mobility    Safety Issues Affecting Function (Mobility) ability to follow commands; awareness of need for assistance; safety precaution awareness; insight into deficits/self-awareness; judgment  -KF     Impairments Affecting Function (Mobility) cognition; balance; strength; endurance/activity tolerance  -KF     Cognitive Impairments, Mobility Safety/Performance awareness, need for assistance; insight into deficits/self-awareness; judgment; problem-solving/reasoning  -KF     Comment, Safety Issues/Impairments (Mobility) improved alertness with activity today  -KF           User Key  (r) = Recorded By, (t) = Taken By, (c) = Cosigned By    Initials Name Provider Type    KF Naty Amaral, OT Occupational Therapist                 Mobility/ADL's     Row Name 12/06/21 1126          Bed Mobility    Bed Mobility scooting/bridging; supine-sit  -KF     Scooting/Bridging [place] (Bed Mobility) moderate assist (50% patient effort); 1 person assist; nonverbal cues (demo/gesture); verbal cues  -KF     Supine-Sit [place] (Bed Mobility) verbal cues; moderate assist (50% patient effort);  1 person assist; nonverbal cues (demo/gesture)  -KF     Bed Mobility, Safety Issues cognitive deficits limit understanding; decreased use of arms for pushing/pulling; decreased use of legs for bridging/pushing  -     Assistive Device (Bed Mobility) draw sheet; head of bed elevated  -     Comment (Bed Mobility) max VC's for seq  -KF     Row Name 12/06/21 1126          Transfers    Transfers sit-stand transfer; bed-chair transfer  -     Comment (Transfers) cues for seq and HP, cue for attention to task, STS in order to use urinal and then progress to sitting upright in chair with steps incorporated  -     Bed-Chair [place] (Transfers) verbal cues; minimum assist (75% patient effort); 2 person assist  -     Assistive Device (Bed-Chair Transfers) walker, front-wheeled  -KF     Sit-Stand [place] (Transfers) minimum assist (75% patient effort); 2 person assist; nonverbal cues (demo/gesture); verbal cues  -     Row Name 12/06/21 1126          Sit-Stand Transfer    Assistive Device (Sit-Stand Transfers) walker, front-wheeled  -     Row Name 12/06/21 1126          Functional Mobility    Functional Mobility-Distance (Feet) 4  -KF     Functional Mobility- Comment deferred to PT; completed steps to chair with FWW and cues for seq  -KF     Row Name 12/06/21 1126          Activities of Daily Living    BADL Assessment/Intervention lower body dressing; upper body dressing; feeding  -     Row Name 12/06/21 1126          Lower Body Dressing Assessment/Training    [place] Level (Lower Body Dressing) don; socks; dependent (less than 25% patient effort)  -     Position (Lower Body Dressing) sitting up in bed  -     Row Name 12/06/21 1126          Upper Body Dressing Assessment/Training    [place] Level (Upper Body Dressing) don; front opening garment; minimum assist (75% patient effort); verbal cues  -     Position (Upper Body Dressing) supported sitting  -     Row Name 12/06/21 1126           Self-Feeding Assessment/Training    [place] Level (Feeding) scoop food and bring to mouth; minimum assist (75% patient effort)  -KF     Assistive Devices (Feeding) hand over hand  -KF     Position (Self-Feeding) supported sitting  -KF     Comment (Feeding) progressed alertness and ability to attend to task post hand over hand assist x3 bites of apple sauce  -KF           User Key  (r) = Recorded By, (t) = Taken By, (c) = Cosigned By    Initials Name Provider Type    KF Naty Amaral, OT Occupational Therapist               Obj/Interventions     Row Name 12/06/21 1129          Shoulder (Therapeutic Exercise)    Shoulder (Therapeutic Exercise) AROM (active range of motion)  -KF     Shoulder AROM (Therapeutic Exercise) bilateral; flexion; extension; 3 repetitions; 5 repetitions; horizontal aBduction/aDduction; sitting  -     Row Name 12/06/21 1129          Elbow/Forearm (Therapeutic Exercise)    Elbow/Forearm (Therapeutic Exercise) AROM (active range of motion)  -KF     Elbow/Forearm AROM (Therapeutic Exercise) bilateral; flexion; extension; 5 repetitions; sitting  -KF     Row Name 12/06/21 1129          Balance    Balance Assessment sitting static balance; sitting dynamic balance; standing static balance; standing dynamic balance  -     Static Sitting Balance WFL; unsupported; sitting, edge of bed  -KF     Dynamic Sitting Balance WFL; mild impairment; unsupported; sitting, edge of bed  -KF     Static Standing Balance mild impairment; supported  -KF     Dynamic Standing Balance mild impairment; supported; moderate impairment  -KF     Balance Interventions standing; sitting; sit to stand; weight shifting activity; occupation based/functional task  -KF     Comment, Balance able to assist in use of urinal in standing with BUE support  -     Row Name 12/06/21 1129          Therapeutic Exercise    Therapeutic Exercise shoulder; elbow/forearm  -KF           User Key  (r) = Recorded By, (t) = Taken By, (c) =  Cosigned By    Initials Name Provider Type    KF Naty Amaral, OT Occupational Therapist               Goals/Plan    No documentation.                Clinical Impression     Row Name 12/06/21 1130          Pain Assessment    Additional Documentation Pain Scale: FACES Pre/Post-Treatment (Group)  -KF     Row Name 12/06/21 1130          Pain Scale: FACES Pre/Post-Treatment    Pain: FACES Scale, Pretreatment 2-->hurts little bit  -KF     Posttreatment Pain Rating 4-->hurts little more  -KF     Pain Location - Side Right  -KF     Pain Location - Orientation lower  -KF     Pain Location extremity  -KF     Pre/Posttreatment Pain Comment tolerated, RN aware  -KF     Row Name 12/06/21 1130          Plan of Care Review    Plan of Care Reviewed With patient  -KF     Progress improving  -KF     Outcome Summary Pt progressed to taking steps to chair today post hand over hand assist to participate and improve alertness for ADL completion sitting upright in bed, [place] self-feeding with hand over hand assist, modA bed mob supine to sitting, modAx2-minAx2 moments STS with FWW, able to use urinal in standing with modA, cont IPOT per POC  -KF     Row Name 12/06/21 1130          Therapy Assessment/Plan (OT)    Rehab Potential (OT) good, to achieve stated therapy goals  -     Criteria for Skilled Therapeutic Interventions Met (OT) yes; meets criteria; skilled treatment is necessary  -KF     Therapy Frequency (OT) daily  -KF     Row Name 12/06/21 1130          Therapy Plan Review/Discharge Plan (OT)    Anticipated Discharge Disposition (OT) [facility] nursing facility  -     Row Name 12/06/21 1130          Vital Signs    Pre Systolic BP Rehab --  RN cleared VSS no dizziness  -KF     Pre SpO2 (%) 98  -KF     O2 Delivery Pre Treatment supplemental O2  -KF     Post SpO2 (%) 97  -KF     O2 Delivery Post Treatment supplemental O2  -KF     Pre Patient Position Supine  -KF     Intra Patient Position Standing  -KF     Post Patient Position  Sitting  -KF     Rest Breaks  1  -KF     Row Name 12/06/21 1130          Positioning and Restraints    Pre-Treatment Position in bed  -KF     Post Treatment Position chair  -KF     In Chair notified nsg; sitting; reclined; call light within reach; encouraged to call for assist; exit alarm on; waffle cushion; legs elevated  -KF           User Key  (r) = Recorded By, (t) = Taken By, (c) = Cosigned By    Initials Name Provider Type    Naty Hernandez, IVA Occupational Therapist               Outcome Measures     Row Name 12/06/21 1134          How much help from another is currently needed...    Putting on and taking off regular lower body clothing? 1  -KF     Bathing (including washing, rinsing, and drying) 2  -KF     Toileting (which includes using toilet bed pan or urinal) 2  -KF     Putting on and taking off regular upper body clothing 2  -KF     Taking care of personal grooming (such as brushing teeth) 3  -KF     Eating meals 3  -KF     AM-PAC 6 Clicks Score (OT) 13  -KF     Row Name 12/06/21 1103          How much help from another person do you currently need...    Turning from your back to your side while in flat bed without using bedrails? 2  -AS     Moving from lying on back to sitting on the side of a flat bed without bedrails? 2  -AS     Moving to and from a bed to a chair (including a wheelchair)? 2  -AS     Standing up from a chair using your arms (e.g., wheelchair, bedside chair)? 2  -AS     Climbing 3-5 steps with a railing? 1  -AS     To walk in hospital room? 2  -AS     AM-PAC 6 Clicks Score (PT) 11  -AS     Row Name 12/06/21 1134 12/06/21 1103       Functional Assessment    Outcome Measure Options AM-PAC 6 Clicks Daily Activity (OT)  -KF AM-PAC 6 Clicks Basic Mobility (PT)  -AS          User Key  (r) = Recorded By, (t) = Taken By, (c) = Cosigned By    Initials Name Provider Type    AS Evelina Reed PTA Physical Therapy Assistant    Naty Hernandez OT Occupational Therapist                 Occupational Therapy Education                 Title: PT OT SLP Therapies (In Progress)     Topic: Occupational Therapy (Done)     Point: ADL training (Done)     Description:   Instruct learner(s) on proper safety adaptation and remediation techniques during self care or transfers.   Instruct in proper use of assistive devices.              Learning Progress Summary           Patient Acceptance, E,TB,D, VU,DU,NR by  at 12/6/2021 1133    Acceptance, E,TB,D, VU,DU,NR by  at 12/1/2021 1438                   Point: Precautions (Done)     Description:   Instruct learner(s) on prescribed precautions during self-care and functional transfers.              Learning Progress Summary           Patient Acceptance, E,TB,D, VU,DU,NR by  at 12/6/2021 1133    Acceptance, E,TB,D, VU,DU,NR by  at 12/1/2021 1438                   Point: Body mechanics (Done)     Description:   Instruct learner(s) on proper positioning and spine alignment during self-care, functional mobility activities and/or exercises.              Learning Progress Summary           Patient Acceptance, E,TB,D, VU,DU,NR by  at 12/6/2021 1133    Acceptance, E,TB,D, VU,DU,NR by  at 12/1/2021 1438                               User Key     Initials Effective Dates Name Provider Type Discipline     06/16/21 -  Naty Amaral, OT Occupational Therapist OT              OT Recommendation and Plan  Planned Therapy Interventions (OT): activity tolerance training, BADL retraining, ROM/therapeutic exercise, strengthening exercise, occupation/activity based interventions, patient/caregiver education/training, transfer/mobility retraining, cognitive/visual perception retraining, functional balance retraining  Therapy Frequency (OT): daily  Plan of Care Review  Plan of Care Reviewed With: patient  Progress: improving  Outcome Summary: Pt progressed to taking steps to chair today post hand over hand assist to participate and improve alertness for ADL  completion sitting upright in bed, [place] self-feeding with hand over hand assist, modA bed mob supine to sitting, modAx2-minAx2 moments STS with FWW, able to use urinal in standing with modA, cont IPOT per POC     Time Calculation:    Time Calculation- OT     Row Name 12/06/21 1104 12/06/21 0958          Time Calculation- OT    OT Start Time -- 0958  -KF     OT Received On -- 12/06/21  -KF            Timed Charges    75554 - OT Therapeutic Exercise Minutes -- 4  -KF     47185 - Gait Training Minutes  15  -AS --     35518 - OT Therapeutic Activity Minutes -- 5  -KF     09107 - OT Self Care/Mgmt Minutes -- 6  -KF            Total Minutes    Timed Charges Total Minutes 15  -AS 15  -KF      Total Minutes 15  -AS 15  -KF           User Key  (r) = Recorded By, (t) = Taken By, (c) = Cosigned By    Initials Name Provider Type    AS Evelina Reed, PTA Physical Therapy Assistant    Naty Hernandez, OT Occupational Therapist              Therapy Charges for Today     Code Description Service Date Service Provider Modifiers Qty    [id number] HC OT SELF CARE/MGMT/TRAIN EA 15 MIN 12/6/2021 Naty Amaral OT GO 1               Naty Amaral OT  12/6/2021

## 2021-12-06 NOTE — PROGRESS NOTES
[facility] Medicine Services  PROGRESS NOTE    Patient Name: Mike Lucero Jr.  : 1932  MRN: [id number]    Date of Admission: 2021  Primary Care Physician: Subha Wesley MD    Subjective   Subjective     CC: Fall at home     HPI -    Resting in bed in no acute distress but is more confused today.      ROS  Unable to obtain more details    Objective   Objective     Vital Signs:   Temp:  [97.4 °F (36.3 °C)-98.4 °F (36.9 °C)] 97.9 °F (36.6 °C)  Heart Rate:  [51-97] 57  Resp:  [18-20] 20  BP: (106-125)/(50-61) 106/54  Flow (L/min):  [3] 3     Physical Exam:  Constitutional: No acute distress  HENT: NCAT, mucous membranes moist  Respiratory: Clear to auscultation bilaterally, respiratory effort normal   Cardiovascular: RRR, no murmurs, rubs, or gallops  Gastrointestinal: Positive bowel sounds, soft, nontender, nondistended  Musculoskeletal: No bilateral ankle edema, right flank and gluteal hematoma.  Psychiatric : unable to assess  Neurologic:  sleepy, arousable, very confused, did not recognize his wife at the bedside.  Skin: No rashes, extensive ecchymosis over the right gluteal and flank area         Results Reviewed:  LAB RESULTS:      Lab 21  0450 21  0425 21  0448 21  0529 21  0342 21  0801 21  0801 21  1137 21  1137   WBC 6.54 6.56 7.95 8.12 10.96*   < > 9.14   < > 8.26   HEMOGLOBIN 8.3* 8.1* 7.9* 8.0* 9.2*   < > 11.3*   < > 11.2*   HEMATOCRIT 25.4* 23.8* 23.6* 23.6* 27.5*   < > 33.4*   < > 35.0*   PLATELETS 206 181 153 142 158   < > 173   < > 165   NEUTROS ABS 4.35  --   --   --   --   --  6.88  --  6.41   IMMATURE GRANS (ABS) 0.04  --   --   --   --   --  0.04  --  0.04   LYMPHS ABS 1.28  --   --   --   --   --  1.12  --  1.07   MONOS ABS 0.66  --   --   --   --   --  0.92*  --  0.60   EOS ABS 0.19  --   --   --   --   --  0.15  --  0.11   .2* 96.4 100.4* 97.5* 96.2   < > 95.2   < > 100.0*    PROCALCITONIN  --   --   --   --  0.09  --   --   --   --    LACTATE  --   --   --   --  1.0  --   --   --   --    PROTIME 16.4* 16.9* 16.9* 20.2* 27.0*  --   --    < > 30.3*    < > = values in this interval not displayed.         Lab 12/06/21 0450 12/05/21 0425 12/04/21 2033 12/04/21 0448 12/03/21 0529 12/02/21 0342 12/02/21 0342 12/01/21 0431 12/01/21 0431   SODIUM 134* 132*  --  135* 132*  --  133*   < > 129*   POTASSIUM 3.6 3.5 3.7 3.4* 3.5   < > 3.8   < > 3.8   CHLORIDE 93* 92*  --  93* 92*  --  91*   < > 93*   CO2 34.0* 33.0*  --  36.0* 32.0*  --  34.0*   < > 29.0   ANION GAP 7.0 7.0  --  6.0 8.0  --  8.0   < > 7.0   BUN 16 17  --  20 21  --  15   < > 9   CREATININE 0.71* 0.63*  --  0.74* 0.81  --  0.94   < > 0.79   GLUCOSE 92 90  --  99 96  --  112*   < > 114*   CALCIUM 8.1* 8.0*  --  8.3* 8.0*  --  8.0*   < > 8.1*   MAGNESIUM 1.9  --   --  2.0 1.8  --   --   --   --    PHOSPHORUS 2.5  --   --  3.6  --   --   --   --  4.0   TSH  --   --   --   --   --   --  1.270  --   --     < > = values in this interval not displayed.         Lab 12/06/21 0450 12/02/21 0342 12/01/21 0431 11/30/21  1137   TOTAL PROTEIN 5.5* 5.3*  --  5.6*   ALBUMIN 3.00* 3.40* 3.50 3.30*   GLOBULIN 2.5 1.9  --  2.3   ALT (SGPT) 6 7  --  8   AST (SGOT) 13 13  --  14   BILIRUBIN 2.2* 2.0*  --  1.0   ALK PHOS 92 74  --  82         Lab 12/06/21  0450 12/05/21  0425 12/04/21  0448 12/03/21  0529 12/02/21  0342 11/30/21  1137 11/30/21  1137   PROBNP  --   --   --   --   --   --  4,268.0*   PROTIME 16.4* 16.9* 16.9* 20.2* 27.0*   < > 30.3*   INR 1.37* 1.42* 1.42* 1.80* 2.63*   < > 3.06*    < > = values in this interval not displayed.             Lab 12/02/21  1159 12/02/21  0342   VITAMIN B 12  --  245   ABO TYPING O  --    RH TYPING Positive  --    ANTIBODY SCREEN Negative  --          Brief Urine Lab Results  (Last result in the past 365 days)      Color   Clarity   Blood   Leuk Est   Nitrite   Protein   CREAT   Urine HCG         12/02/21 0309 Orange   Turbid   Large (3+)   Moderate (2+)   Positive   >=300 mg/dL (3+)                 Microbiology Results Abnormal     Procedure Component Value - Date/Time    COVID PRE-OP / PRE-PROCEDURE SCREENING ORDER (NO ISOLATION) - Swab, Nasopharynx [[id number]]  (Normal) Collected: 11/30/21 1212    Lab Status: Final result Specimen: Swab from Nasopharynx Updated: 11/30/21 1257    Narrative:      The following orders were created for panel order COVID PRE-OP / PRE-PROCEDURE SCREENING ORDER (NO ISOLATION) - Swab, Nasopharynx.  Procedure                               Abnormality         Status                     ---------                               -----------         ------                     COVID-19, ABBOTT IN-HOUS...[[id number]]  Normal              Final result                 Please view results for these tests on the individual orders.    COVID-19, ABBOTT IN-HOUSE,NASAL Swab (NO TRANSPORT MEDIA) 2 HR TAT - Swab, Nasopharynx [[id number]]  (Normal) Collected: 11/30/21 1212    Lab Status: Final result Specimen: Swab from Nasopharynx Updated: 11/30/21 1257     COVID19 Presumptive Negative    Narrative:      Fact sheet for providers: https://www.fda.gov/media/710504/download     Fact sheet for patients: https://www.fda.gov/media/914523/download    Test performed by PCR.  If inconsistent with clinical signs and symptoms patient should be tested with different authorized molecular test.          XR Chest 1 View    Result Date: 12/6/2021   EXAMINATION: XR CHEST 1 VW - 12/05/2021  INDICATION: S30.3XXA-Contusion of anus, initial encounter; Q31-Dmnblfw effusion, not elsewhere classified; R09.02-Hypoxemia; Z74.09-Other reduced mobility.  COMPARISON: 12/02/2021  FINDINGS: Portable chest reveals heart to be enlarged. Improvement seen in the pleural effusions. Small bilateral pleural effusions present. Improvement seen in the pulmonary vascularity.      Impression: Improvement seen in the aeration at the lung  bases bilaterally with decrease seen in size of the bilateral pleural effusions. The heart remains enlarged.  DICTATED:   12/05/2021 EDITED/lfs:   12/05/2021  This report was finalized on 12/6/2021 3:53 PM by Dr. Aleida Riley MD.            I have reviewed the medications:  Scheduled Meds:cyanocobalamin, 1,000 mcg, Intramuscular, Daily  dilTIAZem CD, 240 mg, Oral, Q24H  donepezil, 10 mg, Oral, Daily  ertapenem, 1 g, Intravenous, Q24H  furosemide, 40 mg, Intravenous, Daily  lactobacillus acidophilus, 1 capsule, Oral, Daily  lisinopril, 20 mg, Oral, Daily  nystatin, , Topical, Q12H  pravastatin, 40 mg, Oral, Daily  QUEtiapine, 25 mg, Oral, Q12H  senna-docusate sodium, 1 tablet, Oral, BID  sodium chloride, 10 mL, Intravenous, Q12H  tamsulosin, 0.4 mg, Oral, Daily  traZODone, 50 mg, Oral, Nightly      Continuous Infusions:   PRN Meds:.•  acetaminophen **OR** acetaminophen **OR** acetaminophen  •  senna-docusate sodium **AND** polyethylene glycol **AND** bisacodyl **AND** bisacodyl  •  haloperidol lactate  •  HYDROcodone-acetaminophen  •  ipratropium-albuterol  •  magnesium sulfate **OR** magnesium sulfate **OR** magnesium sulfate  •  [DISCONTINUED] Morphine **AND** naloxone  •  ondansetron **OR** ondansetron  •  potassium chloride **OR** potassium chloride **OR** potassium chloride  •  potassium phosphate infusion greater than 15 mMoles **OR** potassium phosphate infusion greater than 15 mMoles **OR** potassium phosphate **OR** sodium phosphate IVPB **OR** sodium phosphate IVPB **OR** sodium phosphate IVPB  •  [COMPLETED] Insert peripheral IV **AND** sodium chloride  •  sodium chloride    Assessment/Plan   Assessment & Plan     Active Hospital Problems    Diagnosis  POA   • Confusion [R41.0]  Yes   • Hematoma of hip, right, initial encounter [S70.01XA]  Yes   • Dementia (HCC) [F03.90]  Yes   • Hypoxia [R09.02]  Yes   • Pleural effusion, bilateral [J90]  Unknown   • Permanent atrial fibrillation (HCC) [I48.21]  Yes    • Essential hypertension [I10]  Yes   • Mixed hyperlipidemia [E78.2]  Yes      Resolved Hospital Problems   No resolved problems to display.        Brief Hospital Course to date:  Mike Lucero Jr. is a 89 y.o. male with Afib on coumadin, brought after a fall with gluteal hematoma, found to have mult progressing medical problems atop dementia.    Gluteal hematoma post fall at home on coumadin   -- pain control  -Monitor hemoglobin    Confusion, agitation   Dementia x 2 yrs but at home he makes own breakfast and dresses himself; walks without cane or walker   -Currently on Seroquel 25 mg every 12 hours scheduled  -Currently mental status is worse than baseline.       Hypoxia  jamey pleural effusions, new right basilar atelectasis and effusion   -- signif right pleural effusion  --Repeat chest x-ray shows decreasing bilateral pleural effusion.  Patient is breathing also has improved.      Cirrhosis on CT and US  Ascites increased from a month ago  - plan conservative tx w diuresis   - ammonia was nl     Dementia  - lownl B12 - replace here   - nl TSHand ammonia   -- cont home meds   --As needed Haldol, scheduled Seroquel     HTN  HLD  -- cont home meds      Chronic Afib with coumadin   -- cont cardizem  -- hold coumadin for now      Urinary retention - small volume voids are chronic  BPH   - -Placed Zacarias on admission.  Pt tugged on it and developed hematuria.  Zacarias DCd on 12/2.   -Currently on flomax     PLAN:  - check labs including ammonia in am  - CXR to check p- effusion     NOTE: Had a long conversation with patient's wife again at the bedside.  Answered all of her many questions to her satisfaction and explained to her the results of chest x-ray and the change in mental status.  Total time spent 30 minutes.      NOTE:  Late this afternoon I was notified by the nurse that patient had fallen from the chair.  She told me that patient does not have any sign of fracture or laceration.  I saw and examined the  patient in the room.  No sign of trauma to the head.  No sign of trauma to the trunk.  Exam of the lower extremities although there are some pain in the hip joint with internal and external rotation but nothing that indicates fracture.  We will be watching the patient and if necessary will do imaging.    DVT prophylaxis:  Mechanical DVT prophylaxis orders are present.       AM-PAC 6 Clicks Score (PT): 11 (12/06/21 1103)    Disposition: I expect the patient to be discharged tbd - if he cannot walk he would need SNF     CODE STATUS:   Code Status and Medical Interventions:   Ordered at: 11/30/21 7216     Medical Intervention Limits:    NO intubation (DNI)     Level Of Support Discussed With:    Health Care Surrogate     Code Status (Patient has no pulse and is not breathing):    No CPR (Do Not Attempt to Resuscitate)     Medical Interventions (Patient has pulse or is breathing):    Limited Support     Comments:    wife       Librado Ricketts MD  12/06/21

## 2021-12-06 NOTE — SIGNIFICANT NOTE
Around 1419 patient's chair alarm went off. Staff reported to the room and found the patient down on the ground in a sitting position. Patient said he was in no pain. VSS. Upon the first assessment no injuries found and patient continuously denied any pain. A later assessment found an abrasion along the middle, top of the patient's back.

## 2021-12-06 NOTE — CASE MANAGEMENT/SOCIAL WORK
Continued Stay Note  [facility]     Patient Name: Mike Lucero Jr.  MRN: [id number]  Today's Date: 12/6/2021    Admit Date: 11/30/2021     Discharge Plan     Row Name 12/06/21 1603       Plan    Plan Ongoing    Patient/Family in Agreement with Plan yes    Plan Comments Pt fell today. Pt is less lethargic. I spoke with pt's wife on the phone. She was in to visit earlier and felt he is doing much better today. She states he still does not know who she is. We discussed PT/OT recommendations from last week for skilled nursing for rehab. Wife states she cannot bring him home at this time, she states she is fine with SNF or home with home health when medically ready. She states whatever would be better for pt at time of D/C. Today is the first day pt has been able to really participate with PT/OT, wife states she would like to see how that progresses over the next few days. Also to see what PT/OT are still recommending. No other needs voiced or identified. CM will continue to follow.    Final Discharge Disposition Code 30 - still a patient               Discharge Codes    No documentation.                     Lyla Thompson RN

## 2021-12-06 NOTE — PLAN OF CARE
Goal Outcome Evaluation:  Plan of Care Reviewed With: patient        Progress: improving  Outcome Summary: Pt progressed to taking steps to chair today post hand over hand assist to participate and improve alertness for ADL completion sitting upright in bed, Jake self-feeding with hand over hand assist, modA bed mob supine to sitting, modAx2-minAx2 moments STS with FWW, able to use urinal in standing with modA, cont IPOT per POC

## 2021-12-07 LAB
ANION GAP SERPL CALCULATED.3IONS-SCNC: 5 MMOL/L (ref 5–15)
BUN SERPL-MCNC: 17 MG/DL (ref 8–23)
BUN/CREAT SERPL: 26.2 (ref 7–25)
CALCIUM SPEC-SCNC: 8.2 MG/DL (ref 8.6–10.5)
CHLORIDE SERPL-SCNC: 94 MMOL/L (ref 98–107)
CO2 SERPL-SCNC: 34 MMOL/L (ref 22–29)
CREAT SERPL-MCNC: 0.65 MG/DL (ref 0.76–1.27)
DEPRECATED RDW RBC AUTO: 48.4 FL (ref 37–54)
ERYTHROCYTE [DISTWIDTH] IN BLOOD BY AUTOMATED COUNT: 13.5 % (ref 12.3–15.4)
GFR SERPL CREATININE-BSD FRML MDRD: 116 ML/MIN/1.73
GLUCOSE SERPL-MCNC: 98 MG/DL (ref 65–99)
HCT VFR BLD AUTO: 26.7 % (ref 37.5–51)
HGB BLD-MCNC: 8.7 G/DL (ref 13–17.7)
INR PPP: 1.34 (ref 0.85–1.16)
MAGNESIUM SERPL-MCNC: 1.8 MG/DL (ref 1.6–2.4)
MCH RBC QN AUTO: 32.2 PG (ref 26.6–33)
MCHC RBC AUTO-ENTMCNC: 32.6 G/DL (ref 31.5–35.7)
MCV RBC AUTO: 98.9 FL (ref 79–97)
PLATELET # BLD AUTO: 239 10*3/MM3 (ref 140–450)
PMV BLD AUTO: 8.7 FL (ref 6–12)
POTASSIUM SERPL-SCNC: 3.5 MMOL/L (ref 3.5–5.2)
PROTHROMBIN TIME: 16.2 SECONDS (ref 11.4–14.4)
RBC # BLD AUTO: 2.7 10*6/MM3 (ref 4.14–5.8)
SODIUM SERPL-SCNC: 133 MMOL/L (ref 136–145)
WBC NRBC COR # BLD: 7.17 10*3/MM3 (ref 3.4–10.8)

## 2021-12-07 PROCEDURE — 99232 SBSQ HOSP IP/OBS MODERATE 35: CPT | Performed by: INTERNAL MEDICINE

## 2021-12-07 PROCEDURE — 25010000002 CYANOCOBALAMIN PER 1000 MCG: Performed by: INTERNAL MEDICINE

## 2021-12-07 PROCEDURE — 25010000002 ERTAPENEM PER 500 MG: Performed by: INTERNAL MEDICINE

## 2021-12-07 PROCEDURE — 85027 COMPLETE CBC AUTOMATED: CPT | Performed by: INTERNAL MEDICINE

## 2021-12-07 PROCEDURE — 80048 BASIC METABOLIC PNL TOTAL CA: CPT | Performed by: INTERNAL MEDICINE

## 2021-12-07 PROCEDURE — 85610 PROTHROMBIN TIME: CPT | Performed by: INTERNAL MEDICINE

## 2021-12-07 PROCEDURE — 25010000002 FUROSEMIDE PER 20 MG: Performed by: INTERNAL MEDICINE

## 2021-12-07 PROCEDURE — 83735 ASSAY OF MAGNESIUM: CPT | Performed by: INTERNAL MEDICINE

## 2021-12-07 RX ADMIN — SODIUM CHLORIDE, PRESERVATIVE FREE 10 ML: 5 INJECTION INTRAVENOUS at 20:33

## 2021-12-07 RX ADMIN — POTASSIUM CHLORIDE 40 MEQ: 750 CAPSULE, EXTENDED RELEASE ORAL at 09:23

## 2021-12-07 RX ADMIN — POTASSIUM CHLORIDE 40 MEQ: 750 CAPSULE, EXTENDED RELEASE ORAL at 15:52

## 2021-12-07 RX ADMIN — PRAVASTATIN SODIUM 40 MG: 40 TABLET ORAL at 09:22

## 2021-12-07 RX ADMIN — ERTAPENEM 1 G: 1 INJECTION, POWDER, LYOPHILIZED, FOR SOLUTION INTRAMUSCULAR; INTRAVENOUS at 09:26

## 2021-12-07 RX ADMIN — NYSTATIN: 100000 POWDER TOPICAL at 09:27

## 2021-12-07 RX ADMIN — FUROSEMIDE 40 MG: 10 INJECTION, SOLUTION INTRAMUSCULAR; INTRAVENOUS at 09:23

## 2021-12-07 RX ADMIN — QUETIAPINE FUMARATE 25 MG: 25 TABLET ORAL at 23:58

## 2021-12-07 RX ADMIN — TAMSULOSIN HYDROCHLORIDE 0.4 MG: 0.4 CAPSULE ORAL at 09:22

## 2021-12-07 RX ADMIN — DILTIAZEM HYDROCHLORIDE 240 MG: 240 CAPSULE, COATED, EXTENDED RELEASE ORAL at 09:22

## 2021-12-07 RX ADMIN — QUETIAPINE FUMARATE 25 MG: 25 TABLET ORAL at 09:23

## 2021-12-07 RX ADMIN — SENNOSIDES AND DOCUSATE SODIUM 1 TABLET: 50; 8.6 TABLET ORAL at 09:22

## 2021-12-07 RX ADMIN — LISINOPRIL 20 MG: 20 TABLET ORAL at 09:26

## 2021-12-07 RX ADMIN — TRAZODONE HYDROCHLORIDE 50 MG: 50 TABLET ORAL at 23:58

## 2021-12-07 RX ADMIN — CYANOCOBALAMIN 1000 MCG: 1000 INJECTION, SOLUTION INTRAMUSCULAR; SUBCUTANEOUS at 09:25

## 2021-12-07 RX ADMIN — SODIUM CHLORIDE, PRESERVATIVE FREE 10 ML: 5 INJECTION INTRAVENOUS at 09:28

## 2021-12-07 RX ADMIN — SENNOSIDES AND DOCUSATE SODIUM 1 TABLET: 50; 8.6 TABLET ORAL at 20:33

## 2021-12-07 RX ADMIN — NYSTATIN: 100000 POWDER TOPICAL at 20:34

## 2021-12-07 RX ADMIN — DONEPEZIL HYDROCHLORIDE 10 MG: 10 TABLET, FILM COATED ORAL at 09:26

## 2021-12-07 RX ADMIN — Medication 1 CAPSULE: at 09:22

## 2021-12-07 NOTE — PROGRESS NOTES
Clinical Nutrition   Reason For Visit: Identified at risk by screening criteria, MST score 2+, Reduced oral intake    Patient Name: Mike Lucero Jr.  YOB: 1932  MRN: [id number]  Date of Encounter: 12/07/21 14:10 EST  Admission date: 11/30/2021      Comments:       Nutrition Assessment     Admission Problem List:    Mixed hyperlipidemia    Permanent atrial fibrillation (HCC)    Essential hypertension    Pleural effusion, bilateral    Hematoma of hip, right, initial encounter    Dementia (HCC)    Hypoxia    Confusion          PMH: He  has a past medical history of Atrial fibrillation (HCC), Cataract, Chronic anticoagulation, Coronary artery disease, Dementia (HCC), Diverticulosis, Gallstones, History of endocarditis, HLD (hyperlipidemia), HTN (hypertension), Osteoarthritis, and Seasonal allergies.   PSxH: He  has a past surgical history that includes Coronary artery bypass graft (07/2006); Adrenal gland surgery (1996); Appendectomy (1950); Blepharoplasty (08/06/2014); Colonoscopy (2009); Inguinal hernia repair (Left); and Tonsillectomy (01/1958).        Reported/Observed/Food/Nutrition Related History     Pt with AMS severely altered from baseline, pt with Dementia. Discussed care with wife at bedside. She states he has had decreasingly low PO intake over the past month. She states he does not complain of early satiety or pain, just states he is not hungry after a few bites. He will eat at regular meal times but only small amounts, she states around 25-50% of his usual intakes. She states he loves sweets and would likely drink a magic shake. She also reports increasing chewing difficulty related to his dementia, she denies any signs aspiration or choking, states he does well with soft chopped food.      Anthropometrics   Height: 69in  Weight: 215lb(estimated 11/30/21) 195lb at PCP visit (1/12/21)  BMI: 31.75  BMI classification: Obese Class I: 30-34.9kg/m2   IBW: 155 lb    UBW: 200lb per pt  wife  Weight change: unsure, pts wife states she does not believe he has lost any weight though it is difficult to tell with ascites.      Nutrition Focused Physical Exam     Unable to perform exam due to: Patient agitation will re-assess appropriateness      Labs reviewed   Labs reviewed: Yes    Results from last 7 days   Lab Units 12/07/21 0429 12/06/21 0450 12/05/21 0425 12/04/21 2033 12/04/21 0448 12/03/21 0529 12/03/21 0529 12/02/21 0342 12/01/21 0431   SODIUM mmol/L 133* 134* 132*  --  135*   < > 132*   < > 129*   POTASSIUM mmol/L 3.5 3.6 3.5   < > 3.4*   < > 3.5   < > 3.8   CHLORIDE mmol/L 94* 93* 92*  --  93*   < > 92*   < > 93*   CO2 mmol/L 34.0* 34.0* 33.0*  --  36.0*   < > 32.0*   < > 29.0   BUN mg/dL 17 16 17  --  20   < > 21   < > 9   CREATININE mg/dL 0.65* 0.71* 0.63*  --  0.74*   < > 0.81   < > 0.79   GLUCOSE mg/dL 98 92 90  --  99   < > 96   < > 114*   CALCIUM mg/dL 8.2* 8.1* 8.0*  --  8.3*   < > 8.0*   < > 8.1*   PHOSPHORUS mg/dL  --  2.5  --   --  3.6  --   --   --  4.0   MAGNESIUM mg/dL  --  1.9  --   --  2.0  --  1.8  --   --     < > = values in this interval not displayed.     Results from last 7 days   Lab Units 12/07/21 0429 12/06/21 0450 12/05/21 0425 12/03/21 0529 12/02/21 0342 12/01/21  0801 12/01/21 0431   WBC 10*3/mm3 7.17 6.54 6.56   < > 10.96*   < >  --    ALBUMIN g/dL  --  3.00*  --   --  3.40*  --  3.50    < > = values in this interval not displayed.     Results from last 7 days   Lab Units 12/03/21  2042   GLUCOSE mg/dL 145*     Lab Results   Lab Value Date/Time    HGBA1C 5.91 (H) 11/20/2020 0935    HGBA1C 6.17 (H) 11/13/2019 1231     Medications reviewed   Medications reviewed: Yes  Scheduled:Pericolace, B12 injection  PRN: KCl      Current Nutrition Prescription   PO: Diet Soft Texture; Chopped; Cardiac  Oral Nutrition Supplement: Orders Placed This Encounter      Dietary Nutrition Supplements Mighty Shake   X2/day    Average PO intake: 54% X 7 meals      Nutrition  Diagnosis     Problem Inadequate oral intake   Etiology Decreased appetite   Signs/Symptoms PO intakes 54% X 7 meals, PO intakes 25-50% past month prior to admission per caretaker diet hx.       Goal:   General: Nutrition to support treatment  PO: Increase intake       Intervention   Intervention: Follow treatment progress, Care plan reviewed, Advise alternate selection, Interview for preferences, Supplement provided  -Mighty shake BID  -Diet altered to soft texture/chopped      Monitoring/Evaluation:   Monitoring/Evaluation: Per protocol, I&O, PO intake, Supplement intake, Pertinent labs, Weight, GI status, POC/GOC    Tena Hansen RD  Time Spent: 30

## 2021-12-07 NOTE — PROGRESS NOTES
[facility] Medicine Services  PROGRESS NOTE    Patient Name: Mike Lucero Jr.  : 1932  MRN: [id number]    Date of Admission: 2021  Primary Care Physician: Subha Wesley MD    Subjective   Subjective     CC: Fall at home     HPI -    Resting in bed in no acute distress but is very confused and he is restrained today.  Tells me he is comfortable.        ROS  Unable to obtain more details    Objective   Objective     Vital Signs:   Temp:  [97 °F (36.1 °C)-98.8 °F (37.1 °C)] 98.8 °F (37.1 °C)  Heart Rate:  [58-80] 69  Resp:  [18] 18  BP: (122-132)/(68-70) 122/68  Flow (L/min):  [3] 3     Physical Exam:  Constitutional: No acute distress, very confused  HENT: NCAT, mucous membranes moist  Respiratory: Clear to auscultation bilaterally, respiratory effort normal   Cardiovascular: RRR, no murmurs, rubs, or gallops  Gastrointestinal: Positive bowel sounds, soft, nontender, nondistended  Musculoskeletal: No bilateral ankle edema, right flank and gluteal hematoma.  Neurologic: A sleepy, arousable, very confused, did not recognize his wife at the bedside.  Skin: No rashes, extensive ecchymosis over the right gluteal and flank area         Results Reviewed:  LAB RESULTS:      Lab 21  0429 21  0450 21  0425 21  0448 21  0529 21  0342 21  0342 21  0801 21  0801   WBC 7.17 6.54 6.56 7.95 8.12   < > 10.96*   < > 9.14   HEMOGLOBIN 8.7* 8.3* 8.1* 7.9* 8.0*   < > 9.2*   < > 11.3*   HEMATOCRIT 26.7* 25.4* 23.8* 23.6* 23.6*   < > 27.5*   < > 33.4*   PLATELETS 239 206 181 153 142   < > 158   < > 173   NEUTROS ABS  --  4.35  --   --   --   --   --   --  6.88   IMMATURE GRANS (ABS)  --  0.04  --   --   --   --   --   --  0.04   LYMPHS ABS  --  1.28  --   --   --   --   --   --  1.12   MONOS ABS  --  0.66  --   --   --   --   --   --  0.92*   EOS ABS  --  0.19  --   --   --   --   --   --  0.15   MCV 98.9* 101.2* 96.4 100.4* 97.5*   < >  96.2   < > 95.2   PROCALCITONIN  --   --   --   --   --   --  0.09  --   --    LACTATE  --   --   --   --   --   --  1.0  --   --    PROTIME 16.2* 16.4* 16.9* 16.9* 20.2*   < > 27.0*  --   --     < > = values in this interval not displayed.         Lab 12/07/21  1500 12/07/21  0429 12/06/21  0450 12/05/21  0425 12/04/21 2033 12/04/21 0448 12/03/21  0529 12/03/21  0529 12/02/21  0342 12/02/21 0342 12/01/21 0431 12/01/21 0431   SODIUM  --  133* 134* 132*  --  135*  --  132*   < > 133*   < > 129*   POTASSIUM  --  3.5 3.6 3.5 3.7 3.4*   < > 3.5   < > 3.8   < > 3.8   CHLORIDE  --  94* 93* 92*  --  93*  --  92*   < > 91*   < > 93*   CO2  --  34.0* 34.0* 33.0*  --  36.0*  --  32.0*   < > 34.0*   < > 29.0   ANION GAP  --  5.0 7.0 7.0  --  6.0  --  8.0   < > 8.0   < > 7.0   BUN  --  17 16 17  --  20  --  21   < > 15   < > 9   CREATININE  --  0.65* 0.71* 0.63*  --  0.74*  --  0.81   < > 0.94   < > 0.79   GLUCOSE  --  98 92 90  --  99  --  96   < > 112*   < > 114*   CALCIUM  --  8.2* 8.1* 8.0*  --  8.3*  --  8.0*   < > 8.0*   < > 8.1*   MAGNESIUM 1.8  --  1.9  --   --  2.0  --  1.8  --   --   --   --    PHOSPHORUS  --   --  2.5  --   --  3.6  --   --   --   --   --  4.0   TSH  --   --   --   --   --   --   --   --   --  1.270  --   --     < > = values in this interval not displayed.         Lab 12/06/21  0450 12/02/21 0342 12/01/21  0431   TOTAL PROTEIN 5.5* 5.3*  --    ALBUMIN 3.00* 3.40* 3.50   GLOBULIN 2.5 1.9  --    ALT (SGPT) 6 7  --    AST (SGOT) 13 13  --    BILIRUBIN 2.2* 2.0*  --    ALK PHOS 92 74  --          Lab 12/07/21  0429 12/06/21  0450 12/05/21  0425 12/04/21  0448 12/03/21  0529   PROTIME 16.2* 16.4* 16.9* 16.9* 20.2*   INR 1.34* 1.37* 1.42* 1.42* 1.80*             Lab 12/02/21  1159 12/02/21  0342   VITAMIN B 12  --  245   ABO TYPING O  --    RH TYPING Positive  --    ANTIBODY SCREEN Negative  --          Brief Urine Lab Results  (Last result in the past 365 days)      Color   Clarity   Blood   Leuk  Est   Nitrite   Protein   CREAT   Urine HCG        12/02/21 0309 Orange   Turbid   Large (3+)   Moderate (2+)   Positive   >=300 mg/dL (3+)                 Microbiology Results Abnormal     Procedure Component Value - Date/Time    COVID PRE-OP / PRE-PROCEDURE SCREENING ORDER (NO ISOLATION) - Swab, Nasopharynx [[id number]]  (Normal) Collected: 11/30/21 1212    Lab Status: Final result Specimen: Swab from Nasopharynx Updated: 11/30/21 1257    Narrative:      The following orders were created for panel order COVID PRE-OP / PRE-PROCEDURE SCREENING ORDER (NO ISOLATION) - Swab, Nasopharynx.  Procedure                               Abnormality         Status                     ---------                               -----------         ------                     COVID-19, ABBOTT IN-HOUS...[[id number]]  Normal              Final result                 Please view results for these tests on the individual orders.    COVID-19, ABBOTT IN-HOUSE,NASAL Swab (NO TRANSPORT MEDIA) 2 HR TAT - Swab, Nasopharynx [[id number]]  (Normal) Collected: 11/30/21 1212    Lab Status: Final result Specimen: Swab from Nasopharynx Updated: 11/30/21 1257     COVID19 Presumptive Negative    Narrative:      Fact sheet for providers: https://www.fda.gov/media/176835/download     Fact sheet for patients: https://www.fda.gov/media/384711/download    Test performed by PCR.  If inconsistent with clinical signs and symptoms patient should be tested with different authorized molecular test.          No radiology results from the last 24 hrs        I have reviewed the medications:  Scheduled Meds:cyanocobalamin, 1,000 mcg, Intramuscular, Daily  dilTIAZem CD, 240 mg, Oral, Q24H  donepezil, 10 mg, Oral, Daily  lactobacillus acidophilus, 1 capsule, Oral, Daily  lisinopril, 20 mg, Oral, Daily  nystatin, , Topical, Q12H  pravastatin, 40 mg, Oral, Daily  QUEtiapine, 25 mg, Oral, Q12H  senna-docusate sodium, 1 tablet, Oral, BID  sodium chloride, 10 mL, Intravenous,  Q12H  tamsulosin, 0.4 mg, Oral, Daily  traZODone, 50 mg, Oral, Nightly      Continuous Infusions:   PRN Meds:.•  acetaminophen **OR** acetaminophen **OR** acetaminophen  •  senna-docusate sodium **AND** polyethylene glycol **AND** bisacodyl **AND** bisacodyl  •  haloperidol lactate  •  HYDROcodone-acetaminophen  •  ipratropium-albuterol  •  magnesium sulfate **OR** magnesium sulfate **OR** magnesium sulfate  •  [DISCONTINUED] Morphine **AND** naloxone  •  ondansetron **OR** ondansetron  •  potassium chloride **OR** potassium chloride **OR** potassium chloride  •  potassium phosphate infusion greater than 15 mMoles **OR** potassium phosphate infusion greater than 15 mMoles **OR** potassium phosphate **OR** sodium phosphate IVPB **OR** sodium phosphate IVPB **OR** sodium phosphate IVPB  •  [COMPLETED] Insert peripheral IV **AND** sodium chloride  •  sodium chloride    Assessment/Plan   Assessment & Plan     Active Hospital Problems    Diagnosis  POA   • Confusion [R41.0]  Yes   • Hematoma of hip, right, initial encounter [S70.01XA]  Yes   • Dementia (HCC) [F03.90]  Yes   • Hypoxia [R09.02]  Yes   • Pleural effusion, bilateral [J90]  Unknown   • Permanent atrial fibrillation (HCC) [I48.21]  Yes   • Essential hypertension [I10]  Yes   • Mixed hyperlipidemia [E78.2]  Yes      Resolved Hospital Problems   No resolved problems to display.        Brief Hospital Course to date:  Mike Lucero  is a 89 y.o. male with Afib on coumadin, brought after a fall with gluteal hematoma, found to have mult progressing medical problems atop dementia.    Gluteal hematoma post fall at home on coumadin   -- pain control  -Monitor hemoglobin    Confusion, agitation   Dementia x 2 yrs but at home he makes own breakfast and dresses himself; walks without cane or walker   -Currently on Seroquel 25 mg every 12 hours scheduled  -Currently mental status is much worse than baseline.       Hypoxia  jamey pleural effusions, new right basilar  atelectasis and effusion   -- signif right pleural effusion  --Repeat chest x-ray shows decreasing bilateral pleural effusion.  Patient is breathing also has improved.      Cirrhosis on CT and US  Ascites increased from a month ago  - plan conservative tx w diuresis   - ammonia was nl     Dementia  - lownl B12 - replace here   - nl TSHand ammonia   -- cont home meds   --As needed Haldol, scheduled Seroquel     HTN  HLD  -- cont home meds      Chronic Afib with coumadin   -- cont cardizem  -- hold coumadin for now      Urinary retention - small volume voids are chronic  BPH   - -Placed Zacarias on admission.  Pt tugged on it and developed hematuria.  Zacarias DCd on 12/2.   -Currently on flomax     PLAN:  -Continue current car  -Consult palliative and hospice         DVT prophylaxis:  Mechanical DVT prophylaxis orders are present.       AM-PAC 6 Clicks Score (PT): 11 (12/06/21 1103)    Disposition: I expect the patient to be discharged tbd - if he cannot walk he would need SNF     CODE STATUS:   Code Status and Medical Interventions:   Ordered at: 11/30/21 9529     Medical Intervention Limits:    NO intubation (DNI)     Level Of Support Discussed With:    Health Care Surrogate     Code Status (Patient has no pulse and is not breathing):    No CPR (Do Not Attempt to Resuscitate)     Medical Interventions (Patient has pulse or is breathing):    Limited Support     Comments:    wife       Librado Ricketts MD  12/07/21

## 2021-12-07 NOTE — PROGRESS NOTES
"Continued Stay Note  [facility]     Patient Name: Mike Lucero Jr.  MRN: [id number]  Today's Date: 12/7/2021    Admit Date: 11/30/2021     Discharge Plan     Row Name 12/07/21 1644       Plan    Plan To be determined    Plan Comments Hospice referral received, chart reviewed. Visit made to pt, pt's wife Glenis and step daughter Rachel present, Rachel is a hospice employee. Pt alert, answered simple yes/no questions otherwise pt confused. Pt's wife stated has noticed pt does not have a cpap in the room, pt has used  a cpap every night \"for years\" for sleep apnea. Informed staff nurse Dolly of pt's cpap use. Discussed pt's current condition and a plan of care. Glenis stated if pt able does want pt to go to rehab for physical therapy, noted that physical and occupational therapy worked with pt yesterday. Glenis stated pt had a fall early this morning. Teaching done with family on what is required for pt to be able to do rehab at a facility. Family stated if pt is unable to continue with physical therapy the decision will be long term care with hospice vs home with hospice. Will continue to follow pt. Please call [phone] if can be of further assistance.               Discharge Codes    No documentation.                     Marge León RN    "

## 2021-12-07 NOTE — DISCHARGE PLACEMENT REQUEST
"Mike Oseguera Jr. (89 y.o. Male)     Referred 12/7/2021 by Dr. Ricketts  PCP-Dr. HUMBERTO Evans-to be determined  Tested negative for covid 19 on 11/30/2021        Date of Birth Social Security Number Address Home Phone MRN    07/29/1932  [id number] [facility] [address] [phone] [id number]    Yazdanism Marital Status             Scientology        Admission Date Admission Type Admitting Provider Attending Provider Department, Room/Bed    11/30/21 Emergency Librado Ricketts MD Kalantar, Masoud, MD [facility] 4H, S471/1    Discharge Date Discharge Disposition Discharge Destination                         Attending Provider: Librado Ricketts MD    Allergies: Rocephin [Ceftriaxone], Temazepam, Wellbutrin [Bupropion]    Isolation: None   Infection: None   Code Status: No CPR   Advance Care Planning Activity    Ht: 175.3 cm (69\")   Wt: 97.5 kg (215 lb)    Admission Cmt: None   Principal Problem: None                Active Insurance as of 11/30/2021     Primary Coverage     Payor Plan Insurance Group Employer/Plan Group    MEDICARE MEDICARE A & B      Payor Plan Address Payor Plan Phone Number Payor Plan Fax Number Effective Dates    [address] [phone]  7/1/1997 - None Entered    [facility] [id number]       Subscriber Name Subscriber Birth Date Member ID       MIKE OSEGUERA JR. 7/29/1932 [id number]           Secondary Coverage     Payor Plan Insurance Group Employer/Plan Group    [facility] SUPP [id number]     Payor Plan Address Payor Plan Phone Number Payor Plan Fax Number Effective Dates    [address]   12/1/2016 - None Entered    [facility] [id number]       Subscriber Name Subscriber Birth Date Member ID       MIKE OSEGUERA JR. 7/29/1932 [id number]                 Emergency Contacts      (Rel.) Home Phone Work Phone Mobile Phone    Glenis Oseguera (Spouse) [phone] -- [phone]            Emergency Contact Information     Name " Relation Home Work Mobile    Glenis Lucero Spouse [phone]          Insurance Information                MEDICARE/MEDICARE A & B Phone: [phone]    Subscriber: Mike Lucero Jr. Subscriber#: [id number]    Group#: -- Precert#: --        ANTHEM BLUE CROSS/ANTHEM BC  SUPP Phone: --    Subscriber: Mike Lucero Jr. Subscriber#: [id number]    Group#: [id number] Precert#: --             History & Physical      Veronique Ro DO at 21 [facility]              [facility]  HISTORY AND PHYSICAL    Patient Name: Mike Lucero Jr.  : 1932  MRN: [id number]  Primary Care Physician: Subha Wesley MD  Date of admission: 2021      Subjective   Subjective     Chief Complaint:  Right hip pain     HPI:  Mike Lucero Jr. is a 89 y.o. male with PMh of afib on coumadin, dementia, CAD with CABG, HLD, HTN, appy, osteoarthritis. History given per wife due to dementia and patient is poor historian. Per wife she heard noise from patient room and found him standing but he states he was unable to walk due to pain in his right hip. She noticed swelling and bruising on right hip and buttocks. She states patient said he got out of bed and fell. He was able to stand but could not walk. Once in bed patient was unable to stand due to pain. His mentation is at baseline per wife. She states he is usually able to walk, and do things for himself. Talked with wife patient is a DNR with living will. Wife is fine with treating issues, paracentesis, thoracentesis if needed.     In ED: , creat 0.73, INR 3.06, WBC 8.26, hgb 11.2,       COVID Details:    Symptoms:    [x] NONE [] Fever []  Cough [] Shortness of breath [] Change in taste/smell      The patient has started, but not completed, their COVID-19 vaccination series.      Review of Systems   Constitutional: Negative for fever.   Respiratory: Negative for shortness of breath.    Cardiovascular:  "Negative for chest pain.   Genitourinary: Negative for dysuria.   Musculoskeletal: Positive for arthralgias, back pain and gait problem.        All other systems reviewed and are negative.     Personal History     Past Medical History:   Diagnosis Date   • Atrial fibrillation (HCC)    • Cataract    • Chronic anticoagulation    • Coronary artery disease    • Dementia (HCC)    • Diverticulosis    • Gallstones    • History of endocarditis    • HLD (hyperlipidemia)    • HTN (hypertension)    • Osteoarthritis    • Seasonal allergies     Seasonal allergies/rhinitis.        Past Surgical History:   Procedure Laterality Date   • ADRENAL GLAND SURGERY  1996    Dr. Jj Kee   • APPENDECTOMY  1950   • BLEPHAROPLASTY  08/06/2014    Dr. Santamaria   • COLONOSCOPY  2009   • CORONARY ARTERY BYPASS GRAFT  07/2006    CABG for 3-vessel disease, July 2006.   • INGUINAL HERNIA REPAIR Left     1963 and 09/2012   • TONSILLECTOMY  01/1958       Family History:  family history includes Alzheimer's disease in his mother; Dementia in his mother; Heart disease in his brother, father, and sister; Stroke in his brother and father. Otherwise pertinent FHx was reviewed and unremarkable.     Social History:  reports that he quit smoking about 51 years ago. His smoking use included cigarettes. He has a 18.00 pack-year smoking history. He has never used smokeless tobacco. He reports current alcohol use. He reports that he does not use drugs.  Social History     Social History Narrative   • Not on file       Medications:  Available home medication information reviewed.  (Not in a hospital admission)      Allergies   Allergen Reactions   • Rocephin [Ceftriaxone] Angioedema     Tongue and lip swelling, sore throat   • Temazepam Other (See Comments)     Caused pt to \"sleep drive\"   • Wellbutrin [Bupropion] Mental Status Change and Dizziness     Memory loss       Objective   Objective     Vital Signs:   Temp:  [97.6 °F (36.4 °C)] 97.6 °F (36.4 " °C)  Heart Rate:  [43-76] 76  Resp:  [14-26] 16  BP: (105-140)/(59-90) 114/90  Flow (L/min):  [2] 2       Physical Exam   Constitutional: No acute distress, awake, alert  HENT: NCAT, mucous membranes moist  Respiratory: Clear to auscultation bilaterally, respiratory effort normal room air   Cardiovascular: irregular, no murmurs, rubs, or gallops  Gastrointestinal: Positive bowel sounds, soft, nontender, nondistended  Musculoskeletal: No bilateral ankle edema  Psychiatric: Appropriate affect, cooperative  Neurologic: Oriented x 1, strength symmetric in all extremities, Cranial Nerves grossly intact to confrontation, speech clear  Skin: No rashes      Result Review:  I have personally reviewed the results from the time of this admission to 11/30/2021 17:25 EST and agree with these findings:  [x]  Laboratory  []  Microbiology  [x]  Radiology  []  EKG/Telemetry   []  Cardiology/Vascular   []  Pathology  []  Old records  []  Other:  Most notable findings include:       LAB RESULTS:      Lab 11/30/21  1137   WBC 8.26   HEMOGLOBIN 11.2*   HEMATOCRIT 35.0*   PLATELETS 165   NEUTROS ABS 6.41   IMMATURE GRANS (ABS) 0.04   LYMPHS ABS 1.07   MONOS ABS 0.60   EOS ABS 0.11   .0*   PROTIME 30.3*   INR 3.06*         Lab 11/30/21  1137   SODIUM 134*   POTASSIUM 3.9   CHLORIDE 99   CO2 28.0   ANION GAP 7.0   BUN 12   CREATININE 0.73*   GLUCOSE 126*   CALCIUM 8.4*         Lab 11/30/21  1137   TOTAL PROTEIN 5.6*   ALBUMIN 3.30*   GLOBULIN 2.3   ALT (SGPT) 8   AST (SGOT) 14   BILIRUBIN 1.0   ALK PHOS 82                         Microbiology Results (last 10 days)     Procedure Component Value - Date/Time    COVID PRE-OP / PRE-PROCEDURE SCREENING ORDER (NO ISOLATION) - Swab, Nasopharynx [[id number]]  (Normal) Collected: 11/30/21 1212    Lab Status: Final result Specimen: Swab from Nasopharynx Updated: 11/30/21 1257    Narrative:      The following orders were created for panel order COVID PRE-OP / PRE-PROCEDURE SCREENING ORDER (NO  ISOLATION) - Swab, Nasopharynx.  Procedure                               Abnormality         Status                     ---------                               -----------         ------                     COVID-19, ABBOTT IN-HOUS...[[id number]]  Normal              Final result                 Please view results for these tests on the individual orders.    COVID-19, ABBOTT IN-HOUSE,NASAL Swab (NO TRANSPORT MEDIA) 2 HR TAT - Swab, Nasopharynx [[id number]]  (Normal) Collected: 11/30/21 1212    Lab Status: Final result Specimen: Swab from Nasopharynx Updated: 11/30/21 1257     COVID19 Presumptive Negative    Narrative:      Fact sheet for providers: https://www.fda.gov/media/721339/download     Fact sheet for patients: https://www.fda.gov/media/578845/download    Test performed by PCR.  If inconsistent with clinical signs and symptoms patient should be tested with different authorized molecular test.          CT Abdomen Pelvis Without Contrast    Result Date: 11/30/2021  EXAMINATION: CT ABDOMEN PELVIS WO CONTRAST- 11/30/2021  INDICATION: fall/right hip pain  TECHNIQUE: 5 mm unenhanced images through the abdomen and pelvis  The radiation dose reduction device was turned on for each scan per the ALARA (As Low as Reasonably Achievable) protocol.  COMPARISON: 12/04/2020 abdomen and pelvis CT scan  FINDINGS: Patient history indicates fall with right lower back pain and right hip pain.  There is a small to moderate right pleural effusion, and minimal left effusion. There is moderate right lower lobe atelectasis. Similar findings are present on prior study, but are little greater today.  There is now mild upper abdominal ascites adjacent the liver and spleen where only trace ascites was present previously. Liver morphology is typical of cirrhosis. Spleen does not appear to be significantly enlarged. Numerous calcified gallstones are seen in the otherwise normal-appearing gallbladder. Pancreas, adrenal glands, and kidneys  appear unremarkable for a non-IV contrast enhanced exam. No upper abdominal free air or adenopathy is seen. Bowel loops are normal in caliber. There is pancolonic diverticulosis. No upper abdominal inflammatory change is seen.  Regarding the lower abdomen and pelvis, there is extensive descending colon and sigmoid diverticulosis, but no CT scan findings of acute diverticulitis. Terminal ileum and cecum appear grossly normal. The appendix by history is surgically absent.  With respect to the patient's right hip pain/trauma, bony structures including the pelvic bones and right femur appear to be intact. There is diffuse enlargement of the right gluteus alysha and medius, consistent with some diffuse edema or hematoma and a more discrete, almost masslike appearing 5 cm area superficial to the gluteus alysha presumably a discrete deep subcutaneous hematoma. There is also evidence of more extensive diffuse and superficial right subcutaneous hematoma. No intrapelvic hematoma or left flank hematoma is seen. There is a pure fat density 3 cm left gluteus minimus lipoma.      Impression: 1. Extensive hematoma of the right gluteal region, including discrete deep subcutaneous 5 cm hematoma, diffuse overlying subcutaneous hematoma, and probably diffuse hematoma infiltrating and enlarging the right gluteus alysha and gluteus medius. No evidence of intra-abdominal hematoma.  2. No evidence of acute bony trauma.  3. Mild to moderate ascites, increased from the trace ascites noted on 12/04/2020 exam.  4. Liver morphology typical of cirrhosis.  5. Gradually enlarging right pleural effusion, associated right lower lung atelectasis, and minimal left effusion.  D: 11/30/2021 E: 11/30/2021      CT Lumbar Spine Without Contrast    Result Date: 11/30/2021  EXAMINATION: CT LUMBAR SPINE WO CONTRAST-11/30/2021:  INDICATION: Fall/right low back/hip pain.  TECHNIQUE: Spiral acquisition 2 mm axial images through the lumbar spine with sagittal  and coronal 2-D reconstructions.  The radiation dose reduction device was turned on for each scan per the ALARA (As Low as Reasonably Achievable) protocol.  COMPARISON: No previous lumbar studies. Comparison is made to the sagittal reconstruction images of 12/04/2020 abdomen and pelvis CT scan.  FINDINGS: As on that study, there is multilevel lumbar degenerative disc disease, with no evidence of significant focal subluxation or evidence of compression deformity. Degenerative disc changes are again greater at L5-S1 than elsewhere. No pars defects are identified. No destructive or blastic bony disease is appreciated. The coronal images show a minimal dextroconvex lumbar scoliosis unchanged. SI joints show moderate bilateral DJD.  Axial bone window images show no evidence of acute bony trauma. Soft tissue axial images show the usual limited detail of the canal for a non-myelographic CT scan. There is mild canal stenosis at L2-3 due to posterior element hypertrophy and broad-based disc protrusion, mild-to-moderate L3-4 canal stenosis due again to disc protrusion and posterior element hypertrophy, probably moderate canal stenosis at L4-5 due to right paracentral disc protrusion. No pathologic paraspinous soft tissue mass or acute inflammatory focus is identified. Incidental note is made of bilateral pleural effusions, right greater than left.      Impression: 1. Multilevel lower lumbar degenerative disc disease, and SI joint DJD, but no evidence of acute lumbar spine trauma.  2. Multilevel mild-to-moderate lumbar spinal stenosis due to disc protrusions and posterior element hypertrophy.  3. Incidentally noted bilateral pleural effusions.  D:  11/30/2021 E:  11/30/2021       XR Chest 1 View    Result Date: 11/30/2021  EXAMINATION: XR CHEST 1 VW- 11/30/2021  INDICATION: fall/right hip pain  COMPARISON: 12/21/2020  FINDINGS: Heart shadow is markedly enlarged, and appears a little larger than on the prior study even allowing  for different film technique. Pulmonary vasculature is cephalized. There is minimal if any edema, but there is new opacification of the right lung base, probably a combination of right pleural effusion and atelectasis of the right lower lobe. There is minimal left effusion. Lungs elsewhere appear clear. No pneumothorax is seen. Old right upper chest trauma is again noted.      Impression: 1. Marked cardiomegaly and mild pulmonary vascular congestion. 2. New and fairly extensive right basilar atelectasis and effusion.   D: 11/30/2021 E: 11/30/2021            Assessment/Plan   Assessment & Plan     Active Hospital Problems    Diagnosis  POA   • Contusion of anus [S30.3XXA]  Yes   • Dementia (HCC) [F03.90]  Yes   • Hypoxia [R09.02]  Yes   • Pleural effusion, bilateral [J90]  Unknown   • Permanent atrial fibrillation (HCC) [I48.21]  Yes     1. Permanent atrial fibrillation:  a. Anticoagulated on Coumadin.   b. Rate controlled with diltiazem.       • Essential hypertension [I10]  Yes   • Mixed hyperlipidemia [E78.2]  Yes     Contusion with gluteal hematoma  -- pain control  --check cbc in AM, transfuse if needed  -- hold coumadin and ASA -poor anticoagulation candidate at baseline, may need to hold Coumadin at DC  -- PT.OT to see  -- fall precautions     Hypoxia  jamey pleural effusions, new right basilar atelectasis and effusion   -- give IV lasix 60 mg now and in am  -- may need IR to see for thoracentesis if not improved  --repeat chest xray in AM  -- oxygen as needed to keep sat > 90%  -- nebs prn   -- holding coumadin      ? cirrhosis  Ascites  -- US of liver  -- IV lasix times 2 doses  -- check hep panel    Dementia  -- cont home meds   --As needed Haldol, scheduled Seroquel    HTN  HLD  -- cont home meds      Chronic Afib with coumadin   -- cont cardizem  -- hold coumadin for now     Urinary retention  --Place Zacarias    DVT prophylaxis:  [facility]      CODE STATUS:  AND  Code Status and Medical Interventions:   Ordered  at: 11/30/21 1724     Medical Intervention Limits:    NO intubation (DNI)     Level Of Support Discussed With:    Health Care Surrogate     Code Status (Patient has no pulse and is not breathing):    No CPR (Do Not Attempt to Resuscitate)     Medical Interventions (Patient has pulse or is breathing):    Limited Support     Comments:    wife       Admission Status:  I believe this patient meets OBSERVATION status, however if further evaluation or treatment plans warrant, status may change.  Based upon current information, I predict patient's care encounter to be less than or equal to 2 midnights.      Sally Gu, APRN  11/30/21       Attending   Admission Attestation       I have seen and examined the patient, performing an independent face-to-face diagnostic evaluation with plan of care reviewed and developed with the advanced practice clinician (APC).      Brief Summary Statement:   Mike Lucero Jr. is a 89 y.o. male with history of dementia and A. fib on Coumadin who was admitted for inability to ambulate secondary to pain after undergoing mechanical fall.  CT concerning for gluteal hematoma as well as new worsening right pleural effusion and ascites.  Patient had period of agitation earlier tonight and was given IM Haldol with improvement.  Also having urinary retention so Zacarias placed for IV diuresis.     Remainder of detailed HPI is as noted by APC and has been reviewed and/or edited by me for completeness.    Attending Physical Exam:  Constitutional: Awake, confused, hard of hearing  Eyes: PERRLA, sclerae anicteric, no conjunctival injection  HENT: NCAT, mucous membranes moist  Neck: Supple, no thyromegaly,  trachea midline  Respiratory: Clear to auscultation bilaterally, nonlabored respirations   Cardiovascular: IRRR, no murmurs  Gastrointestinal: Positive bowel sounds, soft, nontender, distended  Musculoskeletal: Bilateral LE edema  Psychiatric: Appropriate affect, cooperative  Neurologic:  "Oriented x 1, speech clear  Skin: No rashes(consider inserting and editing \".LEXEXAMHP\" for qualifying comprehensive exam for Level 3 billing)    Brief Assessment/Plan :  See detailed assessment and plan developed with APC which I have reviewed and/or edited for completeness.        Admission Status: I believe that this patient meets OBSERVATION status, however if further evaluation or treatment plans warrant, status may change.  Based upon current information, I predict patient's care encounter to be less than or equal to 2 midnights.        Veronique Ro DO  11/30/21                  Electronically signed by Veronique Ro DO at 11/30/21 2014         Current Facility-Administered Medications   Medication Dose Route Frequency Provider Last Rate Last Admin   • acetaminophen (TYLENOL) tablet 650 mg  650 mg Oral Q4H PRN Sally Gu, TIFFANY   650 mg at 12/04/21 0828    Or   • acetaminophen (TYLENOL) 160 MG/5ML solution 650 mg  650 mg Oral Q4H PRN Sally Gu, APRN        Or   • acetaminophen (TYLENOL) suppository 650 mg  650 mg Rectal Q4H PRN Sally Gu, APRN       • sennosides-docusate (PERICOLACE) 8.6-50 MG per tablet 1 tablet  1 tablet Oral BID Sally Gu, APRN   1 tablet at 12/07/21 0922    And   • polyethylene glycol (MIRALAX) packet 17 g  17 g Oral Daily PRN Sally Gu, APRN        And   • bisacodyl (DULCOLAX) EC tablet 5 mg  5 mg Oral Daily PRN Sally Gu, APRN        And   • bisacodyl (DULCOLAX) suppository 10 mg  10 mg Rectal Daily PRN Sally Gu, APRN       • cyanocobalamin injection 1,000 mcg  1,000 mcg Intramuscular Daily Pat Kelly MD   1,000 mcg at 12/07/21 0925   • dilTIAZem CD (CARDIZEM CD) 24 hr capsule 240 mg  240 mg Oral Q24H Sally Gu, APRN   240 mg at 12/07/21 0922   • donepezil (ARICEPT) tablet 10 mg  10 mg Oral Daily Sally Gu, APRN   10 mg at 12/07/21 0926   • haloperidol lactate (HALDOL) injection 1 mg  1 " mg Intramuscular Q6H PRN Veronique Ro DO   1 mg at 12/06/21 1452   • HYDROcodone-acetaminophen (NORCO) 5-325 MG per tablet 1 tablet  1 tablet Oral Q4H PRN Librado Ricketts MD   1 tablet at 12/02/21 2258   • ipratropium-albuterol (DUO-NEB) nebulizer solution 3 mL  3 mL Nebulization Q6H PRN Sally Gu, APRRULA       • lactobacillus acidophilus (RISAQUAD) capsule 1 capsule  1 capsule Oral Daily Sally Gu APRN   1 capsule at 12/07/21 0922   • lisinopril (PRINIVIL,ZESTRIL) tablet 20 mg  20 mg Oral Daily Sally Gu APRN   20 mg at 12/07/21 0926   • Magnesium Sulfate 2 gram Bolus, followed by 8 gram infusion (total Mg dose 10 grams)- Mg less than or equal to 1mg/dL  2 g Intravenous PRN Librado Ricketts MD        Or   • Magnesium Sulfate 2 gram / 50mL Infusion (GIVE X 3 BAGS TO EQUAL 6GM TOTAL DOSE) - Mg 1.1 - 1.5 mg/dl  2 g Intravenous PRN Librado Ricketts MD        Or   • Magnesium Sulfate 4 gram infusion- Mg 1.6-1.9 mg/dL  4 g Intravenous PRN Librado Ricketts MD 25 mL/hr at 12/06/21 0954 4 g at 12/06/21 0954   • naloxone (NARCAN) injection 0.4 mg  0.4 mg Intravenous Q5 Min PRN Sally Gu, APRRULA       • nystatin (MYCOSTATIN) powder   Topical Q12H Pat Kelly MD   Given at 12/07/21 0927   • ondansetron (ZOFRAN) tablet 4 mg  4 mg Oral Q6H PRN Sally Gu, APRRULA        Or   • ondansetron (ZOFRAN) injection 4 mg  4 mg Intravenous Q6H PRN Sally Gu, APRN       • potassium chloride (MICRO-K) CR capsule 40 mEq  40 mEq Oral PRN Librado Ricketts MD   40 mEq at 12/07/21 0923    Or   • potassium chloride (KLOR-CON) packet 40 mEq  40 mEq Oral PRN Librado Ricketts MD        Or   • potassium chloride 10 mEq in 100 mL IVPB  10 mEq Intravenous Q1H PRN Librado Ricketts  mL/hr at 12/04/21 1532 10 mEq at 12/04/21 1532   • potassium phosphate 45 mmol in sodium chloride 0.9 % 500 mL infusion  45 mmol Intravenous PRN Librado Ricketts MD        Or   • potassium phosphate  30 mmol in sodium chloride 0.9 % 250 mL infusion  30 mmol Intravenous PRN Librado Ricketts MD        Or   • potassium phosphate 15 mmol in sodium chloride 0.9 % 100 mL infusion  15 mmol Intravenous PRN Librado Ricketts MD        Or   • sodium phosphates 45 mmol in sodium chloride 0.9 % 250 mL IVPB  45 mmol Intravenous PRN Librado Ricketts MD        Or   • sodium phosphates 30 mmol in sodium chloride 0.9 % 250 mL IVPB  30 mmol Intravenous PRN Librado Ricketts MD        Or   • sodium phosphates 15 mmol in sodium chloride 0.9 % 250 mL IVPB  15 mmol Intravenous PRN Librado Ricketts MD       • pravastatin (PRAVACHOL) tablet 40 mg  40 mg Oral Daily Sally Gu APRN   40 mg at 21 09   • QUEtiapine (SEROquel) tablet 25 mg  25 mg Oral Q12H Veronique Ro, DO   25 mg at 21 0923   • sodium chloride 0.9 % flush 10 mL  10 mL Intravenous PRN Cassie Castrejon MD       • sodium chloride 0.9 % flush 10 mL  10 mL Intravenous Q12H Sally Gu APRN   10 mL at 21 0928   • sodium chloride 0.9 % flush 10 mL  10 mL Intravenous PRN Sally Gu APRN       • tamsulosin (FLOMAX) 24 hr capsule 0.4 mg  0.4 mg Oral Daily Pat Kelly MD   0.4 mg at 21 0922   • traZODone (DESYREL) tablet 50 mg  50 mg Oral Nightly Sally Gu APRN   50 mg at 21        Physician Progress Notes (last 72 hours)      Librado Ricketts MD at 21 1651              [facility]  PROGRESS NOTE    Patient Name: Mike Lucero Jr.  : 1932  MRN: [id number]    Date of Admission: 2021  Primary Care Physician: Subha Wesley MD    Subjective   Subjective     CC: Fall at home     HPI -    Resting in bed in no acute distress and more alert and interactive and animated than yesterday.  He actually follows some commands.  Tells me he is overall comfortable and does not have any pain or shortness of breath.        ROS  Unable to  obtain more details    Objective   Objective     Vital Signs:   Temp:  [97.4 °F (36.3 °C)-98.4 °F (36.9 °C)] 97.9 °F (36.6 °C)  Heart Rate:  [51-97] 57  Resp:  [18-20] 20  BP: (106-125)/(50-61) 106/54  Flow (L/min):  [3] 3     Physical Exam:  Constitutional: No acute distress  HENT: NCAT, mucous membranes moist  Respiratory: Clear to auscultation bilaterally, respiratory effort normal   Cardiovascular: RRR, no murmurs, rubs, or gallops  Gastrointestinal: Positive bowel sounds, soft, nontender, nondistended  Musculoskeletal: No bilateral ankle edema, right flank and gluteal hematoma.  Unable to assess  Neurologic: A sleepy, arousable, very confused, did not recognize his wife at the bedside.  Skin: No rashes, extensive ecchymosis over the right gluteal and flank area         Results Reviewed:  LAB RESULTS:      Lab 12/06/21  0450 12/05/21  0425 12/04/21  0448 12/03/21  0529 12/02/21  0342 12/01/21  0801 12/01/21  0801 11/30/21  1137 11/30/21  1137   WBC 6.54 6.56 7.95 8.12 10.96*   < > 9.14   < > 8.26   HEMOGLOBIN 8.3* 8.1* 7.9* 8.0* 9.2*   < > 11.3*   < > 11.2*   HEMATOCRIT 25.4* 23.8* 23.6* 23.6* 27.5*   < > 33.4*   < > 35.0*   PLATELETS 206 181 153 142 158   < > 173   < > 165   NEUTROS ABS 4.35  --   --   --   --   --  6.88  --  6.41   IMMATURE GRANS (ABS) 0.04  --   --   --   --   --  0.04  --  0.04   LYMPHS ABS 1.28  --   --   --   --   --  1.12  --  1.07   MONOS ABS 0.66  --   --   --   --   --  0.92*  --  0.60   EOS ABS 0.19  --   --   --   --   --  0.15  --  0.11   .2* 96.4 100.4* 97.5* 96.2   < > 95.2   < > 100.0*   PROCALCITONIN  --   --   --   --  0.09  --   --   --   --    LACTATE  --   --   --   --  1.0  --   --   --   --    PROTIME 16.4* 16.9* 16.9* 20.2* 27.0*  --   --    < > 30.3*    < > = values in this interval not displayed.         Lab 12/06/21  0450 12/05/21  0425 12/04/21  2033 12/04/21  0448 12/03/21  0529 12/02/21  0342 12/02/21  0342 12/01/21  0431 12/01/21  0431   SODIUM 134* 132*  --   135* 132*  --  133*   < > 129*   POTASSIUM 3.6 3.5 3.7 3.4* 3.5   < > 3.8   < > 3.8   CHLORIDE 93* 92*  --  93* 92*  --  91*   < > 93*   CO2 34.0* 33.0*  --  36.0* 32.0*  --  34.0*   < > 29.0   ANION GAP 7.0 7.0  --  6.0 8.0  --  8.0   < > 7.0   BUN 16 17  --  20 21  --  15   < > 9   CREATININE 0.71* 0.63*  --  0.74* 0.81  --  0.94   < > 0.79   GLUCOSE 92 90  --  99 96  --  112*   < > 114*   CALCIUM 8.1* 8.0*  --  8.3* 8.0*  --  8.0*   < > 8.1*   MAGNESIUM 1.9  --   --  2.0 1.8  --   --   --   --    PHOSPHORUS 2.5  --   --  3.6  --   --   --   --  4.0   TSH  --   --   --   --   --   --  1.270  --   --     < > = values in this interval not displayed.         Lab 12/06/21  0450 12/02/21  0342 12/01/21  0431 11/30/21  1137   TOTAL PROTEIN 5.5* 5.3*  --  5.6*   ALBUMIN 3.00* 3.40* 3.50 3.30*   GLOBULIN 2.5 1.9  --  2.3   ALT (SGPT) 6 7  --  8   AST (SGOT) 13 13  --  14   BILIRUBIN 2.2* 2.0*  --  1.0   ALK PHOS 92 74  --  82         Lab 12/06/21  0450 12/05/21  0425 12/04/21  0448 12/03/21  0529 12/02/21  0342 11/30/21  1137 11/30/21  1137   PROBNP  --   --   --   --   --   --  4,268.0*   PROTIME 16.4* 16.9* 16.9* 20.2* 27.0*   < > 30.3*   INR 1.37* 1.42* 1.42* 1.80* 2.63*   < > 3.06*    < > = values in this interval not displayed.             Lab 12/02/21  1159 12/02/21  0342   VITAMIN B 12  --  245   ABO TYPING O  --    RH TYPING Positive  --    ANTIBODY SCREEN Negative  --          Brief Urine Lab Results  (Last result in the past 365 days)      Color   Clarity   Blood   Leuk Est   Nitrite   Protein   CREAT   Urine HCG        12/02/21 0309 Orange   Turbid   Large (3+)   Moderate (2+)   Positive   >=300 mg/dL (3+)                 Microbiology Results Abnormal     Procedure Component Value - Date/Time    COVID PRE-OP / PRE-PROCEDURE SCREENING ORDER (NO ISOLATION) - Swab, Nasopharynx [[id number]]  (Normal) Collected: 11/30/21 1212    Lab Status: Final result Specimen: Swab from Nasopharynx Updated: 11/30/21 1257     Narrative:      The following orders were created for panel order COVID PRE-OP / PRE-PROCEDURE SCREENING ORDER (NO ISOLATION) - Swab, Nasopharynx.  Procedure                               Abnormality         Status                     ---------                               -----------         ------                     COVID-19, ABBOTT IN-HOUS...[[id number]]  Normal              Final result                 Please view results for these tests on the individual orders.    COVID-19, ABBOTT IN-HOUSE,NASAL Swab (NO TRANSPORT MEDIA) 2 HR TAT - Swab, Nasopharynx [[id number]]  (Normal) Collected: 11/30/21 1212    Lab Status: Final result Specimen: Swab from Nasopharynx Updated: 11/30/21 1257     COVID19 Presumptive Negative    Narrative:      Fact sheet for providers: https://www.fda.gov/media/224723/download     Fact sheet for patients: https://www.fda.gov/media/458253/download    Test performed by PCR.  If inconsistent with clinical signs and symptoms patient should be tested with different authorized molecular test.          XR Chest 1 View    Result Date: 12/6/2021   EXAMINATION: XR CHEST 1 VW - 12/05/2021  INDICATION: S30.3XXA-Contusion of anus, initial encounter; K70-Tnlwdjf effusion, not elsewhere classified; R09.02-Hypoxemia; Z74.09-Other reduced mobility.  COMPARISON: 12/02/2021  FINDINGS: Portable chest reveals heart to be enlarged. Improvement seen in the pleural effusions. Small bilateral pleural effusions present. Improvement seen in the pulmonary vascularity.      Impression: Improvement seen in the aeration at the lung bases bilaterally with decrease seen in size of the bilateral pleural effusions. The heart remains enlarged.  DICTATED:   12/05/2021 EDITED/lfs:   12/05/2021  This report was finalized on 12/6/2021 3:53 PM by Dr. Aleida Riley MD.            I have reviewed the medications:  Scheduled Meds:cyanocobalamin, 1,000 mcg, Intramuscular, Daily  dilTIAZem CD, 240 mg, Oral, Q24H  donepezil, 10 mg,  Oral, Daily  ertapenem, 1 g, Intravenous, Q24H  furosemide, 40 mg, Intravenous, Daily  lactobacillus acidophilus, 1 capsule, Oral, Daily  lisinopril, 20 mg, Oral, Daily  nystatin, , Topical, Q12H  pravastatin, 40 mg, Oral, Daily  QUEtiapine, 25 mg, Oral, Q12H  senna-docusate sodium, 1 tablet, Oral, BID  sodium chloride, 10 mL, Intravenous, Q12H  tamsulosin, 0.4 mg, Oral, Daily  traZODone, 50 mg, Oral, Nightly      Continuous Infusions:   PRN Meds:.•  acetaminophen **OR** acetaminophen **OR** acetaminophen  •  senna-docusate sodium **AND** polyethylene glycol **AND** bisacodyl **AND** bisacodyl  •  haloperidol lactate  •  HYDROcodone-acetaminophen  •  ipratropium-albuterol  •  magnesium sulfate **OR** magnesium sulfate **OR** magnesium sulfate  •  [DISCONTINUED] Morphine **AND** naloxone  •  ondansetron **OR** ondansetron  •  potassium chloride **OR** potassium chloride **OR** potassium chloride  •  potassium phosphate infusion greater than 15 mMoles **OR** potassium phosphate infusion greater than 15 mMoles **OR** potassium phosphate **OR** sodium phosphate IVPB **OR** sodium phosphate IVPB **OR** sodium phosphate IVPB  •  [COMPLETED] Insert peripheral IV **AND** sodium chloride  •  sodium chloride    Assessment/Plan   Assessment & Plan     Active Hospital Problems    Diagnosis  POA   • Confusion [R41.0]  Yes   • Hematoma of hip, right, initial encounter [S70.01XA]  Yes   • Dementia (HCC) [F03.90]  Yes   • Hypoxia [R09.02]  Yes   • Pleural effusion, bilateral [J90]  Unknown   • Permanent atrial fibrillation (HCC) [I48.21]  Yes   • Essential hypertension [I10]  Yes   • Mixed hyperlipidemia [E78.2]  Yes      Resolved Hospital Problems   No resolved problems to display.        Brief Hospital Course to date:  Mike Lucero JrBear is a 89 y.o. male with Afib on coumadin, brought after a fall with gluteal hematoma, found to have mult progressing medical problems atop dementia.    Gluteal hematoma post fall at home on  coumadin   -- pain control  -Monitor hemoglobin    Confusion, agitation   Dementia x 2 yrs but at home he makes own breakfast and dresses himself; walks without cane or walker   -Currently on Seroquel 25 mg every 12 hours scheduled  -Currently mental status is worse than baseline.       Hypoxia  jamey pleural effusions, new right basilar atelectasis and effusion   -- signif right pleural effusion  --Repeat chest x-ray shows decreasing bilateral pleural effusion.  Patient is breathing also has improved.      Cirrhosis on CT and US  Ascites increased from a month ago  - plan conservative tx w diuresis   - ammonia was nl     Dementia  - lownl B12 - replace here   - nl TSHand ammonia   -- cont home meds   --As needed Haldol, scheduled Seroquel     HTN  HLD  -- cont home meds      Chronic Afib with coumadin   -- cont cardizem  -- hold coumadin for now      Urinary retention - small volume voids are chronic  BPH   - -Placed Zacarias on admission.  Pt tugged on it and developed hematuria.  Zacarias DCd on 12/2.   -Currently on flomax     PLAN:  - check labs including ammonia in am  - CXR to check p- effusion     NOTE: Had a long conversation with patient's wife again at the bedside.  Answered all of her many questions to her satisfaction and explained to her the results of chest x-ray and the change in mental status.  Total time spent 30 minutes.      NOTE:  Late this afternoon I was notified by the nurse that patient had fallen from the chair.  She told me that patient does not have any sign of fracture or laceration.  I saw and examined the patient in the room.  No sign of trauma to the head.  No sign of trauma to the trunk.  Exam of the lower extremities although there are some pain in the hip joint with internal and external rotation but nothing that indicates fracture.  We will be watching the patient and if necessary will do imaging.    DVT prophylaxis:  Mechanical DVT prophylaxis orders are present.       AM-PAC 6 Clicks  Score (PT): 11 (21 1103)    Disposition: I expect the patient to be discharged tbd - if he cannot walk he would need SNF     CODE STATUS:   Code Status and Medical Interventions:   Ordered at: 21 1724     Medical Intervention Limits:    NO intubation (DNI)     Level Of Support Discussed With:    Health Care Surrogate     Code Status (Patient has no pulse and is not breathing):    No CPR (Do Not Attempt to Resuscitate)     Medical Interventions (Patient has pulse or is breathing):    Limited Support     Comments:    wife       Librado Ricketts MD  21                Electronically signed by Librado Ricketts MD at 21 2205     Librado Ricketts MD at 21 1535              [facility] Medicine Services  PROGRESS NOTE    Patient Name: Mike Lucero Jr.  : 1932  MRN: [id number]    Date of Admission: 2021  Primary Care Physician: Subha Wesley MD    Subjective   Subjective     CC: Fall at home     HPI -    Resting in bed in no acute distress but he is very drowsy although arousable.  His wife is present at the bedside.  He is also very confused.        ROS  Unable to obtain    Objective   Objective     Vital Signs:   Temp:  [98 °F (36.7 °C)-98.9 °F (37.2 °C)] 98.9 °F (37.2 °C)  Heart Rate:  [45-88] 60  Resp:  [18-20] 18  BP: (107-138)/(47-74) 127/74  Flow (L/min):  [3] 3     Physical Exam:  Constitutional: No acute distress  HENT: NCAT, mucous membranes moist  Respiratory: Clear to auscultation bilaterally, respiratory effort normal   Cardiovascular: RRR, no murmurs, rubs, or gallops  Gastrointestinal: Positive bowel sounds, soft, nontender, nondistended  Musculoskeletal: No bilateral ankle edema, right flank and gluteal hematoma.  Unable to assess  Neurologic: A sleepy, arousable, very confused, did not recognize his wife at the bedside.  Skin: No rashes, extensive ecchymosis over the right gluteal and flank area         Results Reviewed:  LAB  RESULTS:      Lab 12/05/21  0425 12/04/21 0448 12/03/21  0529 12/02/21  0342 12/01/21  0801 11/30/21  1137 11/30/21  1137   WBC 6.56 7.95 8.12 10.96* 9.14   < > 8.26   HEMOGLOBIN 8.1* 7.9* 8.0* 9.2* 11.3*   < > 11.2*   HEMATOCRIT 23.8* 23.6* 23.6* 27.5* 33.4*   < > 35.0*   PLATELETS 181 153 142 158 173   < > 165   NEUTROS ABS  --   --   --   --  6.88  --  6.41   IMMATURE GRANS (ABS)  --   --   --   --  0.04  --  0.04   LYMPHS ABS  --   --   --   --  1.12  --  1.07   MONOS ABS  --   --   --   --  0.92*  --  0.60   EOS ABS  --   --   --   --  0.15  --  0.11   MCV 96.4 100.4* 97.5* 96.2 95.2   < > 100.0*   PROCALCITONIN  --   --   --  0.09  --   --   --    LACTATE  --   --   --  1.0  --   --   --    PROTIME 16.9* 16.9* 20.2* 27.0*  --   --  30.3*    < > = values in this interval not displayed.         Lab 12/05/21 0425 12/04/21 2033 12/04/21 0448 12/03/21 0529 12/02/21 0342 12/01/21  0431 12/01/21  0431   SODIUM 132*  --  135* 132* 133*  --  129*   POTASSIUM 3.5 3.7 3.4* 3.5 3.8   < > 3.8   CHLORIDE 92*  --  93* 92* 91*  --  93*   CO2 33.0*  --  36.0* 32.0* 34.0*  --  29.0   ANION GAP 7.0  --  6.0 8.0 8.0  --  7.0   BUN 17  --  20 21 15  --  9   CREATININE 0.63*  --  0.74* 0.81 0.94  --  0.79   GLUCOSE 90  --  99 96 112*  --  114*   CALCIUM 8.0*  --  8.3* 8.0* 8.0*  --  8.1*   MAGNESIUM  --   --  2.0 1.8  --   --   --    PHOSPHORUS  --   --  3.6  --   --   --  4.0   TSH  --   --   --   --  1.270  --   --     < > = values in this interval not displayed.         Lab 12/02/21 0342 12/01/21  0431 11/30/21  1137   TOTAL PROTEIN 5.3*  --  5.6*   ALBUMIN 3.40* 3.50 3.30*   GLOBULIN 1.9  --  2.3   ALT (SGPT) 7  --  8   AST (SGOT) 13  --  14   BILIRUBIN 2.0*  --  1.0   ALK PHOS 74  --  82         Lab 12/05/21  0425 12/04/21  0448 12/03/21  0529 12/02/21  0342 11/30/21  1137   PROBNP  --   --   --   --  4,268.0*   PROTIME 16.9* 16.9* 20.2* 27.0* 30.3*   INR 1.42* 1.42* 1.80* 2.63* 3.06*             Lab 12/02/21  1150  12/02/21  0342   VITAMIN B 12  --  245   ABO TYPING O  --    RH TYPING Positive  --    ANTIBODY SCREEN Negative  --          Brief Urine Lab Results  (Last result in the past 365 days)      Color   Clarity   Blood   Leuk Est   Nitrite   Protein   CREAT   Urine HCG        12/02/21 0309 Orange   Turbid   Large (3+)   Moderate (2+)   Positive   >=300 mg/dL (3+)                 Microbiology Results Abnormal     Procedure Component Value - Date/Time    COVID PRE-OP / PRE-PROCEDURE SCREENING ORDER (NO ISOLATION) - Swab, Nasopharynx [[id number]]  (Normal) Collected: 11/30/21 1212    Lab Status: Final result Specimen: Swab from Nasopharynx Updated: 11/30/21 1257    Narrative:      The following orders were created for panel order COVID PRE-OP / PRE-PROCEDURE SCREENING ORDER (NO ISOLATION) - Swab, Nasopharynx.  Procedure                               Abnormality         Status                     ---------                               -----------         ------                     COVID-19, ABBOTT IN-HOUS...[[id number]]  Normal              Final result                 Please view results for these tests on the individual orders.    COVID-19, ABBOTT IN-HOUSE,NASAL Swab (NO TRANSPORT MEDIA) 2 HR TAT - Swab, Nasopharynx [[id number]]  (Normal) Collected: 11/30/21 1212    Lab Status: Final result Specimen: Swab from Nasopharynx Updated: 11/30/21 1257     COVID19 Presumptive Negative    Narrative:      Fact sheet for providers: https://www.fda.gov/media/596380/download     Fact sheet for patients: https://www.fda.gov/media/725309/download    Test performed by PCR.  If inconsistent with clinical signs and symptoms patient should be tested with different authorized molecular test.          CT Head Without Contrast    Result Date: 12/3/2021  PROCEDURE: CT Head WO COMPARISON: November 2019 INDICATIONS: confusion, rule out bleed;Contusion of anus, initial encounter;Pleural effusion, not elsewhere classified;Hypoxemia;Other reduced  mobility Additional Relevant clinical info: Confusion, fall, r/o bleed.;Best images possible, scan range of pt motion repeated.  TECHNIQUE:  CT examination of the brain is performed without IV contrast. Radiation dose reduction techniques included automated exposure control or exposure modulation based on body size.  Count of known CT and cardiac nuc med studies performed in previous 12 months: 2.  FINDINGS:  Significant motion artifact seen throughout the examination sequences had to be repeated in order to try to obtain a diagnostic examination. Study is limited as a result. Moderate cortical atrophy seen somewhat asymmetric worse on the left than the right. It is possible that the left extra-axial space which is stable from 2019 could be an old chronic hygroma. No gross acute subdural collection seen. No definite acute intracranial hemorrhage mass effect or midline shift. Moderate cortical atrophy present. There is mild cortical atrophy. Periventricular low attenuation is likely secondary to small vessel ischemic disease. The ventricular system is not dilated.  The calvarium is grossly intact. However examination is limited with reconstructions performed of small areas of field-of-view.     Impression: No gross acute intracranial process. Probable small vessel ischemic disease.  Signer Name: Cinda Trinidad MD  Signed: 12/3/2021 9:47 PM  Workstation Name: [facility]  [facility]          I have reviewed the medications:  Scheduled Meds:cyanocobalamin, 1,000 mcg, Intramuscular, Daily  dilTIAZem CD, 240 mg, Oral, Q24H  donepezil, 10 mg, Oral, Daily  ertapenem, 1 g, Intravenous, Q24H  furosemide, 40 mg, Intravenous, Daily  lactobacillus acidophilus, 1 capsule, Oral, Daily  lisinopril, 20 mg, Oral, Daily  nystatin, , Topical, Q12H  pravastatin, 40 mg, Oral, Daily  QUEtiapine, 25 mg, Oral, Q12H  senna-docusate sodium, 1 tablet, Oral, BID  sodium chloride, 10 mL, Intravenous, Q12H  tamsulosin,  0.4 mg, Oral, Daily  traZODone, 50 mg, Oral, Nightly      Continuous Infusions:   PRN Meds:.•  acetaminophen **OR** acetaminophen **OR** acetaminophen  •  senna-docusate sodium **AND** polyethylene glycol **AND** bisacodyl **AND** bisacodyl  •  haloperidol lactate  •  HYDROcodone-acetaminophen  •  ipratropium-albuterol  •  magnesium sulfate **OR** magnesium sulfate **OR** magnesium sulfate  •  Morphine **AND** naloxone  •  ondansetron **OR** ondansetron  •  potassium chloride **OR** potassium chloride **OR** potassium chloride  •  potassium phosphate infusion greater than 15 mMoles **OR** potassium phosphate infusion greater than 15 mMoles **OR** potassium phosphate **OR** sodium phosphate IVPB **OR** sodium phosphate IVPB **OR** sodium phosphate IVPB  •  [COMPLETED] Insert peripheral IV **AND** sodium chloride  •  sodium chloride    Assessment/Plan   Assessment & Plan     Active Hospital Problems    Diagnosis  POA   • Confusion [R41.0]  Yes   • Hematoma of hip, right, initial encounter [S70.01XA]  Yes   • Dementia (HCC) [F03.90]  Yes   • Hypoxia [R09.02]  Yes   • Pleural effusion, bilateral [J90]  Unknown   • Permanent atrial fibrillation (HCC) [I48.21]  Yes   • Essential hypertension [I10]  Yes   • Mixed hyperlipidemia [E78.2]  Yes      Resolved Hospital Problems   No resolved problems to display.        Brief Hospital Course to date:  Mike Lucero  is a 89 y.o. male with Afib on coumadin, brought after a fall with gluteal hematoma, found to have mult progressing medical problems atop dementia.    Gluteal hematoma post fall at home on coumadin   -- pain control  -Monitor hemoglobin    Confusion, agitation   Dementia x 2 yrs but at home he makes own breakfast and dresses himself; walks without cane or walker   -Currently on Seroquel 25 mg every 12 hours scheduled  -Currently mental status is worse than baseline.  Patient is very confused.     Hypoxia  jamey pleural effusions, new right basilar atelectasis and  effusion   -- signif right pleural effusion  --Get a chest x-ray and cpnsider IR drainage when INR corrects      Cirrhosis on CT and US  Ascites increased from a month ago  - plan conservative tx w diuresis   - ammonia was nl     Dementia  - lownl B12 - replace here   - nl TSHand ammonia   -- cont home meds   --As needed Haldol, scheduled Seroquel     HTN  HLD  -- cont home meds      Chronic Afib with coumadin   -- cont cardizem  -- hold coumadin for now      Urinary retention - small volume voids are chronic  BPH   - -Placed Zacarias on admission.  Pt tugged on it and developed hematuria.  Zacarias DCd on .   -Currently on flomax     PLAN:  - check labs including ammonia in am  - CXR to check p- effusion     NOTE: Had a long conversation with patient's wife at the bedside.  Answered all of her many questions to her satisfaction and explained to her the plan of care.  Total time spent 38 minutes.    DVT prophylaxis:  Mechanical DVT prophylaxis orders are present.       AM-PAC 6 Clicks Score (PT): 10 (21 0820)    Disposition: I expect the patient to be discharged tbd - if he cannot walk he would need SNF     CODE STATUS:   Code Status and Medical Interventions:   Ordered at: 21 1724     Medical Intervention Limits:    NO intubation (DNI)     Level Of Support Discussed With:    Health Care Surrogate     Code Status (Patient has no pulse and is not breathing):    No CPR (Do Not Attempt to Resuscitate)     Medical Interventions (Patient has pulse or is breathing):    Limited Support     Comments:    wife       Librado Ricketts MD  21                Electronically signed by Librado Ricketts MD at 21 1542     Librado Ricketts MD at 21 1622              [facility] Medicine Services  PROGRESS NOTE    Patient Name: Mike Lucero Jr.  : 1932  MRN: [id number]    Date of Admission: 2021  Primary Care Physician: Subha Wesley MD    Subjective    Subjective     CC: Fall at home     HPI -    Resting in bed in no acute distress but tells me that he is not comfortable.  He denies pain or shortness of breath however.  No fever or chills.  No chest pain or palpitation or shortness of breath.  No nausea, vomiting, diarrhea, abdominal pain.        ROS  As above    Objective   Objective     Vital Signs:   Temp:  [97.3 °F (36.3 °C)-98.6 °F (37 °C)] 98.6 °F (37 °C)  Heart Rate:  [48-84] 56  Resp:  [18-20] 18  BP: ()/(47-73) 107/47  Flow (L/min):  [3] 3     Physical Exam:  Constitutional: No acute distress  HENT: NCAT, mucous membranes moist  Respiratory: Clear to auscultation bilaterally, respiratory effort normal   Cardiovascular: RRR, no murmurs, rubs, or gallops  Gastrointestinal: Positive bowel sounds, soft, nontender, nondistended  Musculoskeletal: No bilateral ankle edema, right flank hematoma.  Psychiatric: Appropriate affect, cooperative  Neurologic: Awake, alert, follows commands, no focality appreciated.  Speech clear  Skin: No rashes         Results Reviewed:  LAB RESULTS:      Lab 12/04/21 0448 12/03/21  0529 12/02/21  0342 12/01/21  0801 11/30/21  1137   WBC 7.95 8.12 10.96* 9.14 8.26   HEMOGLOBIN 7.9* 8.0* 9.2* 11.3* 11.2*   HEMATOCRIT 23.6* 23.6* 27.5* 33.4* 35.0*   PLATELETS 153 142 158 173 165   NEUTROS ABS  --   --   --  6.88 6.41   IMMATURE GRANS (ABS)  --   --   --  0.04 0.04   LYMPHS ABS  --   --   --  1.12 1.07   MONOS ABS  --   --   --  0.92* 0.60   EOS ABS  --   --   --  0.15 0.11   .4* 97.5* 96.2 95.2 100.0*   PROCALCITONIN  --   --  0.09  --   --    LACTATE  --   --  1.0  --   --    PROTIME 16.9* 20.2* 27.0*  --  30.3*         Lab 12/04/21 0448 12/03/21  0529 12/02/21  0342 12/01/21  0431 11/30/21  1137   SODIUM 135* 132* 133* 129* 134*   POTASSIUM 3.4* 3.5 3.8 3.8 3.9   CHLORIDE 93* 92* 91* 93* 99   CO2 36.0* 32.0* 34.0* 29.0 28.0   ANION GAP 6.0 8.0 8.0 7.0 7.0   BUN 20 21 15 9 12   CREATININE 0.74* 0.81 0.94 0.79 0.73*    GLUCOSE 99 96 112* 114* 126*   CALCIUM 8.3* 8.0* 8.0* 8.1* 8.4*   MAGNESIUM 2.0 1.8  --   --   --    PHOSPHORUS 3.6  --   --  4.0  --    TSH  --   --  1.270  --   --          Lab 12/02/21  0342 12/01/21  0431 11/30/21  1137   TOTAL PROTEIN 5.3*  --  5.6*   ALBUMIN 3.40* 3.50 3.30*   GLOBULIN 1.9  --  2.3   ALT (SGPT) 7  --  8   AST (SGOT) 13  --  14   BILIRUBIN 2.0*  --  1.0   ALK PHOS 74  --  82         Lab 12/04/21  0448 12/03/21  0529 12/02/21  0342 11/30/21  1137   PROBNP  --   --   --  4,268.0*   PROTIME 16.9* 20.2* 27.0* 30.3*   INR 1.42* 1.80* 2.63* 3.06*             Lab 12/02/21  1159 12/02/21  0342   VITAMIN B 12  --  245   ABO TYPING O  --    RH TYPING Positive  --    ANTIBODY SCREEN Negative  --          Brief Urine Lab Results  (Last result in the past 365 days)      Color   Clarity   Blood   Leuk Est   Nitrite   Protein   CREAT   Urine HCG        12/02/21 0309 Orange   Turbid   Large (3+)   Moderate (2+)   Positive   >=300 mg/dL (3+)                 Microbiology Results Abnormal     Procedure Component Value - Date/Time    COVID PRE-OP / PRE-PROCEDURE SCREENING ORDER (NO ISOLATION) - Swab, Nasopharynx [[id number]]  (Normal) Collected: 11/30/21 1212    Lab Status: Final result Specimen: Swab from Nasopharynx Updated: 11/30/21 1257    Narrative:      The following orders were created for panel order COVID PRE-OP / PRE-PROCEDURE SCREENING ORDER (NO ISOLATION) - Swab, Nasopharynx.  Procedure                               Abnormality         Status                     ---------                               -----------         ------                     COVID-Daya ABBOTT IN-HOUS...[[id number]]  Normal              Final result                 Please view results for these tests on the individual orders.    COVID-19 ABBOTT IN-HOUSE,NASAL Swab (NO TRANSPORT MEDIA) 2 HR TAT - Swab, Nasopharynx [[id number]]  (Normal) Collected: 11/30/21 1212    Lab Status: Final result Specimen: Swab from Nasopharynx Updated:  11/30/21 1257     COVID19 Presumptive Negative    Narrative:      Fact sheet for providers: https://www.fda.gov/media/157881/download     Fact sheet for patients: https://www.fda.gov/media/476793/download    Test performed by PCR.  If inconsistent with clinical signs and symptoms patient should be tested with different authorized molecular test.          CT Head Without Contrast    Result Date: 12/3/2021  PROCEDURE: CT Head WO COMPARISON: November 2019 INDICATIONS: confusion, rule out bleed;Contusion of anus, initial encounter;Pleural effusion, not elsewhere classified;Hypoxemia;Other reduced mobility Additional Relevant clinical info: Confusion, fall, r/o bleed.;Best images possible, scan range of pt motion repeated.  TECHNIQUE:  CT examination of the brain is performed without IV contrast. Radiation dose reduction techniques included automated exposure control or exposure modulation based on body size.  Count of known CT and cardiac nuc med studies performed in previous 12 months: 2.  FINDINGS:  Significant motion artifact seen throughout the examination sequences had to be repeated in order to try to obtain a diagnostic examination. Study is limited as a result. Moderate cortical atrophy seen somewhat asymmetric worse on the left than the right. It is possible that the left extra-axial space which is stable from 2019 could be an old chronic hygroma. No gross acute subdural collection seen. No definite acute intracranial hemorrhage mass effect or midline shift. Moderate cortical atrophy present. There is mild cortical atrophy. Periventricular low attenuation is likely secondary to small vessel ischemic disease. The ventricular system is not dilated.  The calvarium is grossly intact. However examination is limited with reconstructions performed of small areas of field-of-view.     Impression: No gross acute intracranial process. Probable small vessel ischemic disease.  Signer Name: Cinda Trinidad MD  Signed:  12/3/2021 9:47 PM  Workstation Name: [facility]  [facility]          I have reviewed the medications:  Scheduled Meds:cyanocobalamin, 1,000 mcg, Intramuscular, Daily  dilTIAZem CD, 240 mg, Oral, Q24H  donepezil, 10 mg, Oral, Daily  ertapenem, 1 g, Intravenous, Q24H  furosemide, 40 mg, Intravenous, Daily  lactobacillus acidophilus, 1 capsule, Oral, Daily  lisinopril, 20 mg, Oral, Daily  nystatin, , Topical, Q12H  pravastatin, 40 mg, Oral, Daily  QUEtiapine, 25 mg, Oral, Q12H  senna-docusate sodium, 1 tablet, Oral, BID  sodium chloride, 10 mL, Intravenous, Q12H  tamsulosin, 0.4 mg, Oral, Daily  traZODone, 50 mg, Oral, Nightly      Continuous Infusions:   PRN Meds:.•  acetaminophen **OR** acetaminophen **OR** acetaminophen  •  senna-docusate sodium **AND** polyethylene glycol **AND** bisacodyl **AND** bisacodyl  •  haloperidol lactate  •  HYDROcodone-acetaminophen  •  ipratropium-albuterol  •  magnesium sulfate **OR** magnesium sulfate **OR** magnesium sulfate  •  Morphine **AND** naloxone  •  ondansetron **OR** ondansetron  •  potassium chloride **OR** potassium chloride **OR** potassium chloride  •  potassium phosphate infusion greater than 15 mMoles **OR** potassium phosphate infusion greater than 15 mMoles **OR** potassium phosphate **OR** sodium phosphate IVPB **OR** sodium phosphate IVPB **OR** sodium phosphate IVPB  •  [COMPLETED] Insert peripheral IV **AND** sodium chloride  •  sodium chloride    Assessment/Plan   Assessment & Plan     Active Hospital Problems    Diagnosis  POA   • Confusion [R41.0]  Yes   • Hematoma of hip, right, initial encounter [S70.01XA]  Yes   • Dementia (HCC) [F03.90]  Yes   • Hypoxia [R09.02]  Yes   • Pleural effusion, bilateral [J90]  Unknown   • Permanent atrial fibrillation (HCC) [I48.21]  Yes   • Essential hypertension [I10]  Yes   • Mixed hyperlipidemia [E78.2]  Yes      Resolved Hospital Problems   No resolved problems to display.        Brief Hospital  Course to date:  Mike Lucero Jr. is a 89 y.o. male with Afib on coumadin, brought after a fall with gluteal hematoma, found to have mult progressing medical problems atop dementia.    Gluteal hematoma post fall at home on coumadin   -- pain control  -Monitor hemoglobin    Confusion, agitation   Dementia x 2 yrs but at home he makes own breakfast and dresses himself; walks without cane or walker   -  seroquel at night   -Currently seems to be at baseline.     Hypoxia  jamey pleural effusions, new right basilar atelectasis and effusion   - diuresed 3400ml w IV lasix 60 mg  -- signif right pleural effusion   - cpnsider IR drainage when INR corrects      Cirrhosis on CT and US  Ascites increased from a month ago  - plan conservative tx w diuresis   - ammonia nl     Dementia  - lownl B12 - replace here   - nl TSHand ammonia   -- cont home meds   --As needed Haldol, scheduled Seroquel     HTN  HLD  -- cont home meds      Chronic Afib with coumadin   -- cont cardizem  -- hold coumadin for now      Urinary retention - small volume voids are chronic  BPH   - -Placed Zacarias on admission.  Pt tugged on it and developed hematuria.  Zacarias DCd on 12/2.   -Currently on flomax          DVT prophylaxis:  Mechanical DVT prophylaxis orders are present.       AM-PAC 6 Clicks Score (PT): 6 (12/04/21 9264)    Disposition: I expect the patient to be discharged tbd - if he cannot walk he would need SNF     CODE STATUS:   Code Status and Medical Interventions:   Ordered at: 11/30/21 8534     Medical Intervention Limits:    NO intubation (DNI)     Level Of Support Discussed With:    Health Care Surrogate     Code Status (Patient has no pulse and is not breathing):    No CPR (Do Not Attempt to Resuscitate)     Medical Interventions (Patient has pulse or is breathing):    Limited Support     Comments:    wife       Librado Ricketts MD  12/04/21                Electronically signed by Librado Ricketts MD at 12/04/21 3988       Consult Notes  (last 72 hours)  Notes from 12/04/21 1321 through 12/07/21 1321   No notes of this type exist for this encounter.

## 2021-12-08 LAB
ANION GAP SERPL CALCULATED.3IONS-SCNC: 10 MMOL/L (ref 5–15)
BUN SERPL-MCNC: 18 MG/DL (ref 8–23)
BUN/CREAT SERPL: 29 (ref 7–25)
CALCIUM SPEC-SCNC: 8.6 MG/DL (ref 8.6–10.5)
CHLORIDE SERPL-SCNC: 95 MMOL/L (ref 98–107)
CO2 SERPL-SCNC: 29 MMOL/L (ref 22–29)
CREAT SERPL-MCNC: 0.62 MG/DL (ref 0.76–1.27)
DEPRECATED RDW RBC AUTO: 47.8 FL (ref 37–54)
ERYTHROCYTE [DISTWIDTH] IN BLOOD BY AUTOMATED COUNT: 13.5 % (ref 12.3–15.4)
GFR SERPL CREATININE-BSD FRML MDRD: 122 ML/MIN/1.73
GLUCOSE SERPL-MCNC: 95 MG/DL (ref 65–99)
HCT VFR BLD AUTO: 27.9 % (ref 37.5–51)
HGB BLD-MCNC: 9.4 G/DL (ref 13–17.7)
INR PPP: 1.34 (ref 0.85–1.16)
MAGNESIUM SERPL-MCNC: 1.9 MG/DL (ref 1.6–2.4)
MCH RBC QN AUTO: 32.5 PG (ref 26.6–33)
MCHC RBC AUTO-ENTMCNC: 33.7 G/DL (ref 31.5–35.7)
MCV RBC AUTO: 96.5 FL (ref 79–97)
PLATELET # BLD AUTO: 243 10*3/MM3 (ref 140–450)
PMV BLD AUTO: 8.4 FL (ref 6–12)
POTASSIUM SERPL-SCNC: 4.3 MMOL/L (ref 3.5–5.2)
POTASSIUM SERPL-SCNC: 4.6 MMOL/L (ref 3.5–5.2)
PROTHROMBIN TIME: 16.2 SECONDS (ref 11.4–14.4)
RBC # BLD AUTO: 2.89 10*6/MM3 (ref 4.14–5.8)
SODIUM SERPL-SCNC: 134 MMOL/L (ref 136–145)
WBC NRBC COR # BLD: 8.19 10*3/MM3 (ref 3.4–10.8)

## 2021-12-08 PROCEDURE — 85027 COMPLETE CBC AUTOMATED: CPT | Performed by: INTERNAL MEDICINE

## 2021-12-08 PROCEDURE — 0 MAGNESIUM SULFATE 4 GM/100ML SOLUTION: Performed by: INTERNAL MEDICINE

## 2021-12-08 PROCEDURE — 85610 PROTHROMBIN TIME: CPT | Performed by: INTERNAL MEDICINE

## 2021-12-08 PROCEDURE — 99222 1ST HOSP IP/OBS MODERATE 55: CPT | Performed by: PSYCHIATRY & NEUROLOGY

## 2021-12-08 PROCEDURE — 99233 SBSQ HOSP IP/OBS HIGH 50: CPT | Performed by: HOSPITALIST

## 2021-12-08 PROCEDURE — 83735 ASSAY OF MAGNESIUM: CPT | Performed by: INTERNAL MEDICINE

## 2021-12-08 PROCEDURE — 84132 ASSAY OF SERUM POTASSIUM: CPT | Performed by: INTERNAL MEDICINE

## 2021-12-08 PROCEDURE — 86592 SYPHILIS TEST NON-TREP QUAL: CPT | Performed by: PSYCHIATRY & NEUROLOGY

## 2021-12-08 PROCEDURE — 25010000002 CYANOCOBALAMIN PER 1000 MCG: Performed by: INTERNAL MEDICINE

## 2021-12-08 PROCEDURE — 80048 BASIC METABOLIC PNL TOTAL CA: CPT | Performed by: INTERNAL MEDICINE

## 2021-12-08 RX ORDER — QUETIAPINE FUMARATE 25 MG/1
25 TABLET, FILM COATED ORAL NIGHTLY
Status: DISCONTINUED | OUTPATIENT
Start: 2021-12-08 | End: 2021-12-15 | Stop reason: HOSPADM

## 2021-12-08 RX ADMIN — TRAZODONE HYDROCHLORIDE 50 MG: 50 TABLET ORAL at 20:59

## 2021-12-08 RX ADMIN — Medication 1 CAPSULE: at 09:55

## 2021-12-08 RX ADMIN — LISINOPRIL 20 MG: 20 TABLET ORAL at 09:55

## 2021-12-08 RX ADMIN — NYSTATIN: 100000 POWDER TOPICAL at 09:55

## 2021-12-08 RX ADMIN — TAMSULOSIN HYDROCHLORIDE 0.4 MG: 0.4 CAPSULE ORAL at 09:55

## 2021-12-08 RX ADMIN — QUETIAPINE FUMARATE 25 MG: 25 TABLET ORAL at 21:04

## 2021-12-08 RX ADMIN — CYANOCOBALAMIN 1000 MCG: 1000 INJECTION, SOLUTION INTRAMUSCULAR; SUBCUTANEOUS at 11:07

## 2021-12-08 RX ADMIN — PRAVASTATIN SODIUM 40 MG: 40 TABLET ORAL at 09:54

## 2021-12-08 RX ADMIN — SENNOSIDES AND DOCUSATE SODIUM 1 TABLET: 50; 8.6 TABLET ORAL at 09:54

## 2021-12-08 RX ADMIN — THIAMINE HCL TAB 100 MG 100 MG: 100 TAB at 16:19

## 2021-12-08 RX ADMIN — DONEPEZIL HYDROCHLORIDE 10 MG: 10 TABLET, FILM COATED ORAL at 09:54

## 2021-12-08 RX ADMIN — DILTIAZEM HYDROCHLORIDE 240 MG: 240 CAPSULE, COATED, EXTENDED RELEASE ORAL at 09:55

## 2021-12-08 RX ADMIN — NYSTATIN: 100000 POWDER TOPICAL at 20:59

## 2021-12-08 RX ADMIN — MAGNESIUM SULFATE HEPTAHYDRATE 4 G: 40 INJECTION, SOLUTION INTRAVENOUS at 02:00

## 2021-12-08 RX ADMIN — SODIUM CHLORIDE, PRESERVATIVE FREE 10 ML: 5 INJECTION INTRAVENOUS at 21:00

## 2021-12-08 RX ADMIN — SENNOSIDES AND DOCUSATE SODIUM 1 TABLET: 50; 8.6 TABLET ORAL at 20:59

## 2021-12-08 RX ADMIN — QUETIAPINE FUMARATE 25 MG: 25 TABLET ORAL at 09:55

## 2021-12-08 RX ADMIN — SODIUM CHLORIDE, PRESERVATIVE FREE 10 ML: 5 INJECTION INTRAVENOUS at 09:56

## 2021-12-08 NOTE — CONSULTS
Neurology Note    Patient:  Mike Lucero Jr.    YOB: 1932    REFERRING PHYSICIAN:  Dr. Cervantes    CHIEF COMPLAINT:    AMS    HISTORY OF PRESENT ILLNESS:   The patient is a 89 y.o. male with AF on warfarin, baseline dementia for a number of years, has not been driving for 2-3 years, lives at home, able to bathe and get dressed some per wife, admitted on  with a fall at home, gluteal hematoma, hypoxia, newly diagnosed ascites. Wife reports that he no longer consumes alcohol. He has been increasingly agitated and confused here, able to rest with Seroquel and trazodone, sleepy today. Ammonia level WNL. CTH negative for acute changes. Patient is DNR per Living Will prior to admission. Evaluated by hospice this admission, felt to be appropriate.    Past Medical History:  Past Medical History:   Diagnosis Date   • Atrial fibrillation (HCC)    • Cataract    • Chronic anticoagulation    • Coronary artery disease    • Dementia (HCC)    • Diverticulosis    • Gallstones    • History of endocarditis    • HLD (hyperlipidemia)    • HTN (hypertension)    • Osteoarthritis    • Seasonal allergies     Seasonal allergies/rhinitis.        Past Surgical History:  Past Surgical History:   Procedure Laterality Date   • ADRENAL GLAND SURGERY      Dr. Jj Kee   • APPENDECTOMY     • BLEPHAROPLASTY  2014    Dr. Santamaria   • COLONOSCOPY     • CORONARY ARTERY BYPASS GRAFT  2006    CABG for 3-vessel disease, 2006.   • INGUINAL HERNIA REPAIR Left      and 2012   • TONSILLECTOMY  1958       Social History:   Social History     Socioeconomic History   • Marital status:      Spouse name: Glenis   • Number of children: 2   • Years of education: J.D.   Tobacco Use   • Smoking status: Former Smoker     Packs/day: 1.00     Years: 18.00     Pack years: 18.00     Types: Cigarettes     Quit date: 1970     Years since quittin.5   • Smokeless tobacco: Never Used   Substance and  Sexual Activity   • Alcohol use: Yes     Comment: rare   • Drug use: No   • Sexual activity: Not Currently     Partners: Female        Family History:   Family History   Problem Relation Age of Onset   • Alzheimer's disease Mother    • Dementia Mother    • Heart disease Father    • Stroke Father    • Heart disease Sister    • Heart disease Brother    • Stroke Brother        Medications Prior to Admission:    Prior to Admission medications    Medication Sig Start Date End Date Taking? Authorizing Provider   alfuzosin (UROXATRAL) 10 MG 24 hr tablet TAKE ONE TABLET BY MOUTH DAILY 4/1/21   Fabien Merino PA   aspirin 81 MG EC tablet Take 81 mg by mouth Daily.    ProviderNeville MD   dilTIAZem CD (CARDIZEM CD) 240 MG 24 hr capsule TAKE ONE CAPSULE BY MOUTH DAILY **NEED FOLLOW UP APPOINTMENT FOR FURTHER REFILLS. OTHERWISE DEFER TO PCP** 9/27/21   Ike Strickland III, MD   donepezil (ARICEPT) 10 MG tablet TAKE ONE TABLET BY MOUTH DAILY 2/3/21   Kim Novak MD   lactobacillus acidophilus (RISAQUAD) capsule capsule Take 1 capsule by mouth Daily. 3/11/18   Kaitlynn Caruso APRN   lisinopril (PRINIVIL,ZESTRIL) 20 MG tablet Take 20 mg by mouth Daily.    ProviderNeville MD   potassium chloride (KLOR-CON) 20 MEQ CR tablet Take 1 tablet by mouth Daily. Needs f/u appt for further refills. Otherwise defer to PCP. 9/13/21   Ike Strickland III, MD   pravastatin (PRAVACHOL) 40 MG tablet TAKE ONE TABLET BY MOUTH DAILY 6/14/21   Fabien Merino PA   prednisoLONE acetate (PRED FORTE) 1 % ophthalmic suspension Apply 1 drop to eye(s) as directed by provider Daily As Needed. 5/31/19   Neville Puckett MD   sodium chloride (OCEAN) 0.65 % nasal spray 1 spray into the nostril(s) as directed by provider As Needed for Congestion.    Neville Puckett MD   traZODone (DESYREL) 50 MG tablet TAKE ONE TABLET BY MOUTH ONCE NIGHTLY 1/27/21   Fabien Merino PA   warfarin (COUMADIN) 2.5 MG tablet TAKE 1 TO 1 AND 1.5 TAB BY MOUTH  DAILY OR AS DIRECTED BY ANTICOAGULATION CLINIC 5/19/21   Ike Strickland III, MD       Allergies:  Rocephin [ceftriaxone], Temazepam, and Wellbutrin [bupropion]      Review of system  Review of Systems   Unable to perform ROS: Dementia       Vitals:    12/08/21 1400   BP:    Pulse: 54   Resp:    Temp:    SpO2: 92%       Physical exam  Physical Exam  HENT:      Head: Normocephalic and atraumatic.   Cardiovascular:      Rate and Rhythm: Normal rate. Rhythm irregular.   Pulmonary:      Effort: Pulmonary effort is normal.   Musculoskeletal:      Cervical back: Neck supple.   Neurological:      Deep Tendon Reflexes: Babinski sign absent on the right side. Babinski sign absent on the left side.      Comments: Eyes closed, alerts to voice briefly but keeps eyes closed, resists passive opening, oriented to his name, speech somewhat dysarthric, follows some commands, moves limbs x 4.           Lab Results   Component Value Date    WBC 8.19 12/08/2021    HGB 9.4 (L) 12/08/2021    HCT 27.9 (L) 12/08/2021    MCV 96.5 12/08/2021     12/08/2021     Lab Results   Component Value Date    GLUCOSE 95 12/08/2021    BUN 18 12/08/2021    CREATININE 0.62 (L) 12/08/2021    EGFRIFNONA 122 12/08/2021    BCR 29.0 (H) 12/08/2021    CO2 29.0 12/08/2021    CALCIUM 8.6 12/08/2021    ALBUMIN 3.00 (L) 12/06/2021    AST 13 12/06/2021    ALT 6 12/06/2021     TSH   0.270 - 4.200 uIU/mL 1.270      Vitamin B-12   211 - 946 pg/mL 245          Radiological Studies:  PROCEDURE: CT Head WO     COMPARISON: November 2019     INDICATIONS: confusion, rule out bleed;Contusion of anus, initial encounter;Pleural effusion, not elsewhere classified;Hypoxemia;Other reduced mobility  Additional Relevant clinical info: Confusion, fall, r/o bleed.;Best images possible, scan range of pt motion repeated.        TECHNIQUE:  CT examination of the brain is performed without IV contrast.  Radiation dose reduction techniques included automated exposure control or  exposure modulation based on body size.   Count of known CT and cardiac nuc med studies performed in previous 12 months: 2.            FINDINGS:  Significant motion artifact seen throughout the examination sequences had to be repeated in order to try to obtain a diagnostic examination. Study is limited as a result. Moderate cortical atrophy seen somewhat asymmetric worse on the left  than the right. It is possible that the left extra-axial space which is stable from 2019 could be an old chronic hygroma. No gross acute subdural collection seen. No definite acute intracranial hemorrhage mass effect or midline shift. Moderate cortical  atrophy present. There is mild cortical atrophy. Periventricular low attenuation is likely secondary to small vessel ischemic disease. The ventricular system is not dilated.  The calvarium is grossly intact. However examination is limited with  reconstructions performed of small areas of field-of-view.        IMPRESSION:  No gross acute intracranial process. Probable small vessel ischemic disease.           Signer Name: Cinda Trinidad MD   Signed: 12/3/2021 9:47 PM   Workstation Name: [facility]    [facility]    During this visit the following were done:  Labs Reviewed [x]    Labs Ordered []    Radiology Reports Reviewed [x]    Radiology Ordered []    EKG, echo, and/or stress test reviewed []    EEG results reviewed  []    EEG reviewed and interpreted per myself   []    Discussed case with neurointerventionalist or neuroradiologist []    Referring Provider Records Reviewed []    ER Records Reviewed []    Hospital Records Reviewed []    History Obtained From Family []    Radiological images view and Interpreted per myself [x]    Case Discussed with referring provider []     Decision to obtain and request outside records  []        Assessment and Plan     TME 22 multiple medical problems, baseline moderate to advanced dementia, probable AD. Currently oversedated.  Guarded prognosis for meaningful improvement given cognitive baseline, unlikely to be able to return home given required level of care here.   - Change Seroquel to night time only.   - EEG.   - RPR.   - Replace b12 and thiamine.   - Continue Aricept.   - Agree with DNR, likely hospice on discharge.       Thanks,          Electronically signed by Jefferson Boyd MD on 12/8/2021 at 15:26 EST

## 2021-12-08 NOTE — NURSING NOTE
Woc follow up for gerald scale < or = to 14    Current Gerald Score: 13    Skin issues: Per RN no skin issues, when not in restraints patient can get up to chair with standby assist.  Woc usually hesitates to order specialty bed when in restraints to this likeness of the surface.  RN encouraged to call Woc if any needs arise.    Specialty bed ordered: Not at this time.    Pressure Injury Protocol (initiate for Gerald Score of 18 or less):   *Maintain good skin care, keep dry, turn q 2 hr, keep heels elevated and offloaded with heel boots.    *Apply z-guard to sacrococcygeal area/ perineal area BID or daily and PRN per incontinent episodes.  *Follow C.A.R.E protocol if medical devices (Bipap, means, Ng tube, etc) are being used.     Please contact [facility] with any new questions or concerns.    Dragon disclaimer:  This note was entered via electronic voice recognition/ transcription prorgram. The electronic translation of spoken language may permit erroneous, or at times, nonsensical words or phrases to be inadvertently transcribed; Although I have reviewed the note for such errors, some may still exist.

## 2021-12-08 NOTE — PROGRESS NOTES
Continued Stay Note  [facility]     Patient Name: Mike Lucero Jr.  MRN: [id number]  Today's Date: 12/8/2021    Admit Date: 11/30/2021     Discharge Plan     Row Name 12/08/21 1708       Plan    Plan To be determined    Plan Comments Visit made to pt. pt's wife Glenis present. Discussed discharge plans. Glenis stated if pt able does want rehab for pt, followed by discharging home. If pt is unable to do rehab Glenis does not want long term care and would most likely  take pt home with hospice. Glenis stated pt's son is coming into town on Friday, 12/10, Glenis wants to discuss discharge plans with pt's son before making any final decisions. Pt appeared to be sleeping, did not arouse during the visit. Will continue to follow. Please call [phone] if can be of further assistance.    Row Name 12/08/21 3847       Plan    Plan     Plan Comments                Discharge Codes    No documentation.                     Marge León RN

## 2021-12-08 NOTE — NURSING NOTE
Patient in 2 point restraints still. Patient is confused and has dementia. Neuro consulted and evaluated patient today. Patient turned Q2 and heel boots in place. D/c plan is to look for placement for patient.

## 2021-12-08 NOTE — PROGRESS NOTES
[facility] Medicine Services  PROGRESS NOTE    Patient Name: Mike Lucero Jr.  : 1932  MRN: [id number]    Date of Admission: 2021  Primary Care Physician: Subha Wesley MD    Subjective   Subjective     CC:  Fall, AMS    HPI:  Patient seen this morning. In restraints. Unable to provide history.    ROS:  Unable to obtain due to mental status    Objective   Objective     Vital Signs:   Temp:  [97.8 °F (36.6 °C)-98.8 °F (37.1 °C)] 97.8 °F (36.6 °C)  Heart Rate:  [53-83] 65  Resp:  [18-20] 18  BP: (105-126)/(57-76) 126/68  Flow (L/min):  [3] 3     Physical Exam:  Gen-no acute distress  HENT-NCAT, mucous membranes moist  CV-RRR, S1 S2 normal, no m/r/g  Resp-CTAB, no wheezes or rales  Abd-soft, NT, ND, +BS  Ext-no edema  Neuro-lethargic, confused, mumbling incoherent speech  Skin-no rashes  Psych-unable to assess      Results Reviewed:  LAB RESULTS:      Lab 21  0508 21  0429 21  0450 21  0425 21  0448 21  0529 21  0529 21  0342 21  0342 21  0801 21  0801   WBC 8.19 7.17 6.54 6.56 7.95   < > 8.12   < > 10.96*   < > 9.14   HEMOGLOBIN 9.4* 8.7* 8.3* 8.1* 7.9*   < > 8.0*   < > 9.2*   < > 11.3*   HEMATOCRIT 27.9* 26.7* 25.4* 23.8* 23.6*   < > 23.6*   < > 27.5*   < > 33.4*   PLATELETS 243 239 206 181 153   < > 142   < > 158   < > 173   NEUTROS ABS  --   --  4.35  --   --   --   --   --   --   --  6.88   IMMATURE GRANS (ABS)  --   --  0.04  --   --   --   --   --   --   --  0.04   LYMPHS ABS  --   --  1.28  --   --   --   --   --   --   --  1.12   MONOS ABS  --   --  0.66  --   --   --   --   --   --   --  0.92*   EOS ABS  --   --  0.19  --   --   --   --   --   --   --  0.15   MCV 96.5 98.9* 101.2* 96.4 100.4*   < > 97.5*   < > 96.2   < > 95.2   PROCALCITONIN  --   --   --   --   --   --   --   --  0.09  --   --    LACTATE  --   --   --   --   --   --   --   --  1.0  --   --    PROTIME  --  16.2* 16.4* 16.9*  16.9*  --  20.2*   < > 27.0*  --   --     < > = values in this interval not displayed.         Lab 12/08/21  0014 12/07/21  1500 12/07/21  0429 12/06/21 0450 12/05/21 0425 12/04/21 2033 12/04/21 0448 12/04/21 0448 12/03/21  0529 12/03/21  0529 12/02/21 0342 12/02/21 0342   SODIUM  --   --  133* 134* 132*  --   --  135*  --  132*   < > 133*   POTASSIUM 4.6  --  3.5 3.6 3.5 3.7   < > 3.4*   < > 3.5   < > 3.8   CHLORIDE  --   --  94* 93* 92*  --   --  93*  --  92*   < > 91*   CO2  --   --  34.0* 34.0* 33.0*  --   --  36.0*  --  32.0*   < > 34.0*   ANION GAP  --   --  5.0 7.0 7.0  --   --  6.0  --  8.0   < > 8.0   BUN  --   --  17 16 17  --   --  20  --  21   < > 15   CREATININE  --   --  0.65* 0.71* 0.63*  --   --  0.74*  --  0.81   < > 0.94   GLUCOSE  --   --  98 92 90  --   --  99  --  96   < > 112*   CALCIUM  --   --  8.2* 8.1* 8.0*  --   --  8.3*  --  8.0*   < > 8.0*   MAGNESIUM 1.9 1.8  --  1.9  --   --   --  2.0  --  1.8  --   --    PHOSPHORUS  --   --   --  2.5  --   --   --  3.6  --   --   --   --    TSH  --   --   --   --   --   --   --   --   --   --   --  1.270    < > = values in this interval not displayed.         Lab 12/06/21 0450 12/02/21 0342   TOTAL PROTEIN 5.5* 5.3*   ALBUMIN 3.00* 3.40*   GLOBULIN 2.5 1.9   ALT (SGPT) 6 7   AST (SGOT) 13 13   BILIRUBIN 2.2* 2.0*   ALK PHOS 92 74         Lab 12/07/21  0429 12/06/21  0450 12/05/21  0425 12/04/21  0448 12/03/21  0529   PROTIME 16.2* 16.4* 16.9* 16.9* 20.2*   INR 1.34* 1.37* 1.42* 1.42* 1.80*             Lab 12/02/21  1159 12/02/21  0342   VITAMIN B 12  --  245   ABO TYPING O  --    RH TYPING Positive  --    ANTIBODY SCREEN Negative  --          Brief Urine Lab Results  (Last result in the past 365 days)      Color   Clarity   Blood   Leuk Est   Nitrite   Protein   CREAT   Urine HCG        12/02/21 0309 Orange   Turbid   Large (3+)   Moderate (2+)   Positive   >=300 mg/dL (3+)                 Microbiology Results Abnormal     Procedure Component  Value - Date/Time    COVID PRE-OP / PRE-PROCEDURE SCREENING ORDER (NO ISOLATION) - Swab, Nasopharynx [[id number]]  (Normal) Collected: 11/30/21 1212    Lab Status: Final result Specimen: Swab from Nasopharynx Updated: 11/30/21 1257    Narrative:      The following orders were created for panel order COVID PRE-OP / PRE-PROCEDURE SCREENING ORDER (NO ISOLATION) - Swab, Nasopharynx.  Procedure                               Abnormality         Status                     ---------                               -----------         ------                     COVID-19, ABBOTT IN-HOUS...[[id number]]  Normal              Final result                 Please view results for these tests on the individual orders.    COVID-19, ABBOTT IN-HOUSE,NASAL Swab (NO TRANSPORT MEDIA) 2 HR TAT - Swab, Nasopharynx [[id number]]  (Normal) Collected: 11/30/21 1212    Lab Status: Final result Specimen: Swab from Nasopharynx Updated: 11/30/21 1257     COVID19 Presumptive Negative    Narrative:      Fact sheet for providers: https://www.fda.gov/media/433528/download     Fact sheet for patients: https://www.fda.gov/media/340170/download    Test performed by PCR.  If inconsistent with clinical signs and symptoms patient should be tested with different authorized molecular test.          No radiology results from the last 24 hrs        I have reviewed the medications:  Scheduled Meds:cyanocobalamin, 1,000 mcg, Intramuscular, Daily  dilTIAZem CD, 240 mg, Oral, Q24H  donepezil, 10 mg, Oral, Daily  lactobacillus acidophilus, 1 capsule, Oral, Daily  lisinopril, 20 mg, Oral, Daily  nystatin, , Topical, Q12H  pravastatin, 40 mg, Oral, Daily  QUEtiapine, 25 mg, Oral, Q12H  senna-docusate sodium, 1 tablet, Oral, BID  sodium chloride, 10 mL, Intravenous, Q12H  tamsulosin, 0.4 mg, Oral, Daily  traZODone, 50 mg, Oral, Nightly      Continuous Infusions:   PRN Meds:.•  acetaminophen **OR** acetaminophen **OR** acetaminophen  •  senna-docusate sodium **AND**  polyethylene glycol **AND** bisacodyl **AND** bisacodyl  •  haloperidol lactate  •  HYDROcodone-acetaminophen  •  ipratropium-albuterol  •  magnesium sulfate **OR** magnesium sulfate **OR** magnesium sulfate  •  [DISCONTINUED] Morphine **AND** naloxone  •  ondansetron **OR** ondansetron  •  potassium chloride **OR** potassium chloride **OR** potassium chloride  •  potassium phosphate infusion greater than 15 mMoles **OR** potassium phosphate infusion greater than 15 mMoles **OR** potassium phosphate **OR** sodium phosphate IVPB **OR** sodium phosphate IVPB **OR** sodium phosphate IVPB  •  [COMPLETED] Insert peripheral IV **AND** sodium chloride  •  sodium chloride    Assessment/Plan   Assessment & Plan     Active Hospital Problems    Diagnosis  POA   • **Hematoma of hip, right, initial encounter [S70.01XA]  Yes   • Confusion [R41.0]  Yes   • Dementia (HCC) [F03.90]  Yes   • Hypoxia [R09.02]  Yes   • Pleural effusion, bilateral [J90]  Unknown   • Permanent atrial fibrillation (HCC) [I48.21]  Yes   • Essential hypertension [I10]  Yes   • Mixed hyperlipidemia [E78.2]  Yes      Resolved Hospital Problems   No resolved problems to display.        Brief Hospital Course to date:  Mike Lucero Jr. is a 89 y.o. male with hx of Afib on Coumadin, dementia, CAD s/p CABG, HTN, HLD, and osteoarthritis who presents due to right hip pain and inability to walk following a fall while getting out of bed. He has swelling and bruising on the right hip and buttocks.    *All problems are new to me today.    Gluteal hematoma s/p fall at home, on chronic anticoagulation  --Holding Coumadin for now.  --Monitor H&H--stable.  --PT/OT.    Confusion/agitation  Underlying dementia  --At baseline, makes his own breakfast and dresses himself, walks without cane/walker.   --Normal TSH, B12, ammonia.  --Continue home Aricept and Trazodone.  --Mental status worse than baseline, currently in restraints.  --Scheduled Seroquel 25 mg BID, PRN  Haldol.  --Will consult Neurology to help with medication regimen to get behaviors under better control.     Hypoxia  Bilateral pleural effusions, right greater than left  --Improved, repeat CXR shows decreased size of effusions.  --Currently on 3 liters, continue to wean as tolerated.    Cirrhosis per CT and US, new diagnosis  Ascites, increased from 1 year ago  --Ammonia WNL.  --Monitor closely, gentle diuresis as tolerated.  --Defer further workup at this time. Ascites does not appear enough to tap at the moment. Monitor.    Afib on chronic Coumadin  --Continue home Cardizem.  --Continue to hold Coumadin for now given gluteal hematoma, hopefully can resume soon.    Urinary retention  BPH  --Small volume voids are chronic.  --Placed Zacarias on admission, then patient tugged on it and developed hematuria--d/c'ed 12/2/21.   --Continue Flomax.    HTN  HLD  --Continue Lisinopril.  --Continue statin.    Goals of care planning  --Palliative Care and Hospice following.  --If patient unable to continue with PT, likely plan will be long term care vs home with hospice.      DVT prophylaxis:  Mechanical DVT prophylaxis orders are present.       AM-PAC 6 Clicks Score (PT): 11 (12/07/21 1044)    Disposition: I expect the patient to be discharged TBD    CODE STATUS:   Code Status and Medical Interventions:   Ordered at: 11/30/21 5938     Medical Intervention Limits:    NO intubation (DNI)     Level Of Support Discussed With:    Health Care Surrogate     Code Status (Patient has no pulse and is not breathing):    No CPR (Do Not Attempt to Resuscitate)     Medical Interventions (Patient has pulse or is breathing):    Limited Support     Comments:    wife       Cynthia Cervantes MD  12/08/21

## 2021-12-08 NOTE — CASE MANAGEMENT/SOCIAL WORK
Continued Stay Note   Jesus     Patient Name: Mike Lucero Jr.  MRN: [id number]  Today's Date: 12/8/2021    Admit Date: 11/30/2021     Discharge Plan     Row Name 12/08/21 1458       Plan    Plan TBD    Plan Comments Patient  still in restraints, PT signed in for today to work with patient. I am waiting to see if patient able to work with therapy and their notes.  I plan to reach out to wife about type of placement patient  needs or she wants. Wife spoke with Hospice but not ready for that. CM following. Nighat @ [phone]               Discharge Codes    No documentation.                     Charlee Garcia RN

## 2021-12-09 ENCOUNTER — APPOINTMENT (OUTPATIENT)
Dept: NEUROLOGY | Facility: HOSPITAL | Age: 86
End: 2021-12-09

## 2021-12-09 LAB
ANION GAP SERPL CALCULATED.3IONS-SCNC: 6 MMOL/L (ref 5–15)
BUN SERPL-MCNC: 21 MG/DL (ref 8–23)
BUN/CREAT SERPL: 29.6 (ref 7–25)
CALCIUM SPEC-SCNC: 8.1 MG/DL (ref 8.6–10.5)
CHLORIDE SERPL-SCNC: 96 MMOL/L (ref 98–107)
CO2 SERPL-SCNC: 31 MMOL/L (ref 22–29)
CREAT SERPL-MCNC: 0.71 MG/DL (ref 0.76–1.27)
DEPRECATED RDW RBC AUTO: 49.1 FL (ref 37–54)
ERYTHROCYTE [DISTWIDTH] IN BLOOD BY AUTOMATED COUNT: 13.9 % (ref 12.3–15.4)
GFR SERPL CREATININE-BSD FRML MDRD: 104 ML/MIN/1.73
GLUCOSE SERPL-MCNC: 93 MG/DL (ref 65–99)
HCT VFR BLD AUTO: 27 % (ref 37.5–51)
HGB BLD-MCNC: 9 G/DL (ref 13–17.7)
INR PPP: 1.33 (ref 0.85–1.16)
MAGNESIUM SERPL-MCNC: 2.2 MG/DL (ref 1.6–2.4)
MCH RBC QN AUTO: 32.7 PG (ref 26.6–33)
MCHC RBC AUTO-ENTMCNC: 33.3 G/DL (ref 31.5–35.7)
MCV RBC AUTO: 98.2 FL (ref 79–97)
PLATELET # BLD AUTO: 259 10*3/MM3 (ref 140–450)
PMV BLD AUTO: 8.6 FL (ref 6–12)
POTASSIUM SERPL-SCNC: 4.1 MMOL/L (ref 3.5–5.2)
PROTHROMBIN TIME: 16.1 SECONDS (ref 11.4–14.4)
RBC # BLD AUTO: 2.75 10*6/MM3 (ref 4.14–5.8)
RPR SER QL: NORMAL
SODIUM SERPL-SCNC: 133 MMOL/L (ref 136–145)
WBC NRBC COR # BLD: 7.88 10*3/MM3 (ref 3.4–10.8)

## 2021-12-09 PROCEDURE — 83735 ASSAY OF MAGNESIUM: CPT | Performed by: INTERNAL MEDICINE

## 2021-12-09 PROCEDURE — 99231 SBSQ HOSP IP/OBS SF/LOW 25: CPT | Performed by: PSYCHIATRY & NEUROLOGY

## 2021-12-09 PROCEDURE — 95816 EEG AWAKE AND DROWSY: CPT

## 2021-12-09 PROCEDURE — 97530 THERAPEUTIC ACTIVITIES: CPT

## 2021-12-09 PROCEDURE — 80048 BASIC METABOLIC PNL TOTAL CA: CPT | Performed by: HOSPITALIST

## 2021-12-09 PROCEDURE — 99233 SBSQ HOSP IP/OBS HIGH 50: CPT | Performed by: HOSPITALIST

## 2021-12-09 PROCEDURE — 25010000002 CYANOCOBALAMIN PER 1000 MCG: Performed by: INTERNAL MEDICINE

## 2021-12-09 PROCEDURE — 97535 SELF CARE MNGMENT TRAINING: CPT

## 2021-12-09 PROCEDURE — 85610 PROTHROMBIN TIME: CPT | Performed by: INTERNAL MEDICINE

## 2021-12-09 PROCEDURE — 85027 COMPLETE CBC AUTOMATED: CPT | Performed by: HOSPITALIST

## 2021-12-09 RX ADMIN — TRAZODONE HYDROCHLORIDE 50 MG: 50 TABLET ORAL at 20:15

## 2021-12-09 RX ADMIN — Medication 1 CAPSULE: at 08:53

## 2021-12-09 RX ADMIN — PRAVASTATIN SODIUM 40 MG: 40 TABLET ORAL at 08:53

## 2021-12-09 RX ADMIN — CYANOCOBALAMIN 1000 MCG: 1000 INJECTION, SOLUTION INTRAMUSCULAR; SUBCUTANEOUS at 10:53

## 2021-12-09 RX ADMIN — QUETIAPINE FUMARATE 25 MG: 25 TABLET ORAL at 20:15

## 2021-12-09 RX ADMIN — THIAMINE HCL TAB 100 MG 100 MG: 100 TAB at 08:53

## 2021-12-09 RX ADMIN — LISINOPRIL 20 MG: 20 TABLET ORAL at 08:53

## 2021-12-09 RX ADMIN — TAMSULOSIN HYDROCHLORIDE 0.4 MG: 0.4 CAPSULE ORAL at 08:53

## 2021-12-09 RX ADMIN — SENNOSIDES AND DOCUSATE SODIUM 1 TABLET: 50; 8.6 TABLET ORAL at 08:53

## 2021-12-09 RX ADMIN — SODIUM CHLORIDE, PRESERVATIVE FREE 10 ML: 5 INJECTION INTRAVENOUS at 21:02

## 2021-12-09 RX ADMIN — SENNOSIDES AND DOCUSATE SODIUM 1 TABLET: 50; 8.6 TABLET ORAL at 20:16

## 2021-12-09 RX ADMIN — NYSTATIN: 100000 POWDER TOPICAL at 20:19

## 2021-12-09 RX ADMIN — NYSTATIN: 100000 POWDER TOPICAL at 08:53

## 2021-12-09 RX ADMIN — DONEPEZIL HYDROCHLORIDE 10 MG: 10 TABLET, FILM COATED ORAL at 08:53

## 2021-12-09 RX ADMIN — DILTIAZEM HYDROCHLORIDE 240 MG: 240 CAPSULE, COATED, EXTENDED RELEASE ORAL at 08:53

## 2021-12-09 RX ADMIN — SODIUM CHLORIDE, PRESERVATIVE FREE 10 ML: 5 INJECTION INTRAVENOUS at 08:53

## 2021-12-09 NOTE — THERAPY TREATMENT NOTE
Patient Name: Mike Lucero Jr.  : 1932    MRN: [id number]                              Today's Date: 2021       Admit Date: 2021    Visit Dx:     ICD-10-CM ICD-9-CM   1. Contusion of anus, initial encounter  S30.3XXA 922.9   2. Chronic bilateral pleural effusions  J90 511.9   3. Hypoxia  R09.02 799.02   4. Impaired functional mobility, balance, gait, and endurance  Z74.09 V49.89     Patient Active Problem List   Diagnosis   • Mixed hyperlipidemia   • Coronary artery disease   • Permanent atrial fibrillation (HCC)   • Essential hypertension   • Hypercholesterolemia   • h/o Endocarditis   • Osteoarthritis   • Cataract   • Seasonal allergies   • Insomnia   • RELL (obstructive sleep apnea)   • Left leg cellulitis   • Mild cognitive impairment   • Chronic anticoagulation   • Benign prostatic hyperplasia with nocturia   • Impaired glucose tolerance   • Microscopic hematuria   • Umbilical hernia   • IgM deficiency (HCC)   • Class 1 obesity due to excess calories with serious comorbidity and body mass index (BMI) of 32.0 to 32.9 in adult   • Onychomycosis of toenail   • Chronic rhinitis   • Chronic atrial fibrillation (HCC)   • Vitamin D insufficiency   • Basal cell carcinoma (BCC) of skin of neck   • Bilateral hearing loss   • Pleural effusion, bilateral   • Hematoma of hip, right, initial encounter   • Dementia (HCC)   • Hypoxia   • Confusion     Past Medical History:   Diagnosis Date   • Atrial fibrillation (HCC)    • Cataract    • Chronic anticoagulation    • Coronary artery disease    • Dementia (HCC)    • Diverticulosis    • Gallstones    • History of endocarditis    • HLD (hyperlipidemia)    • HTN (hypertension)    • Osteoarthritis    • Seasonal allergies     Seasonal allergies/rhinitis.      Past Surgical History:   Procedure Laterality Date   • ADRENAL GLAND SURGERY      Dr. Jj Kee   • APPENDECTOMY  1950   • BLEPHAROPLASTY  2014    Dr. Santamaria   • COLONOSCOPY     • CORONARY  ARTERY BYPASS GRAFT  07/2006    CABG for 3-vessel disease, July 2006.   • INGUINAL HERNIA REPAIR Left     1963 and 09/2012   • TONSILLECTOMY  01/1958      General Information     Row Name 12/09/21 1304          OT Time and Intention    Document Type therapy note (daily note)  -KF     Mode of Treatment occupational therapy  -KF     Row Name 12/09/21 1307          General Information    Patient Profile Reviewed yes  -KF     Existing Precautions/Restrictions fall; oxygen therapy device and L/min  -KF     Barriers to Rehab cognitive status; previous functional deficit; medically complex  -KF     Row Name 12/09/21 1305          Cognition    Orientation Status (Cognition) oriented to; person; verbal cues/prompts needed for orientation; place; time; disoriented to; situation  -KF     Row Name 12/09/21 1308          Safety Issues, Functional Mobility    Safety Issues Affecting Function (Mobility) awareness of need for assistance; insight into deficits/self-awareness; judgment; sequencing abilities; safety precaution awareness  -KF     Impairments Affecting Function (Mobility) cognition; balance; strength; endurance/activity tolerance  -KF     Cognitive Impairments, Mobility Safety/Performance attention; awareness, need for assistance; judgment  -KF           User Key  (r) = Recorded By, (t) = Taken By, (c) = Cosigned By    Initials Name Provider Type    KF Naty Amaral OT Occupational Therapist                 Mobility/ADL's     Row Name 12/09/21 3199          Bed Mobility    Bed Mobility rolling left; rolling right; scooting/bridging; supine-sit; sit-supine  -KF     Rolling Left [place] (Bed Mobility) moderate assist (50% patient effort); 1 person assist; verbal cues  -KF     Rolling Right [place] (Bed Mobility) moderate assist (50% patient effort); 1 person assist; verbal cues  -KF     Scooting/Bridging [place] (Bed Mobility) minimum assist (75% patient effort); 2 person assist; nonverbal cues  (demo/gesture); verbal cues  -KF     Supine-Sit [place] (Bed Mobility) minimum assist (75% patient effort); 2 person assist; nonverbal cues (demo/gesture); verbal cues  -KF     Sit-Supine [place] (Bed Mobility) minimum assist (75% patient effort); 2 person assist; nonverbal cues (demo/gesture); verbal cues  -KF     Bed Mobility, Safety Issues cognitive deficits limit understanding; decreased use of arms for pushing/pulling; decreased use of legs for bridging/pushing  -KF     Assistive Device (Bed Mobility) draw sheet; head of bed elevated  -     Comment (Bed Mobility) Vc's for seq and HP  -     Row Name 12/09/21 1305          Transfers    Transfers sit-stand transfer; bed-chair transfer  -     Comment (Transfers) brief donned prior to mob and doffed post; cues for HP and tactile cues required; fluctuated between modAX2 to minAx2 with FWW  -KF     Bed-Chair [place] (Transfers) moderate assist (50% patient effort); 2 person assist; nonverbal cues (demo/gesture); verbal cues  -KF     Chair-Bed [place] (Transfers) moderate assist (50% patient effort); 2 person assist; nonverbal cues (demo/gesture); verbal cues  -KF     Sit-Stand [place] (Transfers) minimum assist (75% patient effort); 2 person assist; nonverbal cues (demo/gesture); verbal cues  -     Row Name 12/09/21 1305          Sit-Stand Transfer    Assistive Device (Sit-Stand Transfers) walker, front-wheeled  -     Row Name 12/09/21 1305          Functional Mobility    Functional Mobility-Distance (Feet) 8  4+4  -KF     Functional Mobility- Safety Issues step length decreased; supplemental O2; sequencing ability decreased  -     Row Name 12/09/21 1305          Activities of Daily Living    BADL Assessment/Intervention upper body dressing; lower body dressing  -     Row Name 12/09/21 1305          Lower Body Dressing Assessment/Training    [place] Level (Lower Body Dressing) don; doff; socks; dependent (less than 25%  patient effort)  -KF     Position (Lower Body Dressing) sitting up in bed  -KF     Row Name 12/09/21 1305          Upper Body Dressing Assessment/Training    [place] Level (Upper Body Dressing) don; doff; front opening garment; hand over hand; moderate assist (50% patient effort); verbal cues; nonverbal cues (demo/gesture)  -KF     Position (Upper Body Dressing) edge of bed sitting  -KF     Row Name 12/09/21 1305          Chair-Bed Transfer    Assistive Device (Chair-Bed Transfers) walker, front-wheeled  -KF           User Key  (r) = Recorded By, (t) = Taken By, (c) = Cosigned By    Initials Name Provider Type    Naty Hernandez OT Occupational Therapist               Obj/Interventions     Row Name 12/09/21 1311          Shoulder (Therapeutic Exercise)    Shoulder (Therapeutic Exercise) AAROM (active assistive range of motion)  -     Shoulder AAROM (Therapeutic Exercise) bilateral; flexion; extension; horizontal aBduction/aDduction; 5 repetitions; supine  -     Row Name 12/09/21 1311          Balance    Balance Assessment sitting static balance; sitting dynamic balance; standing static balance; standing dynamic balance  -KF     Static Sitting Balance WNL; unsupported; sitting, edge of bed  -KF     Dynamic Sitting Balance WFL; mild impairment; unsupported; sitting, edge of bed  -KF     Static Standing Balance mild impairment; supported  -KF     Dynamic Standing Balance moderate impairment; supported  -KF     Balance Interventions sit to stand; standing; weight shifting activity  -KF     Comment, Balance modAX2-minAx2  -KF           User Key  (r) = Recorded By, (t) = Taken By, (c) = Cosigned By    Initials Name Provider Type    Naty Hernandez OT Occupational Therapist               Goals/Plan    No documentation.                Clinical Impression     Row Name 12/09/21 1312          Pain Assessment    Additional Documentation Pain Scale: Numbers Pre/Post-Treatment (Group)  -     Row Name  12/09/21 1312          Pain Scale: Numbers Pre/Post-Treatment    Pretreatment Pain Rating 0/10 - no pain  -KF     Posttreatment Pain Rating 0/10 - no pain  -KF     Pre/Posttreatment Pain Comment tolerated  -KF     Pain Intervention(s) Repositioned; Ambulation/increased activity  -KF     Row Name 12/09/21 1312          Plan of Care Review    Plan of Care Reviewed With patient  -KF     Progress improving  -KF     Outcome Summary Pt completed STS with minAx2 at FWW, modAx2 overall bed mob with max cues for seq, completed 4+4ft functional mob modAx2 moments minAx2 at FWW, cont IPOT per POC  -KF     Row Name 12/09/21 1312          Therapy Assessment/Plan (OT)    Rehab Potential (OT) good, to achieve stated therapy goals  -     Criteria for Skilled Therapeutic Interventions Met (OT) yes; meets criteria; skilled treatment is necessary  -KF     Therapy Frequency (OT) daily  -KF     Row Name 12/09/21 1312          Therapy Plan Review/Discharge Plan (OT)    Anticipated Discharge Disposition (OT) skilled nursing facility  -     Row Name 12/09/21 1312          Vital Signs    Pre Systolic BP Rehab 127  RN cleared VSS  -KF     Pre Treatment Diastolic BP 73  -KF     Post Systolic BP Rehab 128  -KF     Post Treatment Diastolic BP 76  -KF     Pre SpO2 (%) 95  -KF     O2 Delivery Pre Treatment supplemental O2  -KF     Intra SpO2 (%) 95  -KF     O2 Delivery Intra Treatment supplemental O2  -KF     Post SpO2 (%) 97  -KF     O2 Delivery Post Treatment supplemental O2  -KF     Pre Patient Position Supine  -KF     Intra Patient Position Standing  -KF     Post Patient Position Supine  -KF     Rest Breaks  1  -KF     Row Name 12/09/21 1312          Positioning and Restraints    Pre-Treatment Position in bed  -KF     Post Treatment Position bed  -KF     In Bed notified nsg; supine; fowlers; side rails up x3; exit alarm on; call light within reach; encouraged to call for assist; heels elevated; waffle boots/both; SCD pump applied; legs  elevated  exit x2  -KF     Restraints released:; reapplied:; notified nsg:; soft limb  -KF           User Key  (r) = Recorded By, (t) = Taken By, (c) = Cosigned By    Initials Name Provider Type    Naty Hernandez OT Occupational Therapist               Outcome Measures     Row Name 12/09/21 1315          How much help from another is currently needed...    Putting on and taking off regular lower body clothing? 1  -KF     Bathing (including washing, rinsing, and drying) 2  -KF     Toileting (which includes using toilet bed pan or urinal) 1  -KF     Putting on and taking off regular upper body clothing 2  -KF     Taking care of personal grooming (such as brushing teeth) 3  -KF     Eating meals 3  -KF     AM-PAC 6 Clicks Score (OT) 12  -KF     Row Name 12/09/21 0853          How much help from another person do you currently need...    Turning from your back to your side while in flat bed without using bedrails? 3  -KR     Moving from lying on back to sitting on the side of a flat bed without bedrails? 3  -KR     Moving to and from a bed to a chair (including a wheelchair)? 3  -KR     Standing up from a chair using your arms (e.g., wheelchair, bedside chair)? 3  -KR     Climbing 3-5 steps with a railing? 2  -KR     To walk in hospital room? 3  -KR     AM-PAC 6 Clicks Score (PT) 17  -KR     Row Name 12/09/21 1315          Functional Assessment    Outcome Measure Options AM-PAC 6 Clicks Daily Activity (OT)  -KF           User Key  (r) = Recorded By, (t) = Taken By, (c) = Cosigned By    Initials Name Provider Type    Naty Hernandez OT Occupational Therapist    Renae Asencio, RN Registered Nurse                Occupational Therapy Education                 Title: PT OT SLP Therapies (In Progress)     Topic: Occupational Therapy (Done)     Point: ADL training (Done)     Description:   Instruct learner(s) on proper safety adaptation and remediation techniques during self care or transfers.   Instruct  in proper use of assistive devices.              Learning Progress Summary           Patient Acceptance, E,TB,D, VU,DU,NR by  at 12/9/2021 1316    Acceptance, E,TB,D, VU,DU,NR by KF at 12/6/2021 1133    Acceptance, E,TB,D, VU,DU,NR by KF at 12/1/2021 1438                   Point: Precautions (Done)     Description:   Instruct learner(s) on prescribed precautions during self-care and functional transfers.              Learning Progress Summary           Patient Acceptance, E,TB,D, VU,DU,NR by  at 12/9/2021 1316    Acceptance, E,TB,D, VU,DU,NR by KF at 12/6/2021 1133    Acceptance, E,TB,D, VU,DU,NR by KF at 12/1/2021 1438                   Point: Body mechanics (Done)     Description:   Instruct learner(s) on proper positioning and spine alignment during self-care, functional mobility activities and/or exercises.              Learning Progress Summary           Patient Acceptance, E,TB,D, VU,DU,NR by  at 12/9/2021 1316    Acceptance, E,TB,D, VU,DU,NR by KF at 12/6/2021 1133    Acceptance, E,TB,D, VU,DU,NR by KF at 12/1/2021 1438                               User Key     Initials Effective Dates Name Provider Type Discipline     06/16/21 -  Naty Amaral, OT Occupational Therapist OT              OT Recommendation and Plan  Planned Therapy Interventions (OT): activity tolerance training, BADL retraining, ROM/therapeutic exercise, strengthening exercise, occupation/activity based interventions, patient/caregiver education/training, transfer/mobility retraining, cognitive/visual perception retraining, functional balance retraining  Therapy Frequency (OT): daily  Plan of Care Review  Plan of Care Reviewed With: patient  Progress: improving  Outcome Summary: Pt completed STS with minAx2 at FWW, modAx2 overall bed mob with max cues for seq, completed 4+4ft functional mob modAx2 moments minAx2 at FWW, cont IPOT per POC     Time Calculation:    Time Calculation- OT     Row Name 12/09/21 1117             Time  Calculation- OT    OT Start Time 1117  -KF      OT Received On 12/09/21  -KF              Timed Charges    71547 - OT Therapeutic Exercise Minutes 4  -KF      94405 - OT Therapeutic Activity Minutes 30  -KF      66892 - OT Self Care/Mgmt Minutes 6  -KF              Total Minutes    Timed Charges Total Minutes 40  -KF       Total Minutes 40  -KF            User Key  (r) = Recorded By, (t) = Taken By, (c) = Cosigned By    Initials Name Provider Type     Naty Amaral, OT Occupational Therapist              Therapy Charges for Today     Code Description Service Date Service Provider Modifiers Qty    89407884807 HC OT SELF CARE/MGMT/TRAIN EA 15 MIN 12/9/2021 Naty Amaral OT GO 1    49372642525 HC OT THERAPEUTIC ACT EA 15 MIN 12/9/2021 Naty Amaral OT GO 2    21244960847 HC OT THER SUPP EA 15 MIN 12/9/2021 Naty Amaral OT GO 2               Naty Amaral OT  12/9/2021

## 2021-12-09 NOTE — CASE MANAGEMENT/SOCIAL WORK
Continued Stay Note   Jesus     Patient Name: Mike Lucero Jr.  MRN: [id number]  Today's Date: 12/9/2021    Admit Date: 11/30/2021     Discharge Plan     Row Name 12/09/21 1327       Plan    Plan TBD/Ongoing    Patient/Family in Agreement with Plan yes    Plan Comments I talked with alisson's step dtr Nay @ [phone] to discuss pending d/c plans. Hospice still follows but family wants to see if patient is able to do short term rehab then go back home. I explained to step dtr that patient remains in restraints and I can't place him in a nursing home bed rehab or long term care as long as he is in restraints or has a sitter. Patient needs to be out of restraints for 48 hours to begin making referrals for rehab. CM following. Nighat @ [phone]    Final Discharge Disposition Code 30 - still a patient               Discharge Codes    No documentation.                     Charlee Garcia RN

## 2021-12-09 NOTE — PROGRESS NOTES
Neurology Note    Patient:  Mike Lucero Jr.    YOB: 1932    REFERRING PHYSICIAN:  Dr. Cervantes    CHIEF COMPLAINT:    AMS    HISTORY OF PRESENT ILLNESS:   The patient was more alert this am, ate, talked some with wife, now resting.    Past Medical History:  Past Medical History:   Diagnosis Date   • Atrial fibrillation (HCC)    • Cataract    • Chronic anticoagulation    • Coronary artery disease    • Dementia (HCC)    • Diverticulosis    • Gallstones    • History of endocarditis    • HLD (hyperlipidemia)    • HTN (hypertension)    • Osteoarthritis    • Seasonal allergies     Seasonal allergies/rhinitis.        Past Surgical History:  Past Surgical History:   Procedure Laterality Date   • ADRENAL GLAND SURGERY      Dr. Jj Kee   • APPENDECTOMY     • BLEPHAROPLASTY  2014    Dr. Santamaria   • COLONOSCOPY     • CORONARY ARTERY BYPASS GRAFT  2006    CABG for 3-vessel disease, 2006.   • INGUINAL HERNIA REPAIR Left      and 2012   • TONSILLECTOMY  1958       Social History:   Social History     Socioeconomic History   • Marital status:      Spouse name: Glenis   • Number of children: 2   • Years of education: J.D.   Tobacco Use   • Smoking status: Former Smoker     Packs/day: 1.00     Years: 18.00     Pack years: 18.00     Types: Cigarettes     Quit date: 1970     Years since quittin.5   • Smokeless tobacco: Never Used   Substance and Sexual Activity   • Alcohol use: Yes     Comment: rare   • Drug use: No   • Sexual activity: Not Currently     Partners: Female        Family History:   Family History   Problem Relation Age of Onset   • Alzheimer's disease Mother    • Dementia Mother    • Heart disease Father    • Stroke Father    • Heart disease Sister    • Heart disease Brother    • Stroke Brother        Medications Prior to Admission:    Prior to Admission medications    Medication Sig Start Date End Date Taking? Authorizing Provider   alfuzosin  (UROXATRAL) 10 MG 24 hr tablet TAKE ONE TABLET BY MOUTH DAILY 4/1/21   Fabien Merino PA   aspirin 81 MG EC tablet Take 81 mg by mouth Daily.    ProviderNeville MD   dilTIAZem CD (CARDIZEM CD) 240 MG 24 hr capsule TAKE ONE CAPSULE BY MOUTH DAILY **NEED FOLLOW UP APPOINTMENT FOR FURTHER REFILLS. OTHERWISE DEFER TO PCP** 9/27/21   Ike Strickland III, MD   donepezil (ARICEPT) 10 MG tablet TAKE ONE TABLET BY MOUTH DAILY 2/3/21   Kim Novak MD   lactobacillus acidophilus (RISAQUAD) capsule capsule Take 1 capsule by mouth Daily. 3/11/18   Kaitlynn Caruso APRN   lisinopril (PRINIVIL,ZESTRIL) 20 MG tablet Take 20 mg by mouth Daily.    ProviderNeville MD   potassium chloride (KLOR-CON) 20 MEQ CR tablet Take 1 tablet by mouth Daily. Needs f/u appt for further refills. Otherwise defer to PCP. 9/13/21   Ike Strickland III, MD   pravastatin (PRAVACHOL) 40 MG tablet TAKE ONE TABLET BY MOUTH DAILY 6/14/21   Fabien Merino PA   prednisoLONE acetate (PRED FORTE) 1 % ophthalmic suspension Apply 1 drop to eye(s) as directed by provider Daily As Needed. 5/31/19   Neville Puckett MD   sodium chloride (OCEAN) 0.65 % nasal spray 1 spray into the nostril(s) as directed by provider As Needed for Congestion.    Neville Puckett MD   traZODone (DESYREL) 50 MG tablet TAKE ONE TABLET BY MOUTH ONCE NIGHTLY 1/27/21   Fabien Merino PA   warfarin (COUMADIN) 2.5 MG tablet TAKE 1 TO 1 AND 1.5 TAB BY MOUTH DAILY OR AS DIRECTED BY ANTICOAGULATION CLINIC 5/19/21   Ike Strickland III, MD       Allergies:  Rocephin [ceftriaxone], Temazepam, and Wellbutrin [bupropion]      Review of system  Review of Systems   Unable to perform ROS: Mental status change       Vitals:    12/09/21 1400   BP:    Pulse: 58   Resp:    Temp:    SpO2: 94%       Physical exam  Physical Exam  Cardiovascular:      Rate and Rhythm: Normal rate. Rhythm irregular.   Pulmonary:      Effort: Pulmonary effort is normal.   Neurological:      Comments: Eyes  closed, no response to voice, stirs to touch, moves limbs some.           Lab Results   Component Value Date    WBC 7.88 12/09/2021    HGB 9.0 (L) 12/09/2021    HCT 27.0 (L) 12/09/2021    MCV 98.2 (H) 12/09/2021     12/09/2021     Lab Results   Component Value Date    GLUCOSE 93 12/09/2021    BUN 21 12/09/2021    CREATININE 0.71 (L) 12/09/2021    EGFRIFNONA 104 12/09/2021    BCR 29.6 (H) 12/09/2021    CO2 31.0 (H) 12/09/2021    CALCIUM 8.1 (L) 12/09/2021    ALBUMIN 3.00 (L) 12/06/2021    AST 13 12/06/2021    ALT 6 12/06/2021     RPR   Non-Reactive Non-Reactive          Radiological Studies:  EEG    Result Date: 12/9/2021  Reason for referral: 89 y.o.male with altered mental status Technical Summary:  A 19 channel digital EEG was performed using the international 10-20 placement system, including eye leads and EKG leads. Duration: 22 minutes Video: Off Findings: The awake tracing shows diffuse medium amplitude theta activity which is present symmetrically over both hemispheres.  Electrode artifact is seen over the right posterior leads due to patient position and head movement.  Photic stimulation does not change the background.  Stage II sleep is not clearly seen.  Hyperventilation is not performed. Technical quality: Good SUMMARY: Mild generalized slow No epileptiform activity is seen     The study shows evidence for diffuse cerebral dysfunction of at least mild degree but is nonspecific as to cause This report is transcribed using the Dragon dictation system.      CT Abdomen Pelvis Without Contrast    Result Date: 12/1/2021  EXAMINATION: CT ABDOMEN PELVIS WO CONTRAST- 11/30/2021  INDICATION: fall/right hip pain  TECHNIQUE: 5 mm unenhanced images through the abdomen and pelvis  The radiation dose reduction device was turned on for each scan per the ALARA (As Low as Reasonably Achievable) protocol.  COMPARISON: 12/04/2020 abdomen and pelvis CT scan  FINDINGS: Patient history indicates fall with right lower  back pain and right hip pain.  There is a small to moderate right pleural effusion, and minimal left effusion. There is moderate right lower lobe atelectasis. Similar findings are present on prior study, but are little greater today.  There is now mild upper abdominal ascites adjacent the liver and spleen where only trace ascites was present previously. Liver morphology is typical of cirrhosis. Spleen does not appear to be significantly enlarged. Numerous calcified gallstones are seen in the otherwise normal-appearing gallbladder. Pancreas, adrenal glands, and kidneys appear unremarkable for a non-IV contrast enhanced exam. No upper abdominal free air or adenopathy is seen. Bowel loops are normal in caliber. There is pancolonic diverticulosis. No upper abdominal inflammatory change is seen.  Regarding the lower abdomen and pelvis, there is extensive descending colon and sigmoid diverticulosis, but no CT scan findings of acute diverticulitis. Terminal ileum and cecum appear grossly normal. The appendix by history is surgically absent.  With respect to the patient's right hip pain/trauma, bony structures including the pelvic bones and right femur appear to be intact. There is diffuse enlargement of the right gluteus alysha and medius, consistent with some diffuse edema or hematoma and a more discrete, almost masslike appearing 5 cm area superficial to the gluteus alysha presumably a discrete deep subcutaneous hematoma. There is also evidence of more extensive diffuse and superficial right subcutaneous hematoma. No intrapelvic hematoma or left flank hematoma is seen. There is a pure fat density 3 cm left gluteus minimus lipoma.      1. Extensive hematoma of the right gluteal region, including discrete deep subcutaneous 5 cm hematoma, diffuse overlying subcutaneous hematoma, and probably diffuse hematoma infiltrating and enlarging the right gluteus alysha and gluteus medius. No evidence of intra-abdominal hematoma.   2. No evidence of acute bony trauma.  3. Mild to moderate ascites, increased from the trace ascites noted on 12/04/2020 exam.  4. Liver morphology typical of cirrhosis.  5. Gradually enlarging right pleural effusion, associated right lower lung atelectasis, and minimal left effusion.  D: 11/30/2021 E: 11/30/2021  This report was finalized on 12/1/2021 2:46 PM by Dr. Jw Madrigal MD.      CT Head Without Contrast    Result Date: 12/3/2021  PROCEDURE: CT Head WO COMPARISON: November 2019 INDICATIONS: confusion, rule out bleed;Contusion of anus, initial encounter;Pleural effusion, not elsewhere classified;Hypoxemia;Other reduced mobility Additional Relevant clinical info: Confusion, fall, r/o bleed.;Best images possible, scan range of pt motion repeated.  TECHNIQUE:  CT examination of the brain is performed without IV contrast. Radiation dose reduction techniques included automated exposure control or exposure modulation based on body size.  Count of known CT and cardiac nuc med studies performed in previous 12 months: 2.  FINDINGS:  Significant motion artifact seen throughout the examination sequences had to be repeated in order to try to obtain a diagnostic examination. Study is limited as a result. Moderate cortical atrophy seen somewhat asymmetric worse on the left than the right. It is possible that the left extra-axial space which is stable from 2019 could be an old chronic hygroma. No gross acute subdural collection seen. No definite acute intracranial hemorrhage mass effect or midline shift. Moderate cortical atrophy present. There is mild cortical atrophy. Periventricular low attenuation is likely secondary to small vessel ischemic disease. The ventricular system is not dilated.  The calvarium is grossly intact. However examination is limited with reconstructions performed of small areas of field-of-view.     No gross acute intracranial process. Probable small vessel ischemic disease.  Signer Name: Cinda Katie  Amos JORGE  Signed: 12/3/2021 9:47 PM  Workstation Name: [facility]  [facility]    CT Lumbar Spine Without Contrast    Result Date: 12/1/2021  EXAMINATION: CT LUMBAR SPINE WO CONTRAST-11/30/2021:  INDICATION: Fall/right low back/hip pain.  TECHNIQUE: Spiral acquisition 2 mm axial images through the lumbar spine with sagittal and coronal 2-D reconstructions.  The radiation dose reduction device was turned on for each scan per the ALARA (As Low as Reasonably Achievable) protocol.  COMPARISON: No previous lumbar studies. Comparison is made to the sagittal reconstruction images of 12/04/2020 abdomen and pelvis CT scan.  FINDINGS: As on that study, there is multilevel lumbar degenerative disc disease, with no evidence of significant focal subluxation or evidence of compression deformity. Degenerative disc changes are again greater at L5-S1 than elsewhere. No pars defects are identified. No destructive or blastic bony disease is appreciated. The coronal images show a minimal dextroconvex lumbar scoliosis unchanged. SI joints show moderate bilateral DJD.  Axial bone window images show no evidence of acute bony trauma. Soft tissue axial images show the usual limited detail of the canal for a non-myelographic CT scan. There is mild canal stenosis at L2-3 due to posterior element hypertrophy and broad-based disc protrusion, mild-to-moderate L3-4 canal stenosis due again to disc protrusion and posterior element hypertrophy, probably moderate canal stenosis at L4-5 due to right paracentral disc protrusion. No pathologic paraspinous soft tissue mass or acute inflammatory focus is identified. Incidental note is made of bilateral pleural effusions, right greater than left.      1. Multilevel lower lumbar degenerative disc disease, and SI joint DJD, but no evidence of acute lumbar spine trauma.  2. Multilevel mild-to-moderate lumbar spinal stenosis due to disc protrusions and posterior element hypertrophy.   3. Incidentally noted bilateral pleural effusions.  D:  11/30/2021 E:  11/30/2021  This report was finalized on 12/1/2021 1:14 PM by Dr. Jw Madrigal MD.      US Liver    Result Date: 12/1/2021  EXAMINATION: US LIVER- 12/01/2021  INDICATION: cirrhosis; S30.3XXA-Contusion of anus, initial encounter; Z02-Smzfkoq effusion, not elsewhere classified; R09.02-Hypoxemia  TECHNIQUE: Ultrasound right upper quadrant abdomen and liver  COMPARISON: CT abdomen and pelvis dated 11/30/2021  FINDINGS:  Visualized portions of the pancreas within normal limits.  Liver demonstrates coarsened echotexture throughout the heterogeneous liver with nodular contours of a cirrhotic hepatic morphology hypertrophy/atrophy complex without focal liver lesion evident in a largely atrophic hepatic parenchyma of limited evaluation with adjacent ascites.  Gallbladder contains echogenic foci with shadowing of cholelithiasis along with intermixed sludge however no wall thickening or pericholecystic fluid.  Common bile duct 6 to 7 mm in diameter.  Right kidney measures 11.3 cm in length without evidence of hydronephrosis, contour deforming mass or obvious calculi.      Cirrhotic hepatic morphology without focal liver lesion in a limited evaluation due to largely atrophic hepatic parenchyma. Perihepatic ascites is noted.  D:  12/01/2021 E:  12/01/2021  This report was finalized on 12/1/2021 4:32 PM by Dr. Veto Hein.      XR Chest 1 View    Result Date: 12/6/2021   EXAMINATION: XR CHEST 1 VW - 12/05/2021  INDICATION: S30.3XXA-Contusion of anus, initial encounter; H96-Zcwshby effusion, not elsewhere classified; R09.02-Hypoxemia; Z74.09-Other reduced mobility.  COMPARISON: 12/02/2021  FINDINGS: Portable chest reveals heart to be enlarged. Improvement seen in the pleural effusions. Small bilateral pleural effusions present. Improvement seen in the pulmonary vascularity.      Improvement seen in the aeration at the lung bases bilaterally with decrease seen in  size of the bilateral pleural effusions. The heart remains enlarged.  DICTATED:   12/05/2021 EDITED/lfs:   12/05/2021  This report was finalized on 12/6/2021 3:53 PM by Dr. Aleida Riley MD.      XR Chest 1 View    Result Date: 12/2/2021  CR Chest 1 Vw INDICATION: Hypoxia. Pleural effusion. COMPARISON:  12/1/2021 FINDINGS: Single portable AP view(s) of the chest. Moderate to large right pleural effusion is unchanged. Consolidation in the right lung is stable. There is a small left pleural effusion with left basilar consolidation as well. Cardiomegaly is noted. No pneumothorax.     No significant interval change. Signer Name: Ghassan Howard MD  Signed: 12/2/2021 3:48 AM  Workstation Name: [facility]  [facility]    XR Chest 1 View    Result Date: 12/1/2021  EXAMINATION: XR CHEST 1 VW- 11/30/2021  INDICATION: fall/right hip pain  COMPARISON: 12/21/2020  FINDINGS: Heart shadow is markedly enlarged, and appears a little larger than on the prior study even allowing for different film technique. Pulmonary vasculature is cephalized. There is minimal if any edema, but there is new opacification of the right lung base, probably a combination of right pleural effusion and atelectasis of the right lower lobe. There is minimal left effusion. Lungs elsewhere appear clear. No pneumothorax is seen. Old right upper chest trauma is again noted.      1. Marked cardiomegaly and mild pulmonary vascular congestion. 2. New and fairly extensive right basilar atelectasis and effusion.   D: 11/30/2021 E: 11/30/2021  This report was finalized on 12/1/2021 10:46 AM by Dr. Jw Madrigal MD.      XR Chest 1 View    Result Date: 12/1/2021  EXAMINATION: XR CHEST 1 VW-  INDICATION: eval effusion; S30.3XXA-Contusion of anus, initial encounter; F30-Vpunoin effusion, not elsewhere classified; R09.02-Hypoxemia  TECHNIQUE: Frontal view of the chest  COMPARISON: 11/30/2021  FINDINGS:  Redemonstration of a small right layering  pleural effusion which appears similar or minimally increased in size although increased conspicuity of right pleural fluid on today's exam may reflect semiupright technique versus upright technique from yesterday's exam. Similar associated right lung base parenchymal opacities. Similar mild streaky retrocardiac parenchymal opacities persist. No pneumothorax. Stable cardiomediastinal silhouette demonstrating cardiomegaly.       Redemonstration of a layering right pleural effusion which appears similar to minimally increased in size from prior although increased conspicuity may reflect semiupright technique versus upright technique on yesterday's exam. Right lung base parenchymal opacities persist. Otherwise no significant interval change.  This report was finalized on 12/1/2021 6:39 AM by Jose Samayoa MD.        During this visit the following were done:  Labs Reviewed []    Labs Ordered []    Radiology Reports Reviewed []    Radiology Ordered []    EKG, echo, and/or stress test reviewed []    EEG results reviewed  []    EEG reviewed and interpreted per myself   []    Discussed case with neurointerventionalist or neuroradiologist []    Referring Provider Records Reviewed []    ER Records Reviewed []    Hospital Records Reviewed []    History Obtained From Family []    Radiological images view and Interpreted per myself []    Case Discussed with referring provider []     Decision to obtain and request outside records  []        Assessment and Plan     TME 22 multiple medical problems, baseline moderate to advanced dementia, probable AD. Currently oversedated. Guarded prognosis for meaningful improvement given cognitive baseline, unlikely to be able to return home given required level of care here.   - Changed Seroquel to night time only.   - Replace b12 and thiamine.   - Continue Aricept.   - Agree with DNR, likely hospice on discharge.     Call for questions, will see prn. Thanks,            Electronically signed  by Jefferson Boyd MD on 12/9/2021 at 15:27 EST

## 2021-12-09 NOTE — PROGRESS NOTES
[facility] Medicine Services  PROGRESS NOTE    Patient Name: Mike Lucero Jr.  : 1932  MRN: [id number]    Date of Admission: 2021  Primary Care Physician: Subha Wesley MD    Subjective   Subjective     CC:  F/U fall, AMS    HPI:  Patient seen this morning. Much more awake today after stopping AM dose of Seroquel. He attempts to answer questions appropriately but tends to ramble off with confused speech.    ROS:  Limited due to mental status but does deny pain, SOA today    Objective   Objective     Vital Signs:   Temp:  [97.9 °F (36.6 °C)-98.6 °F (37 °C)] 97.9 °F (36.6 °C)  Heart Rate:  [54-74] 67  Resp:  [15-18] 15  BP: (100-127)/(49-73) 127/73  Flow (L/min):  [3] 3     Physical Exam:  Gen-no acute distress  HENT-NCAT, mucous membranes moist  CV-RRR, S1 S2 normal, no m/r/g  Resp-CTAB, no wheezes or rales  Abd-soft, NT, ND, +BS  Ext-no edema  Neuro-much more awake but remains confused, attempts to answer questions but then rambles off into confused/incoherent speech, still in restraints  Skin-no rashes  Psych-appropriate mood, calm      Results Reviewed:  LAB RESULTS:      Lab 21  0451 21  0508 21  0429 21  0450 21  0425   WBC 7.88 8.19 7.17 6.54 6.56   HEMOGLOBIN 9.0* 9.4* 8.7* 8.3* 8.1*   HEMATOCRIT 27.0* 27.9* 26.7* 25.4* 23.8*   PLATELETS 259 243 239 206 181   NEUTROS ABS  --   --   --  4.35  --    IMMATURE GRANS (ABS)  --   --   --  0.04  --    LYMPHS ABS  --   --   --  1.28  --    MONOS ABS  --   --   --  0.66  --    EOS ABS  --   --   --  0.19  --    MCV 98.2* 96.5 98.9* 101.2* 96.4   PROTIME 16.1* 16.2* 16.2* 16.4* 16.9*         Lab 21  0451 21  0508 21  0014 21  1500 21  0429 21  0450 21  0425 21  0425 21  2033 21  0448   SODIUM 133* 134*  --   --  133* 134*  --  132*  --  135*   POTASSIUM 4.1 4.3 4.6  --  3.5 3.6   < > 3.5   < > 3.4*   CHLORIDE 96* 95*  --   --  94*  93*  --  92*  --  93*   CO2 31.0* 29.0  --   --  34.0* 34.0*  --  33.0*  --  36.0*   ANION GAP 6.0 10.0  --   --  5.0 7.0  --  7.0  --  6.0   BUN 21 18  --   --  17 16  --  17  --  20   CREATININE 0.71* 0.62*  --   --  0.65* 0.71*  --  0.63*  --  0.74*   GLUCOSE 93 95  --   --  98 92  --  90  --  99   CALCIUM 8.1* 8.6  --   --  8.2* 8.1*  --  8.0*  --  8.3*   MAGNESIUM 2.2  --  1.9 1.8  --  1.9  --   --   --  2.0   PHOSPHORUS  --   --   --   --   --  2.5  --   --   --  3.6    < > = values in this interval not displayed.         Lab 12/06/21  0450   TOTAL PROTEIN 5.5*   ALBUMIN 3.00*   GLOBULIN 2.5   ALT (SGPT) 6   AST (SGOT) 13   BILIRUBIN 2.2*   ALK PHOS 92         Lab 12/09/21  0451 12/08/21  0508 12/07/21  0429 12/06/21  0450 12/05/21  0425   PROTIME 16.1* 16.2* 16.2* 16.4* 16.9*   INR 1.33* 1.34* 1.34* 1.37* 1.42*                 Brief Urine Lab Results  (Last result in the past 365 days)      Color   Clarity   Blood   Leuk Est   Nitrite   Protein   CREAT   Urine HCG        12/02/21 0309 Orange   Turbid   Large (3+)   Moderate (2+)   Positive   >=300 mg/dL (3+)                 Microbiology Results Abnormal     Procedure Component Value - Date/Time    COVID PRE-OP / PRE-PROCEDURE SCREENING ORDER (NO ISOLATION) - Swab, Nasopharynx [[id number]]  (Normal) Collected: 11/30/21 1212    Lab Status: Final result Specimen: Swab from Nasopharynx Updated: 11/30/21 1257    Narrative:      The following orders were created for panel order COVID PRE-OP / PRE-PROCEDURE SCREENING ORDER (NO ISOLATION) - Swab, Nasopharynx.  Procedure                               Abnormality         Status                     ---------                               -----------         ------                     COVID-19, ABBOTT IN-HOUS...[[id number]]  Normal              Final result                 Please view results for these tests on the individual orders.    COVID-19, ABBOTT IN-HOUSE,NASAL Swab (NO TRANSPORT MEDIA) 2 HR TAT - Swab, Nasopharynx  [[id number]]  (Normal) Collected: 11/30/21 1212    Lab Status: Final result Specimen: Swab from Nasopharynx Updated: 11/30/21 1257     COVID19 Presumptive Negative    Narrative:      Fact sheet for providers: https://www.fda.gov/media/215291/download     Fact sheet for patients: https://www.fda.gov/media/399630/download    Test performed by PCR.  If inconsistent with clinical signs and symptoms patient should be tested with different authorized molecular test.          EEG    Result Date: 12/9/2021  Reason for referral: 89 y.o.male with altered mental status Technical Summary:  A 19 channel digital EEG was performed using the international 10-20 placement system, including eye leads and EKG leads. Duration: 22 minutes Video: Off Findings: The awake tracing shows diffuse medium amplitude theta activity which is present symmetrically over both hemispheres.  Electrode artifact is seen over the right posterior leads due to patient position and head movement.  Photic stimulation does not change the background.  Stage II sleep is not clearly seen.  Hyperventilation is not performed. Technical quality: Good SUMMARY: Mild generalized slow No epileptiform activity is seen     Impression: The study shows evidence for diffuse cerebral dysfunction of at least mild degree but is nonspecific as to cause This report is transcribed using the Dragon dictation system.            I have reviewed the medications:  Scheduled Meds:cyanocobalamin, 1,000 mcg, Intramuscular, Daily  dilTIAZem CD, 240 mg, Oral, Q24H  donepezil, 10 mg, Oral, Daily  lactobacillus acidophilus, 1 capsule, Oral, Daily  lisinopril, 20 mg, Oral, Daily  nystatin, , Topical, Q12H  pravastatin, 40 mg, Oral, Daily  QUEtiapine, 25 mg, Oral, Nightly  senna-docusate sodium, 1 tablet, Oral, BID  sodium chloride, 10 mL, Intravenous, Q12H  tamsulosin, 0.4 mg, Oral, Daily  thiamine, 100 mg, Oral, Daily  traZODone, 50 mg, Oral, Nightly      Continuous Infusions:   PRN Meds:.•   acetaminophen **OR** acetaminophen **OR** acetaminophen  •  senna-docusate sodium **AND** polyethylene glycol **AND** bisacodyl **AND** bisacodyl  •  haloperidol lactate  •  HYDROcodone-acetaminophen  •  ipratropium-albuterol  •  magnesium sulfate **OR** magnesium sulfate **OR** magnesium sulfate  •  [DISCONTINUED] Morphine **AND** naloxone  •  ondansetron **OR** ondansetron  •  potassium chloride **OR** potassium chloride **OR** potassium chloride  •  potassium phosphate infusion greater than 15 mMoles **OR** potassium phosphate infusion greater than 15 mMoles **OR** potassium phosphate **OR** sodium phosphate IVPB **OR** sodium phosphate IVPB **OR** sodium phosphate IVPB  •  [COMPLETED] Insert peripheral IV **AND** sodium chloride  •  sodium chloride    Assessment/Plan   Assessment & Plan     Active Hospital Problems    Diagnosis  POA   • **Hematoma of hip, right, initial encounter [S70.01XA]  Yes   • Confusion [R41.0]  Yes   • Dementia (HCC) [F03.90]  Yes   • Hypoxia [R09.02]  Yes   • Pleural effusion, bilateral [J90]  Unknown   • Permanent atrial fibrillation (HCC) [I48.21]  Yes   • Essential hypertension [I10]  Yes   • Mixed hyperlipidemia [E78.2]  Yes      Resolved Hospital Problems   No resolved problems to display.        Brief Hospital Course to date:  Mkie Lucero Jr. is a 89 y.o. male with hx of Afib on Coumadin, dementia, CAD s/p CABG, HTN, HLD, and osteoarthritis who presents due to right hip pain and inability to walk following a fall while getting out of bed. He has swelling and bruising on the right hip and buttocks. Admitted for further management of gluteal hematoma. Hospital course complicated by worsening agitation requiring restraints for safety.    Gluteal hematoma s/p fall at home, on chronic anticoagulation  --Holding Coumadin for now.  --Monitor H&H--stable.  --PT/OT.    Confusion/agitation  Underlying dementia  --At baseline, makes his own breakfast and dresses himself, walks without  cane/walker.   --Normal TSH, B12, ammonia.  --Continue home Aricept and Trazodone.  --Mental status worse than baseline, currently in restraints.  --Consulted Neurology 12/8/21 to help with medication regimen to get behaviors under better control. Stopped AM Seroquel dose, continue nightly Seroquel. EEG reviewed and negative for seizure activity.  --Appears more awake today, continue to monitor.    Hypoxia  Bilateral pleural effusions, right greater than left  --No Echo on file.   --Improved, repeat CXR shows decreased size of effusions.  --Has been receiving daily IV Lasix 40 mg, now stopped.  --Currently on 3 liters, continue to wean as tolerated. Consider more diuresis.     Cirrhosis per CT and US, new diagnosis  Ascites, increased from 1 year ago  --Ammonia WNL.  --Monitor closely, gentle diuresis as tolerated.  --Defer further workup at this time. Ascites does not appear enough to tap at the moment. Monitor.    Afib on chronic Coumadin  --Continue home Cardizem.  --Continue to hold Coumadin for now given gluteal hematoma.    Urinary retention  BPH  --Small volume voids are chronic.  --Placed Zacarias on admission, then patient tugged on it and developed hematuria--d/c'ed 12/2/21.   --Continue Flomax.    HTN  HLD  --Continue Lisinopril.  --Continue statin.    Goals of care planning  --Palliative Care and Hospice following.  --If patient unable to continue with PT, likely plan will be long term care vs home with hospice.      DVT prophylaxis:  Mechanical DVT prophylaxis orders are present.       AM-PAC 6 Clicks Score (PT): 17 (12/09/21 0853)    Disposition: I expect the patient to be discharged TBD, difficult to keep out of restraints as he is a fall risk, not enough staff for sitter at bedside    CODE STATUS:   Code Status and Medical Interventions:   Ordered at: 11/30/21 4518     Medical Intervention Limits:    NO intubation (DNI)     Level Of Support Discussed With:    Health Care Surrogate     Code Status  (Patient has no pulse and is not breathing):    No CPR (Do Not Attempt to Resuscitate)     Medical Interventions (Patient has pulse or is breathing):    Limited Support     Comments:    wife       Cynthia Cervantes MD  12/09/21

## 2021-12-09 NOTE — PLAN OF CARE
Goal Outcome Evaluation:  Plan of Care Reviewed With: patient        Progress: improving  Outcome Summary: Pt completed STS with minAx2 at FWW, modAx2 overall bed mob with max cues for seq, completed 4+4ft functional mob modAx2 moments minAx2 at FWW, cont IPOT per POC

## 2021-12-10 LAB
ANION GAP SERPL CALCULATED.3IONS-SCNC: 7 MMOL/L (ref 5–15)
BUN SERPL-MCNC: 20 MG/DL (ref 8–23)
BUN/CREAT SERPL: 29.9 (ref 7–25)
CALCIUM SPEC-SCNC: 8.1 MG/DL (ref 8.6–10.5)
CHLORIDE SERPL-SCNC: 97 MMOL/L (ref 98–107)
CO2 SERPL-SCNC: 29 MMOL/L (ref 22–29)
CREAT SERPL-MCNC: 0.67 MG/DL (ref 0.76–1.27)
DEPRECATED RDW RBC AUTO: 50.4 FL (ref 37–54)
ERYTHROCYTE [DISTWIDTH] IN BLOOD BY AUTOMATED COUNT: 13.9 % (ref 12.3–15.4)
GFR SERPL CREATININE-BSD FRML MDRD: 112 ML/MIN/1.73
GLUCOSE SERPL-MCNC: 98 MG/DL (ref 65–99)
HCT VFR BLD AUTO: 28.2 % (ref 37.5–51)
HGB BLD-MCNC: 9.1 G/DL (ref 13–17.7)
INR PPP: 1.24 (ref 0.85–1.16)
MCH RBC QN AUTO: 32.2 PG (ref 26.6–33)
MCHC RBC AUTO-ENTMCNC: 32.3 G/DL (ref 31.5–35.7)
MCV RBC AUTO: 99.6 FL (ref 79–97)
PLATELET # BLD AUTO: 264 10*3/MM3 (ref 140–450)
PMV BLD AUTO: 8.7 FL (ref 6–12)
POTASSIUM SERPL-SCNC: 4 MMOL/L (ref 3.5–5.2)
PROTHROMBIN TIME: 15.2 SECONDS (ref 11.4–14.4)
RBC # BLD AUTO: 2.83 10*6/MM3 (ref 4.14–5.8)
SODIUM SERPL-SCNC: 133 MMOL/L (ref 136–145)
WBC NRBC COR # BLD: 6.89 10*3/MM3 (ref 3.4–10.8)

## 2021-12-10 PROCEDURE — 85610 PROTHROMBIN TIME: CPT | Performed by: INTERNAL MEDICINE

## 2021-12-10 PROCEDURE — 99232 SBSQ HOSP IP/OBS MODERATE 35: CPT | Performed by: HOSPITALIST

## 2021-12-10 PROCEDURE — 25010000002 CYANOCOBALAMIN PER 1000 MCG: Performed by: INTERNAL MEDICINE

## 2021-12-10 PROCEDURE — 80048 BASIC METABOLIC PNL TOTAL CA: CPT | Performed by: HOSPITALIST

## 2021-12-10 PROCEDURE — 85027 COMPLETE CBC AUTOMATED: CPT | Performed by: HOSPITALIST

## 2021-12-10 RX ADMIN — DILTIAZEM HYDROCHLORIDE 240 MG: 240 CAPSULE, COATED, EXTENDED RELEASE ORAL at 11:14

## 2021-12-10 RX ADMIN — QUETIAPINE FUMARATE 25 MG: 25 TABLET ORAL at 20:07

## 2021-12-10 RX ADMIN — CYANOCOBALAMIN 1000 MCG: 1000 INJECTION, SOLUTION INTRAMUSCULAR; SUBCUTANEOUS at 12:00

## 2021-12-10 RX ADMIN — SENNOSIDES AND DOCUSATE SODIUM 1 TABLET: 50; 8.6 TABLET ORAL at 20:07

## 2021-12-10 RX ADMIN — SODIUM CHLORIDE, PRESERVATIVE FREE 10 ML: 5 INJECTION INTRAVENOUS at 20:08

## 2021-12-10 RX ADMIN — NYSTATIN: 100000 POWDER TOPICAL at 11:14

## 2021-12-10 RX ADMIN — TRAZODONE HYDROCHLORIDE 50 MG: 50 TABLET ORAL at 20:07

## 2021-12-10 RX ADMIN — Medication 1 CAPSULE: at 11:15

## 2021-12-10 RX ADMIN — NYSTATIN: 100000 POWDER TOPICAL at 20:07

## 2021-12-10 RX ADMIN — SODIUM CHLORIDE, PRESERVATIVE FREE 10 ML: 5 INJECTION INTRAVENOUS at 11:24

## 2021-12-10 RX ADMIN — TAMSULOSIN HYDROCHLORIDE 0.4 MG: 0.4 CAPSULE ORAL at 11:14

## 2021-12-10 NOTE — CASE MANAGEMENT/SOCIAL WORK
Continued Stay Note   [facility]     Patient Name: Mike Lucero Jr.  MRN: [id number]  Today's Date: 12/10/2021    Admit Date: 11/30/2021     Discharge Plan     Row Name 12/10/21 1308       Plan    Patient/Family in Agreement with Plan yes    Plan Comments Discussed in MDR's. Patient remains in restraints and Hospice still following. Patient's step-dtr Nay works for Hospice. Nay understands as long as patient in restraints or with a sitter then I can't place patient for rehab or even in a long term care bed. Nighat @ [phone]    Final Discharge Disposition Code 30 - still a patient               Discharge Codes    No documentation.                     Charlee Garcia RN

## 2021-12-10 NOTE — NURSING NOTE
Daily Low Gerald <=14    Gerald score: 13    At this time the patient's RN reports no new skin issues or needs.  Interventions in place. Continue to provide good general skin care. Apply barrier cream daily/ PRN. Keep patient dry and turn regularly.  Elevate and offload heels. The patient is on a waffle mattress at this time.  Will order WENDY bed    Please contact [facility] if new skin issues arise.

## 2021-12-10 NOTE — PROGRESS NOTES
Continued Stay Note  [facility]     Patient Name: Mike Lucero Jr.  MRN: [id number]  Today's Date: 12/10/2021    Admit Date: 11/30/2021     Discharge Plan     Row Name 12/10/21 7047       Plan    Plan To be determined    Plan Comments Pt's wife and step-daughter Rachel visiting pt. Pt's son is due to visit pt this weekend. The family wants to all meet together to discuss a discharge plan. Family aware pt may not be admitted to a facility for rehab or long term care until pt is out of restraints for 48 hrs. Pt's wife has expressed will not be able to care for pt at home. Will continue to follow. Please call [phone] if can be of further assistance.               Discharge Codes    No documentation.                     Marge León RN

## 2021-12-10 NOTE — PLAN OF CARE
Goal Outcome Evaluation:   No events overnight. Pt progressing toward d/c. Hospice vs SNF placement.

## 2021-12-10 NOTE — PROGRESS NOTES
Clinical Nutrition   Reason For Visit: Follow-up protocol    Patient Name: Mike Lucero Jr.  YOB: 1932  MRN: [id number]  Date of Encounter: 12/10/21 11:30 EST  Admission date: 11/30/2021        Nutrition Assessment     Admission Problem List:    Hematoma of hip, right, initial encounter    Mixed hyperlipidemia    Permanent atrial fibrillation (HCC)    Essential hypertension    Pleural effusion, bilateral    Dementia (HCC)    Hypoxia    Confusion          PMH: He  has a past medical history of Atrial fibrillation (HCC), Cataract, Chronic anticoagulation, Coronary artery disease, Dementia (HCC), Diverticulosis, Gallstones, History of endocarditis, HLD (hyperlipidemia), HTN (hypertension), Osteoarthritis, and Seasonal allergies.   PSxH: He  has a past surgical history that includes Coronary artery bypass graft (07/2006); Adrenal gland surgery (1996); Appendectomy (1950); Blepharoplasty (08/06/2014); Colonoscopy (2009); Inguinal hernia repair (Left); and Tonsillectomy (01/1958).        Reported/Observed/Food/Nutrition Related History     12/10) No family at bedside today, pt non-communicative. Intakes decreased.    12/7) Pt with AMS severely altered from baseline, pt with Dementia. Discussed care with wife at bedside. She states he has had decreasingly low PO intake over the past month. She states he does not complain of early satiety or pain, just states he is not hungry after a few bites. He will eat at regular meal times but only small amounts, she states around 25-50% of his usual intakes. She states he loves sweets and would likely drink a magic shake. She also reports increasing chewing difficulty related to his dementia, she denies any signs aspiration or choking, states he does well with soft chopped food.      Anthropometrics 12/7   Height: 69in  Weight: 215lb(estimated 11/30/21) 195lb at PCP visit (1/12/21)  BMI: 31.75  BMI classification: Obese Class I: 30-34.9kg/m2   IBW: 155 lb    UBW:  200lb per pt wife  Weight change: unsure, pts wife states she does not believe he has lost any weight though it is difficult to tell with ascites.      Nutrition Focused Physical Exam     Unable to perform exam due to: Patient agitation will re-assess appropriateness      Labs reviewed   Labs reviewed: Yes    Results from last 7 days   Lab Units 12/10/21  0449 12/09/21  0451 12/08/21  0508 12/08/21  0014 12/07/21  1500 12/07/21 0429 12/06/21 0450 12/04/21 2033 12/04/21 0448   SODIUM mmol/L 133* 133* 134*  --   --    < > 134*   < > 135*   POTASSIUM mmol/L 4.0 4.1 4.3 4.6  --    < > 3.6   < > 3.4*   CHLORIDE mmol/L 97* 96* 95*  --   --    < > 93*   < > 93*   CO2 mmol/L 29.0 31.0* 29.0  --   --    < > 34.0*   < > 36.0*   BUN mg/dL 20 21 18  --   --    < > 16   < > 20   CREATININE mg/dL 0.67* 0.71* 0.62*  --   --    < > 0.71*   < > 0.74*   GLUCOSE mg/dL 98 93 95  --   --    < > 92   < > 99   CALCIUM mg/dL 8.1* 8.1* 8.6  --   --    < > 8.1*   < > 8.3*   PHOSPHORUS mg/dL  --   --   --   --   --   --  2.5  --  3.6   MAGNESIUM mg/dL  --  2.2  --  1.9 1.8  --  1.9  --  2.0    < > = values in this interval not displayed.     Results from last 7 days   Lab Units 12/10/21  0449 12/09/21  0451 12/08/21  0508 12/07/21  0429 12/06/21  0450   WBC 10*3/mm3 6.89 7.88 8.19   < > 6.54   ALBUMIN g/dL  --   --   --   --  3.00*    < > = values in this interval not displayed.     Results from last 7 days   Lab Units 12/03/21  2042   GLUCOSE mg/dL 145*     Lab Results   Lab Value Date/Time    HGBA1C 5.91 (H) 11/20/2020 0935    HGBA1C 6.17 (H) 11/13/2019 1231     Medications reviewed   Medications reviewed: Yes  Scheduled:Pericolace, B12 injection, B1  PRN: KCl, Norco      Current Nutrition Prescription   PO: Diet Soft Texture; Chopped; Cardiac  Oral Nutrition Supplement: Orders Placed This Encounter      Dietary Nutrition Supplements Mighty Shake      DIET MESSAGE Fruit with breakfast and lunch please, likes mandarin oranges    X2/day    Average PO intake:   12/10) 32% X 7 meals  12/7) 54% X 7 meals      Nutrition Diagnosis   Updated: 12/10  Problem Inadequate oral intake   Etiology Decreased appetite   Signs/Symptoms PO intakes 32% X 7 meals, PO intakes 25-50% past month prior to admission per caretaker diet hx.       Goal:   General: Nutrition to support treatment  PO: Increase intake       Intervention   Intervention: Follow treatment progress, Care plan reviewed  -Continue Mighty shake BID      Monitoring/Evaluation:   Monitoring/Evaluation: Per protocol, I&O, PO intake, Supplement intake, Pertinent labs, Weight, GI status, POC/GOC    Tena Hansen RD  Time Spent: 30

## 2021-12-10 NOTE — PROGRESS NOTES
Psychiatric Medicine Services  PROGRESS NOTE    Patient Name: Mike Lucero Jr.  : 1932  MRN: [id number]    Date of Admission: 2021  Primary Care Physician: Subha Wesley MD    Subjective   Subjective     CC:  F/U fall, AMS    HPI:  Patient seen this morning. Remains more awake but confused. Still in restraints.    ROS:  Limited due to mental status but does deny pain, SOA     Objective   Objective     Vital Signs:   Temp:  [97.7 °F (36.5 °C)-98.4 °F (36.9 °C)] 98 °F (36.7 °C)  Heart Rate:  [57-63] 57  Resp:  [16-18] 16  BP: ()/(52-67) 112/54  Flow (L/min):  [3] 3     Physical Exam:  Gen-no acute distress  HENT-NCAT, mucous membranes moist  CV-RRR, S1 S2 normal, no m/r/g  Resp-CTAB, no wheezes or rales  Abd-soft, NT, ND, +BS  Ext-no edema  Neuro-much more awake but remains confused, attempts to answer questions but then rambles off into confused/incoherent speech, still in restraints  Skin-no rashes  Psych-appropriate mood, calm  No change from yesterday    Results Reviewed:  LAB RESULTS:      Lab 12/10/21  0449 21  0451 21  0508 21  0429 21  0450   WBC 6.89 7.88 8.19 7.17 6.54   HEMOGLOBIN 9.1* 9.0* 9.4* 8.7* 8.3*   HEMATOCRIT 28.2* 27.0* 27.9* 26.7* 25.4*   PLATELETS 264 259 243 239 206   NEUTROS ABS  --   --   --   --  4.35   IMMATURE GRANS (ABS)  --   --   --   --  0.04   LYMPHS ABS  --   --   --   --  1.28   MONOS ABS  --   --   --   --  0.66   EOS ABS  --   --   --   --  0.19   MCV 99.6* 98.2* 96.5 98.9* 101.2*   PROTIME 15.2* 16.1* 16.2* 16.2* 16.4*         Lab 12/10/21  0449 21  0451 21  0508 21  0014 21  1500 21  0429 210 210 21   SODIUM 133* 133* 134*  --   --  133*  --  134*   < > 135*   POTASSIUM 4.0 4.1 4.3 4.6  --  3.5   < > 3.6   < > 3.4*   CHLORIDE 97* 96* 95*  --   --  94*  --  93*   < > 93*   CO2 29.0 31.0* 29.0  --   --  34.0*  --  34.0*   <  > 36.0*   ANION GAP 7.0 6.0 10.0  --   --  5.0  --  7.0   < > 6.0   BUN 20 21 18  --   --  17  --  16   < > 20   CREATININE 0.67* 0.71* 0.62*  --   --  0.65*  --  0.71*   < > 0.74*   GLUCOSE 98 93 95  --   --  98  --  92   < > 99   CALCIUM 8.1* 8.1* 8.6  --   --  8.2*  --  8.1*   < > 8.3*   MAGNESIUM  --  2.2  --  1.9 1.8  --   --  1.9  --  2.0   PHOSPHORUS  --   --   --   --   --   --   --  2.5  --  3.6    < > = values in this interval not displayed.         Lab 12/06/21  0450   TOTAL PROTEIN 5.5*   ALBUMIN 3.00*   GLOBULIN 2.5   ALT (SGPT) 6   AST (SGOT) 13   BILIRUBIN 2.2*   ALK PHOS 92         Lab 12/10/21  0449 12/09/21  0451 12/08/21  0508 12/07/21  0429 12/06/21  0450   PROTIME 15.2* 16.1* 16.2* 16.2* 16.4*   INR 1.24* 1.33* 1.34* 1.34* 1.37*                 Brief Urine Lab Results  (Last result in the past 365 days)      Color   Clarity   Blood   Leuk Est   Nitrite   Protein   CREAT   Urine HCG        12/02/21 0309 Orange   Turbid   Large (3+)   Moderate (2+)   Positive   >=300 mg/dL (3+)                 Microbiology Results Abnormal     Procedure Component Value - Date/Time    COVID PRE-OP / PRE-PROCEDURE SCREENING ORDER (NO ISOLATION) - Swab, Nasopharynx [[id number]]  (Normal) Collected: 11/30/21 1212    Lab Status: Final result Specimen: Swab from Nasopharynx Updated: 11/30/21 1257    Narrative:      The following orders were created for panel order COVID PRE-OP / PRE-PROCEDURE SCREENING ORDER (NO ISOLATION) - Swab, Nasopharynx.  Procedure                               Abnormality         Status                     ---------                               -----------         ------                     COVID-[facility] ABBOTT IN-HOUS...[[id number]]  Normal              Final result                 Please view results for these tests on the individual orders.    COVID-19, [facility]OTT IN-HOUSE,NASAL Swab (NO TRANSPORT MEDIA) 2 HR TAT - Swab, Nasopharynx [[id number]]  (Normal) Collected: 11/30/21 1212    Lab Status: Final  result Specimen: Swab from Nasopharynx Updated: 11/30/21 1257     COVID19 Presumptive Negative    Narrative:      Fact sheet for providers: https://www.fda.gov/media/782411/download     Fact sheet for patients: https://www.fda.gov/media/110772/download    Test performed by PCR.  If inconsistent with clinical signs and symptoms patient should be tested with different authorized molecular test.          EEG    Result Date: 12/9/2021  Reason for referral: 89 y.o.male with altered mental status Technical Summary:  A 19 channel digital EEG was performed using the international 10-20 placement system, including eye leads and EKG leads. Duration: 22 minutes Video: Off Findings: The awake tracing shows diffuse medium amplitude theta activity which is present symmetrically over both hemispheres.  Electrode artifact is seen over the right posterior leads due to patient position and head movement.  Photic stimulation does not change the background.  Stage II sleep is not clearly seen.  Hyperventilation is not performed. Technical quality: Good SUMMARY: Mild generalized slow No epileptiform activity is seen     Impression: The study shows evidence for diffuse cerebral dysfunction of at least mild degree but is nonspecific as to cause This report is transcribed using the Dragon dictation system.            I have reviewed the medications:  Scheduled Meds:cyanocobalamin, 1,000 mcg, Intramuscular, Daily  dilTIAZem CD, 240 mg, Oral, Q24H  donepezil, 10 mg, Oral, Daily  lactobacillus acidophilus, 1 capsule, Oral, Daily  lisinopril, 20 mg, Oral, Daily  nystatin, , Topical, Q12H  pravastatin, 40 mg, Oral, Daily  QUEtiapine, 25 mg, Oral, Nightly  senna-docusate sodium, 1 tablet, Oral, BID  sodium chloride, 10 mL, Intravenous, Q12H  tamsulosin, 0.4 mg, Oral, Daily  thiamine, 100 mg, Oral, Daily  traZODone, 50 mg, Oral, Nightly      Continuous Infusions:   PRN Meds:.•  acetaminophen **OR** acetaminophen **OR** acetaminophen  •   senna-docusate sodium **AND** polyethylene glycol **AND** bisacodyl **AND** bisacodyl  •  haloperidol lactate  •  HYDROcodone-acetaminophen  •  ipratropium-albuterol  •  magnesium sulfate **OR** magnesium sulfate **OR** magnesium sulfate  •  [DISCONTINUED] Morphine **AND** naloxone  •  ondansetron **OR** ondansetron  •  potassium chloride **OR** potassium chloride **OR** potassium chloride  •  potassium phosphate infusion greater than 15 mMoles **OR** potassium phosphate infusion greater than 15 mMoles **OR** potassium phosphate **OR** sodium phosphate IVPB **OR** sodium phosphate IVPB **OR** sodium phosphate IVPB  •  [COMPLETED] Insert peripheral IV **AND** sodium chloride  •  sodium chloride    Assessment/Plan   Assessment & Plan     Active Hospital Problems    Diagnosis  POA   • **Hematoma of hip, right, initial encounter [S70.01XA]  Yes   • Confusion [R41.0]  Yes   • Dementia (HCC) [F03.90]  Yes   • Hypoxia [R09.02]  Yes   • Pleural effusion, bilateral [J90]  Unknown   • Permanent atrial fibrillation (HCC) [I48.21]  Yes   • Essential hypertension [I10]  Yes   • Mixed hyperlipidemia [E78.2]  Yes      Resolved Hospital Problems   No resolved problems to display.        Brief Hospital Course to date:  Mike Lucero Jr. is a 89 y.o. male with hx of Afib on Coumadin, dementia, CAD s/p CABG, HTN, HLD, and osteoarthritis who presents due to right hip pain and inability to walk following a fall while getting out of bed. He has swelling and bruising on the right hip and buttocks. Admitted for further management of gluteal hematoma. Hospital course complicated by worsening agitation requiring restraints for safety.    Gluteal hematoma s/p fall at home, on chronic anticoagulation  --Holding Coumadin for now.  --Monitor H&H--stable.  --PT/OT.    Confusion/agitation  Underlying dementia  --At baseline, makes his own breakfast and dresses himself, walks without cane/walker.   --Normal TSH, B12, ammonia.  --Continue home  Aricept and Trazodone.  --Mental status worse than baseline, currently in restraints.  --Consulted Neurology 12/8/21 to help with medication regimen to get behaviors under better control. Stopped AM Seroquel dose, continue nightly Seroquel. EEG negative for seizure activity.  --Appears more awake since stopping AM Seroquel dose, continue to monitor. Unfortunately removing restraints is not possible at this time, he is quick to try to get out of bed and is a major fall risk.     Hypoxia  Bilateral pleural effusions, right greater than left  --No Echo on file.   --Improved, repeat CXR shows decreased size of effusions.  --Has been receiving daily IV Lasix 40 mg, now stopped.  --Currently on 3 liters, continue to wean as tolerated. Consider more diuresis.     Cirrhosis per CT and US, new diagnosis  Ascites, increased from 1 year ago  --Ammonia WNL.  --Monitor closely, gentle diuresis as tolerated.  --Defer further workup at this time. Ascites does not appear enough to tap at the moment. Monitor.    Afib on chronic Coumadin  --Continue home Cardizem.  --Continue to hold Coumadin for now given gluteal hematoma, ongoing GOC/dispositon discussions.    Urinary retention  BPH  --Small volume voids are chronic.  --Placed Zacarias on admission, then patient tugged on it and developed hematuria--d/c'ed 12/2/21.   --Continue Flomax.    HTN  HLD  --Continue Lisinopril.  --Continue statin.    Goals of care planning  --Palliative Care and Hospice following.  --Neurology feels he has a poor overall prognosis in setting of advanced dementia.  --If patient unable to continue with PT or stay out of restraints, likely plan will be long term care vs home with hospice.      DVT prophylaxis:  Mechanical DVT prophylaxis orders are present.       AM-PAC 6 Clicks Score (PT): 17 (12/09/21 2000)    Disposition: I expect the patient to be discharged TBD, difficult to keep out of restraints as he is a high fall risk, not enough staff for sitter at  bedside, clearly cannot go back to previous living situation    CODE STATUS:   Code Status and Medical Interventions:   Ordered at: 11/30/21 1724     Medical Intervention Limits:    NO intubation (DNI)     Level Of Support Discussed With:    Health Care Surrogate     Code Status (Patient has no pulse and is not breathing):    No CPR (Do Not Attempt to Resuscitate)     Medical Interventions (Patient has pulse or is breathing):    Limited Support     Comments:    wife       Cynthia Cervantes MD  12/10/21

## 2021-12-11 LAB
INR PPP: 1.21 (ref 0.85–1.16)
PROTHROMBIN TIME: 14.9 SECONDS (ref 11.4–14.4)

## 2021-12-11 PROCEDURE — 25010000002 FUROSEMIDE PER 20 MG: Performed by: HOSPITALIST

## 2021-12-11 PROCEDURE — 99233 SBSQ HOSP IP/OBS HIGH 50: CPT | Performed by: HOSPITALIST

## 2021-12-11 PROCEDURE — 85610 PROTHROMBIN TIME: CPT | Performed by: INTERNAL MEDICINE

## 2021-12-11 PROCEDURE — 25010000002 CYANOCOBALAMIN PER 1000 MCG: Performed by: INTERNAL MEDICINE

## 2021-12-11 RX ORDER — FUROSEMIDE 10 MG/ML
40 INJECTION INTRAMUSCULAR; INTRAVENOUS ONCE
Status: COMPLETED | OUTPATIENT
Start: 2021-12-11 | End: 2021-12-11

## 2021-12-11 RX ADMIN — Medication 1 CAPSULE: at 09:04

## 2021-12-11 RX ADMIN — TAMSULOSIN HYDROCHLORIDE 0.4 MG: 0.4 CAPSULE ORAL at 09:05

## 2021-12-11 RX ADMIN — SODIUM CHLORIDE, PRESERVATIVE FREE 10 ML: 5 INJECTION INTRAVENOUS at 20:37

## 2021-12-11 RX ADMIN — SENNOSIDES AND DOCUSATE SODIUM 1 TABLET: 50; 8.6 TABLET ORAL at 20:36

## 2021-12-11 RX ADMIN — LISINOPRIL 20 MG: 20 TABLET ORAL at 09:05

## 2021-12-11 RX ADMIN — DONEPEZIL HYDROCHLORIDE 10 MG: 10 TABLET, FILM COATED ORAL at 09:05

## 2021-12-11 RX ADMIN — CYANOCOBALAMIN 1000 MCG: 1000 INJECTION, SOLUTION INTRAMUSCULAR; SUBCUTANEOUS at 09:13

## 2021-12-11 RX ADMIN — SODIUM CHLORIDE, PRESERVATIVE FREE 10 ML: 5 INJECTION INTRAVENOUS at 09:05

## 2021-12-11 RX ADMIN — QUETIAPINE FUMARATE 25 MG: 25 TABLET ORAL at 20:36

## 2021-12-11 RX ADMIN — THIAMINE HCL TAB 100 MG 100 MG: 100 TAB at 09:04

## 2021-12-11 RX ADMIN — NYSTATIN: 100000 POWDER TOPICAL at 20:38

## 2021-12-11 RX ADMIN — TRAZODONE HYDROCHLORIDE 50 MG: 50 TABLET ORAL at 20:36

## 2021-12-11 RX ADMIN — FUROSEMIDE 40 MG: 10 INJECTION, SOLUTION INTRAMUSCULAR; INTRAVENOUS at 23:43

## 2021-12-11 RX ADMIN — SENNOSIDES AND DOCUSATE SODIUM 1 TABLET: 50; 8.6 TABLET ORAL at 09:04

## 2021-12-11 RX ADMIN — PRAVASTATIN SODIUM 40 MG: 40 TABLET ORAL at 09:04

## 2021-12-11 RX ADMIN — NYSTATIN: 100000 POWDER TOPICAL at 09:04

## 2021-12-11 RX ADMIN — DILTIAZEM HYDROCHLORIDE 240 MG: 240 CAPSULE, COATED, EXTENDED RELEASE ORAL at 09:04

## 2021-12-12 LAB
ANION GAP SERPL CALCULATED.3IONS-SCNC: 10 MMOL/L (ref 5–15)
BUN SERPL-MCNC: 16 MG/DL (ref 8–23)
BUN/CREAT SERPL: 25 (ref 7–25)
CALCIUM SPEC-SCNC: 8.3 MG/DL (ref 8.6–10.5)
CHLORIDE SERPL-SCNC: 93 MMOL/L (ref 98–107)
CO2 SERPL-SCNC: 28 MMOL/L (ref 22–29)
CREAT SERPL-MCNC: 0.64 MG/DL (ref 0.76–1.27)
GFR SERPL CREATININE-BSD FRML MDRD: 118 ML/MIN/1.73
GLUCOSE SERPL-MCNC: 101 MG/DL (ref 65–99)
POTASSIUM SERPL-SCNC: 3.7 MMOL/L (ref 3.5–5.2)
SODIUM SERPL-SCNC: 131 MMOL/L (ref 136–145)

## 2021-12-12 PROCEDURE — 25010000002 CYANOCOBALAMIN PER 1000 MCG: Performed by: INTERNAL MEDICINE

## 2021-12-12 PROCEDURE — 80048 BASIC METABOLIC PNL TOTAL CA: CPT | Performed by: HOSPITALIST

## 2021-12-12 PROCEDURE — 99232 SBSQ HOSP IP/OBS MODERATE 35: CPT | Performed by: HOSPITALIST

## 2021-12-12 RX ADMIN — QUETIAPINE FUMARATE 25 MG: 25 TABLET ORAL at 20:31

## 2021-12-12 RX ADMIN — SODIUM CHLORIDE, PRESERVATIVE FREE 10 ML: 5 INJECTION INTRAVENOUS at 13:12

## 2021-12-12 RX ADMIN — NYSTATIN: 100000 POWDER TOPICAL at 20:31

## 2021-12-12 RX ADMIN — THIAMINE HCL TAB 100 MG 100 MG: 100 TAB at 13:13

## 2021-12-12 RX ADMIN — LISINOPRIL 20 MG: 20 TABLET ORAL at 13:11

## 2021-12-12 RX ADMIN — DONEPEZIL HYDROCHLORIDE 10 MG: 10 TABLET, FILM COATED ORAL at 13:11

## 2021-12-12 RX ADMIN — TRAZODONE HYDROCHLORIDE 50 MG: 50 TABLET ORAL at 20:31

## 2021-12-12 RX ADMIN — SODIUM CHLORIDE, PRESERVATIVE FREE 10 ML: 5 INJECTION INTRAVENOUS at 20:32

## 2021-12-12 RX ADMIN — NYSTATIN: 100000 POWDER TOPICAL at 11:11

## 2021-12-12 RX ADMIN — TAMSULOSIN HYDROCHLORIDE 0.4 MG: 0.4 CAPSULE ORAL at 13:12

## 2021-12-12 RX ADMIN — CYANOCOBALAMIN 1000 MCG: 1000 INJECTION, SOLUTION INTRAMUSCULAR; SUBCUTANEOUS at 13:13

## 2021-12-12 RX ADMIN — PRAVASTATIN SODIUM 40 MG: 40 TABLET ORAL at 13:12

## 2021-12-12 RX ADMIN — Medication 1 CAPSULE: at 13:11

## 2021-12-12 NOTE — PLAN OF CARE
Goal Outcome Evaluation:           Progress: no change  Outcome Summary: pt rested intermittently throughout night, no c/o pain, no c/o n/v/d, bowel movement during night, voiding very well, turn q2, out of restraints all evening, did pull at lines and take clothes off usually only when needing the restroom, oriented to person only, ate 75% of meals when fed. VSS, will continue to monitor.

## 2021-12-12 NOTE — PROGRESS NOTES
[facility] Medicine Services  PROGRESS NOTE    Patient Name: Mike Lucero Jr.  : 1932  MRN: [id number]    Date of Admission: 2021  Primary Care Physician: Subha Wesley MD    Subjective   Subjective     CC:  F/U fall, AMS    HPI:  Patient seen this morning. Out of restraints since last night. Remains confused this morning. Says he is trying to have a bowel movement but nothing is coming out.     ROS:  Denies pain, SOA  Limited due to mental status    Objective   Objective     Vital Signs:   Temp:  [97.9 °F (36.6 °C)-98.3 °F (36.8 °C)] 98.1 °F (36.7 °C)  Heart Rate:  [64-77] 77  Resp:  [16-18] 18  BP: ()/(56-68) 98/56  Flow (L/min):  [2-3] 3     Physical Exam:  Gen-no acute distress, chronically ill appearing  HENT-NCAT, mucous membranes moist  CV-RRR, S1 S2 normal, no m/r/g  Resp-CTAB, no wheezes or rales  Abd-soft, NT, ND, +BS  Ext-no edema  Neuro-intermittently somnolent, does wake up and answer some simple questions, overall confused  Skin-no rashes    Results Reviewed:  LAB RESULTS:      Lab 21  0504 12/10/21  0449 21  0451 21  0508 21  0429 21  0450 21  0450   WBC  --  6.89 7.88 8.19 7.17  --  6.54   HEMOGLOBIN  --  9.1* 9.0* 9.4* 8.7*  --  8.3*   HEMATOCRIT  --  28.2* 27.0* 27.9* 26.7*  --  25.4*   PLATELETS  --  264 259 243 239  --  206   NEUTROS ABS  --   --   --   --   --   --  4.35   IMMATURE GRANS (ABS)  --   --   --   --   --   --  0.04   LYMPHS ABS  --   --   --   --   --   --  1.28   MONOS ABS  --   --   --   --   --   --  0.66   EOS ABS  --   --   --   --   --   --  0.19   MCV  --  99.6* 98.2* 96.5 98.9*  --  101.2*   PROTIME 14.9* 15.2* 16.1* 16.2* 16.2*   < > 16.4*    < > = values in this interval not displayed.         Lab 21  0425 12/10/21  0449 21  0451 21  0508 21  0014 21  1500 21  0429 21  0429 21  0450 21  0450   SODIUM 131* 133* 133* 134*  --   --    --  133*   < > 134*   POTASSIUM 3.7 4.0 4.1 4.3 4.6  --    < > 3.5   < > 3.6   CHLORIDE 93* 97* 96* 95*  --   --   --  94*   < > 93*   CO2 28.0 29.0 31.0* 29.0  --   --   --  34.0*   < > 34.0*   ANION GAP 10.0 7.0 6.0 10.0  --   --   --  5.0   < > 7.0   BUN 16 20 21 18  --   --   --  17   < > 16   CREATININE 0.64* 0.67* 0.71* 0.62*  --   --   --  0.65*   < > 0.71*   GLUCOSE 101* 98 93 95  --   --   --  98   < > 92   CALCIUM 8.3* 8.1* 8.1* 8.6  --   --   --  8.2*   < > 8.1*   MAGNESIUM  --   --  2.2  --  1.9 1.8  --   --   --  1.9   PHOSPHORUS  --   --   --   --   --   --   --   --   --  2.5    < > = values in this interval not displayed.         Lab 12/06/21  0450   TOTAL PROTEIN 5.5*   ALBUMIN 3.00*   GLOBULIN 2.5   ALT (SGPT) 6   AST (SGOT) 13   BILIRUBIN 2.2*   ALK PHOS 92         Lab 12/11/21  0504 12/10/21  0449 12/09/21  0451 12/08/21  0508 12/07/21  0429   PROTIME 14.9* 15.2* 16.1* 16.2* 16.2*   INR 1.21* 1.24* 1.33* 1.34* 1.34*                 Brief Urine Lab Results  (Last result in the past 365 days)      Color   Clarity   Blood   Leuk Est   Nitrite   Protein   CREAT   Urine HCG        12/02/21 0309 Orange   Turbid   Large (3+)   Moderate (2+)   Positive   >=300 mg/dL (3+)                 Microbiology Results Abnormal     Procedure Component Value - Date/Time    COVID PRE-OP / PRE-PROCEDURE SCREENING ORDER (NO ISOLATION) - Swab, Nasopharynx [[id number]]  (Normal) Collected: 11/30/21 1212    Lab Status: Final result Specimen: Swab from Nasopharynx Updated: 11/30/21 1257    Narrative:      The following orders were created for panel order COVID PRE-OP / PRE-PROCEDURE SCREENING ORDER (NO ISOLATION) - Swab, Nasopharynx.  Procedure                               Abnormality         Status                     ---------                               -----------         ------                     COVID-19, ABBOTT IN-HOUS...[[id number]]  Normal              Final result                 Please view results for these  tests on the individual orders.    COVID-19, [facility]OTT IN-HOUSE,NASAL Swab (NO TRANSPORT MEDIA) 2 HR TAT - Swab, Nasopharynx [[id number]]  (Normal) Collected: 11/30/21 1212    Lab Status: Final result Specimen: Swab from Nasopharynx Updated: 11/30/21 1257     COVID19 Presumptive Negative    Narrative:      Fact sheet for providers: https://www.fda.gov/media/938826/download     Fact sheet for patients: https://www.fda.gov/media/065233/download    Test performed by PCR.  If inconsistent with clinical signs and symptoms patient should be tested with different authorized molecular test.          No radiology results from the last 24 hrs        I have reviewed the medications:  Scheduled Meds:cyanocobalamin, 1,000 mcg, Intramuscular, Daily  dilTIAZem CD, 240 mg, Oral, Q24H  donepezil, 10 mg, Oral, Daily  lactobacillus acidophilus, 1 capsule, Oral, Daily  lisinopril, 20 mg, Oral, Daily  nystatin, , Topical, Q12H  pravastatin, 40 mg, Oral, Daily  QUEtiapine, 25 mg, Oral, Nightly  senna-docusate sodium, 1 tablet, Oral, BID  sodium chloride, 10 mL, Intravenous, Q12H  tamsulosin, 0.4 mg, Oral, Daily  thiamine, 100 mg, Oral, Daily  traZODone, 50 mg, Oral, Nightly      Continuous Infusions:   PRN Meds:.•  acetaminophen **OR** acetaminophen **OR** acetaminophen  •  senna-docusate sodium **AND** polyethylene glycol **AND** bisacodyl **AND** bisacodyl  •  haloperidol lactate  •  HYDROcodone-acetaminophen  •  ipratropium-albuterol  •  magnesium sulfate **OR** magnesium sulfate **OR** magnesium sulfate  •  [DISCONTINUED] Morphine **AND** naloxone  •  ondansetron **OR** ondansetron  •  potassium chloride **OR** potassium chloride **OR** potassium chloride  •  potassium phosphate infusion greater than 15 mMoles **OR** potassium phosphate infusion greater than 15 mMoles **OR** potassium phosphate **OR** sodium phosphate IVPB **OR** sodium phosphate IVPB **OR** sodium phosphate IVPB  •  [COMPLETED] Insert peripheral IV **AND** sodium  chloride  •  sodium chloride    Assessment/Plan   Assessment & Plan     Active Hospital Problems    Diagnosis  POA   • **Hematoma of hip, right, initial encounter [S70.01XA]  Yes   • Confusion [R41.0]  Yes   • Dementia (HCC) [F03.90]  Yes   • Hypoxia [R09.02]  Yes   • Pleural effusion, bilateral [J90]  Unknown   • Permanent atrial fibrillation (HCC) [I48.21]  Yes   • Essential hypertension [I10]  Yes   • Mixed hyperlipidemia [E78.2]  Yes      Resolved Hospital Problems   No resolved problems to display.        Brief Hospital Course to date:  Mike Lucero Jr. is a 89 y.o. male with hx of Afib on Coumadin, dementia, CAD s/p CABG, HTN, HLD, and osteoarthritis who presents due to right hip pain and inability to walk following a fall while getting out of bed. He has swelling and bruising on the right hip and buttocks. Admitted for further management of gluteal hematoma. Hospital course complicated by worsening agitation requiring restraints for safety.    Gluteal hematoma s/p fall at home, on chronic anticoagulation  --Holding Coumadin for now.  --Monitor H&H--stable overall.  --PT/OT.    Confusion/agitation  Underlying dementia  --At baseline, makes his own breakfast and dresses himself, walks without cane/walker.   --Normal TSH, B12, ammonia.  --Continue home Aricept and Trazodone.  --Mental status worse than baseline, has required restraints.  --Consulted Neurology 12/8/21 to help with medication regimen to get behaviors under better control. Stopped AM Seroquel dose, continued nightly Seroquel. EEG negative for seizure activity.  --Appears more awake since stopping AM Seroquel dose, continue to monitor. Currently has been out of restraints since 12/11/21 evening, will continue to monitor.    Hypoxia  Bilateral pleural effusions, right greater than left  --No Echo on file.   --Improved, repeat CXR shows decreased size of effusions.  --Has been receiving daily IV Lasix 40 mg, now stopped and just giving  intermittently. Last dose 12/11/21.  --Currently on 3 liters, continue to wean as tolerated.     Hyponatremia  --Has been slightly low most of admission.  --Slightly worse after Lasix yesterday. Monitor.     Cirrhosis per CT and US, new diagnosis  Ascites, increased from 1 year ago  --Ammonia WNL.  --Monitor closely, gentle diuresis as tolerated.  --Defer further workup at this time given his overall poor functional status. Ascites does not appear enough to tap at the moment. Monitor.    Afib on chronic Coumadin  --Continue home Cardizem.  --Continue to hold Coumadin for now given gluteal hematoma, ongoing GOC/dispositon discussions. Stop daily INR checks.    Urinary retention  BPH  --Small volume voids are chronic.  --Placed Zacarias on admission, then patient tugged on it and developed hematuria--d/c'ed 12/2/21.   --Continue Flomax.    HTN  HLD  --Continue Lisinopril.  --Continue statin.    Goals of care planning  --Palliative Care and Hospice following.  --Neurology feels he has a poor overall prognosis in setting of advanced dementia.  --If patient unable to continue with PT or stay out of restraints, likely plan will be long term care vs home with hospice. Hospice to meet with family on Monday for further discussion.      DVT prophylaxis:  Mechanical DVT prophylaxis orders are present.       AM-PAC 6 Clicks Score (PT): 17 (12/09/21 2000)    Disposition: I expect the patient to be discharged TBD, difficult to keep out of restraints as he is a high fall risk, not enough staff for sitter at bedside, clearly cannot go back to previous living situation; Hospice to discuss with family this week     CODE STATUS:   Code Status and Medical Interventions:   Ordered at: 11/30/21 1724     Medical Intervention Limits:    NO intubation (DNI)     Level Of Support Discussed With:    Health Care Surrogate     Code Status (Patient has no pulse and is not breathing):    No CPR (Do Not Attempt to Resuscitate)     Medical  Interventions (Patient has pulse or is breathing):    Limited Support     Comments:    wife       Cynthia Cervantes MD  12/12/21

## 2021-12-13 LAB
ANION GAP SERPL CALCULATED.3IONS-SCNC: 5 MMOL/L (ref 5–15)
BUN SERPL-MCNC: 17 MG/DL (ref 8–23)
BUN/CREAT SERPL: 23.3 (ref 7–25)
CALCIUM SPEC-SCNC: 8.5 MG/DL (ref 8.6–10.5)
CHLORIDE SERPL-SCNC: 95 MMOL/L (ref 98–107)
CO2 SERPL-SCNC: 32 MMOL/L (ref 22–29)
CREAT SERPL-MCNC: 0.73 MG/DL (ref 0.76–1.27)
DEPRECATED RDW RBC AUTO: 49.3 FL (ref 37–54)
ERYTHROCYTE [DISTWIDTH] IN BLOOD BY AUTOMATED COUNT: 13.7 % (ref 12.3–15.4)
GFR SERPL CREATININE-BSD FRML MDRD: 101 ML/MIN/1.73
GLUCOSE SERPL-MCNC: 95 MG/DL (ref 65–99)
HCT VFR BLD AUTO: 33.9 % (ref 37.5–51)
HGB BLD-MCNC: 11.3 G/DL (ref 13–17.7)
MCH RBC QN AUTO: 32.9 PG (ref 26.6–33)
MCHC RBC AUTO-ENTMCNC: 33.3 G/DL (ref 31.5–35.7)
MCV RBC AUTO: 98.8 FL (ref 79–97)
PLATELET # BLD AUTO: 327 10*3/MM3 (ref 140–450)
PMV BLD AUTO: 8.5 FL (ref 6–12)
POTASSIUM SERPL-SCNC: 3.8 MMOL/L (ref 3.5–5.2)
RBC # BLD AUTO: 3.43 10*6/MM3 (ref 4.14–5.8)
SODIUM SERPL-SCNC: 132 MMOL/L (ref 136–145)
WBC NRBC COR # BLD: 6.17 10*3/MM3 (ref 3.4–10.8)

## 2021-12-13 PROCEDURE — 80048 BASIC METABOLIC PNL TOTAL CA: CPT | Performed by: HOSPITALIST

## 2021-12-13 PROCEDURE — 99233 SBSQ HOSP IP/OBS HIGH 50: CPT | Performed by: FAMILY MEDICINE

## 2021-12-13 PROCEDURE — 25010000002 CYANOCOBALAMIN PER 1000 MCG: Performed by: INTERNAL MEDICINE

## 2021-12-13 PROCEDURE — 85027 COMPLETE CBC AUTOMATED: CPT | Performed by: HOSPITALIST

## 2021-12-13 RX ADMIN — TAMSULOSIN HYDROCHLORIDE 0.4 MG: 0.4 CAPSULE ORAL at 09:20

## 2021-12-13 RX ADMIN — ACETAMINOPHEN 650 MG: 325 TABLET, FILM COATED ORAL at 21:25

## 2021-12-13 RX ADMIN — NYSTATIN: 100000 POWDER TOPICAL at 09:21

## 2021-12-13 RX ADMIN — LISINOPRIL 20 MG: 20 TABLET ORAL at 09:20

## 2021-12-13 RX ADMIN — SENNOSIDES AND DOCUSATE SODIUM 1 TABLET: 50; 8.6 TABLET ORAL at 09:20

## 2021-12-13 RX ADMIN — NYSTATIN: 100000 POWDER TOPICAL at 21:25

## 2021-12-13 RX ADMIN — Medication 1 CAPSULE: at 09:20

## 2021-12-13 RX ADMIN — SODIUM CHLORIDE, PRESERVATIVE FREE 10 ML: 5 INJECTION INTRAVENOUS at 09:21

## 2021-12-13 RX ADMIN — DILTIAZEM HYDROCHLORIDE 240 MG: 240 CAPSULE, COATED, EXTENDED RELEASE ORAL at 09:20

## 2021-12-13 RX ADMIN — THIAMINE HCL TAB 100 MG 100 MG: 100 TAB at 09:20

## 2021-12-13 RX ADMIN — PRAVASTATIN SODIUM 40 MG: 40 TABLET ORAL at 09:20

## 2021-12-13 RX ADMIN — CYANOCOBALAMIN 1000 MCG: 1000 INJECTION, SOLUTION INTRAMUSCULAR; SUBCUTANEOUS at 09:20

## 2021-12-13 RX ADMIN — TRAZODONE HYDROCHLORIDE 50 MG: 50 TABLET ORAL at 21:25

## 2021-12-13 RX ADMIN — DONEPEZIL HYDROCHLORIDE 10 MG: 10 TABLET, FILM COATED ORAL at 09:20

## 2021-12-13 RX ADMIN — QUETIAPINE FUMARATE 25 MG: 25 TABLET ORAL at 21:25

## 2021-12-13 RX ADMIN — ACETAMINOPHEN 650 MG: 325 TABLET, FILM COATED ORAL at 09:20

## 2021-12-13 NOTE — CASE MANAGEMENT/SOCIAL WORK
Continued Stay Note  [facility]     Patient Name: Mike Lucero Jr.  MRN: [id number]  Today's Date: 12/13/2021    Admit Date: 11/30/2021     Discharge Plan     Row Name 12/13/21 1436       Plan    Plan skilled nursing facility    Patient/Family in Agreement with Plan yes    Plan Comments I met with Mr. Lucero and his wife, Glenis, at the bedside to discuss his discharge plan. Glenis would like referrals sent to [facility], [facility], and [facility]. Case management will fax/call these referrals one there are physical and occupational therapy notes in the chart. Case management will continue to follow Mr. Lucero' plan of care and assist with discharge needs.    Final Discharge Disposition Code 30 - still a patient               Discharge Codes    No documentation.                     Fabien Marie RN

## 2021-12-13 NOTE — PROGRESS NOTES
[facility] Medicine Services  PROGRESS NOTE    Patient Name: Mike Lucero Jr.  : 1932  MRN: [id number]    Date of Admission: 2021  Primary Care Physician: Subha Wesley MD    Subjective   Subjective     CC:  F/U fall, AMS    HPI:  Patient seen and examined. RN notes reviewed. No acute events overnight. Remains out of restraints. Pt sleeping, did not awaken.    ROS:  UTO    Objective   Objective     Vital Signs:   Temp:  [97.7 °F (36.5 °C)-98 °F (36.7 °C)] 97.7 °F (36.5 °C)  Heart Rate:  [61-89] 89  Resp:  [18] 18  BP: (100-113)/(54-71) 113/54  Flow (L/min):  [3] 3     Physical Exam:  Constitutional: No acute distress, resting comfortably   HENT: NCAT, mucous membranes moist  Respiratory: Clear to auscultation bilaterally, respiratory effort normal   Cardiovascular: RRR, no murmurs, rubs, or gallops  Gastrointestinal: Positive bowel sounds, soft, nontender, nondistended  Musculoskeletal: No bilateral ankle edema  Psychiatric: Unable to evaluate   Neurologic: No focal deficits   Skin: No rashes    Results Reviewed:  LAB RESULTS:      Lab 21  0521  0504 12/10/21  0449 12/09/21  0451 21  0508 21  0429   WBC 6.17  --  6.89 7.88 8.19 7.17   HEMOGLOBIN 11.3*  --  9.1* 9.0* 9.4* 8.7*   HEMATOCRIT 33.9*  --  28.2* 27.0* 27.9* 26.7*   PLATELETS 327  --  264 259 243 239   MCV 98.8*  --  99.6* 98.2* 96.5 98.9*   PROTIME  --  14.9* 15.2* 16.1* 16.2* 16.2*         Lab 21  0523 21  0425 12/10/21  0449 21  0451 21  0508 21  0014 21  1500 21  0429   SODIUM 132* 131* 133* 133* 134*  --   --    < >   POTASSIUM 3.8 3.7 4.0 4.1 4.3 4.6  --    < >   CHLORIDE 95* 93* 97* 96* 95*  --   --    < >   CO2 32.0* 28.0 29.0 31.0* 29.0  --   --    < >   ANION GAP 5.0 10.0 7.0 6.0 10.0  --   --    < >   BUN 17 16 20 21 18  --   --    < >   CREATININE 0.73* 0.64* 0.67* 0.71* 0.62*  --   --    < >   GLUCOSE 95 101* 98 93 95  --   --     < >   CALCIUM 8.5* 8.3* 8.1* 8.1* 8.6  --   --    < >   MAGNESIUM  --   --   --  2.2  --  1.9 1.8  --     < > = values in this interval not displayed.             Lab 12/11/21  0504 12/10/21  0449 12/09/21  0451 12/08/21  0508 12/07/21  0429   PROTIME 14.9* 15.2* 16.1* 16.2* 16.2*   INR 1.21* 1.24* 1.33* 1.34* 1.34*                 Brief Urine Lab Results  (Last result in the past 365 days)      Color   Clarity   Blood   Leuk Est   Nitrite   Protein   CREAT   Urine HCG        12/02/21 0309 Orange   Turbid   Large (3+)   Moderate (2+)   Positive   >=300 mg/dL (3+)                 Microbiology Results Abnormal     Procedure Component Value - Date/Time    COVID PRE-OP / PRE-PROCEDURE SCREENING ORDER (NO ISOLATION) - Swab, Nasopharynx [[id number]]  (Normal) Collected: 11/30/21 1212    Lab Status: Final result Specimen: Swab from Nasopharynx Updated: 11/30/21 1257    Narrative:      The following orders were created for panel order COVID PRE-OP / PRE-PROCEDURE SCREENING ORDER (NO ISOLATION) - Swab, Nasopharynx.  Procedure                               Abnormality         Status                     ---------                               -----------         ------                     COVID-19, ABBOTT IN-HOUS...[[id number]]  Normal              Final result                 Please view results for these tests on the individual orders.    COVID-19, ABBOTT IN-HOUSE,NASAL Swab (NO TRANSPORT MEDIA) 2 HR TAT - Swab, Nasopharynx [[id number]]  (Normal) Collected: 11/30/21 1212    Lab Status: Final result Specimen: Swab from Nasopharynx Updated: 11/30/21 1257     COVID19 Presumptive Negative    Narrative:      Fact sheet for providers: https://www.fda.gov/media/600761/download     Fact sheet for patients: https://www.fda.gov/media/366794/download    Test performed by PCR.  If inconsistent with clinical signs and symptoms patient should be tested with different authorized molecular test.          No radiology results from the last  24 hrs        I have reviewed the medications:  Scheduled Meds:cyanocobalamin, 1,000 mcg, Intramuscular, Daily  dilTIAZem CD, 240 mg, Oral, Q24H  donepezil, 10 mg, Oral, Daily  lactobacillus acidophilus, 1 capsule, Oral, Daily  lisinopril, 20 mg, Oral, Daily  nystatin, , Topical, Q12H  pravastatin, 40 mg, Oral, Daily  QUEtiapine, 25 mg, Oral, Nightly  senna-docusate sodium, 1 tablet, Oral, BID  sodium chloride, 10 mL, Intravenous, Q12H  tamsulosin, 0.4 mg, Oral, Daily  thiamine, 100 mg, Oral, Daily  traZODone, 50 mg, Oral, Nightly      Continuous Infusions:   PRN Meds:.•  acetaminophen **OR** acetaminophen **OR** acetaminophen  •  senna-docusate sodium **AND** polyethylene glycol **AND** bisacodyl **AND** bisacodyl  •  haloperidol lactate  •  HYDROcodone-acetaminophen  •  ipratropium-albuterol  •  magnesium sulfate **OR** magnesium sulfate **OR** magnesium sulfate  •  [DISCONTINUED] Morphine **AND** naloxone  •  ondansetron **OR** ondansetron  •  potassium chloride **OR** potassium chloride **OR** potassium chloride  •  potassium phosphate infusion greater than 15 mMoles **OR** potassium phosphate infusion greater than 15 mMoles **OR** potassium phosphate **OR** sodium phosphate IVPB **OR** sodium phosphate IVPB **OR** sodium phosphate IVPB  •  [COMPLETED] Insert peripheral IV **AND** sodium chloride  •  sodium chloride    Assessment/Plan   Assessment & Plan     Active Hospital Problems    Diagnosis  POA   • **Hematoma of hip, right, initial encounter [S70.01XA]  Yes   • Confusion [R41.0]  Yes   • Dementia (HCC) [F03.90]  Yes   • Hypoxia [R09.02]  Yes   • Pleural effusion, bilateral [J90]  Unknown   • Permanent atrial fibrillation (HCC) [I48.21]  Yes   • Essential hypertension [I10]  Yes   • Mixed hyperlipidemia [E78.2]  Yes      Resolved Hospital Problems   No resolved problems to display.        Brief Hospital Course to date:  Mike SCHULZ Nic Ballesteros is a 89 y.o. male with hx of Afib on Coumadin, dementia, CAD s/p  CABG, HTN, HLD, and osteoarthritis who presents due to right hip pain and inability to walk following a fall while getting out of bed. He has swelling and bruising on the right hip and buttocks. Admitted for further management of gluteal hematoma. Hospital course complicated by worsening agitation requiring restraints for safety.    This patient's problems and plans were partially entered by my partner and updated as appropriate by me 12/13/21.  All problems are new to me today     Gluteal hematoma s/p fall at home, on chronic anticoagulation  --Holding Coumadin for now.  --H&H trending UP  --PT has not seen since 12/6, OT 12/9. Family supposed to have meeting with hospice today, will ask PT/OT to re-visit depending on GOC    Confusion/agitation  Underlying dementia  --At baseline, makes his own breakfast and dresses himself, walks without cane/walker.   --Normal TSH, B12, ammonia.  --Continue home Aricept and Trazodone.  --Mental status worse than baseline, has required restraints.  --Consulted Neurology 12/8/21 to help with medication regimen to get behaviors under better control. Stopped AM Seroquel dose, continued nightly Seroquel. EEG negative for seizure activity.  --Appears more awake since stopping AM Seroquel dose, continue to monitor. Currently has been out of restraints since 12/11/21 evening, will continue to monitor.    Hypoxia  Bilateral pleural effusions, right greater than left  --No Echo on file.   --Improved, repeat CXR shows decreased size of effusions.  --Has been receiving daily IV Lasix 40 mg, now stopped and just giving intermittently. Last dose 12/11/21.  --Currently on 3 liters, continue to wean as tolerated.     Hyponatremia  --Stable    Cirrhosis per CT and US, new diagnosis  Ascites, increased from 1 year ago  --Ammonia WNL.  --Monitor closely, gentle diuresis as tolerated.  --Defer further workup at this time given his overall poor functional status. Ascites does not appear enough to tap  at the moment. Monitor.    Afib on chronic Coumadin  --Continue home Cardizem.  --Continue to hold Coumadin for now given gluteal hematoma, ongoing GOC/dispositon discussions. Stopped daily INR checks.    Urinary retention  BPH  --Small volume voids are chronic.  --Placed Zacarias on admission, then patient tugged on it and developed hematuria--d/c'ed 12/2/21.   --Continue Flomax.    HTN  HLD  --Continue Lisinopril.  --Continue statin.    Goals of care planning  --Palliative Care and Hospice following.  --Neurology feels he has a poor overall prognosis in setting of advanced dementia.  --If patient unable to continue with PT or stay out of restraints, likely plan will be long term care vs home with hospice. Hospice to meet with family today for further discussion.      DVT prophylaxis:  Mechanical DVT prophylaxis orders are present.       AM-PAC 6 Clicks Score (PT): 14 (12/13/21 0920)    Disposition: I expect the patient to be discharged TBD, difficult to keep out of restraints as he is a high fall risk, not enough staff for sitter at bedside, clearly cannot go back to previous living situation; Hospice to discuss with family today    CODE STATUS:   Code Status and Medical Interventions:   Ordered at: 11/30/21 1724     Medical Intervention Limits:    NO intubation (DNI)     Level Of Support Discussed With:    Health Care Surrogate     Code Status (Patient has no pulse and is not breathing):    No CPR (Do Not Attempt to Resuscitate)     Medical Interventions (Patient has pulse or is breathing):    Limited Support     Comments:    wife       Rosita Lane, DO  12/13/21

## 2021-12-13 NOTE — NURSING NOTE
Daily Low Gerald <=14    Gerald score: 14    no new skin issues or needs per review of charting. Continue to provide good general skin care. Apply barrier cream daily/ PRN. Keep patient dry and turn regularly.  Elevate and offload heels. The patient is on a WENDY mattress at this time.     Please contact [facility] if new skin issues arise.

## 2021-12-13 NOTE — PROGRESS NOTES
Continued Stay Note  [facility]     Patient Name: Mike Lucero Jr.  MRN: [id number]  Today's Date: 12/13/2021    Admit Date: 11/30/2021     Discharge Plan     Row Name 12/13/21 1804       Plan    Plan Ongoing    Plan Comments Visit made to pt. pt's wife Glenis present. Pt alert, Glenis stated pt has been more alert for the past 2 days and eating more. Glenis stated has met with the  and discussed pt being admitted to a facility for rehab. Plan is for the physical therapist to assess pt for the ability to have therapy outside the hospital. Will continue to follow. Please call [phone] if can be of further assistance.               Discharge Codes    No documentation.                     Marge León RN

## 2021-12-14 LAB
INR PPP: 1.23 (ref 0.85–1.16)
PROTHROMBIN TIME: 15.1 SECONDS (ref 11.4–14.4)
SARS-COV-2 RDRP RESP QL NAA+PROBE: NORMAL

## 2021-12-14 PROCEDURE — 97110 THERAPEUTIC EXERCISES: CPT

## 2021-12-14 PROCEDURE — 85610 PROTHROMBIN TIME: CPT | Performed by: FAMILY MEDICINE

## 2021-12-14 PROCEDURE — 97535 SELF CARE MNGMENT TRAINING: CPT

## 2021-12-14 PROCEDURE — 87635 SARS-COV-2 COVID-19 AMP PRB: CPT | Performed by: FAMILY MEDICINE

## 2021-12-14 PROCEDURE — 25010000002 CYANOCOBALAMIN PER 1000 MCG: Performed by: INTERNAL MEDICINE

## 2021-12-14 PROCEDURE — 97116 GAIT TRAINING THERAPY: CPT

## 2021-12-14 PROCEDURE — 99232 SBSQ HOSP IP/OBS MODERATE 35: CPT | Performed by: FAMILY MEDICINE

## 2021-12-14 RX ORDER — LANOLIN ALCOHOL/MO/W.PET/CERES
1000 CREAM (GRAM) TOPICAL DAILY
Status: DISCONTINUED | OUTPATIENT
Start: 2021-12-15 | End: 2021-12-15 | Stop reason: HOSPADM

## 2021-12-14 RX ORDER — WARFARIN SODIUM 2.5 MG/1
2.5 TABLET ORAL
Status: DISCONTINUED | OUTPATIENT
Start: 2021-12-14 | End: 2021-12-15

## 2021-12-14 RX ADMIN — DILTIAZEM HYDROCHLORIDE 240 MG: 240 CAPSULE, COATED, EXTENDED RELEASE ORAL at 09:52

## 2021-12-14 RX ADMIN — WARFARIN SODIUM 2.5 MG: 2.5 TABLET ORAL at 17:42

## 2021-12-14 RX ADMIN — NYSTATIN 1 APPLICATION: 100000 POWDER TOPICAL at 20:31

## 2021-12-14 RX ADMIN — THIAMINE HCL TAB 100 MG 100 MG: 100 TAB at 09:52

## 2021-12-14 RX ADMIN — NYSTATIN: 100000 POWDER TOPICAL at 09:56

## 2021-12-14 RX ADMIN — SENNOSIDES AND DOCUSATE SODIUM 1 TABLET: 50; 8.6 TABLET ORAL at 09:52

## 2021-12-14 RX ADMIN — ACETAMINOPHEN 650 MG: 325 TABLET, FILM COATED ORAL at 09:52

## 2021-12-14 RX ADMIN — QUETIAPINE FUMARATE 25 MG: 25 TABLET ORAL at 20:31

## 2021-12-14 RX ADMIN — TRAZODONE HYDROCHLORIDE 50 MG: 50 TABLET ORAL at 20:31

## 2021-12-14 RX ADMIN — PRAVASTATIN SODIUM 40 MG: 40 TABLET ORAL at 09:52

## 2021-12-14 RX ADMIN — TAMSULOSIN HYDROCHLORIDE 0.4 MG: 0.4 CAPSULE ORAL at 09:52

## 2021-12-14 RX ADMIN — LISINOPRIL 20 MG: 20 TABLET ORAL at 10:00

## 2021-12-14 RX ADMIN — DONEPEZIL HYDROCHLORIDE 10 MG: 10 TABLET, FILM COATED ORAL at 09:52

## 2021-12-14 RX ADMIN — Medication 1 CAPSULE: at 09:52

## 2021-12-14 RX ADMIN — SENNOSIDES AND DOCUSATE SODIUM 1 TABLET: 50; 8.6 TABLET ORAL at 20:30

## 2021-12-14 RX ADMIN — CYANOCOBALAMIN 1000 MCG: 1000 INJECTION, SOLUTION INTRAMUSCULAR; SUBCUTANEOUS at 09:53

## 2021-12-14 NOTE — PLAN OF CARE
Goal Outcome Evaluation:  Plan of Care Reviewed With: patient        Progress: improving  Outcome Summary: Pt progressing today with balance in standing and able to improve paricipation in donning brief and pants, tolerates standing for use of urinal with Jake at [facility], Jake overall bed mob, setup with SUP grooming seated in chair, cont IPOT per POC

## 2021-12-14 NOTE — THERAPY TREATMENT NOTE
Patient Name: Mike Lucero Jr.  : 1932    MRN: [id number]                              Today's Date: 2021       Admit Date: 2021    Visit Dx:     ICD-10-CM ICD-9-CM   1. Contusion of anus, initial encounter  S30.3XXA 922.9   2. Chronic bilateral pleural effusions  J90 511.9   3. Hypoxia  R09.02 799.02   4. Impaired functional mobility, balance, gait, and endurance  Z74.09 V49.89     Patient Active Problem List   Diagnosis   • Mixed hyperlipidemia   • Coronary artery disease   • Permanent atrial fibrillation (HCC)   • Essential hypertension   • Hypercholesterolemia   • h/o Endocarditis   • Osteoarthritis   • Cataract   • Seasonal allergies   • Insomnia   • RELL (obstructive sleep apnea)   • Left leg cellulitis   • Mild cognitive impairment   • Chronic anticoagulation   • Benign prostatic hyperplasia with nocturia   • Impaired glucose tolerance   • Microscopic hematuria   • Umbilical hernia   • IgM deficiency (HCC)   • Class 1 obesity due to excess calories with serious comorbidity and body mass index (BMI) of 32.0 to 32.9 in adult   • Onychomycosis of toenail   • Chronic rhinitis   • Chronic atrial fibrillation (HCC)   • Vitamin D insufficiency   • Basal cell carcinoma (BCC) of skin of neck   • Bilateral hearing loss   • Pleural effusion, bilateral   • Hematoma of hip, right, initial encounter   • Dementia (HCC)   • Hypoxia   • Confusion     Past Medical History:   Diagnosis Date   • Atrial fibrillation (HCC)    • Cataract    • Chronic anticoagulation    • Coronary artery disease    • Dementia (HCC)    • Diverticulosis    • Gallstones    • History of endocarditis    • HLD (hyperlipidemia)    • HTN (hypertension)    • Osteoarthritis    • Seasonal allergies     Seasonal allergies/rhinitis.      Past Surgical History:   Procedure Laterality Date   • ADRENAL GLAND SURGERY      Dr. Jj Kee   • APPENDECTOMY  1950   • BLEPHAROPLASTY  2014    Dr. Santamaria   • COLONOSCOPY     • CORONARY  ARTERY BYPASS GRAFT  07/2006    CABG for 3-vessel disease, July 2006.   • INGUINAL HERNIA REPAIR Left     1963 and 09/2012   • TONSILLECTOMY  01/1958      General Information     Row Name 12/14/21 1048          OT Time and Intention    Document Type therapy note (daily note)  -KF     Mode of Treatment occupational therapy  -KF     Row Name 12/14/21 1048          General Information    Patient Profile Reviewed yes  -KF     Existing Precautions/Restrictions fall; oxygen therapy device and L/min  -KF     Barriers to Rehab cognitive status; previous functional deficit; hearing deficit  -KF     Row Name 12/14/21 1048          Cognition    Orientation Status (Cognition) oriented to; person; verbal cues/prompts needed for orientation; place; time; disoriented to; situation  -     Row Name 12/14/21 1048          Safety Issues, Functional Mobility    Safety Issues Affecting Function (Mobility) insight into deficits/self-awareness; awareness of need for assistance; safety precaution awareness; problem-solving  -KF     Impairments Affecting Function (Mobility) cognition; balance; strength; endurance/activity tolerance  -KF     Cognitive Impairments, Mobility Safety/Performance awareness, need for assistance; insight into deficits/self-awareness; judgment; impulsivity  -KF           User Key  (r) = Recorded By, (t) = Taken By, (c) = Cosigned By    Initials Name Provider Type    KF Naty Amaral, OT Occupational Therapist                 Mobility/ADL's     Row Name 12/14/21 1048          Bed Mobility    Bed Mobility rolling right; rolling left; scooting/bridging; supine-sit  -KF     Rolling Left [place] (Bed Mobility) minimum assist (75% patient effort); 1 person assist  -KF     Rolling Right [place] (Bed Mobility) minimum assist (75% patient effort); 1 person assist  -KF     Scooting/Bridging [place] (Bed Mobility) 1 person assist; contact guard; nonverbal cues (demo/gesture); verbal cues  -KF      Supine-Sit [place] (Bed Mobility) minimum assist (75% patient effort); 2 person assist; nonverbal cues (demo/gesture); verbal cues  -KF     Bed Mobility, Safety Issues cognitive deficits limit understanding; decreased use of arms for pushing/pulling; decreased use of legs for bridging/pushing  -     Assistive Device (Bed Mobility) bed rails; draw sheet; head of bed elevated  -     Comment (Bed Mobility) good progress with tactile and VC's; rolling complete for brief donning prior to mobility  -     Row Name 12/14/21 1048          Transfers    Transfers sit-stand transfer; bed-chair transfer  -     Comment (Transfers) STS for use of urinal  -     Bed-Chair [place] (Transfers) minimum assist (75% patient effort); 2 person assist; nonverbal cues (demo/gesture); verbal cues  -     Assistive Device (Bed-Chair Transfers) walker, front-wheeled  -     Sit-Stand [place] (Transfers) minimum assist (75% patient effort); 1 person to manage equipment  -     Row Name 12/14/21 1048          Sit-Stand Transfer    Assistive Device (Sit-Stand Transfers) walker, front-wheeled  -     Row Name 12/14/21 1048          Functional Mobility    Functional Mobility- Ind. Level minimum assist (75% patient effort); 1 person + 1 person to manage equipment  -     Functional Mobility- Device rolling walker  -     Functional Mobility-Distance (Feet) 20  -     Functional Mobility- Safety Issues supplemental O2; sequencing ability decreased; step length decreased; weight-shifting ability decreased  -     Functional Mobility- Comment steps to chair and door with chair follow required for safety, improved today  -     Row Name 12/14/21 1048          Activities of Daily Living    BADL Assessment/Intervention upper body dressing; lower body dressing; grooming; toileting  -     Row Name 12/14/21 1048          Lower Body Dressing Assessment/Training    [place] Level (Lower Body Dressing) don; socks; maximum  assist (25% patient effort); pants/bottoms; moderate assist (50% patient effort)  -KF     Position (Lower Body Dressing) sitting up in bed  -KF     Comment (Lower Body Dressing) +brief prior to pants progressed to modA donning pants  -KF     Row Name 12/14/21 1048          Upper Body Dressing Assessment/Training    [place] Level (Upper Body Dressing) don; front opening garment; minimum assist (75% patient effort); verbal cues; nonverbal cues (demo/gesture)  -KF     Position (Upper Body Dressing) edge of bed sitting  -KF     Row Name 12/14/21 1048          Grooming Assessment/Training    [place] Level (Grooming) hair care, combing/brushing; minimum assist (75% patient effort); oral care regimen; wash face, hands; nonverbal cues (demo/gesture); verbal cues  -KF     Position (Grooming) unsupported sitting  -KF     Comment (Grooming) from chair and cues for use of mirror for hair care  -KF     Row Name 12/14/21 1048          Toileting Assessment/Training    [place] Level (Toileting) minimum assist (75% patient effort); verbal cues; nonverbal cues (demo/gesture)  -KF     Assistive Devices (Toileting) urinal  -KF     Position (Toileting) supported standing  -KF     Comment (Toileting) assist for balance required however progress in use of UE in standing for ADL participation  -KF           User Key  (r) = Recorded By, (t) = Taken By, (c) = Cosigned By    Initials Name Provider Type    KF Naty Amaral, OT Occupational Therapist               Obj/Interventions     Row Name 12/14/21 1100          Shoulder (Therapeutic Exercise)    Shoulder (Therapeutic Exercise) AAROM (active assistive range of motion)  -     Shoulder AAROM (Therapeutic Exercise) bilateral; flexion; extension; horizontal aBduction/aDduction; 10 repetitions; sitting  -KF     Row Name 12/14/21 1100          Elbow/Forearm (Therapeutic Exercise)    Elbow/Forearm (Therapeutic Exercise) AAROM (active assistive range of motion)  -KF      Elbow/Forearm AAROM (Therapeutic Exercise) bilateral; flexion; extension; 10 repetitions  -KF     Row Name 12/14/21 1100          Balance    Balance Assessment sitting static balance; sitting dynamic balance; standing static balance; standing dynamic balance  -KF     Static Sitting Balance WFL; unsupported  -KF     Dynamic Sitting Balance WFL; mild impairment; unsupported  -KF     Static Standing Balance mild impairment; supported  -KF     Dynamic Standing Balance moderate impairment; supported  -KF     Balance Interventions sit to stand; standing; sitting; UE activity with balance activity; occupation based/functional task  -KF     Comment, Balance progressed to Jake 1 for balance with progress in participation with ADL tasks in standing  -KF     Row Name 12/14/21 1100          Therapeutic Exercise    Therapeutic Exercise shoulder; elbow/forearm  -KF           User Key  (r) = Recorded By, (t) = Taken By, (c) = Cosigned By    Initials Name Provider Type    KF Naty Amaral OT Occupational Therapist               Goals/Plan     Row Name 12/14/21 1103          Bed Mobility Goal 1 (OT)    Activity/Assistive Device (Bed Mobility Goal 1, OT) sit to supine/supine to sit  -KF     [place] Level/Cues Needed (Bed Mobility Goal 1, OT) contact guard assist; verbal cues required  -KF     Time Frame (Bed Mobility Goal 1, OT) long term goal (LTG); 10 days  -KF     Progress/Outcomes (Bed Mobility Goal 1, OT) good progress toward goal  -KF     Row Name 12/14/21 1103          Transfer Goal 1 (OT)    Activity/Assistive Device (Transfer Goal 1, OT) sit-to-stand/stand-to-sit; bed-to-chair/chair-to-bed  -KF     [place] Level/Cues Needed (Transfer Goal 1, OT) minimum assist (75% or more patient effort)  -KF     Time Frame (Transfer Goal 1, OT) long term goal (LTG); 10 days  -KF     Progress/Outcome (Transfer Goal 1, OT) goal partially met  STS met and bed to chair  -KF     Row Name 12/14/21 1103          Dressing Goal 1  (OT)    Activity/Device (Dressing Goal 1, OT) lower body dressing  -KF     [place]/Cues Needed (Dressing Goal 1, OT) minimum assist (75% or more patient effort); verbal cues required  -KF     Time Frame (Dressing Goal 1, OT) long term goal (LTG); 10 days  -KF     Progress/Outcome (Dressing Goal 1, OT) goal ongoing; good progress toward goal  progress made today with pants  -KF           User Key  (r) = Recorded By, (t) = Taken By, (c) = Cosigned By    Initials Name Provider Type    KF Naty Amaral, OT Occupational Therapist               Clinical Impression     Row Name 12/14/21 1101          Pain Assessment    Additional Documentation Pain Scale: Numbers Pre/Post-Treatment (Group)  -KF     Row Name 12/14/21 1101          Pain Scale: Numbers Pre/Post-Treatment    Pretreatment Pain Rating 0/10 - no pain  -KF     Posttreatment Pain Rating 0/10 - no pain  -KF     Pre/Posttreatment Pain Comment tolerated  -KF     Pain Intervention(s) Repositioned; Ambulation/increased activity  -KF     Row Name 12/14/21 1101          Plan of Care Review    Plan of Care Reviewed With patient  -KF     Progress improving  -KF     Outcome Summary Pt progressing today with balance in standing and able to improve paricipation in donning brief and pants, tolerates standing for use of urinal with Jake at [facility], Jake overall bed mob, setup with SUP grooming seated in chair, cont IPOT per POC  -KF     Row Name 12/14/21 1101          Therapy Assessment/Plan (OT)    Rehab Potential (OT) good, to achieve stated therapy goals  -KF     Criteria for Skilled Therapeutic Interventions Met (OT) yes; meets criteria; skilled treatment is necessary  -KF     Therapy Frequency (OT) daily  -KF     Row Name 12/14/21 1101          Therapy Plan Review/Discharge Plan (OT)    Anticipated Discharge Disposition (OT) skilled nursing facility  -KF     Row Name 12/14/21 1101          Vital Signs    Pre Systolic BP Rehab --  RN cleared VSS  -KF     Pre SpO2 (%)  94  -KF     O2 Delivery Pre Treatment supplemental O2  -KF     Post SpO2 (%) 97  -KF     O2 Delivery Post Treatment supplemental O2  -KF     Pre Patient Position Supine  -KF     Intra Patient Position Standing  -KF     Post Patient Position Sitting  -KF     Rest Breaks  2  -KF     Row Name 12/14/21 1101          Positioning and Restraints    Pre-Treatment Position in bed  -KF     Post Treatment Position chair  -KF     In Chair notified nsg; reclined; sitting; call light within reach; encouraged to call for assist; exit alarm on; waffle cushion; on mechanical lift sling; legs elevated  -KF           User Key  (r) = Recorded By, (t) = Taken By, (c) = Cosigned By    Initials Name Provider Type    KF Naty Amaral, OT Occupational Therapist               Outcome Measures     Row Name 12/14/21 1106          How much help from another is currently needed...    Putting on and taking off regular lower body clothing? 2  -KF     Bathing (including washing, rinsing, and drying) 2  -KF     Toileting (which includes using toilet bed pan or urinal) 2  -KF     Putting on and taking off regular upper body clothing 3  -KF     Taking care of personal grooming (such as brushing teeth) 3  -KF     Eating meals 3  -KF     AM-PAC 6 Clicks Score (OT) 15  -KF     Row Name 12/14/21 1055          How much help from another person do you currently need...    Turning from your back to your side while in flat bed without using bedrails? 3  -EJ     Moving from lying on back to sitting on the side of a flat bed without bedrails? 3  -EJ     Moving to and from a bed to a chair (including a wheelchair)? 3  -EJ     Standing up from a chair using your arms (e.g., wheelchair, bedside chair)? 3  -EJ     Climbing 3-5 steps with a railing? 2  -EJ     To walk in hospital room? 3  -EJ     AM-PAC 6 Clicks Score (PT) 17  -EJ     Row Name 12/14/21 1106 12/14/21 1055       Functional Assessment    Outcome Measure Options AM-PAC 6 Clicks Daily Activity (OT)   -KF AM-PAC 6 Clicks Basic Mobility (PT)  -          User Key  (r) = Recorded By, (t) = Taken By, (c) = Cosigned By    Initials Name Provider Type    Tena Deleon, PT Physical Therapist    Naty Hernandez, OT Occupational Therapist                Occupational Therapy Education                 Title: PT OT SLP Therapies (In Progress)     Topic: Occupational Therapy (Done)     Point: ADL training (Done)     Description:   Instruct learner(s) on proper safety adaptation and remediation techniques during self care or transfers.   Instruct in proper use of assistive devices.              Learning Progress Summary           Patient Acceptance, E,TB,D, VU,DU,NR by  at 12/14/2021 1106    Acceptance, E,TB,D, VU,DU,NR by  at 12/9/2021 1316    Acceptance, E,TB,D, VU,DU,NR by  at 12/6/2021 1133    Acceptance, E,TB,D, VU,DU,NR by  at 12/1/2021 1438                   Point: Precautions (Done)     Description:   Instruct learner(s) on prescribed precautions during self-care and functional transfers.              Learning Progress Summary           Patient Acceptance, E,TB,D, VU,DU,NR by  at 12/14/2021 1106    Acceptance, E,TB,D, VU,DU,NR by  at 12/9/2021 1316    Acceptance, E,TB,D, VU,DU,NR by  at 12/6/2021 1133    Acceptance, E,TB,D, VU,DU,NR by  at 12/1/2021 1438                   Point: Body mechanics (Done)     Description:   Instruct learner(s) on proper positioning and spine alignment during self-care, functional mobility activities and/or exercises.              Learning Progress Summary           Patient Acceptance, E,TB,D, VU,DU,NR by  at 12/14/2021 1106    Acceptance, E,TB,D, VU,DU,NR by  at 12/9/2021 1316    Acceptance, E,TB,D, VU,DU,NR by  at 12/6/2021 1133    Acceptance, E,TB,D, VU,DU,NR by  at 12/1/2021 1438                               User Key     Initials Effective Dates Name Provider Type [facility] 06/16/21 -  Naty Amaral, OT Occupational Therapist OT               OT Recommendation and Plan  Planned Therapy Interventions (OT): activity tolerance training, BADL retraining, ROM/therapeutic exercise, strengthening exercise, occupation/activity based interventions, patient/caregiver education/training, transfer/mobility retraining, cognitive/visual perception retraining, functional balance retraining  Therapy Frequency (OT): daily  Plan of Care Review  Plan of Care Reviewed With: patient  Progress: improving  Outcome Summary: Pt progressing today with balance in standing and able to improve paricipation in donning brief and pants, tolerates standing for use of urinal with Jake at [facility], [place] overall bed mob, setup with SUP grooming seated in chair, cont IPOT per POC     Time Calculation:    Time Calculation- OT     Row Name 12/14/21 1057 12/14/21 1000          Time Calculation- OT    OT Start Time -- 1000  -KF     OT Received On -- 12/14/21  -KF     OT Goal Re-Cert Due Date -- 12/24/21  -KF            Timed Charges    19347 - OT Therapeutic Exercise Minutes -- 5  -KF     23646 - Gait Training Minutes  13  -EJ --     42075 - OT Therapeutic Activity Minutes -- 5  -KF     13755 - OT Self Care/Mgmt Minutes -- 13  -KF            Total Minutes    Timed Charges Total Minutes 13  -EJ 23  -KF      Total Minutes 13  -EJ 23  -KF           User Key  (r) = Recorded By, (t) = Taken By, (c) = Cosigned By    Initials Name Provider Type    Tena Deleon, PT Physical Therapist    KF Naty Amaral, OT Occupational Therapist              Therapy Charges for Today     Code Description Service Date Service Provider Modifiers Qty    92463490217 HC OT SELF CARE/MGMT/TRAIN EA 15 MIN 12/14/2021 Naty Amaral OT GO 1    23711952296 HC OT THER PROC EA 15 MIN 12/14/2021 Naty Amaral OT GO 1               Naty Amaral OT  12/14/2021

## 2021-12-14 NOTE — PROGRESS NOTES
[facility] Medicine Services  PROGRESS NOTE    Patient Name: Mike Lucero Jr.  : 1932  MRN: [id number]    Date of Admission: 2021  Primary Care Physician: Subha Wesley MD    Subjective   Subjective     CC:  F/U fall, AMS    HPI:  Patient seen and examined. RN notes reviewed. No acute events overnight. Resting comfortably.    ROS:  UTO    Objective   Objective     Vital Signs:   Temp:  [96.7 °F (35.9 °C)-98.1 °F (36.7 °C)] 98.1 °F (36.7 °C)  Heart Rate:  [] 62  Resp:  [18] 18  BP: ()/(45-70) 107/56  Flow (L/min):  [3] 3     Physical Exam:  Constitutional: No acute distress, resting comfortably   HENT: NCAT, mucous membranes moist  Respiratory: Clear to auscultation bilaterally, respiratory effort normal   Cardiovascular: RRR, no murmurs, rubs, or gallops  Gastrointestinal: Positive bowel sounds, soft, nontender, nondistended  Musculoskeletal: No bilateral ankle edema  Psychiatric: Unable to evaluate   Neurologic: No focal deficits   Skin: Gluteal hematoma     Results Reviewed:  LAB RESULTS:      Lab 21  0523 21  0504 12/10/21  0449 12/09/21  0451 21  0508   WBC 6.17  --  6.89 7.88 8.19   HEMOGLOBIN 11.3*  --  9.1* 9.0* 9.4*   HEMATOCRIT 33.9*  --  28.2* 27.0* 27.9*   PLATELETS 327  --  264 259 243   MCV 98.8*  --  99.6* 98.2* 96.5   PROTIME  --  14.9* 15.2* 16.1* 16.2*         Lab 21  0523 21  0425 12/10/21  0449 21  0451 21  0508 21  0014 21  0014 21  1500   SODIUM 132* 131* 133* 133* 134*  --   --   --    POTASSIUM 3.8 3.7 4.0 4.1 4.3   < > 4.6  --    CHLORIDE 95* 93* 97* 96* 95*  --   --   --    CO2 32.0* 28.0 29.0 31.0* 29.0  --   --   --    ANION GAP 5.0 10.0 7.0 6.0 10.0  --   --   --    BUN 17 16 20 21 18  --   --   --    CREATININE 0.73* 0.64* 0.67* 0.71* 0.62*  --   --   --    GLUCOSE 95 101* 98 93 95  --   --   --    CALCIUM 8.5* 8.3* 8.1* 8.1* 8.6  --   --   --    MAGNESIUM  --   --    --  2.2  --   --  1.9 1.8    < > = values in this interval not displayed.             Lab 12/11/21  0504 12/10/21  0449 12/09/21  0451 12/08/21  0508   PROTIME 14.9* 15.2* 16.1* 16.2*   INR 1.21* 1.24* 1.33* 1.34*                 Brief Urine Lab Results  (Last result in the past 365 days)      Color   Clarity   Blood   Leuk Est   Nitrite   Protein   CREAT   Urine HCG        12/02/21 0309 Orange   Turbid   Large (3+)   Moderate (2+)   Positive   >=300 mg/dL (3+)                 Microbiology Results Abnormal     Procedure Component Value - Date/Time    COVID PRE-OP / PRE-PROCEDURE SCREENING ORDER (NO ISOLATION) - Swab, Nasopharynx [[id number]]  (Normal) Collected: 11/30/21 1212    Lab Status: Final result Specimen: Swab from Nasopharynx Updated: 11/30/21 1257    Narrative:      The following orders were created for panel order COVID PRE-OP / PRE-PROCEDURE SCREENING ORDER (NO ISOLATION) - Swab, Nasopharynx.  Procedure                               Abnormality         Status                     ---------                               -----------         ------                     COVID-19, ABBOTT IN-HOUS...[[id number]]  Normal              Final result                 Please view results for these tests on the individual orders.    COVID-19, ABBOTT IN-HOUSE,NASAL Swab (NO TRANSPORT MEDIA) 2 HR TAT - Swab, Nasopharynx [[id number]]  (Normal) Collected: 11/30/21 1212    Lab Status: Final result Specimen: Swab from Nasopharynx Updated: 11/30/21 1257     COVID19 Presumptive Negative    Narrative:      Fact sheet for providers: https://www.fda.gov/media/567346/download     Fact sheet for patients: https://www.fda.gov/media/960927/download    Test performed by PCR.  If inconsistent with clinical signs and symptoms patient should be tested with different authorized molecular test.          No radiology results from the last 24 hrs        I have reviewed the medications:  Scheduled Meds:dilTIAZem CD, 240 mg, Oral, Q24H  donepezil,  10 mg, Oral, Daily  lactobacillus acidophilus, 1 capsule, Oral, Daily  lisinopril, 20 mg, Oral, Daily  nystatin, , Topical, Q12H  pravastatin, 40 mg, Oral, Daily  QUEtiapine, 25 mg, Oral, Nightly  senna-docusate sodium, 1 tablet, Oral, BID  sodium chloride, 10 mL, Intravenous, Q12H  tamsulosin, 0.4 mg, Oral, Daily  thiamine, 100 mg, Oral, Daily  traZODone, 50 mg, Oral, Nightly  [START ON 12/15/2021] vitamin B-12, 1,000 mcg, Oral, Daily      Continuous Infusions:   PRN Meds:.•  acetaminophen **OR** acetaminophen **OR** acetaminophen  •  senna-docusate sodium **AND** polyethylene glycol **AND** bisacodyl **AND** bisacodyl  •  haloperidol lactate  •  HYDROcodone-acetaminophen  •  ipratropium-albuterol  •  magnesium sulfate **OR** magnesium sulfate **OR** magnesium sulfate  •  [DISCONTINUED] Morphine **AND** naloxone  •  ondansetron **OR** ondansetron  •  potassium chloride **OR** potassium chloride **OR** potassium chloride  •  potassium phosphate infusion greater than 15 mMoles **OR** potassium phosphate infusion greater than 15 mMoles **OR** potassium phosphate **OR** sodium phosphate IVPB **OR** sodium phosphate IVPB **OR** sodium phosphate IVPB  •  [COMPLETED] Insert peripheral IV **AND** sodium chloride  •  sodium chloride    Assessment/Plan   Assessment & Plan     Active Hospital Problems    Diagnosis  POA   • **Hematoma of hip, right, initial encounter [S70.01XA]  Yes   • Confusion [R41.0]  Yes   • Dementia (HCC) [F03.90]  Yes   • Hypoxia [R09.02]  Yes   • Pleural effusion, bilateral [J90]  Unknown   • Permanent atrial fibrillation (HCC) [I48.21]  Yes   • Essential hypertension [I10]  Yes   • Mixed hyperlipidemia [E78.2]  Yes      Resolved Hospital Problems   No resolved problems to display.        Brief Hospital Course to date:  Mike Lucero Jr. is a 89 y.o. male with hx of Afib on Coumadin, dementia, CAD s/p CABG, HTN, HLD, and osteoarthritis who presents due to right hip pain and inability to walk  following a fall while getting out of bed. He has swelling and bruising on the right hip and buttocks. Admitted for further management of gluteal hematoma. Hospital course complicated by worsening agitation requiring restraints for safety.    This patient's problems and plans were partially entered by my partner and updated as appropriate by me 12/14/21.    Gluteal hematoma s/p fall at home, on chronic anticoagulation  --Will resume Coumadin today, pharmacy to dose   --H&H has been trending UP  --PT/OT have re-evaluated today     Confusion/agitation  Underlying dementia  --At baseline, makes his own breakfast and dresses himself, walks without cane/walker.   --Normal TSH, B12, ammonia.  --Continue home Aricept and Trazodone.  --Mental status worse than baseline, has required restraints.  --Consulted Neurology 12/8/21 to help with medication regimen to get behaviors under better control. Stopped AM Seroquel dose, continued nightly Seroquel. EEG negative for seizure activity.  --Appears more awake since stopping AM Seroquel dose, continue to monitor. Currently has been out of restraints since 12/11/21 evening, will continue to monitor.    Hypoxia  Bilateral pleural effusions, right greater than left  --No Echo on file.   --Improved, repeat CXR shows decreased size of effusions.  --Has been receiving daily IV Lasix 40 mg, now stopped and just giving intermittently. Last dose 12/11/21.  --Currently on 3 liters, continue to wean as tolerated.     Hyponatremia  --Stable    Cirrhosis per CT and US, new diagnosis  Ascites, increased from 1 year ago  --Ammonia WNL.  --Monitor closely, gentle diuresis as tolerated.  --Defer further workup at this time given his overall poor functional status. Ascites does not appear enough to tap at the moment. Monitor.    Afib on chronic Coumadin  --Continue home Cardizem.  --Resume Coumadin today     Urinary retention  BPH  --Small volume voids are chronic.  --Placed Zacarias on admission,  then patient tugged on it and developed hematuria--d/c'ed 12/2/21.   --Continue Flomax.    HTN  HLD  --Continue Lisinopril.  --Continue statin.    Goals of care planning  --Palliative Care and Hospice following.  --Neurology feels he has a poor overall prognosis in setting of advanced dementia.  --Current plan is rehab     DVT prophylaxis:  Mechanical DVT prophylaxis orders are present.       AM-PAC 6 Clicks Score (PT): 17 (12/14/21 1055)    Disposition: I expect the patient to be discharged to rehab when arranged.     CODE STATUS:   Code Status and Medical Interventions:   Ordered at: 11/30/21 6704     Medical Intervention Limits:    NO intubation (DNI)     Level Of Support Discussed With:    Health Care Surrogate     Code Status (Patient has no pulse and is not breathing):    No CPR (Do Not Attempt to Resuscitate)     Medical Interventions (Patient has pulse or is breathing):    Limited Support     Comments:    wife       Rosita Lane, DO  12/14/21

## 2021-12-14 NOTE — THERAPY TREATMENT NOTE
Patient Name: Mike Lucero Jr.  : 1932    MRN: [id number]                              Today's Date: 2021       Admit Date: 2021    Visit Dx:     ICD-10-CM ICD-9-CM   1. Contusion of anus, initial encounter  S30.3XXA 922.9   2. Chronic bilateral pleural effusions  J90 511.9   3. Hypoxia  R09.02 799.02   4. Impaired functional mobility, balance, gait, and endurance  Z74.09 V49.89     Patient Active Problem List   Diagnosis   • Mixed hyperlipidemia   • Coronary artery disease   • Permanent atrial fibrillation (HCC)   • Essential hypertension   • Hypercholesterolemia   • h/o Endocarditis   • Osteoarthritis   • Cataract   • Seasonal allergies   • Insomnia   • RELL (obstructive sleep apnea)   • Left leg cellulitis   • Mild cognitive impairment   • Chronic anticoagulation   • Benign prostatic hyperplasia with nocturia   • Impaired glucose tolerance   • Microscopic hematuria   • Umbilical hernia   • IgM deficiency (HCC)   • Class 1 obesity due to excess calories with serious comorbidity and body mass index (BMI) of 32.0 to 32.9 in adult   • Onychomycosis of toenail   • Chronic rhinitis   • Chronic atrial fibrillation (HCC)   • Vitamin D insufficiency   • Basal cell carcinoma (BCC) of skin of neck   • Bilateral hearing loss   • Pleural effusion, bilateral   • Hematoma of hip, right, initial encounter   • Dementia (HCC)   • Hypoxia   • Confusion     Past Medical History:   Diagnosis Date   • Atrial fibrillation (HCC)    • Cataract    • Chronic anticoagulation    • Coronary artery disease    • Dementia (HCC)    • Diverticulosis    • Gallstones    • History of endocarditis    • HLD (hyperlipidemia)    • HTN (hypertension)    • Osteoarthritis    • Seasonal allergies     Seasonal allergies/rhinitis.      Past Surgical History:   Procedure Laterality Date   • ADRENAL GLAND SURGERY      Dr. Jj Kee   • APPENDECTOMY  1950   • BLEPHAROPLASTY  2014    Dr. Santamaria   • COLONOSCOPY     • CORONARY  ARTERY BYPASS GRAFT  07/2006    CABG for 3-vessel disease, July 2006.   • INGUINAL HERNIA REPAIR Left     1963 and 09/2012   • TONSILLECTOMY  01/1958      General Information     Row Name 12/14/21 1039          Physical Therapy Time and Intention    Document Type therapy note (daily note)  -EJ     Mode of Treatment physical therapy  -     Row Name 12/14/21 1039          General Information    Patient Profile Reviewed yes  -EJ     Existing Precautions/Restrictions fall; oxygen therapy device and L/min  -EJ     Barriers to Rehab cognitive status; previous functional deficit; medically complex  -     Row Name 12/14/21 1039          Cognition    Orientation Status (Cognition) oriented to; person; verbal cues/prompts needed for orientation; place; time; disoriented to; situation  -     Row Name 12/14/21 1039          Safety Issues, Functional Mobility    Impairments Affecting Function (Mobility) cognition; balance; strength; endurance/activity tolerance  -EJ     Cognitive Impairments, Mobility Safety/Performance awareness, need for assistance; judgment; attention  -EJ     Comment, Safety Issues/Impairments (Mobility) Pt with improved alertness, needed cueing for eyes open occasionally during tasks.  -EJ           User Key  (r) = Recorded By, (t) = Taken By, (c) = Cosigned By    Initials Name Provider Type    EJ Tena Ontiveros PT Physical Therapist               Mobility     Row Name 12/14/21 1040          Bed Mobility    Bed Mobility rolling right  -EJ     Rolling Right [place] (Bed Mobility) verbal cues; minimum assist (75% patient effort)  -EJ     Supine-Sit [place] (Bed Mobility) minimum assist (75% patient effort); 2 person assist; nonverbal cues (demo/gesture); verbal cues  Pt assisted with LEs toward EOB, able to use RUE to push up  -EJ     Assistive Device (Bed Mobility) head of bed elevated; bed rails  -EJ     Comment (Bed Mobility) VC for sequencing, hand placement; pt responds better to  "simple cues of \"sit up\" or \"stand up\", but requires tactile cues/assist to have proper hand placement with this technqiue.  -     Row Name 12/14/21 1040          Transfers    Comment (Transfers) t/f progressed to MIN Ax1 using RWx.  -     Row Name 12/14/21 1040          Sit-Stand Transfer    Sit-Stand [place] (Transfers) minimum assist (75% patient effort); 2 person assist; nonverbal cues (demo/gesture); verbal cues  -     Assistive Device (Sit-Stand Transfers) walker, front-wheeled  -     Row Name 12/14/21 1040          Gait/Stairs (Locomotion)    [place] Level (Gait) minimum assist (75% patient effort)  -     Assistive Device (Gait) walker, front-wheeled  -     Distance in Feet (Gait) 4+110  -EJ     Right Sided Gait Deviations forward flexed posture; heel strike decreased; weight shift ability decreased  -     Comment (Gait/Stairs) 110 ft with MIN  using RWx for support. Pt veers to L, assist given for correction and occasional posterior lean.  -           User Key  (r) = Recorded By, (t) = Taken By, (c) = Cosigned By    Initials Name Provider Type     Tena Ontiveros PT Physical Therapist               Obj/Interventions     [facility] Name 12/14/21 1051          Motor Skills    Therapeutic Exercise hip; knee; ankle  -[facility] Name 12/14/21 1051          Hip (Therapeutic Exercise)    Hip (Therapeutic Exercise) AROM (active range of motion); AAROM (active assistive range of motion)  -     Hip AROM (Therapeutic Exercise) bilateral; flexion; extension; sitting; external rotation; internal rotation; 10 repetitions  -     Hip AAROM (Therapeutic Exercise) bilateral; aBduction; aDduction; 10 repetitions  -[facility] Name 12/14/21 1051          Knee (Therapeutic Exercise)    Knee Strengthening (Therapeutic Exercise) bilateral; LAQ (long arc quad); 10 repetitions; 5 repetitions  -[facility] Name 12/14/21 1051          Ankle (Therapeutic Exercise)    Ankle (Therapeutic Exercise) AROM (active " range of motion)  -EJ     Ankle AROM (Therapeutic Exercise) bilateral; dorsiflexion; plantarflexion; 10 repetitions; 5 repetitions  -     Row Name 12/14/21 1051          Balance    Static Sitting Balance mild impairment; supported; sitting in chair  leans back during performance of hip mobility in sitting.  -EJ     Balance Interventions sit to stand; standing; supported; dynamic; moderate challenge  -EJ           User Key  (r) = Recorded By, (t) = Taken By, (c) = Cosigned By    Initials Name Provider Type     Tena Ontiveros PT Physical Therapist               Goals/Plan    No documentation.                Clinical Impression     [facility] Name 12/14/21 1052          Pain    Additional Documentation Pain Scale: FACES Pre/Post-Treatment (Group)  -[facility] Name 12/14/21 1052          Pain Scale: Numbers Pre/Post-Treatment    Pain Intervention(s) Ambulation/increased activity; Repositioned  -[facility] Name 12/14/21 1052          Pain Scale: FACES Pre/Post-Treatment    Pain: FACES Scale, Pretreatment 2-->hurts little bit  -EJ     Posttreatment Pain Rating 2-->hurts little bit  -EJ     Pain Location - Orientation generalized  -     Pain Location extremity  -EJ     Pre/Posttreatment Pain Comment Pt reports soreness from being in bed.  -     Row Name 12/14/21 1052          Plan of Care Review    Plan of Care Reviewed With patient  -     Progress improving  -     Outcome Summary Pt ambulates in [place] 110 ft with MIN A, using RWx, chair follow. pt limited by fatigue. Pt tolerates ther ex, but limited from fatigue. Balance work performed with WS in standing, varying UE support used.  -     Row Name 12/14/21 1052          Therapy Assessment/Plan (PT)    Rehab Potential (PT) fair, will monitor progress closely  -EJ     Criteria for Skilled Interventions Met (PT) yes; skilled treatment is necessary  -     Row Name 12/14/21 1052          Positioning and Restraints    Pre-Treatment Position in bed  -EJ     Post  Treatment Position chair  -EJ     In Chair notified nsg; reclined; call light within reach; exit alarm on; encouraged to call for assist; waffle cushion; on mechanical lift sling  PT returned to room to complete ther ex.  -           User Key  (r) = Recorded By, (t) = Taken By, (c) = Cosigned By    Initials Name Provider Type    Tena Deleon PT Physical Therapist               Outcome Measures     Row Name 12/14/21 1055          How much help from another person do you currently need...    Turning from your back to your side while in flat bed without using bedrails? 3  -EJ     Moving from lying on back to sitting on the side of a flat bed without bedrails? 3  -EJ     Moving to and from a bed to a chair (including a wheelchair)? 3  -EJ     Standing up from a chair using your arms (e.g., wheelchair, bedside chair)? 3  -EJ     Climbing 3-5 steps with a railing? 2  -EJ     To walk in hospital room? 3  -EJ     AM-PAC 6 Clicks Score (PT) 17  -EJ     Row Name 12/14/21 1055          Functional Assessment    Outcome Measure Options AM-PAC 6 Clicks Basic Mobility (PT)  -EJ           User Key  (r) = Recorded By, (t) = Taken By, (c) = Cosigned By    Initials Name Provider Type    Tena Deleon PT Physical Therapist                             Physical Therapy Education                 Title: PT OT SLP Therapies (In Progress)     Topic: Physical Therapy (In Progress)     Point: Mobility training (In Progress)     Learning Progress Summary           Patient Acceptance, E, NR by GRAEME at 12/14/2021 1057    Acceptance, E, NR by AS at 12/6/2021 1103                   Point: Home exercise program (In Progress)     Learning Progress Summary           Patient Acceptance, E, NR by GRAEME at 12/14/2021 1057    Acceptance, E, NR by AS at 12/6/2021 1103    Nonacceptance, E,D, NR,NL by JIM at 12/3/2021 1045                   Point: Body mechanics (In Progress)     Learning Progress Summary           Patient Acceptance, E, NR by  EJ at 12/14/2021 1057    Acceptance, E, NR by AS at 12/6/2021 1103                   Point: Precautions (In Progress)     Learning Progress Summary           Patient Acceptance, E, NR by EJ at 12/14/2021 1057    Acceptance, E, NR by AS at 12/6/2021 1103                               User Key     Initials Effective Dates Name Provider Type Discipline     06/16/21 -  Tena Ontiveros, PT Physical Therapist PT     06/16/21 -  Ghislaine Briones, MORENA Physical Therapist PT    AS 06/16/21 -  Evelina Reed PTA Physical Therapy Assistant PT              PT Recommendation and Plan     Plan of Care Reviewed With: patient  Progress: improving  Outcome Summary: Pt ambulates in [place] 110 ft with MIN A, using RWx, chair follow. pt limited by fatigue. Pt tolerates ther ex, but limited from fatigue. Balance work performed with WS in standing, varying UE support used.     Time Calculation:    PT Charges     Row Name 12/14/21 1057             Time Calculation    Start Time 1006  -EJ      PT Received On 12/14/21  -EJ      PT Goal Re-Cert Due Date 12/24/21  -EJ              Time Calculation- PT    Total Timed Code Minutes- PT 23 minute(s)  -EJ              Timed Charges    66568 - PT Therapeutic Exercise Minutes 8  -EJ      18003 - Gait Training Minutes  13  -EJ      75207 - PT Therapeutic Activity Minutes 2  -EJ              Total Minutes    Timed Charges Total Minutes 23  -EJ       Total Minutes 23  -EJ            User Key  (r) = Recorded By, (t) = Taken By, (c) = Cosigned By    Initials Name Provider Type    EJ Tena Ontiveros PT Physical Therapist              Therapy Charges for Today     Code Description Service Date Service Provider Modifiers Qty    06522246493 HC PT THER PROC EA 15 MIN 12/14/2021 Tena Ontiverso, PT GP 1    [id number] HC GAIT TRAINING EA 15 MIN 12/14/2021 Tena Ontiveros, PT GP 1          PT G-Codes  Outcome Measure Options: AM-PAC 6 Clicks Basic Mobility (PT)  AM-PAC 6 Clicks Score (PT):  17  AM-PAC 6 Clicks Score (OT): 12    Tena Mansfield, PT  12/14/2021

## 2021-12-14 NOTE — CASE MANAGEMENT/SOCIAL WORK
Case Management Discharge Note      Final Note: I spoke with Mr. Lucero' wife, Glenis, on the phone to update her on her husbands discharge plan. Per Jillian, admissions liason, [facility] will have a skilled nursing bed for Mr. Lucero tomorrow. [facility] is in agreement with this plan and anticipates transporting Mr. Lucero at the time of discharge via private vehicle. Nurse to call report to [phone].         Selected Continued Care - Admitted Since 11/30/2021     Destination Coordination complete.    Service Provider Selected Services Address Phone Fax Patient Preferred    [facility]  Skilled Nursing [address] [address] [phone] --          Durable Medical Equipment    No services have been selected for the patient.              Dialysis/Infusion    No services have been selected for the patient.              Home Medical Care    No services have been selected for the patient.              Therapy    No services have been selected for the patient.              Community Resources    No services have been selected for the patient.              Community & DME    No services have been selected for the patient.                  Transportation Services  Private: Car    Final Discharge Disposition Code: 03 - skilled nursing facility (SNF)

## 2021-12-14 NOTE — PLAN OF CARE
Goal Outcome Evaluation:  Plan of Care Reviewed With: patient        Progress: improving  Outcome Summary: Pt ambulates in [place] 110 ft with MIN A, using RWx, chair follow. pt limited by fatigue. Pt tolerates ther ex, but limited from fatigue. Balance work performed with WS in standing, varying UE support used.

## 2021-12-14 NOTE — PROGRESS NOTES
"Pharmacy Consult  -  Warfarin    Mike Lucero Jr. is a  89 y.o. male   Height - 175.3 cm (69\")  Weight - 97.5 kg (215 lb)    Consulting Provider: -   Indication: - atrial fibrillation  Goal INR: - 2-3  Home Regimen:   - warfarin 2.5 mg oral daily except 3.75 mg on MonWedFri.    Bridge Therapy: No       Drug-Drug Interactions with current regimen:   Currently no significant medication interactions with warfarin    Warfarin Dosing During Admission:    Date  12/14           INR  Pending           Dose  2.5mg                Education Provided: patient follows with [facility] where they receive education, will be availible for questions as needed    Discharge Follow up:   Following Provider -  [facility]   Follow up time range or appointment - within 2-3 days of discharge      Labs:    Results from last 7 days   Lab Units 12/13/21  0523 12/11/21  0504 12/10/21  0449 12/09/21  0451 12/08/21  0508   INR   --  1.21* 1.24* 1.33* 1.34*   HEMOGLOBIN g/dL 11.3*  --  9.1* 9.0* 9.4*   HEMATOCRIT % 33.9*  --  28.2* 27.0* 27.9*     Results from last 7 days   Lab Units 12/13/21  0523 12/12/21  0425 12/10/21  0449   SODIUM mmol/L 132* 131* 133*   POTASSIUM mmol/L 3.8 3.7 4.0   CHLORIDE mmol/L 95* 93* 97*   CO2 mmol/L 32.0* 28.0 29.0   BUN mg/dL 17 16 20   CREATININE mg/dL 0.73* 0.64* 0.67*   CALCIUM mg/dL 8.5* 8.3* 8.1*   GLUCOSE mg/dL 95 101* 98       Current dietary intake: patient has minimal oral intake      Assessment/Plan:     Pharmacy consulted to dose warfarin for atrial fibrillation, goal INR 2-3.  INR today is in process, note patient was admitted with a gluteal hematoma s/p fall, INR on admission was 3.06.  Will give warfarin 2.5mg tonight.  Daily PT/INR ordered, monitor for s/s of bleeding/clotting, and for new medication interactions.  Will continue to follow and adjust dose as needed.    Thank you for this consult,    Ilda Guillen, YossiD, BCPS  12/14/2021  13:09 EST        "

## 2021-12-15 VITALS
BODY MASS INDEX: 31.84 KG/M2 | DIASTOLIC BLOOD PRESSURE: 70 MMHG | HEART RATE: 71 BPM | TEMPERATURE: 98.4 F | RESPIRATION RATE: 18 BRPM | OXYGEN SATURATION: 94 % | SYSTOLIC BLOOD PRESSURE: 99 MMHG | HEIGHT: 69 IN | WEIGHT: 215 LBS

## 2021-12-15 PROBLEM — J90 PLEURAL EFFUSION, BILATERAL: Status: RESOLVED | Noted: 2020-12-08 | Resolved: 2021-12-15

## 2021-12-15 LAB
ANION GAP SERPL CALCULATED.3IONS-SCNC: 9 MMOL/L (ref 5–15)
BUN SERPL-MCNC: 21 MG/DL (ref 8–23)
BUN/CREAT SERPL: 29.2 (ref 7–25)
CALCIUM SPEC-SCNC: 8.7 MG/DL (ref 8.6–10.5)
CHLORIDE SERPL-SCNC: 96 MMOL/L (ref 98–107)
CO2 SERPL-SCNC: 28 MMOL/L (ref 22–29)
CREAT SERPL-MCNC: 0.72 MG/DL (ref 0.76–1.27)
DEPRECATED RDW RBC AUTO: 50.3 FL (ref 37–54)
ERYTHROCYTE [DISTWIDTH] IN BLOOD BY AUTOMATED COUNT: 14.2 % (ref 12.3–15.4)
GFR SERPL CREATININE-BSD FRML MDRD: 103 ML/MIN/1.73
GLUCOSE SERPL-MCNC: 105 MG/DL (ref 65–99)
HCT VFR BLD AUTO: 29.9 % (ref 37.5–51)
HGB BLD-MCNC: 9.9 G/DL (ref 13–17.7)
INR PPP: 1.24 (ref 0.85–1.16)
MCH RBC QN AUTO: 32.2 PG (ref 26.6–33)
MCHC RBC AUTO-ENTMCNC: 33.1 G/DL (ref 31.5–35.7)
MCV RBC AUTO: 97.4 FL (ref 79–97)
PLATELET # BLD AUTO: 329 10*3/MM3 (ref 140–450)
PMV BLD AUTO: 8.7 FL (ref 6–12)
POTASSIUM SERPL-SCNC: 3.9 MMOL/L (ref 3.5–5.2)
PROTHROMBIN TIME: 15.2 SECONDS (ref 11.4–14.4)
RBC # BLD AUTO: 3.07 10*6/MM3 (ref 4.14–5.8)
SODIUM SERPL-SCNC: 133 MMOL/L (ref 136–145)
WBC NRBC COR # BLD: 8.79 10*3/MM3 (ref 3.4–10.8)

## 2021-12-15 PROCEDURE — 85027 COMPLETE CBC AUTOMATED: CPT | Performed by: FAMILY MEDICINE

## 2021-12-15 PROCEDURE — 99239 HOSP IP/OBS DSCHRG MGMT >30: CPT | Performed by: FAMILY MEDICINE

## 2021-12-15 PROCEDURE — 80048 BASIC METABOLIC PNL TOTAL CA: CPT | Performed by: FAMILY MEDICINE

## 2021-12-15 PROCEDURE — 85610 PROTHROMBIN TIME: CPT | Performed by: FAMILY MEDICINE

## 2021-12-15 RX ORDER — QUETIAPINE FUMARATE 25 MG/1
25 TABLET, FILM COATED ORAL NIGHTLY
Qty: 30 TABLET | Refills: 0 | OUTPATIENT
Start: 2021-12-15 | End: 2022-06-11

## 2021-12-15 RX ORDER — WARFARIN SODIUM 7.5 MG/1
3.75 TABLET ORAL
Status: DISCONTINUED | OUTPATIENT
Start: 2021-12-15 | End: 2021-12-15 | Stop reason: HOSPADM

## 2021-12-15 RX ADMIN — NYSTATIN: 100000 POWDER TOPICAL at 09:59

## 2021-12-15 RX ADMIN — DILTIAZEM HYDROCHLORIDE 240 MG: 240 CAPSULE, COATED, EXTENDED RELEASE ORAL at 09:59

## 2021-12-15 RX ADMIN — PRAVASTATIN SODIUM 40 MG: 40 TABLET ORAL at 09:59

## 2021-12-15 RX ADMIN — LISINOPRIL 20 MG: 20 TABLET ORAL at 09:59

## 2021-12-15 RX ADMIN — THIAMINE HCL TAB 100 MG 100 MG: 100 TAB at 09:59

## 2021-12-15 RX ADMIN — DONEPEZIL HYDROCHLORIDE 10 MG: 10 TABLET, FILM COATED ORAL at 09:59

## 2021-12-15 RX ADMIN — CYANOCOBALAMIN TAB 1000 MCG 1000 MCG: 1000 TAB at 09:59

## 2021-12-15 RX ADMIN — TAMSULOSIN HYDROCHLORIDE 0.4 MG: 0.4 CAPSULE ORAL at 09:59

## 2021-12-15 RX ADMIN — Medication 1 CAPSULE: at 09:59

## 2021-12-15 NOTE — PLAN OF CARE
Goal Outcome Evaluation:  Plan of Care Reviewed With: patient        Progress: improving  Outcome Summary: Pt able to sleep/rest for more than 6 hours with occassional bouts of agitation with swinging legs over bedrail a couple of times, but easily went back to sleep.  Pt had large BM and incontinent urine episode in beginning of shift.  VSS on 2-3 liters NC.  Pt continues to be confused, but knows name.  Supposed to be discharging today to [facility] and be picked up by wife.  But with max assist of 2-3 people to get to the [facility], it may be too difficult for her to get him in and out of her car.

## 2021-12-15 NOTE — DISCHARGE PLACEMENT REQUEST
"Mike Oseguera JAZZ Ballesteros (89 y.o. Male)             Date of Birth Social Security Number Address Home Phone MRN    07/29/1932  [address] [facility] [address] [phone] [id number]    Hoahaoism Marital Status             Jehovah's witness        Admission Date Admission Type Admitting Provider Attending Provider Department, Room/Bed    11/30/21 Emergency Rosita Lane DO Hamilton, Olivia D, DO [facility] 4H, S471/1    Discharge Date Discharge Disposition Discharge Destination           Skilled Nursing Facility (DC - External)              Attending Provider: Rosita Lane DO    Allergies: Rocephin [Ceftriaxone], Temazepam, Wellbutrin [Bupropion]    Isolation: None   Infection: None   Code Status: No CPR   Advance Care Planning Activity    Ht: 175.3 cm (69\")   Wt: 97.5 kg (215 lb)    Admission Cmt: None   Principal Problem: Hematoma of hip, right, initial encounter [S70.01XA]                 Active Insurance as of 11/30/2021     Primary Coverage     Payor Plan Insurance Group Employer/Plan Group    MEDICARE MEDICARE A & B      Payor Plan Address Payor Plan Phone Number Payor Plan Fax Number Effective Dates    [address] [phone]  7/1/1997 - None Entered    [facility] [id number]       Subscriber Name Subscriber Birth Date Member ID       MIKE OSEGUERA  7/29/1932 [id number]           Secondary Coverage     Payor Plan Insurance Group Employer/Plan Group    [facility] SUPP [id number]     Payor Plan Address Payor Plan Phone Number Payor Plan Fax Number Effective Dates    [address]   12/1/2016 - None Entered    [facility] [address]       Subscriber Name Subscriber Birth Date Member ID       MIKE OSEGUERA  7/29/1932 [id number]                 Emergency Contacts      (Rel.) Home Phone Work Phone Mobile Phone    Glenis Oseguera (Spouse) [phone] -- [phone]               Discharge Summary      Rosita Lane DO at 12/15/21 0957  "             [facility]  DISCHARGE SUMMARY    Patient Name: Mike Lucero Jr.  : 1932  MRN: [id number]    Date of Admission: 2021 10:42 AM  Date of Discharge:  12/15/2021  Primary Care Physician: Subha Wesley MD    Consults     Date and Time Order Name Status Description    2021  1:28 PM Inpatient Neurology Consult General Completed           Hospital Course     Presenting Problem:   Contusion of anus, initial encounter [S30.3XXA]  Confusion [R41.0]    Active Hospital Problems    Diagnosis  POA   • **Hematoma of hip, right, initial encounter [S70.01XA]  Yes   • Confusion [R41.0]  Yes   • Dementia (HCC) [F03.90]  Yes   • Hypoxia [R09.02]  Yes   • Permanent atrial fibrillation (HCC) [I48.21]  Yes   • Essential hypertension [I10]  Yes   • Mixed hyperlipidemia [E78.2]  Yes      Resolved Hospital Problems    Diagnosis Date Resolved POA   • Pleural effusion, bilateral [J90] 12/15/2021 Unknown          Hospital Course:  Mike Lucero Jr. is a 89 y.o. male with hx of Afib on Coumadin, dementia, CAD s/p CABG, HTN, HLD, and osteoarthritis who presents due to right hip pain and inability to walk following a fall while getting out of bed. He has swelling and bruising on the right hip and buttocks. Admitted for further management of gluteal hematoma. Hospital course complicated by worsening agitation requiring restraints for safety.     Gluteal hematoma s/p fall at home, on chronic anticoagulation  --Continue Coumadin   --H&H stable  --PT/OT following, plan for SNF today      Confusion/agitation  Underlying dementia  --At baseline, makes his own breakfast and dresses himself, walks without cane/walker.   --Normal TSH, B12, ammonia.  --Continue home Aricept and Trazodone.  --Consulted Neurology 21 to help with medication regimen to get behaviors under better control. Stopped AM Seroquel dose, continued nightly Seroquel. EEG negative for seizure  activity.     Hypoxia  Bilateral pleural effusions, right greater than left  --No Echo on file.   --Improved, repeat CXR shows decreased size of effusions.  --Has been receiving daily IV Lasix 40 mg, now stopped and just giving intermittently. Last dose 12/11/21.  --Currently on 3 liters, continue to wean as tolerated.      Hyponatremia  --Stable     Cirrhosis per CT and US, new diagnosis  Ascites, increased from 1 year ago  --Ammonia WNL.  --Monitor closely, gentle diuresis as tolerated.  --Defer further workup at this time given his overall poor functional status. Ascites does not appear enough to tap at the moment. Monitor.     Afib on chronic Coumadin  --Continue home Cardizem.  --Continue Coumadin      Urinary retention  BPH  --Small volume voids are chronic.  --Placed Zacarias on admission, then patient tugged on it and developed hematuria--d/c'ed 12/2/21.   --Continue Uraxatrol      HTN  HLD  --Continue Lisinopril.  --Continue statin.     Goals of care planning  --Palliative Care and Hospice followed during admission   --Neurology feels he has a poor overall prognosis in setting of advanced dementia.  --Current plan is rehab     Discharge Follow Up Recommendations for outpatient labs/diagnostics:  -PCP 1 week     Day of Discharge     HPI:   Patient seen and examined. RN notes reviewed. Pt sleeping. Did not awaken.     Review of Systems  UTO-sleeping    Vital Signs:   Temp:  [97.1 °F (36.2 °C)-99 °F (37.2 °C)] 98.4 °F (36.9 °C)  Heart Rate:  [52-91] 84  Resp:  [15-20] 18  BP: ()/(47-75) 96/63     Physical Exam:  Constitutional: No acute distress, resting comfortably   HENT: NCAT, mucous membranes moist  Respiratory: Clear to auscultation bilaterally, respiratory effort normal   Cardiovascular: RRR, no murmurs, rubs, or gallops  Gastrointestinal: Positive bowel sounds, soft, nontender, nondistended  Musculoskeletal: No bilateral ankle edema  Psychiatric: Unable to evaluate   Neurologic: No focal deficits    Skin: No rashes    Pertinent  and/or Most Recent Results     LAB RESULTS:      Lab 12/15/21  0448 12/14/21  1412 12/13/21  0523 12/11/21  0504 12/10/21  0449 12/09/21  0451   WBC 8.79  --  6.17  --  6.89 7.88   HEMOGLOBIN 9.9*  --  11.3*  --  9.1* 9.0*   HEMATOCRIT 29.9*  --  33.9*  --  28.2* 27.0*   PLATELETS 329  --  327  --  264 259   MCV 97.4*  --  98.8*  --  99.6* 98.2*   PROTIME 15.2* 15.1*  --  14.9* 15.2* 16.1*         Lab 12/15/21  0448 12/13/21  0523 12/12/21  0425 12/10/21  0449 12/09/21  0451   SODIUM 133* 132* 131* 133* 133*   POTASSIUM 3.9 3.8 3.7 4.0 4.1   CHLORIDE 96* 95* 93* 97* 96*   CO2 28.0 32.0* 28.0 29.0 31.0*   ANION GAP 9.0 5.0 10.0 7.0 6.0   BUN 21 17 16 20 21   CREATININE 0.72* 0.73* 0.64* 0.67* 0.71*   GLUCOSE 105* 95 101* 98 93   CALCIUM 8.7 8.5* 8.3* 8.1* 8.1*   MAGNESIUM  --   --   --   --  2.2             Lab 12/15/21  0448 12/14/21  1412 12/11/21  0504 12/10/21  0449 12/09/21  0451   PROTIME 15.2* 15.1* 14.9* 15.2* 16.1*   INR 1.24* 1.23* 1.21* 1.24* 1.33*                 Brief Urine Lab Results  (Last result in the past 365 days)      Color   Clarity   Blood   Leuk Est   Nitrite   Protein   CREAT   Urine HCG        12/02/21 0309 Orange   Turbid   Large (3+)   Moderate (2+)   Positive   >=300 mg/dL (3+)               Microbiology Results (last 10 days)     Procedure Component Value - Date/Time    COVID PRE-OP / PRE-PROCEDURE SCREENING ORDER (NO ISOLATION) - Swab, Nasopharynx [[id number]]  (Normal) Collected: 12/14/21 2115    Lab Status: Final result Specimen: Swab from Nasopharynx Updated: 12/14/21 2147    Narrative:      The following orders were created for panel order COVID PRE-OP / PRE-PROCEDURE SCREENING ORDER (NO ISOLATION) - Swab, Nasopharynx.  Procedure                               Abnormality         Status                     ---------                               -----------         ------                     COVID-19, ABBOTT IN-HOUS...[[id number]]  Normal               Final result                 Please view results for these tests on the individual orders.    COVID-19, ABBOTT IN-HOUSE,NASAL Swab (NO TRANSPORT MEDIA) 2 HR TAT - Swab, Nasopharynx [[id number]]  (Normal) Collected: 12/14/21 2115    Lab Status: Final result Specimen: Swab from Nasopharynx Updated: 12/14/21 2147     COVID19 Presumptive Negative    Narrative:      Fact sheet for providers: https://www.fda.gov/media/132150/download     Fact sheet for patients: https://www.fda.gov/media/802356/download    Test performed by PCR.  If inconsistent with clinical signs and symptoms patient should be tested with different authorized molecular test.          EEG    Result Date: 12/9/2021  Reason for referral: 89 y.o.male with altered mental status Technical Summary:  A 19 channel digital EEG was performed using the international 10-20 placement system, including eye leads and EKG leads. Duration: 22 minutes Video: Off Findings: The awake tracing shows diffuse medium amplitude theta activity which is present symmetrically over both hemispheres.  Electrode artifact is seen over the right posterior leads due to patient position and head movement.  Photic stimulation does not change the background.  Stage II sleep is not clearly seen.  Hyperventilation is not performed. Technical quality: Good SUMMARY: Mild generalized slow No epileptiform activity is seen     The study shows evidence for diffuse cerebral dysfunction of at least mild degree but is nonspecific as to cause This report is transcribed using the Dragon dictation system.      XR Chest 1 View    Result Date: 12/6/2021   EXAMINATION: XR CHEST 1 VW - 12/05/2021  INDICATION: S30.3XXA-Contusion of anus, initial encounter; V12-Lloiqfi effusion, not elsewhere classified; R09.02-Hypoxemia; Z74.09-Other reduced mobility.  COMPARISON: 12/02/2021  FINDINGS: Portable chest reveals heart to be enlarged. Improvement seen in the pleural effusions. Small bilateral pleural effusions  present. Improvement seen in the pulmonary vascularity.      Improvement seen in the aeration at the lung bases bilaterally with decrease seen in size of the bilateral pleural effusions. The heart remains enlarged.  DICTATED:   12/05/2021 EDITED/lfs:   12/05/2021  This report was finalized on 12/6/2021 3:53 PM by Dr. Aleida Riley MD.        Results for orders placed during the hospital encounter of 10/22/17    Duplex Venous Lower Extremity - Left CAR    Interpretation Summary  · No evidence of deep or superficial venous thrombosis in the left lower extremity.    .      Results for orders placed during the hospital encounter of 10/22/17    Duplex Venous Lower Extremity - Left CAR    Interpretation Summary  · No evidence of deep or superficial venous thrombosis in the left lower extremity.    .          Plan for Follow-up of Pending Labs/Results: Inbox     Discharge Details        Discharge Medications      New Medications      Instructions Start Date   cyanocobalamin 1000 MCG tablet  Commonly known as: VITAMIN B-12   1,000 mcg, Oral, Daily      QUEtiapine 25 MG tablet  Commonly known as: SEROquel   25 mg, Oral, Nightly         Continue These Medications      Instructions Start Date   alfuzosin 10 MG 24 hr tablet  Commonly known as: UROXATRAL   TAKE ONE TABLET BY MOUTH DAILY      dilTIAZem  MG 24 hr capsule  Commonly known as: CARDIZEM CD   TAKE ONE CAPSULE BY MOUTH DAILY **NEED FOLLOW UP APPOINTMENT FOR FURTHER REFILLS. OTHERWISE DEFER TO PCP**      donepezil 10 MG tablet  Commonly known as: ARICEPT   TAKE ONE TABLET BY MOUTH DAILY      lactobacillus acidophilus capsule capsule   1 capsule, Oral, Daily      lisinopril 20 MG tablet  Commonly known as: PRINIVIL,ZESTRIL   20 mg, Oral, Daily      pravastatin 40 MG tablet  Commonly known as: PRAVACHOL   TAKE ONE TABLET BY MOUTH DAILY      prednisoLONE acetate 1 % ophthalmic suspension  Commonly known as: PRED FORTE   1 drop, Ophthalmic, Daily PRN      sodium  "chloride 0.65 % nasal spray   1 spray, Nasal, As Needed      traZODone 50 MG tablet  Commonly known as: DESYREL   TAKE ONE TABLET BY MOUTH ONCE NIGHTLY      warfarin 2.5 MG tablet  Commonly known as: COUMADIN   TAKE 1 TO 1 AND 1.5 TAB BY MOUTH DAILY OR AS DIRECTED BY ANTICOAGULATION CLINIC         Stop These Medications    aspirin 81 MG EC tablet     potassium chloride 20 MEQ CR tablet  Commonly known as: KLOR-CON            Allergies   Allergen Reactions   • Rocephin [Ceftriaxone] Angioedema     Tongue and lip swelling, sore throat; has tolerated Zosyn   • Temazepam Other (See Comments)     Caused pt to \"sleep drive\"   • Wellbutrin [Bupropion] Mental Status Change and Dizziness     Memory loss         Discharge Disposition:  Skilled Nursing Facility (DC - External)    Diet:  Hospital:  Diet Order   Procedures   • Diet Soft Texture; Chopped; Cardiac       Activity:      Restrictions or Other Recommendations:       CODE STATUS:    Code Status and Medical Interventions:   Ordered at: 11/30/21 1724     Medical Intervention Limits:    NO intubation (DNI)     Level Of Support Discussed With:    Health Care Surrogate     Code Status (Patient has no pulse and is not breathing):    No CPR (Do Not Attempt to Resuscitate)     Medical Interventions (Patient has pulse or is breathing):    Limited Support     Comments:    wife       Future Appointments   Date Time Provider Department Center   12/20/2021 12:00 PM [facility]IC  [facility]   4/11/2022  1:30 PM Vesta Sales, [facility]       Additional Instructions for the Follow-ups that You Need to Schedule     Discharge Follow-up with PCP   As directed       Currently Documented PCP:    Subha Wesley MD    PCP Phone Number:    [phone]     Follow Up Details: 1 week                     Rosita Lane DO  12/15/21      Time Spent on Discharge:  I spent  45  minutes on this discharge activity which included: face-to-face encounter with the " patient, reviewing the data in the system, coordination of the care with the nursing staff as well as consultants, documentation, and entering orders.            Electronically signed by Rosita Lane DO at 12/15/21 1002

## 2021-12-15 NOTE — PROGRESS NOTES
"Pharmacy Consult  -  Warfarin    Mike Lucero Jr. is a  89 y.o. male   Height - 175.3 cm (69\")  Weight - 97.5 kg (215 lb)    Consulting Provider: -   Indication: - atrial fibrillation  Goal INR: - 2-3  Home Regimen:   - warfarin 2.5 mg oral daily except 3.75 mg on MonWedFri.    Bridge Therapy: No       Drug-Drug Interactions with current regimen:   Currently no significant medication interactions with warfarin    Warfarin Dosing During Admission:    Date  12/14 12/15          INR  1.23 1.24          Dose  2.5mg 3.75mg               Education Provided: patient follows with [facility] clinic where they receive education, will be availible for questions as needed    Discharge Follow up:   Following Provider -  [facility]   Follow up time range or appointment - within 2-3 days of discharge      Labs:    Results from last 7 days   Lab Units 12/15/21  0448 12/14/21  1412 12/13/21  0523 12/11/21  0504 12/10/21  0449 12/09/21  0451   INR  1.24* 1.23*  --  1.21* 1.24* 1.33*   HEMOGLOBIN g/dL 9.9*  --  11.3*  --  9.1* 9.0*   HEMATOCRIT % 29.9*  --  33.9*  --  28.2* 27.0*     Results from last 7 days   Lab Units 12/15/21  0448 12/13/21  0523 12/12/21  0425   SODIUM mmol/L 133* 132* 131*   POTASSIUM mmol/L 3.9 3.8 3.7   CHLORIDE mmol/L 96* 95* 93*   CO2 mmol/L 28.0 32.0* 28.0   BUN mg/dL 21 17 16   CREATININE mg/dL 0.72* 0.73* 0.64*   CALCIUM mg/dL 8.7 8.5* 8.3*   GLUCOSE mg/dL 105* 95 101*       Current dietary intake: 0-75% of documented meals on 12/14      Assessment/Plan:     Pharmacy consulted to dose warfarin for atrial fibrillation, goal INR 2-3.  INR today is 1.24, warfarin resumed on 12/14, note patient was admitted with a gluteal hematoma s/p fall, INR on admission was 3.06.  Will give warfarin 3.75mg tonight. If discharged, recommend patient resume home dose and have INR checked no later than Monday 12/20.  Daily PT/INR ordered, monitor for s/s of bleeding/clotting, and for new medication interactions.  Will " continue to follow and adjust dose as needed.    Thank you for this consult,    Ilda Guillen, PharmD, BCPS  12/15/2021  13:02 EST

## 2021-12-15 NOTE — DISCHARGE SUMMARY
[facility] Medicine Services  DISCHARGE SUMMARY    Patient Name: Mike Lucero Jr.  : 1932  MRN: [id number]    Date of Admission: 2021 10:42 AM  Date of Discharge:  12/15/2021  Primary Care Physician: Subha Wesley MD    Consults     Date and Time Order Name Status Description    2021  1:28 PM Inpatient Neurology Consult General Completed           Hospital Course     Presenting Problem:   Contusion of anus, initial encounter [S30.3XXA]  Confusion [R41.0]    Active Hospital Problems    Diagnosis  POA   • **Hematoma of hip, right, initial encounter [S70.01XA]  Yes   • Confusion [R41.0]  Yes   • Dementia (HCC) [F03.90]  Yes   • Hypoxia [R09.02]  Yes   • Permanent atrial fibrillation (HCC) [I48.21]  Yes   • Essential hypertension [I10]  Yes   • Mixed hyperlipidemia [E78.2]  Yes      Resolved Hospital Problems    Diagnosis Date Resolved POA   • Pleural effusion, bilateral [J90] 12/15/2021 Unknown          Hospital Course:  Mike Lucero Jr. is a 89 y.o. male with hx of Afib on Coumadin, dementia, CAD s/p CABG, HTN, HLD, and osteoarthritis who presents due to right hip pain and inability to walk following a fall while getting out of bed. He has swelling and bruising on the right hip and buttocks. Admitted for further management of gluteal hematoma. Hospital course complicated by worsening agitation requiring restraints for safety.     Gluteal hematoma s/p fall at home, on chronic anticoagulation  --Continue Coumadin   --H&H stable  --PT/OT following, plan for SNF today      Confusion/agitation  Underlying dementia  --At baseline, makes his own breakfast and dresses himself, walks without cane/walker.   --Normal TSH, B12, ammonia.  --Continue home Aricept and Trazodone.  --Consulted Neurology 21 to help with medication regimen to get behaviors under better control. Stopped AM Seroquel dose, continued nightly Seroquel. EEG negative for seizure  activity.     Hypoxia  Bilateral pleural effusions, right greater than left  --No Echo on file.   --Improved, repeat CXR shows decreased size of effusions.  --Has been receiving daily IV Lasix 40 mg, now stopped and just giving intermittently. Last dose 12/11/21.  --Currently on 3 liters, continue to wean as tolerated.      Hyponatremia  --Stable     Cirrhosis per CT and US, new diagnosis  Ascites, increased from 1 year ago  --Ammonia WNL.  --Monitor closely, gentle diuresis as tolerated.  --Defer further workup at this time given his overall poor functional status. Ascites does not appear enough to tap at the moment. Monitor.     Afib on chronic Coumadin  --Continue home Cardizem.  --Continue Coumadin      Urinary retention  BPH  --Small volume voids are chronic.  --Placed Zacarias on admission, then patient tugged on it and developed hematuria--d/c'ed 12/2/21.   --Continue Uraxatrol      HTN  HLD  --Continue Lisinopril.  --Continue statin.     Goals of care planning  --Palliative Care and Hospice followed during admission   --Neurology feels he has a poor overall prognosis in setting of advanced dementia.  --Current plan is rehab     Discharge Follow Up Recommendations for outpatient labs/diagnostics:  -PCP 1 week     Day of Discharge     HPI:   Patient seen and examined. RN notes reviewed. Pt sleeping. Did not awaken.     Review of Systems  UTO-sleeping    Vital Signs:   Temp:  [97.1 °F (36.2 °C)-99 °F (37.2 °C)] 98.4 °F (36.9 °C)  Heart Rate:  [52-91] 84  Resp:  [15-20] 18  BP: ()/(47-75) 96/63     Physical Exam:  Constitutional: No acute distress, resting comfortably   HENT: NCAT, mucous membranes moist  Respiratory: Clear to auscultation bilaterally, respiratory effort normal   Cardiovascular: RRR, no murmurs, rubs, or gallops  Gastrointestinal: Positive bowel sounds, soft, nontender, nondistended  Musculoskeletal: No bilateral ankle edema  Psychiatric: Unable to evaluate   Neurologic: No focal deficits    Skin: No rashes    Pertinent  and/or Most Recent Results     LAB RESULTS:      Lab 12/15/21  0448 12/14/21  1412 12/13/21  0523 12/11/21  0504 12/10/21  0449 12/09/21  0451   WBC 8.79  --  6.17  --  6.89 7.88   HEMOGLOBIN 9.9*  --  11.3*  --  9.1* 9.0*   HEMATOCRIT 29.9*  --  33.9*  --  28.2* 27.0*   PLATELETS 329  --  327  --  264 259   MCV 97.4*  --  98.8*  --  99.6* 98.2*   PROTIME 15.2* 15.1*  --  14.9* 15.2* 16.1*         Lab 12/15/21  0448 12/13/21  0523 12/12/21  0425 12/10/21  0449 12/09/21  0451   SODIUM 133* 132* 131* 133* 133*   POTASSIUM 3.9 3.8 3.7 4.0 4.1   CHLORIDE 96* 95* 93* 97* 96*   CO2 28.0 32.0* 28.0 29.0 31.0*   ANION GAP 9.0 5.0 10.0 7.0 6.0   BUN 21 17 16 20 21   CREATININE 0.72* 0.73* 0.64* 0.67* 0.71*   GLUCOSE 105* 95 101* 98 93   CALCIUM 8.7 8.5* 8.3* 8.1* 8.1*   MAGNESIUM  --   --   --   --  2.2             Lab 12/15/21  0448 12/14/21  1412 12/11/21  0504 12/10/21  0449 12/09/21  0451   PROTIME 15.2* 15.1* 14.9* 15.2* 16.1*   INR 1.24* 1.23* 1.21* 1.24* 1.33*                 Brief Urine Lab Results  (Last result in the past 365 days)      Color   Clarity   Blood   Leuk Est   Nitrite   Protein   CREAT   Urine HCG        12/02/21 0309 Orange   Turbid   Large (3+)   Moderate (2+)   Positive   >=300 mg/dL (3+)               Microbiology Results (last 10 days)     Procedure Component Value - Date/Time    COVID PRE-OP / PRE-PROCEDURE SCREENING ORDER (NO ISOLATION) - Swab, Nasopharynx [[id number]]  (Normal) Collected: 12/14/21 2115    Lab Status: Final result Specimen: Swab from Nasopharynx Updated: 12/14/21 2147    Narrative:      The following orders were created for panel order COVID PRE-OP / PRE-PROCEDURE SCREENING ORDER (NO ISOLATION) - Swab, Nasopharynx.  Procedure                               Abnormality         Status                     ---------                               -----------         ------                     COVID-19, ABBOTT IN-HOUS...[[id number]]  Normal               Final result                 Please view results for these tests on the individual orders.    COVID-19, ABBOTT IN-HOUSE,NASAL Swab (NO TRANSPORT MEDIA) 2 HR TAT - Swab, Nasopharynx [[id number]]  (Normal) Collected: 12/14/21 2115    Lab Status: Final result Specimen: Swab from Nasopharynx Updated: 12/14/21 2147     COVID19 Presumptive Negative    Narrative:      Fact sheet for providers: https://www.fda.gov/media/568738/download     Fact sheet for patients: https://www.fda.gov/media/950894/download    Test performed by PCR.  If inconsistent with clinical signs and symptoms patient should be tested with different authorized molecular test.          EEG    Result Date: 12/9/2021  Reason for referral: 89 y.o.male with altered mental status Technical Summary:  A 19 channel digital EEG was performed using the international 10-20 placement system, including eye leads and EKG leads. Duration: 22 minutes Video: Off Findings: The awake tracing shows diffuse medium amplitude theta activity which is present symmetrically over both hemispheres.  Electrode artifact is seen over the right posterior leads due to patient position and head movement.  Photic stimulation does not change the background.  Stage II sleep is not clearly seen.  Hyperventilation is not performed. Technical quality: Good SUMMARY: Mild generalized slow No epileptiform activity is seen     The study shows evidence for diffuse cerebral dysfunction of at least mild degree but is nonspecific as to cause This report is transcribed using the Dragon dictation system.      XR Chest 1 View    Result Date: 12/6/2021   EXAMINATION: XR CHEST 1 VW - 12/05/2021  INDICATION: S30.3XXA-Contusion of anus, initial encounter; N55-Fcotzda effusion, not elsewhere classified; R09.02-Hypoxemia; Z74.09-Other reduced mobility.  COMPARISON: 12/02/2021  FINDINGS: Portable chest reveals heart to be enlarged. Improvement seen in the pleural effusions. Small bilateral pleural effusions  present. Improvement seen in the pulmonary vascularity.      Improvement seen in the aeration at the lung bases bilaterally with decrease seen in size of the bilateral pleural effusions. The heart remains enlarged.  DICTATED:   12/05/2021 EDITED/lfs:   12/05/2021  This report was finalized on 12/6/2021 3:53 PM by Dr. Aleida Riley MD.        Results for orders placed during the hospital encounter of 10/22/17    Duplex Venous Lower Extremity - Left CAR    Interpretation Summary  · No evidence of deep or superficial venous thrombosis in the left lower extremity.    .      Results for orders placed during the hospital encounter of 10/22/17    Duplex Venous Lower Extremity - Left CAR    Interpretation Summary  · No evidence of deep or superficial venous thrombosis in the left lower extremity.    .          Plan for Follow-up of Pending Labs/Results: Inbox     Discharge Details        Discharge Medications      New Medications      Instructions Start Date   cyanocobalamin 1000 MCG tablet  Commonly known as: VITAMIN B-12   1,000 mcg, Oral, Daily      QUEtiapine 25 MG tablet  Commonly known as: SEROquel   25 mg, Oral, Nightly         Continue These Medications      Instructions Start Date   alfuzosin 10 MG 24 hr tablet  Commonly known as: UROXATRAL   TAKE ONE TABLET BY MOUTH DAILY      dilTIAZem  MG 24 hr capsule  Commonly known as: CARDIZEM CD   TAKE ONE CAPSULE BY MOUTH DAILY **NEED FOLLOW UP APPOINTMENT FOR FURTHER REFILLS. OTHERWISE DEFER TO PCP**      donepezil 10 MG tablet  Commonly known as: ARICEPT   TAKE ONE TABLET BY MOUTH DAILY      lactobacillus acidophilus capsule capsule   1 capsule, Oral, Daily      lisinopril 20 MG tablet  Commonly known as: PRINIVIL,ZESTRIL   20 mg, Oral, Daily      pravastatin 40 MG tablet  Commonly known as: PRAVACHOL   TAKE ONE TABLET BY MOUTH DAILY      prednisoLONE acetate 1 % ophthalmic suspension  Commonly known as: PRED FORTE   1 drop, Ophthalmic, Daily PRN      sodium  "chloride 0.65 % nasal spray   1 spray, Nasal, As Needed      traZODone 50 MG tablet  Commonly known as: DESYREL   TAKE ONE TABLET BY MOUTH ONCE NIGHTLY      warfarin 2.5 MG tablet  Commonly known as: COUMADIN   TAKE 1 TO 1 AND 1.5 TAB BY MOUTH DAILY OR AS DIRECTED BY ANTICOAGULATION CLINIC         Stop These Medications    aspirin 81 MG EC tablet     potassium chloride 20 MEQ CR tablet  Commonly known as: KLOR-CON            Allergies   Allergen Reactions   • Rocephin [Ceftriaxone] Angioedema     Tongue and lip swelling, sore throat; has tolerated Zosyn   • Temazepam Other (See Comments)     Caused pt to \"sleep drive\"   • Wellbutrin [Bupropion] Mental Status Change and Dizziness     Memory loss         Discharge Disposition:  Skilled Nursing Facility (DC - External)    Diet:  Hospital:  Diet Order   Procedures   • Diet Soft Texture; Chopped; Cardiac       Activity:      Restrictions or Other Recommendations:       CODE STATUS:    Code Status and Medical Interventions:   Ordered at: 11/30/21 1724     Medical Intervention Limits:    NO intubation (DNI)     Level Of Support Discussed With:    Health Care Surrogate     Code Status (Patient has no pulse and is not breathing):    No CPR (Do Not Attempt to Resuscitate)     Medical Interventions (Patient has pulse or is breathing):    Limited Support     Comments:    wife       Future Appointments   Date Time Provider Department Center   12/20/2021 12:00 PM [facility]  [facility]   4/11/2022  1:30 PM Vesta Sales, [facility]       Additional Instructions for the Follow-ups that You Need to Schedule     Discharge Follow-up with PCP   As directed       Currently Documented PCP:    Subha Wesley MD    PCP Phone Number:    [phone]     Follow Up Details: 1 week                     Rosita Lane DO  12/15/21      Time Spent on Discharge:  I spent  45  minutes on this discharge activity which included: face-to-face encounter with the " patient, reviewing the data in the system, coordination of the care with the nursing staff as well as consultants, documentation, and entering orders.

## 2021-12-16 ENCOUNTER — TELEPHONE (OUTPATIENT)
Dept: PHARMACY | Facility: HOSPITAL | Age: 86
End: 2021-12-16

## 2021-12-16 NOTE — TELEPHONE ENCOUNTER
Spoke with Glenis, Mr Lucero has been discharged from [facility] to [facility]. She anticipates he will be there ~1 month then discharged to skilled nursing. Cancelled appt for 12/20 and she is aware to keep clinic informed on when he is discharged.

## 2021-12-20 ENCOUNTER — APPOINTMENT (OUTPATIENT)
Dept: PHARMACY | Facility: HOSPITAL | Age: 86
End: 2021-12-20

## 2021-12-27 NOTE — TELEPHONE ENCOUNTER
Verified with Crystal,   Mr Lucero remains at [facility] and they are managing his warfarin. They will be checking daily until WNL.

## 2022-01-03 ENCOUNTER — APPOINTMENT (OUTPATIENT)
Dept: GENERAL RADIOLOGY | Facility: HOSPITAL | Age: 87
End: 2022-01-03

## 2022-01-03 ENCOUNTER — APPOINTMENT (OUTPATIENT)
Dept: CT IMAGING | Facility: HOSPITAL | Age: 87
End: 2022-01-03

## 2022-01-03 ENCOUNTER — HOSPITAL ENCOUNTER (EMERGENCY)
Facility: HOSPITAL | Age: 87
Discharge: HOME | End: 2022-01-04
Attending: EMERGENCY MEDICINE | Admitting: EMERGENCY MEDICINE

## 2022-01-03 DIAGNOSIS — M54.50 ACUTE LOW BACK PAIN WITHOUT SCIATICA, UNSPECIFIED BACK PAIN LATERALITY: ICD-10-CM

## 2022-01-03 DIAGNOSIS — Z79.01 CHRONIC ANTICOAGULATION: ICD-10-CM

## 2022-01-03 DIAGNOSIS — F03.91 DEMENTIA WITH BEHAVIORAL DISTURBANCE, UNSPECIFIED DEMENTIA TYPE: ICD-10-CM
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
## 2022-01-03 DIAGNOSIS — S30.0XXA TRAUMATIC HEMATOMA OF BUTTOCK, INITIAL ENCOUNTER: ICD-10-CM

## 2022-01-03 DIAGNOSIS — S30.0XXA CONTUSION OF BUTTOCK, INITIAL ENCOUNTER: ICD-10-CM

## 2022-01-03 LAB
ALBUMIN SERPL-MCNC: 3.3 G/DL (ref 3.5–5.2)
ALBUMIN/GLOB SERPL: 1.3 G/DL
ALP SERPL-CCNC: 83 U/L (ref 39–117)
ALT SERPL W P-5'-P-CCNC: 9 U/L (ref 1–41)
ANION GAP SERPL CALCULATED.3IONS-SCNC: 8 MMOL/L (ref 5–15)
AST SERPL-CCNC: 18 U/L (ref 1–40)
BACTERIA UR QL AUTO: ABNORMAL /HPF
BASOPHILS # BLD AUTO: 0.02 10*3/MM3 (ref 0–0.2)
BASOPHILS NFR BLD AUTO: 0.3 % (ref 0–1.5)
BILIRUB SERPL-MCNC: 1.1 MG/DL (ref 0–1.2)
BILIRUB UR QL STRIP: NEGATIVE
BUN SERPL-MCNC: 7 MG/DL (ref 8–23)
BUN/CREAT SERPL: 9.7 (ref 7–25)
CALCIUM SPEC-SCNC: 8.2 MG/DL (ref 8.6–10.5)
CHLORIDE SERPL-SCNC: 97 MMOL/L (ref 98–107)
CLARITY UR: CLEAR
CO2 SERPL-SCNC: 28 MMOL/L (ref 22–29)
COLOR UR: YELLOW
CREAT SERPL-MCNC: 0.72 MG/DL (ref 0.76–1.27)
DEPRECATED RDW RBC AUTO: 51.9 FL (ref 37–54)
EOSINOPHIL # BLD AUTO: 0.15 10*3/MM3 (ref 0–0.4)
EOSINOPHIL NFR BLD AUTO: 2.2 % (ref 0.3–6.2)
ERYTHROCYTE [DISTWIDTH] IN BLOOD BY AUTOMATED COUNT: 14.3 % (ref 12.3–15.4)
FLUAV RNA RESP QL NAA+PROBE: NOT DETECTED
FLUBV RNA RESP QL NAA+PROBE: NOT DETECTED
GFR SERPL CREATININE-BSD FRML MDRD: 103 ML/MIN/1.73
GLOBULIN UR ELPH-MCNC: 2.6 GM/DL
GLUCOSE SERPL-MCNC: 119 MG/DL (ref 65–99)
GLUCOSE UR STRIP-MCNC: NEGATIVE MG/DL
HCT VFR BLD AUTO: 34.7 % (ref 37.5–51)
HGB BLD-MCNC: 11.8 G/DL (ref 13–17.7)
HGB UR QL STRIP.AUTO: NEGATIVE
HYALINE CASTS UR QL AUTO: ABNORMAL /LPF
IMM GRANULOCYTES # BLD AUTO: 0.04 10*3/MM3 (ref 0–0.05)
IMM GRANULOCYTES NFR BLD AUTO: 0.6 % (ref 0–0.5)
INR PPP: 3.23 (ref 0.85–1.16)
KETONES UR QL STRIP: ABNORMAL
LEUKOCYTE ESTERASE UR QL STRIP.AUTO: ABNORMAL
LYMPHOCYTES # BLD AUTO: 1.45 10*3/MM3 (ref 0.7–3.1)
LYMPHOCYTES NFR BLD AUTO: 21 % (ref 19.6–45.3)
MCH RBC QN AUTO: 33.4 PG (ref 26.6–33)
MCHC RBC AUTO-ENTMCNC: 34 G/DL (ref 31.5–35.7)
MCV RBC AUTO: 98.3 FL (ref 79–97)
MONOCYTES # BLD AUTO: 0.46 10*3/MM3 (ref 0.1–0.9)
MONOCYTES NFR BLD AUTO: 6.6 % (ref 5–12)
NEUTROPHILS NFR BLD AUTO: 4.8 10*3/MM3 (ref 1.7–7)
NEUTROPHILS NFR BLD AUTO: 69.3 % (ref 42.7–76)
NITRITE UR QL STRIP: NEGATIVE
NRBC BLD AUTO-RTO: 0 /100 WBC (ref 0–0.2)
PH UR STRIP.AUTO: 8 [PH] (ref 5–8)
PLATELET # BLD AUTO: 152 10*3/MM3 (ref 140–450)
PMV BLD AUTO: 9 FL (ref 6–12)
POTASSIUM SERPL-SCNC: 3.9 MMOL/L (ref 3.5–5.2)
PROT SERPL-MCNC: 5.9 G/DL (ref 6–8.5)
PROT UR QL STRIP: NEGATIVE
PROTHROMBIN TIME: 31.6 SECONDS (ref 11.4–14.4)
RBC # BLD AUTO: 3.53 10*6/MM3 (ref 4.14–5.8)
RBC # UR STRIP: ABNORMAL /HPF
REF LAB TEST METHOD: ABNORMAL
SARS-COV-2 RNA RESP QL NAA+PROBE: NOT DETECTED
SODIUM SERPL-SCNC: 133 MMOL/L (ref 136–145)
SP GR UR STRIP: 1.01 (ref 1–1.03)
SQUAMOUS #/AREA URNS HPF: ABNORMAL /HPF
UROBILINOGEN UR QL STRIP: ABNORMAL
WBC # UR STRIP: ABNORMAL /HPF
WBC NRBC COR # BLD: 6.92 10*3/MM3 (ref 3.4–10.8)

## 2022-01-03 PROCEDURE — 87636 SARSCOV2 & INF A&B AMP PRB: CPT | Performed by: EMERGENCY MEDICINE

## 2022-01-03 PROCEDURE — 80053 COMPREHEN METABOLIC PANEL: CPT | Performed by: EMERGENCY MEDICINE

## 2022-01-03 PROCEDURE — 71045 X-RAY EXAM CHEST 1 VIEW: CPT

## 2022-01-03 PROCEDURE — 25010000002 KETOROLAC TROMETHAMINE PER 15 MG: Performed by: EMERGENCY MEDICINE

## 2022-01-03 PROCEDURE — 72131 CT LUMBAR SPINE W/O DYE: CPT

## 2022-01-03 PROCEDURE — 70450 CT HEAD/BRAIN W/O DYE: CPT

## 2022-01-03 PROCEDURE — 99284 EMERGENCY DEPT VISIT MOD MDM: CPT

## 2022-01-03 PROCEDURE — 25010000002 ONDANSETRON PER 1 MG: Performed by: EMERGENCY MEDICINE

## 2022-01-03 PROCEDURE — 71250 CT THORAX DX C-: CPT

## 2022-01-03 PROCEDURE — 96375 TX/PRO/DX INJ NEW DRUG ADDON: CPT

## 2022-01-03 PROCEDURE — 85025 COMPLETE CBC W/AUTO DIFF WBC: CPT | Performed by: EMERGENCY MEDICINE

## 2022-01-03 PROCEDURE — 72192 CT PELVIS W/O DYE: CPT

## 2022-01-03 PROCEDURE — 96374 THER/PROPH/DIAG INJ IV PUSH: CPT

## 2022-01-03 PROCEDURE — 81001 URINALYSIS AUTO W/SCOPE: CPT | Performed by: EMERGENCY MEDICINE

## 2022-01-03 PROCEDURE — 36415 COLL VENOUS BLD VENIPUNCTURE: CPT

## 2022-01-03 PROCEDURE — 85610 PROTHROMBIN TIME: CPT | Performed by: EMERGENCY MEDICINE

## 2022-01-03 RX ORDER — ONDANSETRON 2 MG/ML
4 INJECTION INTRAMUSCULAR; INTRAVENOUS ONCE
Status: COMPLETED | OUTPATIENT
Start: 2022-01-03 | End: 2022-01-03

## 2022-01-03 RX ORDER — HYDROMORPHONE HYDROCHLORIDE 1 MG/ML
0.25 INJECTION, SOLUTION INTRAMUSCULAR; INTRAVENOUS; SUBCUTANEOUS ONCE
Status: COMPLETED | OUTPATIENT
Start: 2022-01-03 | End: 2022-01-04

## 2022-01-03 RX ORDER — ONDANSETRON HYDROCHLORIDE 8 MG/1
8 TABLET, FILM COATED ORAL EVERY 8 HOURS PRN
Qty: 15 TABLET | Refills: 0 | OUTPATIENT
Start: 2022-01-03 | End: 2022-06-11

## 2022-01-03 RX ORDER — PHYTONADIONE 2 MG/ML
2.5 INJECTION, EMULSION INTRAMUSCULAR; INTRAVENOUS; SUBCUTANEOUS ONCE
Status: DISCONTINUED | OUTPATIENT
Start: 2022-01-03 | End: 2022-01-04 | Stop reason: HOSPADM

## 2022-01-03 RX ORDER — KETOROLAC TROMETHAMINE 30 MG/ML
7.5 INJECTION, SOLUTION INTRAMUSCULAR; INTRAVENOUS ONCE
Status: COMPLETED | OUTPATIENT
Start: 2022-01-03 | End: 2022-01-03

## 2022-01-03 RX ORDER — HYDROCODONE BITARTRATE AND ACETAMINOPHEN 5; 325 MG/1; MG/1
1 TABLET ORAL EVERY 4 HOURS PRN
Qty: 15 TABLET | Refills: 0 | Status: SHIPPED | OUTPATIENT
Start: 2022-01-03

## 2022-01-03 RX ADMIN — ONDANSETRON 4 MG: 2 INJECTION INTRAMUSCULAR; INTRAVENOUS at 18:43

## 2022-01-03 RX ADMIN — KETOROLAC TROMETHAMINE 8 MG: 30 INJECTION, SOLUTION INTRAMUSCULAR at 18:43

## 2022-01-03 NOTE — ED PROVIDER NOTES
"Subjective   Mr. Mike Lucero Jr. is a 89 y.o. male who presents to the ED with c/o fall. He has history of dementia. He was brought by EMS after he had an unwitnessed fall at his living facility. He complains of lower back pain and right side pain. He has history of atrial fibrillation and is on Coumadin.  Very limited history obtained from the patient due to his dementia and difficulty with verbalization.  He denies any chest or abdominal pain.  He denies any blow to the head or head or neck pain.  He denies any change in mentation but is clearly demented.  He denies any shortness of breath cough congestion nausea vomiting or dysuria, though this history is unreliable.  There are no other acute symptoms at this time.      History provided by:  EMS personnel and patient  History limited by:  Dementia  Fall  Mechanism of injury: fall    Incident location:  Nursing home  Arrived directly from scene: yes    Fall:     Fall occurred:  Unable to specify    Impact surface:  Unable to specify    Point of impact:  Unable to specify  Associated symptoms: back pain        Review of Systems   Unable to perform ROS: Dementia   Musculoskeletal: Positive for back pain.       Past Medical History:   Diagnosis Date   • Atrial fibrillation (HCC)    • Cataract    • Chronic anticoagulation    • Coronary artery disease    • Dementia (HCC)    • Diverticulosis    • Gallstones    • History of endocarditis    • HLD (hyperlipidemia)    • HTN (hypertension)    • Osteoarthritis    • Seasonal allergies     Seasonal allergies/rhinitis.        Allergies   Allergen Reactions   • Rocephin [Ceftriaxone] Angioedema     Tongue and lip swelling, sore throat; has tolerated Zosyn   • Temazepam Other (See Comments)     Caused pt to \"sleep drive\"   • Wellbutrin [Bupropion] Mental Status Change and Dizziness     Memory loss       Past Surgical History:   Procedure Laterality Date   • ADRENAL GLAND SURGERY  1996    Dr. Jj Kee   • APPENDECTOMY  1950 "   • BLEPHAROPLASTY  2014    Dr. Santamaria   • COLONOSCOPY     • CORONARY ARTERY BYPASS GRAFT  2006    CABG for 3-vessel disease, 2006.   • INGUINAL HERNIA REPAIR Left      and 2012   • TONSILLECTOMY  1958       Family History   Problem Relation Age of Onset   • Alzheimer's disease Mother    • Dementia Mother    • Heart disease Father    • Stroke Father    • Heart disease Sister    • Heart disease Brother    • Stroke Brother        Social History     Socioeconomic History   • Marital status:      Spouse name: Glenis   • Number of children: 2   • Years of education: J.D.   Tobacco Use   • Smoking status: Former Smoker     Packs/day: 1.00     Years: 18.00     Pack years: 18.00     Types: Cigarettes     Quit date: 1970     Years since quittin.6   • Smokeless tobacco: Never Used   Substance and Sexual Activity   • Alcohol use: Yes     Comment: rare   • Drug use: No   • Sexual activity: Not Currently     Partners: Female         Objective   Physical Exam  Vitals and nursing note reviewed.   Constitutional:       Appearance: He is well-developed.      Comments: Patient remains in curled up position on his right side and will not move.   HENT:      Head: Normocephalic and atraumatic.      Nose: Nose normal.   Eyes:      General: No scleral icterus.     Conjunctiva/sclera: Conjunctivae normal.   Cardiovascular:      Rate and Rhythm: Normal rate and regular rhythm.      Heart sounds: Normal heart sounds. No murmur heard.      Pulmonary:      Effort: Pulmonary effort is normal. No respiratory distress.      Breath sounds: Normal breath sounds.   Abdominal:      Palpations: Abdomen is soft.      Tenderness: There is no abdominal tenderness.   Musculoskeletal:      Cervical back: Normal range of motion and neck supple.      Comments: Left pelvis is tender with compression.   Skin:     General: Skin is warm and dry.   Neurological:      Mental Status: He is alert and oriented to person,  place, and time.   Psychiatric:         Behavior: Behavior normal.         Procedures         ED Course  ED Course as of 01/04/22 0034   Mon Jan 03, 2022   1744 Discussed findings with him.  He is a demented 89-year-old that is confrontational.  I discussed evaluation and treatment.  He cannot give me a history of the event.  He has no signs of cranial or cervical trauma at the current time.  I will obtain a head CT in addition and L-spine and pelvis.  Will obtain laboratory evaluation.  He has been incontinent in the ED [HH]   2124 I discussed findings with the patient's wife.  I discussed the findings today with a negative CT of the head lumbar and pelvis for fracture but the significant hematoma.  She confirms that the patient has advanced dementia.  He has DNR and DNI and she has copies of both as he was a .  We discussed his cirrhosis and elevated INR on Coumadin.  His total bilirubin is only 1.1.  He continues to have some discomfort.  I treated him with Toradol.  We will treat him with hydromorphone.Radiograph of the chest reveals some opacity that looks like his effusions.  Wife does report that he hit his head tonight.  He has had his CT without subdural hematoma [HH]   2139 Patient has a [id number] of 4.  I discussed anticoagulation with Dr. Bolton, electrophysiology, who feels with the patient's dementia fall and INR of 3.2 that the Coumadin should be DC'd.   [HH]   2255 Radiograph of the chest opacities but it was difficult to figure out what was effusions versus infiltrate.  A CT of the chest is obtained.  This is merely as previous effusions.  No infiltrate is visible.I discussed discharge to the nursing home with his wife.  I discussed needed follow-up.  We will stop his Coumadin.  We discussed analgesics and will treat him with some hydrocodone to help both with transferred and his significant discomfort when he returns to the nursing home.  Wife is very much in agreement with the plan [HH]    2307 I discussed the findings again with the wife including discontinuation of the warfarin, return to the nursing home and pain medications.  We discussed indications for return.  Of answered her questions to her satisfaction.  We will treat with vitamin K1 prior to discharge to attenuate any bleeding.  Patient's wife verbalized understanding agreement with the plan of care as well as indications for return []   2308 KVNG query complete. Treatment plan to include limited course of prescribed  controlled substance. Risks including addiction, benefits, and alternatives presented to patient.    []   Tue Jan 04, 2022 0028 We have tried all available venues for discharge and there is no transport available through either [facility] for [facility].  Also discussed admission with our hospitalist []   0034 Raúl case with Dr. Perdomo will admit for definitive inpatient care []      ED Course User Index  [] Tej Leon MD     Recent Results (from the past 24 hour(s))   Comprehensive Metabolic Panel    Collection Time: 01/03/22  6:17 PM    Specimen: Blood   Result Value Ref Range    Glucose 119 (H) 65 - 99 mg/dL    BUN 7 (L) 8 - 23 mg/dL    Creatinine 0.72 (L) 0.76 - 1.27 mg/dL    Sodium 133 (L) 136 - 145 mmol/L    Potassium 3.9 3.5 - 5.2 mmol/L    Chloride 97 (L) 98 - 107 mmol/L    CO2 28.0 22.0 - 29.0 mmol/L    Calcium 8.2 (L) 8.6 - 10.5 mg/dL    Total Protein 5.9 (L) 6.0 - 8.5 g/dL    Albumin 3.30 (L) 3.50 - 5.20 g/dL    ALT (SGPT) 9 1 - 41 U/L    AST (SGOT) 18 1 - 40 U/L    Alkaline Phosphatase 83 39 - 117 U/L    Total Bilirubin 1.1 0.0 - 1.2 mg/dL    eGFR Non African Amer 103 >60 mL/min/1.73    Globulin 2.6 gm/dL    A/G Ratio 1.3 g/dL    BUN/Creatinine Ratio 9.7 7.0 - 25.0    Anion Gap 8.0 5.0 - 15.0 mmol/L   Protime-INR    Collection Time: 01/03/22  6:17 PM    Specimen: Blood   Result Value Ref Range    Protime 31.6 (H) 11.4 - 14.4 Seconds    INR 3.23 (H) 0.85 - 1.16   CBC Auto Differential    Collection  Time: 01/03/22  6:17 PM    Specimen: Blood   Result Value Ref Range    WBC 6.92 3.40 - 10.80 10*3/mm3    RBC 3.53 (L) 4.14 - 5.80 10*6/mm3    Hemoglobin 11.8 (L) 13.0 - 17.7 g/dL    Hematocrit 34.7 (L) 37.5 - 51.0 %    MCV 98.3 (H) 79.0 - 97.0 fL    MCH 33.4 (H) 26.6 - 33.0 pg    MCHC 34.0 31.5 - 35.7 g/dL    RDW 14.3 12.3 - 15.4 %    RDW-SD 51.9 37.0 - 54.0 fl    MPV 9.0 6.0 - 12.0 fL    Platelets 152 140 - 450 10*3/mm3    Neutrophil % 69.3 42.7 - 76.0 %    Lymphocyte % 21.0 19.6 - 45.3 %    Monocyte % 6.6 5.0 - 12.0 %    Eosinophil % 2.2 0.3 - 6.2 %    Basophil % 0.3 0.0 - 1.5 %    Immature Grans % 0.6 (H) 0.0 - 0.5 %    Neutrophils, Absolute 4.80 1.70 - 7.00 10*3/mm3    Lymphocytes, Absolute 1.45 0.70 - 3.10 10*3/mm3    Monocytes, Absolute 0.46 0.10 - 0.90 10*3/mm3    Eosinophils, Absolute 0.15 0.00 - 0.40 10*3/mm3    Basophils, Absolute 0.02 0.00 - 0.20 10*3/mm3    Immature Grans, Absolute 0.04 0.00 - 0.05 10*3/mm3    nRBC 0.0 0.0 - 0.2 /100 WBC   Urinalysis With Culture If Indicated - Urine, Clean Catch    Collection Time: 01/03/22  6:55 PM    Specimen: Urine, Clean Catch   Result Value Ref Range    Color, UA Yellow Yellow, Straw    Appearance, UA Clear Clear    pH, UA 8.0 5.0 - 8.0    Specific Gravity, UA 1.013 1.001 - 1.030    Glucose, UA Negative Negative    Ketones, UA Trace (A) Negative    Bilirubin, UA Negative Negative    Blood, UA Negative Negative    Protein, UA Negative Negative    Leuk Esterase, UA Trace (A) Negative    Nitrite, UA Negative Negative    Urobilinogen, UA 1.0 E.U./dL 0.2 - 1.0 E.U./dL   Urinalysis, Microscopic Only - Urine, Clean Catch    Collection Time: 01/03/22  6:55 PM    Specimen: Urine, Clean Catch   Result Value Ref Range    RBC, UA 3-6 (A) None Seen, 0-2 /HPF    WBC, UA 0-2 None Seen, 0-2 /HPF    Bacteria, UA None Seen None Seen, Trace /HPF    Squamous Epithelial Cells, UA 0-2 None Seen, 0-2 /HPF    Hyaline Casts, UA None Seen 0 - 6 /LPF    Methodology Automated Microscopy     COVID-19 and FLU A/B PCR - Swab, Nasopharynx    Collection Time: 01/03/22  9:56 PM    Specimen: Nasopharynx; Swab   Result Value Ref Range    COVID19 Not Detected Not Detected - Ref. Range    Influenza A PCR Not Detected Not Detected    Influenza B PCR Not Detected Not Detected     Note: In addition to lab results from this visit, the labs listed above may include labs taken at another facility or during a different encounter within the last 24 hours. Please correlate lab times with ED admission and discharge times for further clarification of the services performed during this visit.    CT Chest Without Contrast Diagnostic   Final Result   Small to moderate right pleural effusion      Minimal right base atelectasis      Marked cardiomegaly      Multiple gallstones      Signer Name: Oni Wade MD    Signed: 1/3/2022 10:30 PM    Workstation Name: [facility]      XR Chest 1 View   Final Result   Patchy right lung infiltrates with right pleural effusion. Chronic cardiomegaly.             Signer Name: Cinda Trinidad MD    Signed: 1/3/2022 9:33 PM    Workstation Name: [facility]      CT Head Without Contrast   Final Result   No evidence for acute intracranial injury. Generalized atrophy and mild probable sequelae of small vessel disease redemonstrated, overall not appreciably changed from December 2021.      Signer Name: Tonya Gutierrez MD    Signed: 1/3/2022 7:33 PM    Workstation Name: [facility]      CT Lumbar Spine Without Contrast   Final Result   No acute fracture or traumatic malalignment is suspected lumbar spine. There is multilevel lumbar degenerative change. Partly seen is a large right pleural effusion      Signer Name: Tonya Gutierrez MD    Signed: 1/3/2022 7:39 PM    Workstation Name: [facility]      CT Pelvis Without Contrast   Final Result      1. No  displaced fracture hips or pelvis.   2. Sigmoid diverticulosis.   3. Likely right buttock bruising. Please correlate with physical findings.      Signer Name: Tonya Gutierrez MD    Signed: 1/3/2022 8:12 PM    Workstation Name: [email or web address]     [facility]        Vitals:    01/03/22 1842 01/03/22 1845 01/03/22 1846 01/03/22 1851   BP:       Patient Position:       Pulse:       Resp:       Temp:       TempSrc:       SpO2: 95% 95% 93% 92%     Medications   HYDROmorphone (DILAUDID) injection 0.25 mg (0 mg Intravenous Hold 1/3/22 1855)   vitamin K1 (PHYTONADIONE) 1 MG/1 ML oral solution 2.5 mg (has no administration in time range)   ketorolac (TORADOL) injection 8 mg (8 mg Intravenous Given 1/3/22 1843)   ondansetron (ZOFRAN) injection 4 mg (4 mg Intravenous Given 1/3/22 1843)     ECG/EMG Results (last 24 hours)     ** No results found for the last 24 hours. **        No orders to display                                              MDM    Final diagnoses:   Fall, initial encounter   Contusion of buttock, initial encounter   Traumatic hematoma of buttock, initial encounter   Chronic anticoagulation   Dementia with behavioral disturbance, unspecified dementia type (HCC)   Acute low back pain without sciatica, unspecified back pain laterality       Documentation assistance provided by nancy Ortiz.  Information recorded by the scribe was done at my direction and has been verified and validated by me.     Darrion Ortiz  01/03/22 4215       Tej Leon MD  01/03/22 6559       Tej Leon MD  01/04/22 7289

## 2022-01-04 VITALS
RESPIRATION RATE: 16 BRPM | TEMPERATURE: 98.2 F | OXYGEN SATURATION: 93 % | HEART RATE: 47 BPM | SYSTOLIC BLOOD PRESSURE: 99 MMHG | DIASTOLIC BLOOD PRESSURE: 58 MMHG
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
## 2022-01-04 PROCEDURE — G0378 HOSPITAL OBSERVATION PER HR: HCPCS

## 2022-01-04 PROCEDURE — 96375 TX/PRO/DX INJ NEW DRUG ADDON: CPT

## 2022-01-04 PROCEDURE — 25010000002 HYDROMORPHONE PER 4 MG: Performed by: EMERGENCY MEDICINE

## 2022-01-04 RX ADMIN — HYDROMORPHONE HYDROCHLORIDE 0.25 MG: 1 INJECTION, SOLUTION INTRAMUSCULAR; INTRAVENOUS; SUBCUTANEOUS at 00:36

## 2022-01-04 NOTE — DISCHARGE INSTRUCTIONS
Resting continue current regimen    Stop the warfarin.  Of discussed this with the cardiology electrophysiologist who concurs that the hemorrhage risk with falls is too high with this patient    You may use the Norco as needed for pain every 4 hours.  If this causes nausea have prescribed Zofran as well.  Fall precautions whenever the patient is on Norco    Follow-up with Dr. Wesley for continued care    Return to the ED at once if there are any acute urgent emergent significant or progressive symptoms including acute mental status changes that are new or different or signs of other injuries.

## 2022-01-04 NOTE — CASE MANAGEMENT/SOCIAL WORK
Continued Stay Note  [facility]     Patient Name: Mike Lucero Jr.  MRN: [id number]  Today's Date: 1/4/2022    Admit Date: 1/3/2022     Discharge Plan     Row Name 01/04/22 1157       Plan    Plan SW    Plan Comments SW’er was contacted by  Ambulance service who was requesting a PCS form for patient. SW’er faxed form.               Discharge Codes    No documentation.                     GUERRERO Noriega (Kay)

## 2022-01-25 ENCOUNTER — TELEPHONE (OUTPATIENT)
Dept: PHARMACY | Facility: HOSPITAL | Age: 87
End: 2022-01-25

## 2022-01-25 NOTE — TELEPHONE ENCOUNTER
Have confirmed with Michell at [facility] ([phone]) that patient is still a resident of their facility

## 2022-02-10 NOTE — TELEPHONE ENCOUNTER
Confirmed with representative at [facility] ([phone]) that pt is still a resident of their facility.

## 2022-05-18 NOTE — TELEPHONE ENCOUNTER
Per Lyric at [facility], she believes patient was discharged home and passed away.    Have attempted to call patient spouse to confirm. Unable LVM.

## 2022-05-19 NOTE — TELEPHONE ENCOUNTER
Spoke with Mrs. Lucero who states her  is a permanent resident at [facility] ([phone]).    Have called and confirmed with Bibiana at [facility] that Mr. Lucero is a permanent resident there and they are managing his medications in house.    Recommend closing episode at this time. Will make Dr. Wesley's office aware.

## 2022-06-08 ENCOUNTER — HOSPITAL ENCOUNTER (EMERGENCY)
Facility: HOSPITAL | Age: 87
Discharge: LEFT BEFORE DISCHARGE | End: 2022-06-08

## 2022-06-08 VITALS
HEIGHT: 69 IN | WEIGHT: 150 LBS | SYSTOLIC BLOOD PRESSURE: 131 MMHG | OXYGEN SATURATION: 98 % | DIASTOLIC BLOOD PRESSURE: 81 MMHG | BODY MASS INDEX: 22.22 KG/M2 | TEMPERATURE: 97.8 F | RESPIRATION RATE: 18 BRPM | HEART RATE: 50 BPM

## 2022-06-08 LAB
ALBUMIN SERPL-MCNC: 3.7 G/DL (ref 3.5–5.2)
ALBUMIN/GLOB SERPL: 1.5 G/DL
ALP SERPL-CCNC: 83 U/L (ref 39–117)
ALT SERPL W P-5'-P-CCNC: 6 U/L (ref 1–41)
ANION GAP SERPL CALCULATED.3IONS-SCNC: 5 MMOL/L (ref 5–15)
AST SERPL-CCNC: 11 U/L (ref 1–40)
BASOPHILS # BLD AUTO: 0.04 10*3/MM3 (ref 0–0.2)
BASOPHILS NFR BLD AUTO: 0.6 % (ref 0–1.5)
BILIRUB SERPL-MCNC: 0.6 MG/DL (ref 0–1.2)
BUN SERPL-MCNC: 17 MG/DL (ref 8–23)
BUN/CREAT SERPL: 19.8 (ref 7–25)
CALCIUM SPEC-SCNC: 8.9 MG/DL (ref 8.6–10.5)
CHLORIDE SERPL-SCNC: 102 MMOL/L (ref 98–107)
CO2 SERPL-SCNC: 32 MMOL/L (ref 22–29)
CREAT SERPL-MCNC: 0.86 MG/DL (ref 0.76–1.27)
DEPRECATED RDW RBC AUTO: 48.6 FL (ref 37–54)
EGFRCR SERPLBLD CKD-EPI 2021: 82.8 ML/MIN/1.73
EOSINOPHIL # BLD AUTO: 0.15 10*3/MM3 (ref 0–0.4)
EOSINOPHIL NFR BLD AUTO: 2.3 % (ref 0.3–6.2)
ERYTHROCYTE [DISTWIDTH] IN BLOOD BY AUTOMATED COUNT: 13.3 % (ref 12.3–15.4)
GLOBULIN UR ELPH-MCNC: 2.4 GM/DL
GLUCOSE SERPL-MCNC: 119 MG/DL (ref 65–99)
HCT VFR BLD AUTO: 37.7 % (ref 37.5–51)
HGB BLD-MCNC: 12.6 G/DL (ref 13–17.7)
HOLD SPECIMEN: NORMAL
HOLD SPECIMEN: NORMAL
IMM GRANULOCYTES # BLD AUTO: 0.02 10*3/MM3 (ref 0–0.05)
IMM GRANULOCYTES NFR BLD AUTO: 0.3 % (ref 0–0.5)
LIPASE SERPL-CCNC: 33 U/L (ref 13–60)
LYMPHOCYTES # BLD AUTO: 2.04 10*3/MM3 (ref 0.7–3.1)
LYMPHOCYTES NFR BLD AUTO: 31.5 % (ref 19.6–45.3)
MCH RBC QN AUTO: 33.3 PG (ref 26.6–33)
MCHC RBC AUTO-ENTMCNC: 33.4 G/DL (ref 31.5–35.7)
MCV RBC AUTO: 99.7 FL (ref 79–97)
MONOCYTES # BLD AUTO: 0.43 10*3/MM3 (ref 0.1–0.9)
MONOCYTES NFR BLD AUTO: 6.6 % (ref 5–12)
NEUTROPHILS NFR BLD AUTO: 3.8 10*3/MM3 (ref 1.7–7)
NEUTROPHILS NFR BLD AUTO: 58.7 % (ref 42.7–76)
NRBC BLD AUTO-RTO: 0 /100 WBC (ref 0–0.2)
PLATELET # BLD AUTO: 154 10*3/MM3 (ref 140–450)
PMV BLD AUTO: 9.9 FL (ref 6–12)
POTASSIUM SERPL-SCNC: 3.7 MMOL/L (ref 3.5–5.2)
PROT SERPL-MCNC: 6.1 G/DL (ref 6–8.5)
RBC # BLD AUTO: 3.78 10*6/MM3 (ref 4.14–5.8)
SODIUM SERPL-SCNC: 139 MMOL/L (ref 136–145)
TROPONIN T SERPL-MCNC: <0.01 NG/ML (ref 0–0.03)
WBC NRBC COR # BLD: 6.48 10*3/MM3 (ref 3.4–10.8)
WHOLE BLOOD HOLD COAG: NORMAL
WHOLE BLOOD HOLD SPECIMEN: NORMAL

## 2022-06-08 PROCEDURE — 93005 ELECTROCARDIOGRAM TRACING: CPT

## 2022-06-08 PROCEDURE — 99211 OFF/OP EST MAY X REQ PHY/QHP: CPT

## 2022-06-08 PROCEDURE — 83690 ASSAY OF LIPASE: CPT

## 2022-06-08 PROCEDURE — 84484 ASSAY OF TROPONIN QUANT: CPT

## 2022-06-08 PROCEDURE — 85025 COMPLETE CBC W/AUTO DIFF WBC: CPT

## 2022-06-08 PROCEDURE — 80053 COMPREHEN METABOLIC PANEL: CPT

## 2022-06-08 RX ORDER — SODIUM CHLORIDE 0.9 % (FLUSH) 0.9 %
10 SYRINGE (ML) INJECTION AS NEEDED
Status: DISCONTINUED | OUTPATIENT
Start: 2022-06-08 | End: 2022-06-09 | Stop reason: HOSPADM

## 2022-06-09 LAB — HOLD SPECIMEN: NORMAL

## 2022-06-11 ENCOUNTER — HOSPITAL ENCOUNTER (EMERGENCY)
Facility: HOSPITAL | Age: 87
Discharge: HOME | End: 2022-06-11
Attending: EMERGENCY MEDICINE | Admitting: EMERGENCY MEDICINE

## 2022-06-11 ENCOUNTER — APPOINTMENT (OUTPATIENT)
Dept: CT IMAGING | Facility: HOSPITAL | Age: 87
End: 2022-06-11

## 2022-06-11 VITALS
WEIGHT: 150 LBS | HEIGHT: 69 IN | RESPIRATION RATE: 18 BRPM | OXYGEN SATURATION: 92 % | TEMPERATURE: 97.8 F | DIASTOLIC BLOOD PRESSURE: 73 MMHG | HEART RATE: 55 BPM | SYSTOLIC BLOOD PRESSURE: 123 MMHG | BODY MASS INDEX: 22.22 KG/M2

## 2022-06-11 DIAGNOSIS — K40.90 RIGHT INGUINAL HERNIA: Primary | ICD-10-CM

## 2022-06-11 DIAGNOSIS — F03.C0 ADVANCED DEMENTIA: ICD-10-CM

## 2022-06-11 DIAGNOSIS — S22.080S CLOSED WEDGE COMPRESSION FRACTURE OF T11 VERTEBRA, SEQUELA: ICD-10-CM

## 2022-06-11 LAB
ALBUMIN SERPL-MCNC: 3.5 G/DL (ref 3.5–5.2)
ALBUMIN/GLOB SERPL: 1.4 G/DL
ALP SERPL-CCNC: 72 U/L (ref 39–117)
ALT SERPL W P-5'-P-CCNC: 5 U/L (ref 1–41)
ANION GAP SERPL CALCULATED.3IONS-SCNC: 5 MMOL/L (ref 5–15)
AST SERPL-CCNC: 10 U/L (ref 1–40)
BACTERIA UR QL AUTO: ABNORMAL /HPF
BASOPHILS # BLD AUTO: 0.02 10*3/MM3 (ref 0–0.2)
BASOPHILS NFR BLD AUTO: 0.3 % (ref 0–1.5)
BILIRUB SERPL-MCNC: 0.7 MG/DL (ref 0–1.2)
BILIRUB UR QL STRIP: NEGATIVE
BUN SERPL-MCNC: 12 MG/DL (ref 8–23)
BUN/CREAT SERPL: 16.2 (ref 7–25)
CALCIUM SPEC-SCNC: 9.1 MG/DL (ref 8.6–10.5)
CHLORIDE SERPL-SCNC: 103 MMOL/L (ref 98–107)
CLARITY UR: CLEAR
CO2 SERPL-SCNC: 32 MMOL/L (ref 22–29)
COLOR UR: YELLOW
CREAT SERPL-MCNC: 0.74 MG/DL (ref 0.76–1.27)
D-LACTATE SERPL-SCNC: 1.4 MMOL/L (ref 0.5–2)
DEPRECATED RDW RBC AUTO: 49.5 FL (ref 37–54)
EGFRCR SERPLBLD CKD-EPI 2021: 86.6 ML/MIN/1.73
EOSINOPHIL # BLD AUTO: 0.13 10*3/MM3 (ref 0–0.4)
EOSINOPHIL NFR BLD AUTO: 2.3 % (ref 0.3–6.2)
ERYTHROCYTE [DISTWIDTH] IN BLOOD BY AUTOMATED COUNT: 13.2 % (ref 12.3–15.4)
GLOBULIN UR ELPH-MCNC: 2.5 GM/DL
GLUCOSE SERPL-MCNC: 103 MG/DL (ref 65–99)
GLUCOSE UR STRIP-MCNC: NEGATIVE MG/DL
HCT VFR BLD AUTO: 39.4 % (ref 37.5–51)
HGB BLD-MCNC: 13 G/DL (ref 13–17.7)
HGB UR QL STRIP.AUTO: NEGATIVE
HYALINE CASTS UR QL AUTO: ABNORMAL /LPF
IMM GRANULOCYTES # BLD AUTO: 0.01 10*3/MM3 (ref 0–0.05)
IMM GRANULOCYTES NFR BLD AUTO: 0.2 % (ref 0–0.5)
KETONES UR QL STRIP: NEGATIVE
LEUKOCYTE ESTERASE UR QL STRIP.AUTO: ABNORMAL
LIPASE SERPL-CCNC: 20 U/L (ref 13–60)
LYMPHOCYTES # BLD AUTO: 1.82 10*3/MM3 (ref 0.7–3.1)
LYMPHOCYTES NFR BLD AUTO: 31.8 % (ref 19.6–45.3)
MCH RBC QN AUTO: 33.1 PG (ref 26.6–33)
MCHC RBC AUTO-ENTMCNC: 33 G/DL (ref 31.5–35.7)
MCV RBC AUTO: 100.3 FL (ref 79–97)
MONOCYTES # BLD AUTO: 0.35 10*3/MM3 (ref 0.1–0.9)
MONOCYTES NFR BLD AUTO: 6.1 % (ref 5–12)
NEUTROPHILS NFR BLD AUTO: 3.39 10*3/MM3 (ref 1.7–7)
NEUTROPHILS NFR BLD AUTO: 59.3 % (ref 42.7–76)
NITRITE UR QL STRIP: NEGATIVE
NRBC BLD AUTO-RTO: 0 /100 WBC (ref 0–0.2)
PH UR STRIP.AUTO: 6 [PH] (ref 5–8)
PLATELET # BLD AUTO: 152 10*3/MM3 (ref 140–450)
PMV BLD AUTO: 10.2 FL (ref 6–12)
POTASSIUM SERPL-SCNC: 4.1 MMOL/L (ref 3.5–5.2)
PROT SERPL-MCNC: 6 G/DL (ref 6–8.5)
PROT UR QL STRIP: NEGATIVE
RBC # BLD AUTO: 3.93 10*6/MM3 (ref 4.14–5.8)
RBC # UR STRIP: ABNORMAL /HPF
REF LAB TEST METHOD: ABNORMAL
SODIUM SERPL-SCNC: 140 MMOL/L (ref 136–145)
SP GR UR STRIP: 1.02 (ref 1–1.03)
SQUAMOUS #/AREA URNS HPF: ABNORMAL /HPF
UROBILINOGEN UR QL STRIP: ABNORMAL
WBC # UR STRIP: ABNORMAL /HPF
WBC NRBC COR # BLD: 5.72 10*3/MM3 (ref 3.4–10.8)

## 2022-06-11 PROCEDURE — 81001 URINALYSIS AUTO W/SCOPE: CPT | Performed by: EMERGENCY MEDICINE

## 2022-06-11 PROCEDURE — 87086 URINE CULTURE/COLONY COUNT: CPT | Performed by: EMERGENCY MEDICINE

## 2022-06-11 PROCEDURE — 99284 EMERGENCY DEPT VISIT MOD MDM: CPT

## 2022-06-11 PROCEDURE — 96374 THER/PROPH/DIAG INJ IV PUSH: CPT

## 2022-06-11 PROCEDURE — 85025 COMPLETE CBC W/AUTO DIFF WBC: CPT | Performed by: EMERGENCY MEDICINE

## 2022-06-11 PROCEDURE — 74176 CT ABD & PELVIS W/O CONTRAST: CPT

## 2022-06-11 PROCEDURE — 25010000002 KETOROLAC TROMETHAMINE PER 15 MG: Performed by: EMERGENCY MEDICINE

## 2022-06-11 PROCEDURE — 83690 ASSAY OF LIPASE: CPT | Performed by: EMERGENCY MEDICINE

## 2022-06-11 PROCEDURE — 83605 ASSAY OF LACTIC ACID: CPT | Performed by: EMERGENCY MEDICINE

## 2022-06-11 PROCEDURE — 80053 COMPREHEN METABOLIC PANEL: CPT | Performed by: EMERGENCY MEDICINE

## 2022-06-11 RX ORDER — SENNOSIDES 8.6 MG
1 CAPSULE ORAL DAILY
COMMUNITY

## 2022-06-11 RX ORDER — ERGOCALCIFEROL (VITAMIN D2) 10 MCG
1 TABLET ORAL DAILY
COMMUNITY

## 2022-06-11 RX ORDER — LIDOCAINE 50 MG/G
1 PATCH TOPICAL EVERY 24 HOURS
COMMUNITY

## 2022-06-11 RX ORDER — SODIUM CHLORIDE 0.9 % (FLUSH) 0.9 %
10 SYRINGE (ML) INJECTION AS NEEDED
Status: DISCONTINUED | OUTPATIENT
Start: 2022-06-11 | End: 2022-06-11 | Stop reason: HOSPADM

## 2022-06-11 RX ORDER — ACETAMINOPHEN 500 MG
1000 TABLET ORAL EVERY 6 HOURS PRN
Qty: 30 TABLET | Refills: 0 | Status: SHIPPED | OUTPATIENT
Start: 2022-06-11

## 2022-06-11 RX ORDER — KETOROLAC TROMETHAMINE 15 MG/ML
15 INJECTION, SOLUTION INTRAMUSCULAR; INTRAVENOUS ONCE
Status: COMPLETED | OUTPATIENT
Start: 2022-06-11 | End: 2022-06-11

## 2022-06-11 RX ORDER — ALBUTEROL SULFATE 90 UG/1
1 AEROSOL, METERED RESPIRATORY (INHALATION) EVERY 4 HOURS PRN
COMMUNITY

## 2022-06-11 RX ORDER — KETOROLAC TROMETHAMINE 10 MG/1
10 TABLET, FILM COATED ORAL EVERY 6 HOURS PRN
Qty: 20 TABLET | Refills: 0 | Status: SHIPPED | OUTPATIENT
Start: 2022-06-11

## 2022-06-11 RX ADMIN — KETOROLAC TROMETHAMINE 15 MG: 15 INJECTION, SOLUTION INTRAMUSCULAR; INTRAVENOUS at 14:09

## 2022-06-11 NOTE — ED PROVIDER NOTES
"Subjective   Patient presents to the emergency department with right testicular pain and discomfort.  Limited history secondary to advanced dementia.  Symptoms are worse with standing and ambulating.  The wife who is with him reports a growing area of swelling in the inguinal region and right testicle.  No urinary symptoms reported.  No other acute symptoms or complaints reported.      History provided by:  EMS personnel  History limited by:  Dementia      Review of Systems   Unable to perform ROS: Dementia   Constitutional: Negative for fever.   Respiratory: Negative.    Cardiovascular: Negative.    Gastrointestinal: Positive for abdominal pain. Negative for diarrhea and vomiting.   Endocrine: Negative.    Genitourinary: Negative for dysuria.   Skin: Negative.    Psychiatric/Behavioral: Positive for agitation.       Past Medical History:   Diagnosis Date   • Atrial fibrillation (HCC)    • Cataract    • Chronic anticoagulation    • Coronary artery disease    • Dementia (HCC)    • Diverticulosis    • Gallstones    • History of endocarditis    • HLD (hyperlipidemia)    • HTN (hypertension)    • Osteoarthritis    • Seasonal allergies     Seasonal allergies/rhinitis.        Allergies   Allergen Reactions   • Rocephin [Ceftriaxone] Angioedema     Tongue and lip swelling, sore throat; has tolerated Zosyn   • Temazepam Other (See Comments)     Caused pt to \"sleep drive\"   • Wellbutrin [Bupropion] Mental Status Change and Dizziness     Memory loss       Past Surgical History:   Procedure Laterality Date   • ADRENAL GLAND SURGERY  1996    Dr. Jj Kee   • APPENDECTOMY  1950   • BLEPHAROPLASTY  08/06/2014    Dr. Santamaria   • COLONOSCOPY  2009   • CORONARY ARTERY BYPASS GRAFT  07/2006    CABG for 3-vessel disease, July 2006.   • INGUINAL HERNIA REPAIR Left     1963 and 09/2012   • TONSILLECTOMY  01/1958       Family History   Problem Relation Age of Onset   • Alzheimer's disease Mother    • Dementia Mother    • Heart " "disease Father    • Stroke Father    • Heart disease Sister    • Heart disease Brother    • Stroke Brother        Social History     Socioeconomic History   • Marital status:      Spouse name: Glenis   • Number of children: 2   • Years of education: J.D.   Tobacco Use   • Smoking status: Former Smoker     Packs/day: 1.00     Years: 18.00     Pack years: 18.00     Types: Cigarettes     Quit date: 1970     Years since quittin.0   • Smokeless tobacco: Never Used   Substance and Sexual Activity   • Alcohol use: Yes     Comment: rare   • Drug use: No   • Sexual activity: Not Currently     Partners: Female           Objective   Physical Exam  Vitals and nursing note reviewed.   Constitutional:       General: He is not in acute distress.     Appearance: Normal appearance.   HENT:      Head: Normocephalic and atraumatic.   Cardiovascular:      Rate and Rhythm: Normal rate and regular rhythm.      Pulses: Normal pulses.   Pulmonary:      Effort: Pulmonary effort is normal. No respiratory distress.      Breath sounds: Normal breath sounds.   Abdominal:      General: Abdomen is flat. There is no distension.      Palpations: Abdomen is soft.      Hernia: A hernia is present. Hernia is present in the right femoral area.      Comments: Palpable right inguinal hernia which is reducible.  Mild scrotal edema.   Genitourinary:     Testes: Normal.      Comments: Right inguinal hernia, but no focal testicular tenderness.  Skin:     General: Skin is warm and dry.   Neurological:      Mental Status: He is alert. Mental status is at baseline.   Psychiatric:         Behavior: Behavior is agitated.         Procedures           ED Course  ED Course as of 22 1540   Sat 2022   1352 Wife is currently refusing lab work stating that \"she wants the ultrasound first\".  We are obtaining a CT scan secondary to the overall undifferentiated symptoms and exam suggesting RIH.  We will continue to encourage blood work pending " further findings.  Unfortunately, this could limit the timeliness of accurate evaluation and diagnosis. [RS]   1408 I reevaluated the patient and his right inguinal hernia is easily reducible.  I spent some time talking with the wife about the findings, expectations of course/progression, and follow-up. [RS]   1449 Lactate: 1.4 [RS]   1520 Patient is resting comfortably and his abdomen is remained nonsurgical.  Patient with a right inguinal hernia which have become symptomatic but is reducible here in the ER.  Encouraged symptomatic management with return for any concerns especially if demonstrating evidence of incarceration or strangulation.  Otherwise follow-up with general surgery for further evaluation and planning. I had a discussion with the patient/family regarding diagnosis, diagnostic results, treatment plan, and medications.  The patient/family indicated understanding of these instructions.  I spent adequate time at the bedside proceeding discharge necessary to personally discuss the aftercare instructions, giving patient education, providing explanations of the results of our evaluations/findings, and my decision making to assure that the patient/family understand the plan of care.  Time was allotted to answer questions at that time and throughout the ED course.  Emphasis was placed on timely follow-up after discharge.  I also discussed the potential for the development of an acute emergent condition requiring further evaluation, admission, or even surgical intervention. I discussed that we found nothing during the visit today indicating the need for further workup, admission, or the presence of an unstable medical condition.  I encouraged the patient to return to the emergency department immediately for ANY concerns, worsening, new complaints, or if symptoms persist and unable to seek follow-up in a timely fashion.  The patient/family expressed understanding and agreement with this plan.  [RS]      ED  Course User Index  [RS] Jose Shi MD                                                 MDM  Number of Diagnoses or Management Options  Advanced dementia (HCC)  Closed wedge compression fracture of T11 vertebra, sequela  Right inguinal hernia  Diagnosis management comments: Recent Results (from the past 24 hour(s))  -Urinalysis With Culture If Indicated - Urine, Clean Catch:   Collection Time: 06/11/22  1:19 PM  Specimen: Urine, Clean Catch       Result                      Value             Ref Range           Color, UA                   Yellow            Yellow, Straw       Appearance, UA              Clear             Clear               pH, UA                      6.0               5.0 - 8.0           Specific Earle, UA        1.021             1.001 - 1.030       Glucose, UA                 Negative          Negative            Ketones, UA                 Negative          Negative            Bilirubin, UA               Negative          Negative            Blood, UA                   Negative          Negative            Protein, UA                 Negative          Negative            Leuk Esterase, UA                             Negative        Moderate (2+) (A)       Nitrite, UA                 Negative          Negative            Urobilinogen, UA            1.0 E.U./dL       0.2 - 1.0 E.*  -Urinalysis, Microscopic Only - Urine, Clean Catch:   Collection Time: 06/11/22  1:19 PM  Specimen: Urine, Clean Catch       Result                      Value             Ref Range           RBC, UA                     3-6 (A)           None Seen, 0*       WBC, UA                     31-50 (A)         None Seen, 0*       Bacteria, UA                None Seen         None Seen, T*       Squamous Epithelial Ce*     0-2               None Seen, 0*       Hyaline Casts, UA           13-20             0 - 6 /LPF          Methodology                                                   Automated  Microscopy  -Comprehensive Metabolic Panel:   Collection Time: 06/11/22  2:08 PM  Specimen: Blood       Result                      Value             Ref Range           Glucose                     103 (H)           65 - 99 mg/dL       BUN                         12                8 - 23 mg/dL        Creatinine                  0.74 (L)          0.76 - 1.27 *       Sodium                      140               136 - 145 mm*       Potassium                   4.1               3.5 - 5.2 mm*       Chloride                    103               98 - 107 mmo*       CO2                         32.0 (H)          22.0 - 29.0 *       Calcium                     9.1               8.6 - 10.5 m*       Total Protein               6.0               6.0 - 8.5 g/*       Albumin                     3.50              3.50 - 5.20 *       ALT (SGPT)                  5                 1 - 41 U/L          AST (SGOT)                  10                1 - 40 U/L          Alkaline Phosphatase        72                39 - 117 U/L        Total Bilirubin             0.7               0.0 - 1.2 mg*       Globulin                    2.5               gm/dL               A/G Ratio                   1.4               g/dL                BUN/Creatinine Ratio        16.2              7.0 - 25.0          Anion Gap                   5.0               5.0 - 15.0 m*       eGFR                        86.6              >60.0 mL/min*  -Lipase:   Collection Time: 06/11/22  2:08 PM  Specimen: Blood       Result                      Value             Ref Range           Lipase                      20                13 - 60 U/L    -Lactic Acid, Plasma:   Collection Time: 06/11/22  2:08 PM  Specimen: Blood       Result                      Value             Ref Range           Lactate                     1.4               0.5 - 2.0 mm*  -CBC Auto Differential:   Collection Time: 06/11/22  2:08 PM  Specimen: Blood       Result                      Value              Ref Range           WBC                         5.72              3.40 - 10.80*       RBC                         3.93 (L)          4.14 - 5.80 *       Hemoglobin                  13.0              13.0 - 17.7 *       Hematocrit                  39.4              37.5 - 51.0 %       MCV                         100.3 (H)         79.0 - 97.0 *       MCH                         33.1 (H)          26.6 - 33.0 *       MCHC                        33.0              31.5 - 35.7 *       RDW                         13.2              12.3 - 15.4 %       RDW-SD                      49.5              37.0 - 54.0 *       MPV                         10.2              6.0 - 12.0 fL       Platelets                   152               140 - 450 10*       Neutrophil %                59.3              42.7 - 76.0 %       Lymphocyte %                31.8              19.6 - 45.3 %       Monocyte %                  6.1               5.0 - 12.0 %        Eosinophil %                2.3               0.3 - 6.2 %         Basophil %                  0.3               0.0 - 1.5 %         Immature Grans %            0.2               0.0 - 0.5 %         Neutrophils, Absolute       3.39              1.70 - 7.00 *       Lymphocytes, Absolute       1.82              0.70 - 3.10 *       Monocytes, Absolute         0.35              0.10 - 0.90 *       Eosinophils, Absolute       0.13              0.00 - 0.40 *       Basophils, Absolute         0.02              0.00 - 0.20 *       Immature Grans, Absolu*     0.01              0.00 - 0.05 *       nRBC                        0.0               0.0 - 0.2 /1*  Note: In addition to lab results from this visit, the labs listed above may include labs taken at another facility or during a different encounter within the last 24 hours. Please correlate lab times with ED admission and discharge times for further clarification of the services performed during this visit.    CT Abdomen Pelvis Without  Contrast   Final Result    There is compression fracture at T11, somewhat age-indeterminate but at    least new since January 2022 comparison, with around 80% ventral height    loss and moderate posterior cortical bony retropulsion.         Right renal hernia is present, previously containing only fat, now    containing a relatively long segment of distal small bowel which may be    minimally edematous, otherwise without evidence of nils distention or    obstruction.         Redemonstrated anasarca and bilateral pleural effusions, consistent with    likely heart failure and/or fluid overload.         This report was finalized on 6/11/2022 2:22 PM by Kuldeep Haddad.          -----------------------------------------------------            06/11/22 06/11/22 06/11/22 06/11/22               1330      1400      1430      1500     -----------------------------------------------------   BP:       136/77    149/83    138/74    123/73     BP Location:                                           Patient Position:                                           Pulse:      58        52        54        55       Resp:                                              Temp:                                              TempSrc:                                           SpO2:      95%       96%       94%       92%       Weight:                                            Height:                                           -----------------------------------------------------  Medications  sodium chloride 0.9 % flush 10 mL (has no administration in time range)  ketorolac (TORADOL) injection 15 mg (15 mg Intravenous Given 6/11/22 1409)  ECG/EMG Results (last 24 hours)     ** No results found for the last 24 hours. **      No orders to display         Amount and/or Complexity of Data Reviewed  Clinical lab tests: reviewed  Tests in the radiology section of CPT®:  reviewed  Decide to obtain previous medical records or to obtain history from someone other than the patient: yes  Obtain history from someone other than the patient: yes  Independent visualization of images, tracings, or specimens: yes        Final diagnoses:   Right inguinal hernia   Advanced dementia (HCC)   Closed wedge compression fracture of T11 vertebra, sequela       ED Disposition  ED Disposition     ED Disposition   Discharge    Condition   Stable    Comment   --             Subha Wesley MD  [address] [address]  [phone]          [facility] Emergency Department  [address] [id number] [phone]    As needed, If symptoms worsen or ANY concerns.    Rita Kaiser MD  [address] [facility]  [address]  [place] [address]  [phone]    Schedule an appointment as soon as possible for a visit            Medication List      New Prescriptions    acetaminophen 500 MG tablet  Commonly known as: TYLENOL  Take 2 tablets by mouth Every 6 (Six) Hours As Needed for Mild Pain  or Moderate Pain .     ketorolac 10 MG tablet  Commonly known as: TORADOL  Take 1 tablet by mouth Every 6 (Six) Hours As Needed for Moderate Pain . Received a dose in the ER.        Stop    ondansetron 8 MG tablet  Commonly known as: ZOFRAN     pravastatin 40 MG tablet  Commonly known as: PRAVACHOL     QUEtiapine 25 MG tablet  Commonly known as: SEROquel           Where to Get Your Medications      These medications were sent to [facility] [address] [place] [address] [address] [facility] - [phone]  - [phone]   [address] [facility] [address]    Phone: [phone]   · acetaminophen 500 MG tablet  · ketorolac 10 MG tablet          Jose Shi MD  06/11/22 0068

## 2022-06-12 LAB — BACTERIA SPEC AEROBE CULT: NORMAL

## 2023-03-08 NOTE — PROGRESS NOTES
Anticoagulation Clinic Progress Note  Indication: afib  Referring Provider: Black  Goal INR: 2-3  Current Drug Interactions: aspirin, simvastatin, trazodone    Anticoagulation Clinic INR History:  Date 9/14 10/12 11/9 12/7 1/11 2/15 3/22 4/26 5/31   Total Weekly Dose 17.5 mg 17.5 mg 17.5 mg 17.5 mg 17.5 mg 17.5 mg 17.5 mg 17.5mg 17.5mg   INR 3.0 2.4 2.5 2.5 2.3 2.3 2.6 2.5 2.6   Notes               Date 7/5 8/9          Total Weekly Dose 17.5mg 17.5mg          INR 2.2 1.9          Notes                 Clinic Interview:  Tablet Strength: 2.5mg tablets   Current Dose: 2.5mg daily  Patient Findings      Negatives Signs/symptoms of thrombosis, Signs/symptoms of bleeding, Laboratory test error suspected, Change in health, Change in alcohol use, Change in activity, Upcoming invasive procedure, Emergency department visit, Upcoming dental procedure, Missed doses, Extra doses, Change in medications, Change in diet/appetite, Hospital admission, Bruising, Other complaints     Comments Patient had an instance of cut on toenail where it bled for a little. Main patient concern is congestion; currently using CPAP machine, recently went in for a sleep study.            Plan:  1. INR is slighly subtherapeutic today (1.9). Instructed pt to boost warfarin 3.75mg today then continue warfarin 2.5mg daily. (~7% boost)  2. RTC in 3 weeks to ensure back WNL.  4. Patient is taking 2.5mg tablets now instead of splitting 5mg in half. 4/26  5. Verbal and written information provided. Pt expresses understanding and has no further questions at this time.      Cortes Singh, PharmD Candidate  Colleen Nolasco, PharmD  8/9/2017  3:17 PM  
No

## 2023-08-02 ENCOUNTER — HOSPITAL ENCOUNTER (EMERGENCY)
Facility: HOSPITAL | Age: 88
Discharge: NURSING, REHAB OR LONG-TERM CARE | End: 2023-08-02
Attending: EMERGENCY MEDICINE | Admitting: EMERGENCY MEDICINE
Payer: MEDICARE

## 2023-08-02 ENCOUNTER — APPOINTMENT (OUTPATIENT)
Dept: GENERAL RADIOLOGY | Facility: HOSPITAL | Age: 88
End: 2023-08-02
Payer: MEDICARE

## 2023-08-02 VITALS
HEART RATE: 56 BPM | WEIGHT: 150 LBS | BODY MASS INDEX: 22.22 KG/M2 | RESPIRATION RATE: 16 BRPM | HEIGHT: 69 IN | SYSTOLIC BLOOD PRESSURE: 127 MMHG | TEMPERATURE: 98 F | DIASTOLIC BLOOD PRESSURE: 48 MMHG | OXYGEN SATURATION: 95 %

## 2023-08-02 DIAGNOSIS — F11.90 CHRONIC, CONTINUOUS USE OF OPIOIDS: ICD-10-CM

## 2023-08-02 DIAGNOSIS — K56.41 FECAL IMPACTION: Primary | ICD-10-CM

## 2023-08-02 DIAGNOSIS — F03.C0 SEVERE DEMENTIA, UNSPECIFIED DEMENTIA TYPE, UNSPECIFIED WHETHER BEHAVIORAL, PSYCHOTIC, OR MOOD DISTURBANCE OR ANXIETY: ICD-10-CM

## 2023-08-02 DIAGNOSIS — K59.00 CONSTIPATION, UNSPECIFIED CONSTIPATION TYPE: ICD-10-CM

## 2023-08-02 PROCEDURE — 25010000002 METHYLNALTREXONE 12 MG/0.6ML SOLUTION: Performed by: EMERGENCY MEDICINE

## 2023-08-02 PROCEDURE — 74018 RADEX ABDOMEN 1 VIEW: CPT

## 2023-08-02 PROCEDURE — 99284 EMERGENCY DEPT VISIT MOD MDM: CPT

## 2023-08-02 PROCEDURE — 96372 THER/PROPH/DIAG INJ SC/IM: CPT

## 2023-08-02 RX ORDER — OLANZAPINE 10 MG/1
10 INJECTION, POWDER, LYOPHILIZED, FOR SOLUTION INTRAMUSCULAR ONCE
Status: DISCONTINUED | OUTPATIENT
Start: 2023-08-02 | End: 2023-08-02

## 2023-08-02 RX ORDER — MINERAL OIL 100 G/100G
1 OIL RECTAL ONCE
Status: COMPLETED | OUTPATIENT
Start: 2023-08-02 | End: 2023-08-02

## 2023-08-02 RX ADMIN — METHYLNALTREXONE BROMIDE 12 MG: 12 INJECTION, SOLUTION SUBCUTANEOUS at 18:41

## 2023-08-02 RX ADMIN — MINERAL OIL 1 ENEMA: 100 ENEMA RECTAL at 18:35

## 2023-08-02 NOTE — ED PROVIDER NOTES
"Subjective   History of Present Illness  Patient is a 89-year-old male sent to the emergency department for further evaluation and management of suspected fecal impaction.  Unknown timing.  Patient has no complaints.  Per EMS the patient is at his baseline mental status with history of significant dementia.  Patient denies any abdominal pain or chest pain.  No vomiting reported by EMS.    History provided by:  Patient, EMS personnel and nursing home  History limited by:  Dementia    Review of Systems    Past Medical History:   Diagnosis Date    Atrial fibrillation     Cataract     Chronic anticoagulation     Coronary artery disease     Dementia     Diverticulosis     Gallstones     History of endocarditis     HLD (hyperlipidemia)     HTN (hypertension)     Osteoarthritis     Seasonal allergies     Seasonal allergies/rhinitis.        Allergies   Allergen Reactions    Rocephin [Ceftriaxone] Angioedema     Tongue and lip swelling, sore throat; has tolerated Zosyn    Temazepam Other (See Comments)     Caused pt to \"sleep drive\"    Wellbutrin [Bupropion] Mental Status Change and Dizziness     Memory loss       Past Surgical History:   Procedure Laterality Date    ADRENAL GLAND SURGERY  1996    Dr. Jj Kee    APPENDECTOMY  1950    BLEPHAROPLASTY  08/06/2014    Dr. Santamaria    COLONOSCOPY  2009    CORONARY ARTERY BYPASS GRAFT  07/2006    CABG for 3-vessel disease, July 2006.    INGUINAL HERNIA REPAIR Left     1963 and 09/2012    TONSILLECTOMY  01/1958       Family History   Problem Relation Age of Onset    Alzheimer's disease Mother     Dementia Mother     Heart disease Father     Stroke Father     Heart disease Sister     Heart disease Brother     Stroke Brother        Social History     Socioeconomic History    Marital status:      Spouse name: Glenis    Number of children: 2    Years of education: BRIDGER   Tobacco Use    Smoking status: Former     Packs/day: 1.00     Years: 18.00     Pack years: 18.00     Types: " Cigarettes     Quit date: 1970     Years since quittin.2    Smokeless tobacco: Never   Substance and Sexual Activity    Alcohol use: Yes     Comment: rare    Drug use: No    Sexual activity: Not Currently     Partners: Female           Objective   Physical Exam  Vitals and nursing note reviewed.   Constitutional:       General: He is not in acute distress.     Appearance: Normal appearance. He is not toxic-appearing.   Cardiovascular:      Rate and Rhythm: Normal rate and regular rhythm.      Pulses: Normal pulses.   Pulmonary:      Effort: Pulmonary effort is normal. No respiratory distress.      Breath sounds: Normal breath sounds.   Abdominal:      General: There is no distension.      Palpations: Abdomen is soft.      Tenderness: There is no guarding.   Neurological:      Mental Status: He is alert. Mental status is at baseline.   Psychiatric:         Mood and Affect: Mood normal.         Behavior: Behavior normal.       Procedures           ED Course  ED Course as of 08/02/23 2106   Wed Aug 02, 2023   1818 XR Abdomen KUB  Personally reviewed the 2 views of the abdomen.  On my interpretation there is no free air or evidence of obstruction.  Moderate stool burden [RS]   1831 Disimpaction performed by the nurse with good output.  We will also start the patient on Relistor. [RS]      ED Course User Index  [RS] Jose Shi MD                                           Medical Decision Making  Problems Addressed:  Chronic, continuous use of opioids: complicated acute illness or injury  Constipation, unspecified constipation type: complicated acute illness or injury  Fecal impaction: complicated acute illness or injury  Severe dementia, unspecified dementia type, unspecified whether behavioral, psychotic, or mood disturbance or anxiety: complicated acute illness or injury    Amount and/or Complexity of Data Reviewed  Independent Historian: EMS  Radiology: ordered. Decision-making details documented in  ED Course.    Risk  OTC drugs.  Prescription drug management.        Final diagnoses:   Fecal impaction   Constipation, unspecified constipation type   Chronic, continuous use of opioids   Severe dementia, unspecified dementia type, unspecified whether behavioral, psychotic, or mood disturbance or anxiety       ED Disposition  ED Disposition       ED Disposition   Discharge    Condition   Stable    Comment   --               Subha Wesley MD  [address] [facility] [address]  [phone]    Schedule an appointment as soon as possible for a visit       [facility] EMERGENCY DEPARTMENT  [address] [facility] [address] [phone]    As needed, If symptoms worsen or ANY concerns.         Medication List      No changes were made to your prescriptions during this visit.            Jose Shi MD  08/02/23 6997

## 2023-08-03 NOTE — CASE MANAGEMENT/SOCIAL WORK
Continued Stay Note  [facility]     Patient Name: Mike Lucero Jr.  MRN: [id number]  Today's Date: 8/2/2023    Admit Date: 8/2/2023    Plan: EMS   Discharge Plan       Row Name 08/02/23 2045       Plan    Plan EMS    Plan Comments MSW contacted by RN regarding transportation assistance. MSW contacted  EMS and left VM. MSW available.    Final Discharge Disposition Code 01 - home or self-care                   Discharge Codes    No documentation.                       GUERRERO Espinoza

## 2024-03-05 NOTE — H&P
URGENT CARE NOTE    Chief Complaint   Patient presents with   • Headache     No head injury. Headache started this morning when he got up for work @ 0130.  Pain is better now. Felt dizzy earlier today. No nausea or vomiting. Took Excedrin @ 0200.   • Eye Pain     Both eyes are painful with headache. Does not wear contacts.   • Letter For School/work     Called in for today, needs work note.       HPI: Morgan Kay is a 27 year old male who presents to Urgent Care today accompanied by self with headache that he awoke with this morning. Describes dull pain across the forehead and behind bilateral eyes. Denies any associated photophobia, phonophobia, lightheadedness or dizziness. No facial asymmetry, dysphagia, dysphasia, paresthesias, or focal weakness. No recent fevers, chills, sore throat, or sinus symptoms. Does report a history of occasional headaches in the past, and he took Excedrin this morning with mild improvement.     The following patient allergies, current medications, and past medical history were reviewed as below.  Current Outpatient Medications   Medication Sig Dispense Refill   • ondansetron (ZOFRAN ODT) 4 MG disintegrating tablet Place 1 tablet onto the tongue every 8 hours as needed for Nausea. 20 tablet 0   • sertraline (ZOLOFT) 50 MG tablet Take 50 mg by mouth daily.     • OLANZapine (ZYPREXA) 10 MG tablet Take 1 tablet by mouth nightly. 30 tablet 0   • venlafaxine XR (EFFEXOR XR) 37.5 MG 24 hr capsule Take 1 capsule by mouth daily. 30 capsule 0   • traZODone (DESYREL) 100 MG tablet Take 1 tablet by mouth nightly. 30 tablet 0   • hydrOXYzine (ATARAX) 25 MG tablet Take 25 mg by mouth 3 times daily as needed for Anxiety.        No current facility-administered medications for this visit.      ALLERGIES:  No Known Allergies  Past Medical History:   Diagnosis Date   • Anxiety    • Hand fracture, left     due to wrestling   • Insomnia    •      [facility] Medicine Services  HISTORY AND PHYSICAL    Patient Name: Mike Lucero Jr.  : 1932  MRN: [id number]  Primary Care Physician: Ike Douglas MD    Subjective   Subjective     Chief Complaint:  Fever/ fatigue / LLE redness    HPI:  Mike Lucero Jr. is a 85 y.o. male with past medical history significant for hypertension, hyperlipidemia, obstructive sleep apnea, coronary artery disease status post CABG x4 , chronic atrial fibrillation on Coumadin therapy followed by Dr. Strickland with [facility] cardiology, cognitive function/memory loss followed by Dr. Jeovany Rodriguez, and remote history of endocarditis who presents to [facility] ED today with sudden onset of fever and chills up to 101.5 prior to arrival.  He states he was initially in his usual state of health feeling well yesterday until last night began to have fatigue and generalized malaise.  Patient states he did not eat as much dinner and went to bed early.  When he awoke today felt worse, chills, generalized weakness and significant sinus congestion.  Patient states he has had some sinus drainage for the last couple of days, but nothing significant.  His wife is concerned with his left toenail that he removed a couple of days ago and now his left lower leg is erythematous with some associated mild edema.  He reports that his toe is tender and red but not swollen.  He denies purulent drainage.  Patient denies groin pain or lymph nodes.  He denies cough, shortness of breath, wheezing, vomiting, diarrhea, urinary, constipation.      On arrival to ED patient was noted to have temperature of 101.4, pulse 91 respirations 28.  WBC 17.08, neutrophil 89.6, lactic acid 2.4, creatinine 1.0, BUN 15, total bilirubin 2.5, INR 1.84.  CT head- without acute findings.  Influenza screen negative.  Left lower extremity noted to be erythematous with circumferential redness from ankle extending up toward knee.  Patient to be  Mood disorder (CMD)    • PTSD (post-traumatic stress disorder)    • Schizophrenia (CMD)    • Unspecified part of closed fracture of clavicle     x 2       ROS:   ALL 13 SYSTEMS REVIEWED AND ARE NEGATIVE  UNLESS OTHERWISE NOTED IN HPI    PHYSICAL EXAMINATION:  Visit Vitals  /76   Pulse 72   Temp 98.4 °F (36.9 °C) (Temporal)   Resp 16   Wt 71.6 kg (157 lb 15.4 oz)   SpO2 97%   BMI 21.42 kg/m²       Constitutional:  Well developed, well nourished, no acute distress, non-toxic appearance   Eyes:  PERRLA, EOMI, conjunctiva without erythema.  HENT:  Atraumatic. External ears nontender to palpation. Clear effusion of bilateral TMs without erythema, purulence, or perforation. No nasal drainage. Oropharynx moist without pharyngeal exudates.   Respiratory:  Clear to auscultation bilaterally without wheezing, rhonchi, or crackles. No tachypnea or dyspnea on exam.  Cardiovascular:  Regular rhythm and normal rate without obvious murmurs, gallops, or rubs.   Integument:  Well hydrated, no rash   Neuro: Alert & oriented x 3.  Cranial nerves II-XII assessed and are intact (detailed visual acuity was not directly tested). 5/5 strength of bilateral upper and lower extremities to resisted flexion and extension. Equal hand grasps bilaterally. Sensation to soft touch and point discrimination of bilateral hands intact. Cerebellar testing including rapid alternating finger movements and heel-to-shin intact. Ambulates independently in room with smooth, rhythmic gait.  Lymphatic:  Submandibular lymphadenopathy.         ASSESSMENT & PLAN:  26 yo male presents today with acute headache.  No concerning red flag symptoms or neuro deficits on exam.  We did discuss several potential causes of headache including tension type, dehydration, and viral illness, for which there is the greatest suspicion.  Reviewed symptomatic treatment with pushing plenty of fluids with electrolytes, rest in a cool/dark room, cool compress to the forehead and  admitted to hospital medicine service for further evaluation.    Review of Systems   Constitutional: Positive for chills, fatigue and fever.   HENT: Positive for congestion and postnasal drip. Negative for ear pain, facial swelling, mouth sores, nosebleeds, sinus pressure and trouble swallowing.    Eyes: Negative.  Negative for discharge.   Respiratory: Negative.  Negative for cough.    Cardiovascular: Negative.  Negative for chest pain.   Gastrointestinal: Positive for abdominal pain (umbilical) and nausea. Negative for blood in stool, diarrhea and vomiting.   Endocrine: Negative.  Negative for polydipsia and polyuria.   Genitourinary: Negative.  Negative for dysuria and hematuria.   Musculoskeletal: Positive for myalgias.   Skin: Positive for wound (left big toe). Negative for rash.   Allergic/Immunologic: Negative.    Neurological: Negative.    Hematological: Negative.  Does not bruise/bleed easily.   Psychiatric/Behavioral: Negative.    All other systems reviewed and are negative.     Gen-+ fevers, chills  CV- No chest pain, palpitations  Resp- No cough, dyspnea  GI- No N/V/D, + abd pain      Personal History     Past Medical History:   Diagnosis Date   • Atrial fibrillation    • Cataract    • Chronic anticoagulation    • Coronary artery disease    • Diverticulosis    • Gallstones    • History of endocarditis    • HLD (hyperlipidemia)    • HTN (hypertension)    • Osteoarthritis    • Seasonal allergies     Seasonal allergies/rhinitis.        Past Surgical History:   Procedure Laterality Date   • APPENDECTOMY     • CORONARY ARTERY BYPASS GRAFT  07/2006    CABG for 3-vessel disease, July 2006.   • INGUINAL HERNIA REPAIR     • KIDNEY SURGERY         Family History: family history includes Alzheimer's disease in his mother; Dementia in his mother; Heart disease in his brother, father, and sister; Stroke in his brother and father.     Social History:  reports that he quit smoking about 47 years ago. His smoking use  "included Cigarettes. He has a 18.00 pack-year smoking history. He has never used smokeless tobacco. He reports that he does not drink alcohol or use illicit drugs.    Medications:  Reviewed and reconciled.    Allergies   Allergen Reactions   • Temazepam Other (See Comments)     Caused pt to \"sleep drive\"     Objective   Objective     Vital Signs: Temp:  [99.1 °F (37.3 °C)-101.4 °F (38.6 °C)] 99.1 °F (37.3 °C)  Heart Rate:  [71-91] 71  Resp:  [22-28] 22  BP: (115-145)/(68-83) 122/75     Physical Exam   Constitutional: He is oriented to person, place, and time. He appears well-developed and well-nourished. He is cooperative. No distress.   HENT:   Head: Normocephalic.   Right Ear: External ear normal. Decreased hearing is noted.   Left Ear: External ear normal. Decreased hearing is noted.   Nose: Mucosal edema and rhinorrhea present.   Mouth/Throat: Uvula is midline, oropharynx is clear and moist and mucous membranes are normal.   Eyes: Conjunctivae and EOM are normal. Pupils are equal, round, and reactive to light. Right eye exhibits no discharge. Left eye exhibits no discharge. No scleral icterus.   Neck: Trachea normal. Neck supple. No JVD present. Muscular tenderness present. Carotid bruit is not present. No thyromegaly present.   Cardiovascular: Normal rate, normal heart sounds and intact distal pulses.  An irregular rhythm present.   No murmur heard.  Pulmonary/Chest: Effort normal and breath sounds normal.   Abdominal: Soft. Bowel sounds are normal. There is no hepatosplenomegaly. There is tenderness in the epigastric area and periumbilical area. There is no rebound and no guarding. A hernia (umbilical easily reduced) is present.   Musculoskeletal: Normal range of motion.   Lymphadenopathy:     He has no cervical adenopathy.        Left: No inguinal adenopathy present.   Neurological: He is alert and oriented to person, place, and time. He has normal strength. No sensory deficit.   Skin: Skin is warm and dry. " heat to the neck, and may continue OTC analgesics. Discussed signs and symptoms for which to monitor and return in the meantime. Patient verbalizes understanding of this care plan, and questions were answered.      Diagnosis:  1. Acute non intractable tension-type headache  - As above.        Follow Up:  No follow-ups on file.   Patient was instructed to return to the ED/UC immediately if symptoms worsen or any new unusual symptoms arise.      Recheck on patient. Discussed with patient UC findings and plan for discharge. Patient was given UC warnings, discharge instructions, and follow up information to go home with. Patient understands and agrees with plan for discharge. Any questions have been answered.      Closure:  Informed patient that this is a provisional diagnosis. Provisional diagnosis can and do change. The diagnosis that you are discharged with today is based on the symptoms with which you presented today. If any new symptoms occur or worsen, you should seek immediate attention for re-evaluation.  Any symptoms that persist or fail to completely resolve require further evaluation by your other healthcare provider(s).    This chart was composed by Mily Cheney, DNP, BS, APRN, FNP-BC; collaborating physicians Dr. Eduardo Etienne and Dr. Dayton Staley.      3/5/2024, 11:07 AM.     There is erythema.   LLE erythema see image--   Psychiatric: He has a normal mood and affect. His speech is normal and behavior is normal. Judgment and thought content normal. Cognition and memory are normal.   Nursing note and vitals reviewed.     Constitutional: No acute distress, awake, alert  Eyes: PERRLA, sclerae anicteric, no conjunctival injection  HENT: NCAT, mucous membranes dry, nasal congestion/ pressure along frontal sinus  Neck: Supple, no thyromegaly, no lymphadenopathy, trachea midline  Respiratory: Clear to auscultation bilaterally, nonlabored respirations   Cardiovascular: irreg, no murmurs, rubs, or gallops, palpable pedal pulses bilaterally  Gastrointestinal: Positive bowel sounds, soft, nontender, nondistended- + umbilical hernia  Musculoskeletal: No bilateral ankle edema, no clubbing or cyanosis to bilateral lower extremities  Psychiatric: Oriented x 3, appropriate affect, cooperative  Neurologic: Strength symmetric in all extremities, Cranial Nerves grossly intact to confrontation, speech clear  Skin: LLE with erythema/ warmth extending from ankle to just below left knee circumferential        Results Reviewed:  I have personally reviewed and interpreted available lab data dated 10/22/2017.  I have personally reviewed chest x-ray and CT abd imaging reports available and dated 10/22/2017.  I have personally reviewed ECG report dated 09/18/2017.  I have discussed the tests with the performing ED provider.  I have personally reviewed and summarized old records.      Results from last 7 days  Lab Units 10/22/17  1329 10/22/17  1315   WBC 10*3/mm3  --  17.08*   HEMOGLOBIN g/dL  --  16.2   HEMATOCRIT %  --  47.9   PLATELETS 10*3/mm3  --  144*   INR  1.84  --        Results from last 7 days  Lab Units 10/22/17  1315   SODIUM mmol/L 136   POTASSIUM mmol/L 3.7   CHLORIDE mmol/L 99   CO2 mmol/L 29.0   BUN mg/dL 15   CREATININE mg/dL 1.00   GLUCOSE mg/dL 160*   CALCIUM mg/dL 9.2   ALT (SGPT) U/L 18   AST  (SGOT) U/L 20     No results found for: BNP  No results found for: PHART  Imaging Results (last 24 hours)     Procedure Component Value Units Date/Time    CT Head Without Contrast [[id number]] Collected:  10/22/17 1526     Updated:  10/22/17 1527    Narrative:       EXAMINATION: CT HEAD WO CONTRAST - 10/22/2017     INDICATION: Frontal headache.      TECHNIQUE: Multiple axial CT imaging is obtained of the head from skull  base to skull vertex without the administration of intravenous contrast.     The radiation dose reduction device was turned on for each scan per the  ALARA (As Low as Reasonably Achievable) protocol.     COMPARISON: 10/05/2017.     FINDINGS: There is atrophy identified of the brain. No intracranial  hemorrhage or hydrocephalus. No mass, mass effect or midline shift. No  abnormal extra-axial fluid collections identified. There is no acute  parenchymal disease. Calvarium is unremarkable. The visualized paranasal  sinuses are grossly clear. Globes and orbits are intact. The mastoid air  cells are patent.       Impression:       Atrophy and chronic changes seen within the brain with no  acute intracranial abnormality identified.     DICTATED:     10/22/2017  EDITED:          10/22/2017                XR Chest 1 View [[id number]] Collected:  10/22/17 1654     Updated:  10/22/17 1654    Narrative:       EXAMINATION: XR CHEST, SINGLE VIEW - 10/22/2017     INDICATION: Cough, fever.       COMPARISON: 08/27/2011.     FINDINGS: Portable chest reveals the patient to be status post median  sternotomy. The heart is enlarged. Vascular calcification seen within  the thoracic aorta. No pleural effusion or  pneumothorax. Pulmonary  vascularity is within normal limits. Degenerative change is seen within  the spine. No focal parenchymal opacification present.           Impression:       Heart is enlarged with no evidence of acute parenchymal  disease.     DICTATED:     10/22/2017  EDITED:          10/22/2017                             Assessment/Plan   Assessment / Plan     Hospital Problem List     * (Principal)Sepsis    Left leg cellulitis    Umbilical pain    Chronic atrial fibrillation    Essential hypertension    Hyperlipidemia    Coronary artery disease    Overview Signed 9/13/2016  9:12 AM by Sarbjit Saunders     1. Coronary artery disease:  a. Preserved LV systolic function.   b. CABG for 3-vessel disease, July 2006.           h/o Endocarditis    Overview Signed 9/13/2016  9:12 AM by Sarbjit Saunders       2. Endocarditis, September 2009, treated with IV antibiotics:  a. MILTON by Dr. Valladares, 09/11/2009:  Small nodular non-pedunculated density, possibly consistent with small vegetation of the posterior leaflet of the mitral valve.  b. Previously treated with Dr. Vann.           RELL (obstructive sleep apnea)    Cognitive dysfunction    Subtherapeutic international normalized ratio (INR)    Viral upper respiratory tract infection          Assessment & Plan:  Sepsis (fever, neutrophilic leukocytosis, tachycardia-91) with LLE cellulitis  -- continue vanc and zosyn with pharmacy to dose  -- blood cultures pending  -- flu swab negative, u/a negative  --  Obtain LLE duplex to r/o DVT  -- IVFs- NS +20K @75ml/hr  -- probiotic  -- Topical toe care    URI  -- flu test is negative  -- supportive care- O2 prn  -- add flonase    Umbilical pain  -- noted hernia with increased pain  -- check non contrasted CT abd/pelvis    Chronic A Fib  -- continue coumadin, pharmacy to dose  -- continue cardizem, metoprolol  -- Telemetry monitoring    HTN/ HLP  -- continue home meds    Cognitive dysfunction  -- cont aricept  -- Fall precautions    CAD s/p remote CABG  -- Continue with home medications    H/O endocarditis    DVT prophylaxis: coumadin    CODE STATUS:full    Admission Status:  I believe this patient meets INPATIENT status due to the need for care which can only be reasonably provided in an hospital setting such as aggressive/expedited  ancillary services and/or consultation services, the necessity for IV antibiotics, close physician monitoring and/or the possible need for procedures.  In such, I feel patient’s risk for adverse outcomes and need for care warrant INPATIENT evaluation and predict the patient’s care encounter to likely last beyond 2 midnights.    Janene Mansfield, TIFFANY   10/22/17   5:06 PM    I have seen and examined the patient, performing an independent face-to-face diagnostic evaluation with plan of care reviewed and developed with the advanced practice clinician (APC). I have addended and modified the above history of present illness, physical examination, and assessment and plan to reflect my findings and impressions. See above for further detailed assessment and plan developed with APC which I have reviewed and/or edited.    Axel Garcia MD 10/22/2017 19:52

## 2024-07-25 NOTE — PROGRESS NOTES
Anticoagulation Clinic Progress Note  Indication: afib  Referring Provider: Em  Goal INR: 2-3  Current Drug Interactions: aspirin, simvastatin, trazodone  Other: no bleeding per patient  [id number]: 4 (age, HTN, CAD)    Diet: Typically doesn't eat many Vit K foods    Anticoagulation Clinic INR History:  Date 9/14 10/12 11/9 12/7 1/11 2/15 3/22 4/26 5/31   Total Weekly Dose 17.5 mg 17.5 mg 17.5 mg 17.5 mg 17.5 mg 17.5 mg 17.5 mg 17.5 mg 17.5mg   INR 3.0 2.4 2.5 2.5 2.3 2.3 2.6 2.5 2.6     Date 7/5 8/9 8/30 9/18 10/16 11/15 11/29 12/13 1/3   Total Weekly Dose 17.5  mg 17.5 mg 17.5 mg 17.5 mg 17.5 mg 17.5 mg 18.75 mg 18.75mg 20mg   INR 2.2 1.9 2.1 2.0 2.2 1.8 1.9 2.1 2.1   Notes       abx   Keflex     Date 1/8 1/10 1/18 1/25 2/9 3/2 3/16 3/23    Total Weekly Dose 23.75 mg 23.75 mg 18.75 mg 18.75 mg 18.75 mg 18.75 mg 20mg 21.25 mg 20mg   INR 1.61 1.9 2.5 2.3 2.2 1.7 1.6 2.3    Notes  hosp doxy     Doxy start 3/10 Doxy finish 3/20      Clinic Interview:  Tablet Strength: 2.5mg tablets   Current Dose: 2.5mg daily except 3.75mg on TuesFri    Patient Findings:  Positives Change in medications   Negatives Signs/symptoms of thrombosis, Signs/symptoms of bleeding, Laboratory test error suspected, Change in health, Change in alcohol use, Change in activity, Upcoming invasive procedure, Emergency department visit, Upcoming dental procedure, Missed doses, Extra doses, Change in diet/appetite, Hospital admission, Bruising, Other complaints   Comments Mr. Lucero finished doxy on Tuesday -- saw Dr. White and will be off abx for two weeks, with follow up again 4/3. He also reports taking warfarin 3.75mg on TuesThurs, instead of instructed MWF.      Plan:  1. INR is therapeutic today. For now, will have Mr. Lucero continue warfarin 2.5mg daily except 3.75mg on TuesFri.  2. RTC in 1.5 weeks after appt with Dr. White.  3. Written and verbal information provided in clinic. Mr. Lucero expresses understanding with teach back and  Physical Therapy Discharge Summary    Reason for therapy discharge:    Discharged to home with home therapy.    Progress towards therapy goal(s). See goals on Care Plan in [facility] electronic health record for goal details.  Goals not met.  Barriers to achieving goals:   discharge from facility.    Therapy recommendation(s):    Continued therapy is recommended.  Rationale/Recommendations:  Continue PT w/ home PT. Recommended TCU and use of FWW and knee walker, pt declined FWW, only wanting to have knee walker for use.     Hector Eisenberg, SPT 7/25/2024    Direct onsite supervision of therapy and co-signature completed by Lisa Juarez, PT on 7/25/2024        has no further questions at this time.    Stefanie Hogue, [facility]  3/23/2018  1:10 PM